# Patient Record
Sex: FEMALE | Race: WHITE | Employment: OTHER | URBAN - METROPOLITAN AREA
[De-identification: names, ages, dates, MRNs, and addresses within clinical notes are randomized per-mention and may not be internally consistent; named-entity substitution may affect disease eponyms.]

---

## 2021-03-19 ENCOUNTER — IMMUNIZATION (OUTPATIENT)
Dept: PRIMARY CARE CLINIC | Age: 81
End: 2021-03-19
Payer: MEDICARE

## 2021-03-19 PROCEDURE — 0001A COVID-19, PFIZER VACCINE 30MCG/0.3ML DOSE: CPT | Performed by: PHYSICIAN ASSISTANT

## 2021-03-19 PROCEDURE — 91300 COVID-19, PFIZER VACCINE 30MCG/0.3ML DOSE: CPT | Performed by: PHYSICIAN ASSISTANT

## 2021-04-14 ENCOUNTER — IMMUNIZATION (OUTPATIENT)
Dept: PRIMARY CARE CLINIC | Age: 81
End: 2021-04-14
Payer: MEDICARE

## 2021-04-14 PROCEDURE — 0002A COVID-19, PFIZER VACCINE 30MCG/0.3ML DOSE: CPT

## 2021-04-14 PROCEDURE — 91300 COVID-19, PFIZER VACCINE 30MCG/0.3ML DOSE: CPT

## 2022-03-09 ENCOUNTER — APPOINTMENT (OUTPATIENT)
Dept: GENERAL RADIOLOGY | Age: 82
End: 2022-03-09
Payer: MEDICARE

## 2022-03-09 ENCOUNTER — HOSPITAL ENCOUNTER (EMERGENCY)
Age: 82
Discharge: HOME | End: 2022-03-09
Attending: EMERGENCY MEDICINE
Payer: MEDICARE

## 2022-03-09 VITALS
OXYGEN SATURATION: 90 % | HEART RATE: 66 BPM | SYSTOLIC BLOOD PRESSURE: 136 MMHG | DIASTOLIC BLOOD PRESSURE: 64 MMHG | TEMPERATURE: 96.7 F | RESPIRATION RATE: 16 BRPM

## 2022-03-09 DIAGNOSIS — M72.2 PLANTAR FASCIITIS OF LEFT FOOT: ICD-10-CM

## 2022-03-09 DIAGNOSIS — M77.32 CALCANEAL SPUR OF FOOT, LEFT: Primary | ICD-10-CM

## 2022-03-09 PROCEDURE — 6370000000 HC RX 637 (ALT 250 FOR IP): Performed by: EMERGENCY MEDICINE

## 2022-03-09 PROCEDURE — 73630 X-RAY EXAM OF FOOT: CPT

## 2022-03-09 PROCEDURE — 99284 EMERGENCY DEPT VISIT MOD MDM: CPT

## 2022-03-09 PROCEDURE — 6370000000 HC RX 637 (ALT 250 FOR IP)

## 2022-03-09 RX ORDER — IBUPROFEN 800 MG/1
TABLET ORAL
Status: COMPLETED
Start: 2022-03-09 | End: 2022-03-09

## 2022-03-09 RX ORDER — IBUPROFEN 800 MG/1
800 TABLET ORAL ONCE
Status: COMPLETED | OUTPATIENT
Start: 2022-03-09 | End: 2022-03-09

## 2022-03-09 RX ORDER — HYDROCHLOROTHIAZIDE 25 MG/1
12.5 TABLET ORAL DAILY
COMMUNITY

## 2022-03-09 RX ADMIN — IBUPROFEN 800 MG: 800 TABLET, FILM COATED ORAL at 11:55

## 2022-03-09 ASSESSMENT — PAIN SCALES - GENERAL
PAINLEVEL_OUTOF10: 8
PAINLEVEL_OUTOF10: 8

## 2022-03-09 NOTE — ED PROVIDER NOTES
HPI:  3/9/22,   Time: 11:49 AM REI Theodore is a 80 y.o. female presenting to the ED for left heel pain, beginning 5 days ago. The complaint has been persistent, mild in severity, and worsened by nothing. Patient says the pain started out of nowhere 5 days ago. Says it's a sharp and stabbing sensation to the left heel. No radiation. Pain alleviating with propping her foot up. Exacerbated when walking. Denies numbness,tingling, or weakness. No known trauma or injury. ROS:   Pertinent positives and negatives are stated within HPI, all other systems reviewed and are negative.  --------------------------------------------- PAST HISTORY ---------------------------------------------  Past Medical History:  has no past medical history on file. Past Surgical History:  has a past surgical history that includes Tonsillectomy and Appendectomy. Social History:  reports that she has been smoking cigarettes. She has been smoking about 0.50 packs per day. She has never used smokeless tobacco. She reports current alcohol use. She reports that she does not use drugs. Family History: family history is not on file. The patients home medications have been reviewed. Allergies: Sulfa antibiotics    -------------------------------------------------- RESULTS -------------------------------------------------  All laboratory and radiology results have been personally reviewed by myself   LABS:  No results found for this visit on 03/09/22. RADIOLOGY:  Interpreted by Radiologist.  XR FOOT LEFT (MIN 3 VIEWS)   Final Result   Calcaneal enthesopathy.             ------------------------- NURSING NOTES AND VITALS REVIEWED ---------------------------   The nursing notes within the ED encounter and vital signs as below have been reviewed.    BP (!) 142/58   Pulse 74   Temp 96.7 °F (35.9 °C) (Temporal)   Resp 14   SpO2 90%   Oxygen Saturation Interpretation: Normal      ---------------------------------------------------PHYSICAL EXAM--------------------------------------      Constitutional/General: Alert and oriented x3, well appearing, non toxic in NAD  Head: NC/AT  Eyes: PERRL, EOMI  Mouth: Oropharynx clear, handling secretions, no trismus  Neck: Supple, full ROM, no meningeal signs  Pulmonary: Lungs clear to auscultation bilaterally, no wheezes, rales, or rhonchi. Not in respiratory distress  Cardiovascular:  Regular rate and rhythm, no murmurs, gallops, or rubs. 2+ distal pulses  Abdomen: Soft, non tender, non distended,   Extremities: Moves all extremities x 4. Warm and well perfused. Left heel tenderness to palpation. Left plantar midfoot tenderness to palpation. No erythema, warmth, signs of infection, signs of injury  Skin: warm and dry without rash  Neurologic: GCS 15, CN 2-12 intact. Psych: Normal Affect      ------------------------------ ED COURSE/MEDICAL DECISION MAKING----------------------  Medications   ibuprofen (ADVIL;MOTRIN) tablet 800 mg (800 mg Oral Given 3/9/22 1155)         Medical Decision Making:    X-ray obtained. No acute fracture or dislocation. There is a calcaneal spur noted. Soft tissues are unremarkable. Patient does have pain to the left heel and midfoot. Likely has plantar fasciitis. She was given Motrin while in the emergency department. I told her to take over-the-counter medication as needed for symptom relief, to follow the exercises in her discharge instructions, and to follow-up with her primary care physician for reevaluation this week. She is agreeable with plan of care. Counseling: The emergency provider has spoken with the patient and discussed todays results, in addition to providing specific details for the plan of care and counseling regarding the diagnosis and prognosis.   Questions are answered at this time and they are agreeable with the plan.      --------------------------------- IMPRESSION AND DISPOSITION ---------------------------------    IMPRESSION  1. Calcaneal spur of foot, left    2.  Plantar fasciitis of left foot        DISPOSITION  Disposition: Discharge to home  Patient condition is stable                  Anuradha Leyva MD  03/09/22 3614

## 2023-01-07 ENCOUNTER — HOSPITAL ENCOUNTER (INPATIENT)
Age: 83
LOS: 5 days | Discharge: OTHER INSTITUTION | DRG: 280 | End: 2023-01-13
Attending: EMERGENCY MEDICINE | Admitting: INTERNAL MEDICINE
Payer: MEDICARE

## 2023-01-07 ENCOUNTER — APPOINTMENT (OUTPATIENT)
Dept: GENERAL RADIOLOGY | Age: 83
DRG: 280 | End: 2023-01-07
Payer: MEDICARE

## 2023-01-07 DIAGNOSIS — I21.4 NSTEMI (NON-ST ELEVATED MYOCARDIAL INFARCTION) (HCC): Primary | ICD-10-CM

## 2023-01-07 DIAGNOSIS — J18.9 PNEUMONIA DUE TO INFECTIOUS ORGANISM, UNSPECIFIED LATERALITY, UNSPECIFIED PART OF LUNG: ICD-10-CM

## 2023-01-07 DIAGNOSIS — R77.8 ELEVATED TROPONIN: ICD-10-CM

## 2023-01-07 DIAGNOSIS — I48.91 ATRIAL FIBRILLATION, UNSPECIFIED TYPE (HCC): ICD-10-CM

## 2023-01-07 DIAGNOSIS — E86.0 DEHYDRATION: ICD-10-CM

## 2023-01-07 DIAGNOSIS — N17.9 AKI (ACUTE KIDNEY INJURY) (HCC): ICD-10-CM

## 2023-01-07 LAB
ACETAMINOPHEN LEVEL: <5 MCG/ML (ref 10–30)
ACETAMINOPHEN LEVEL: <5 MCG/ML (ref 10–30)
ALBUMIN SERPL-MCNC: 3.9 G/DL (ref 3.5–5.2)
ALBUMIN SERPL-MCNC: 3.9 G/DL (ref 3.5–5.2)
ALP BLD-CCNC: 116 U/L (ref 35–104)
ALP BLD-CCNC: 116 U/L (ref 35–104)
ALT SERPL-CCNC: 14 U/L (ref 0–32)
ALT SERPL-CCNC: 14 U/L (ref 0–32)
AMMONIA: 16 UMOL/L (ref 11–51)
AMMONIA: 16 UMOL/L (ref 11–51)
ANION GAP SERPL CALCULATED.3IONS-SCNC: 21 MMOL/L (ref 7–16)
ANION GAP SERPL CALCULATED.3IONS-SCNC: 21 MMOL/L (ref 7–16)
AST SERPL-CCNC: 31 U/L (ref 0–31)
AST SERPL-CCNC: 31 U/L (ref 0–31)
BASOPHILS ABSOLUTE: 0 E9/L (ref 0–0.2)
BASOPHILS ABSOLUTE: 0 E9/L (ref 0–0.2)
BASOPHILS RELATIVE PERCENT: 0.3 % (ref 0–2)
BASOPHILS RELATIVE PERCENT: 0.3 % (ref 0–2)
BILIRUB SERPL-MCNC: 1.1 MG/DL (ref 0–1.2)
BILIRUB SERPL-MCNC: 1.1 MG/DL (ref 0–1.2)
BUN BLDV-MCNC: 41 MG/DL (ref 6–23)
BUN BLDV-MCNC: 41 MG/DL (ref 6–23)
CALCIUM SERPL-MCNC: 9.3 MG/DL (ref 8.6–10.2)
CALCIUM SERPL-MCNC: 9.3 MG/DL (ref 8.6–10.2)
CHLORIDE BLD-SCNC: 101 MMOL/L (ref 98–107)
CHLORIDE BLD-SCNC: 101 MMOL/L (ref 98–107)
CO2: 18 MMOL/L (ref 22–29)
CO2: 18 MMOL/L (ref 22–29)
CREAT SERPL-MCNC: 2.3 MG/DL (ref 0.5–1)
CREAT SERPL-MCNC: 2.3 MG/DL (ref 0.5–1)
DOHLE BODIES: ABNORMAL
DOHLE BODIES: ABNORMAL
EOSINOPHILS ABSOLUTE: 0 E9/L (ref 0.05–0.5)
EOSINOPHILS ABSOLUTE: 0 E9/L (ref 0.05–0.5)
EOSINOPHILS RELATIVE PERCENT: 0.3 % (ref 0–6)
EOSINOPHILS RELATIVE PERCENT: 0.3 % (ref 0–6)
ETHANOL: <10 MG/DL (ref 0–0.08)
ETHANOL: <10 MG/DL (ref 0–0.08)
GFR SERPL CREATININE-BSD FRML MDRD: 21 ML/MIN/1.73
GFR SERPL CREATININE-BSD FRML MDRD: 21 ML/MIN/1.73
GFR SERPL CREATININE-BSD FRML MDRD: 23 ML/MIN/1.73
GFR SERPL CREATININE-BSD FRML MDRD: 23 ML/MIN/1.73
GLUCOSE BLD-MCNC: 123 MG/DL (ref 74–99)
GLUCOSE BLD-MCNC: 123 MG/DL (ref 74–99)
GLUCOSE BLD-MCNC: 125 MG/DL (ref 74–99)
GLUCOSE BLD-MCNC: 125 MG/DL (ref 74–99)
HCT VFR BLD CALC: 35.4 % (ref 34–48)
HCT VFR BLD CALC: 35.4 % (ref 34–48)
HEMOGLOBIN: 12.1 G/DL (ref 11.5–15.5)
HEMOGLOBIN: 12.1 G/DL (ref 11.5–15.5)
INFLUENZA A BY PCR: NOT DETECTED
INFLUENZA A BY PCR: NOT DETECTED
INFLUENZA B BY PCR: NOT DETECTED
INFLUENZA B BY PCR: NOT DETECTED
INR BLD: 1.2
INR BLD: 1.2
LACTIC ACID: 2.1 MMOL/L (ref 0.5–2.2)
LACTIC ACID: 2.1 MMOL/L (ref 0.5–2.2)
LYMPHOCYTES ABSOLUTE: 0.67 E9/L (ref 1.5–4)
LYMPHOCYTES ABSOLUTE: 0.67 E9/L (ref 1.5–4)
LYMPHOCYTES RELATIVE PERCENT: 6.1 % (ref 20–42)
LYMPHOCYTES RELATIVE PERCENT: 6.1 % (ref 20–42)
MCH RBC QN AUTO: 31.4 PG (ref 26–35)
MCH RBC QN AUTO: 31.4 PG (ref 26–35)
MCHC RBC AUTO-ENTMCNC: 34.2 % (ref 32–34.5)
MCHC RBC AUTO-ENTMCNC: 34.2 % (ref 32–34.5)
MCV RBC AUTO: 91.9 FL (ref 80–99.9)
MCV RBC AUTO: 91.9 FL (ref 80–99.9)
METAMYELOCYTES RELATIVE PERCENT: 0.9 % (ref 0–1)
METAMYELOCYTES RELATIVE PERCENT: 0.9 % (ref 0–1)
MONOCYTES ABSOLUTE: 1.12 E9/L (ref 0.1–0.95)
MONOCYTES ABSOLUTE: 1.12 E9/L (ref 0.1–0.95)
MONOCYTES RELATIVE PERCENT: 9.5 % (ref 2–12)
MONOCYTES RELATIVE PERCENT: 9.5 % (ref 2–12)
NEUTROPHILS ABSOLUTE: 9.41 E9/L (ref 1.8–7.3)
NEUTROPHILS ABSOLUTE: 9.41 E9/L (ref 1.8–7.3)
NEUTROPHILS RELATIVE PERCENT: 82.6 % (ref 43–80)
NEUTROPHILS RELATIVE PERCENT: 82.6 % (ref 43–80)
OVALOCYTES: ABNORMAL
OVALOCYTES: ABNORMAL
PDW BLD-RTO: 13.7 FL (ref 11.5–15)
PDW BLD-RTO: 13.7 FL (ref 11.5–15)
PERFORMED ON: ABNORMAL
PERFORMED ON: ABNORMAL
PLATELET # BLD: 351 E9/L (ref 130–450)
PLATELET # BLD: 351 E9/L (ref 130–450)
PMV BLD AUTO: 8.9 FL (ref 7–12)
PMV BLD AUTO: 8.9 FL (ref 7–12)
POC CHLORIDE: 107 MMOL/L (ref 100–108)
POC CHLORIDE: 107 MMOL/L (ref 100–108)
POC CREATININE: 2.1 MG/DL (ref 0.5–1)
POC CREATININE: 2.1 MG/DL (ref 0.5–1)
POC POTASSIUM: 3.8 MMOL/L (ref 3.5–5)
POC POTASSIUM: 3.8 MMOL/L (ref 3.5–5)
POC SODIUM: 138 MMOL/L (ref 132–146)
POC SODIUM: 138 MMOL/L (ref 132–146)
POIKILOCYTES: ABNORMAL
POIKILOCYTES: ABNORMAL
POLYCHROMASIA: ABNORMAL
POLYCHROMASIA: ABNORMAL
POTASSIUM SERPL-SCNC: 3.8 MMOL/L (ref 3.5–5)
POTASSIUM SERPL-SCNC: 3.8 MMOL/L (ref 3.5–5)
PRO-BNP: 1608 PG/ML (ref 0–450)
PRO-BNP: 1608 PG/ML (ref 0–450)
PROMYELOCYTES PERCENT: 0.9 % (ref 0–0)
PROMYELOCYTES PERCENT: 0.9 % (ref 0–0)
PROTHROMBIN TIME: 12.8 SEC (ref 9.3–12.4)
PROTHROMBIN TIME: 12.8 SEC (ref 9.3–12.4)
RBC # BLD: 3.85 E12/L (ref 3.5–5.5)
RBC # BLD: 3.85 E12/L (ref 3.5–5.5)
REASON FOR REJECTION: NORMAL
REASON FOR REJECTION: NORMAL
REJECTED TEST: NORMAL
REJECTED TEST: NORMAL
SALICYLATE, SERUM: <0.3 MG/DL (ref 0–30)
SALICYLATE, SERUM: <0.3 MG/DL (ref 0–30)
SARS-COV-2, NAAT: NOT DETECTED
SARS-COV-2, NAAT: NOT DETECTED
SODIUM BLD-SCNC: 140 MMOL/L (ref 132–146)
SODIUM BLD-SCNC: 140 MMOL/L (ref 132–146)
TOTAL PROTEIN: 7.3 G/DL (ref 6.4–8.3)
TOTAL PROTEIN: 7.3 G/DL (ref 6.4–8.3)
TRICYCLIC ANTIDEPRESSANTS SCREEN SERUM: NEGATIVE NG/ML
TRICYCLIC ANTIDEPRESSANTS SCREEN SERUM: NEGATIVE NG/ML
TROPONIN, HIGH SENSITIVITY: 115 NG/L (ref 0–9)
TROPONIN, HIGH SENSITIVITY: 115 NG/L (ref 0–9)
VACUOLATED NEUTROPHILS: ABNORMAL
VACUOLATED NEUTROPHILS: ABNORMAL
WBC # BLD: 11.2 E9/L (ref 4.5–11.5)
WBC # BLD: 11.2 E9/L (ref 4.5–11.5)

## 2023-01-07 PROCEDURE — 6370000000 HC RX 637 (ALT 250 FOR IP)

## 2023-01-07 PROCEDURE — 80179 DRUG ASSAY SALICYLATE: CPT

## 2023-01-07 PROCEDURE — 96360 HYDRATION IV INFUSION INIT: CPT

## 2023-01-07 PROCEDURE — 96374 THER/PROPH/DIAG INJ IV PUSH: CPT

## 2023-01-07 PROCEDURE — 93005 ELECTROCARDIOGRAM TRACING: CPT

## 2023-01-07 PROCEDURE — 83880 ASSAY OF NATRIURETIC PEPTIDE: CPT

## 2023-01-07 PROCEDURE — 80307 DRUG TEST PRSMV CHEM ANLYZR: CPT

## 2023-01-07 PROCEDURE — 84132 ASSAY OF SERUM POTASSIUM: CPT

## 2023-01-07 PROCEDURE — 87040 BLOOD CULTURE FOR BACTERIA: CPT

## 2023-01-07 PROCEDURE — 84295 ASSAY OF SERUM SODIUM: CPT

## 2023-01-07 PROCEDURE — 82435 ASSAY OF BLOOD CHLORIDE: CPT

## 2023-01-07 PROCEDURE — 99285 EMERGENCY DEPT VISIT HI MDM: CPT

## 2023-01-07 PROCEDURE — 82077 ASSAY SPEC XCP UR&BREATH IA: CPT

## 2023-01-07 PROCEDURE — 6360000002 HC RX W HCPCS

## 2023-01-07 PROCEDURE — 71045 X-RAY EXAM CHEST 1 VIEW: CPT

## 2023-01-07 PROCEDURE — 85610 PROTHROMBIN TIME: CPT

## 2023-01-07 PROCEDURE — 84484 ASSAY OF TROPONIN QUANT: CPT

## 2023-01-07 PROCEDURE — 71045 X-RAY EXAM CHEST 1 VIEW: CPT | Performed by: RADIOLOGY

## 2023-01-07 PROCEDURE — 87635 SARS-COV-2 COVID-19 AMP PRB: CPT

## 2023-01-07 PROCEDURE — 82947 ASSAY GLUCOSE BLOOD QUANT: CPT

## 2023-01-07 PROCEDURE — 2580000003 HC RX 258

## 2023-01-07 PROCEDURE — 82140 ASSAY OF AMMONIA: CPT

## 2023-01-07 PROCEDURE — 87502 INFLUENZA DNA AMP PROBE: CPT

## 2023-01-07 PROCEDURE — 82565 ASSAY OF CREATININE: CPT

## 2023-01-07 PROCEDURE — 83605 ASSAY OF LACTIC ACID: CPT

## 2023-01-07 PROCEDURE — 96361 HYDRATE IV INFUSION ADD-ON: CPT

## 2023-01-07 PROCEDURE — 85025 COMPLETE CBC W/AUTO DIFF WBC: CPT

## 2023-01-07 PROCEDURE — 80143 DRUG ASSAY ACETAMINOPHEN: CPT

## 2023-01-07 PROCEDURE — 80053 COMPREHEN METABOLIC PANEL: CPT

## 2023-01-07 RX ORDER — DOXYCYCLINE HYCLATE 100 MG/1
100 CAPSULE ORAL ONCE
Status: COMPLETED | OUTPATIENT
Start: 2023-01-07 | End: 2023-01-07

## 2023-01-07 RX ORDER — HYDROCHLOROTHIAZIDE 12.5 MG/1
12.5 CAPSULE, GELATIN COATED ORAL 2 TIMES DAILY
Status: ON HOLD | COMMUNITY
End: 2023-01-13 | Stop reason: HOSPADM

## 2023-01-07 RX ORDER — 0.9 % SODIUM CHLORIDE 0.9 %
1000 INTRAVENOUS SOLUTION INTRAVENOUS ONCE
Status: COMPLETED | OUTPATIENT
Start: 2023-01-07 | End: 2023-01-08

## 2023-01-07 RX ORDER — MIRTAZAPINE 15 MG/1
15 TABLET, FILM COATED ORAL NIGHTLY
Status: ON HOLD | COMMUNITY
End: 2023-01-13 | Stop reason: HOSPADM

## 2023-01-07 RX ORDER — AMLODIPINE BESYLATE 5 MG/1
5 TABLET ORAL DAILY
Status: ON HOLD | COMMUNITY
End: 2023-01-13 | Stop reason: HOSPADM

## 2023-01-07 RX ADMIN — CEFTRIAXONE 2000 MG: 2 INJECTION, POWDER, FOR SOLUTION INTRAMUSCULAR; INTRAVENOUS at 23:07

## 2023-01-07 RX ADMIN — SODIUM CHLORIDE 1000 ML: 9 INJECTION, SOLUTION INTRAVENOUS at 20:40

## 2023-01-07 RX ADMIN — SODIUM CHLORIDE 1000 ML: 9 INJECTION, SOLUTION INTRAVENOUS at 21:30

## 2023-01-07 RX ADMIN — DOXYCYCLINE HYCLATE 100 MG: 100 CAPSULE ORAL at 23:01

## 2023-01-07 ASSESSMENT — ENCOUNTER SYMPTOMS
CHEST TIGHTNESS: 0
BACK PAIN: 0
EYE ITCHING: 0
APNEA: 0
ABDOMINAL PAIN: 0
EYE DISCHARGE: 0
ABDOMINAL DISTENTION: 0

## 2023-01-07 NOTE — Clinical Note
Discharge Plan[de-identified] Other/[facility])   Telemetry/Cardiac Monitoring Required?: Yes

## 2023-01-07 NOTE — LETTER
[address] Medicaid  CERTIFICATION OF NECESSITY  FOR TRANSPORTATION   BY WHEELCHAIR VAN     Individual Information   1. Name: Gary Toure 2. [place] Medicaid Billing Number: self pay    3. Address: [place] [address]  [address]      Transportation Provider Information   4. Provider Name:     5. [place] Medicaid Provider Number:   National Provider Identifier (NPI):       Certification  7. Criteria:  By signing this document, the practitioner certifies that two statements are true:  A. This individual must be accompanied by a mobility-related assistive device from the point of pick-up to the point of drop-off. B. Transport of this individual by standard passenger vehicle or common carrier is precluded or contraindicated. 8. Period Beginning Date:    9. Length  [x] Not more than 1 day(s)  [] One Year     Additional Information Relevant to Certification   Weakness      Certifying Practitioner Information   11. Name of Practitioner: Amarjit Butt M.D.    12. [facility] Provider Number, If Applicable:   [address] Provider Identifier (NPI):       Signature Information   14. Date of Signature:   15. Name of Person Signing: Casandra Sindhu    12. Signature and Professional Designation:      [facility] [id number]  Rev. 2015  #  [id number]        Gary Toure : 1940 (82 yrs)    Address: [address]  Sex: Female   City: [place] [address]         Marital Status:    Employer: NOT EMPLOYED         Rastafarian: None   Primary Care Provider:           Primary Phone: [phone]   EMERGENCY CONTACT   Contact Name Legal Guardian?  Relationship to Patient Home Phone Work Phone   1. fred andrade  2. fernando small No  No Child  Child                   GUARANTOR            Guarantor: Gary Mesfin     : 1940   [address]  Sex: Female     [place] [address]     Relation to Patient: Self       Home Phone: [phone]   Guarantor ID: [id number]       Work Phone:     Guarantor Employer: NOT EMPLOYED         Status: NOT EMPLO*      COVERAGE        PRIMARY INSURANCE   Payor: [facility] MEDICARE Plan: DINORA DOZIER*   Payor Address: [facility] [id number] [place] [address]       Group Number: [address] Type: INDEMNITY   Subscriber Name: Ted Contreras : 1940   Subscriber ID: [id number] Deo Webber.  Rel. to Sub: Self

## 2023-01-08 ENCOUNTER — APPOINTMENT (OUTPATIENT)
Dept: CT IMAGING | Age: 83
DRG: 280 | End: 2023-01-08
Payer: MEDICARE

## 2023-01-08 ENCOUNTER — APPOINTMENT (OUTPATIENT)
Dept: ULTRASOUND IMAGING | Age: 83
DRG: 280 | End: 2023-01-08
Payer: MEDICARE

## 2023-01-08 PROBLEM — I21.4 NSTEMI (NON-ST ELEVATED MYOCARDIAL INFARCTION) (HCC): Status: ACTIVE | Noted: 2023-01-08

## 2023-01-08 PROBLEM — R41.82 AMS (ALTERED MENTAL STATUS): Status: ACTIVE | Noted: 2023-01-08

## 2023-01-08 LAB
ADENOVIRUS BY PCR: NOT DETECTED
ADENOVIRUS BY PCR: NOT DETECTED
AMORPHOUS: ABNORMAL
AMORPHOUS: ABNORMAL
AMPHETAMINE SCREEN, URINE: NOT DETECTED
AMPHETAMINE SCREEN, URINE: NOT DETECTED
ANION GAP SERPL CALCULATED.3IONS-SCNC: 17 MMOL/L (ref 7–16)
ANION GAP SERPL CALCULATED.3IONS-SCNC: 17 MMOL/L (ref 7–16)
APTT: 33.7 SEC (ref 24.5–35.1)
APTT: 33.7 SEC (ref 24.5–35.1)
APTT: 40.6 SEC (ref 24.5–35.1)
APTT: 40.6 SEC (ref 24.5–35.1)
APTT: 48 SEC (ref 24.5–35.1)
APTT: 48 SEC (ref 24.5–35.1)
APTT: 63.1 SEC (ref 24.5–35.1)
APTT: 63.1 SEC (ref 24.5–35.1)
BACTERIA: ABNORMAL /HPF
BACTERIA: ABNORMAL /HPF
BARBITURATE SCREEN URINE: NOT DETECTED
BARBITURATE SCREEN URINE: NOT DETECTED
BENZODIAZEPINE SCREEN, URINE: NOT DETECTED
BENZODIAZEPINE SCREEN, URINE: NOT DETECTED
BILIRUBIN URINE: ABNORMAL
BILIRUBIN URINE: ABNORMAL
BLOOD, URINE: ABNORMAL
BLOOD, URINE: ABNORMAL
BORDETELLA PARAPERTUSSIS BY PCR: NOT DETECTED
BORDETELLA PARAPERTUSSIS BY PCR: NOT DETECTED
BORDETELLA PERTUSSIS BY PCR: NOT DETECTED
BORDETELLA PERTUSSIS BY PCR: NOT DETECTED
BUN BLDV-MCNC: 48 MG/DL (ref 6–23)
BUN BLDV-MCNC: 48 MG/DL (ref 6–23)
CALCIUM SERPL-MCNC: 7.9 MG/DL (ref 8.6–10.2)
CALCIUM SERPL-MCNC: 7.9 MG/DL (ref 8.6–10.2)
CANNABINOID SCREEN URINE: NOT DETECTED
CANNABINOID SCREEN URINE: NOT DETECTED
CHLAMYDOPHILIA PNEUMONIAE BY PCR: NOT DETECTED
CHLAMYDOPHILIA PNEUMONIAE BY PCR: NOT DETECTED
CHLORIDE BLD-SCNC: 108 MMOL/L (ref 98–107)
CHLORIDE BLD-SCNC: 108 MMOL/L (ref 98–107)
CHOLESTEROL, TOTAL: 155 MG/DL (ref 0–199)
CHOLESTEROL, TOTAL: 155 MG/DL (ref 0–199)
CLARITY: CLEAR
CLARITY: CLEAR
CO2: 19 MMOL/L (ref 22–29)
CO2: 19 MMOL/L (ref 22–29)
COARSE CASTS, UA: ABNORMAL /LPF (ref 0–2)
COARSE CASTS, UA: ABNORMAL /LPF (ref 0–2)
COCAINE METABOLITE SCREEN URINE: NOT DETECTED
COCAINE METABOLITE SCREEN URINE: NOT DETECTED
COLOR: YELLOW
COLOR: YELLOW
CORONAVIRUS 229E BY PCR: NOT DETECTED
CORONAVIRUS 229E BY PCR: NOT DETECTED
CORONAVIRUS HKU1 BY PCR: NOT DETECTED
CORONAVIRUS HKU1 BY PCR: NOT DETECTED
CORONAVIRUS NL63 BY PCR: NOT DETECTED
CORONAVIRUS NL63 BY PCR: NOT DETECTED
CORONAVIRUS OC43 BY PCR: NOT DETECTED
CORONAVIRUS OC43 BY PCR: NOT DETECTED
CREAT SERPL-MCNC: 1.8 MG/DL (ref 0.5–1)
CREAT SERPL-MCNC: 1.8 MG/DL (ref 0.5–1)
EPITHELIAL CELLS, UA: ABNORMAL /HPF
EPITHELIAL CELLS, UA: ABNORMAL /HPF
FENTANYL SCREEN, URINE: NOT DETECTED
FENTANYL SCREEN, URINE: NOT DETECTED
GFR SERPL CREATININE-BSD FRML MDRD: 28 ML/MIN/1.73
GFR SERPL CREATININE-BSD FRML MDRD: 28 ML/MIN/1.73
GLUCOSE BLD-MCNC: 88 MG/DL (ref 74–99)
GLUCOSE BLD-MCNC: 88 MG/DL (ref 74–99)
GLUCOSE URINE: NEGATIVE MG/DL
GLUCOSE URINE: NEGATIVE MG/DL
HBA1C MFR BLD: 5.8 % (ref 4–5.6)
HBA1C MFR BLD: 5.8 % (ref 4–5.6)
HDLC SERPL-MCNC: 58 MG/DL
HDLC SERPL-MCNC: 58 MG/DL
HUMAN METAPNEUMOVIRUS BY PCR: NOT DETECTED
HUMAN METAPNEUMOVIRUS BY PCR: NOT DETECTED
HUMAN RHINOVIRUS/ENTEROVIRUS BY PCR: NOT DETECTED
HUMAN RHINOVIRUS/ENTEROVIRUS BY PCR: NOT DETECTED
HYALINE CASTS: ABNORMAL /LPF (ref 0–2)
HYALINE CASTS: ABNORMAL /LPF (ref 0–2)
INFLUENZA A BY PCR: NOT DETECTED
INFLUENZA A BY PCR: NOT DETECTED
INFLUENZA B BY PCR: NOT DETECTED
INFLUENZA B BY PCR: NOT DETECTED
KETONES, URINE: NEGATIVE MG/DL
KETONES, URINE: NEGATIVE MG/DL
LDL CHOLESTEROL CALCULATED: 79 MG/DL (ref 0–99)
LDL CHOLESTEROL CALCULATED: 79 MG/DL (ref 0–99)
LEUKOCYTE ESTERASE, URINE: NEGATIVE
LEUKOCYTE ESTERASE, URINE: NEGATIVE
Lab: NORMAL
Lab: NORMAL
MAGNESIUM: 1.9 MG/DL (ref 1.6–2.6)
MAGNESIUM: 1.9 MG/DL (ref 1.6–2.6)
METHADONE SCREEN, URINE: NOT DETECTED
METHADONE SCREEN, URINE: NOT DETECTED
MUCUS: PRESENT /LPF
MUCUS: PRESENT /LPF
MYCOPLASMA PNEUMONIAE BY PCR: NOT DETECTED
MYCOPLASMA PNEUMONIAE BY PCR: NOT DETECTED
NITRITE, URINE: NEGATIVE
NITRITE, URINE: NEGATIVE
OPIATE SCREEN URINE: NOT DETECTED
OPIATE SCREEN URINE: NOT DETECTED
OXYCODONE URINE: NOT DETECTED
OXYCODONE URINE: NOT DETECTED
PARAINFLUENZA VIRUS 1 BY PCR: NOT DETECTED
PARAINFLUENZA VIRUS 1 BY PCR: NOT DETECTED
PARAINFLUENZA VIRUS 2 BY PCR: NOT DETECTED
PARAINFLUENZA VIRUS 2 BY PCR: NOT DETECTED
PARAINFLUENZA VIRUS 3 BY PCR: NOT DETECTED
PARAINFLUENZA VIRUS 3 BY PCR: NOT DETECTED
PARAINFLUENZA VIRUS 4 BY PCR: NOT DETECTED
PARAINFLUENZA VIRUS 4 BY PCR: NOT DETECTED
PH UA: 5.5 (ref 5–9)
PH UA: 5.5 (ref 5–9)
PHENCYCLIDINE SCREEN URINE: NOT DETECTED
PHENCYCLIDINE SCREEN URINE: NOT DETECTED
PHOSPHORUS: 4.3 MG/DL (ref 2.5–4.5)
PHOSPHORUS: 4.3 MG/DL (ref 2.5–4.5)
POTASSIUM SERPL-SCNC: 3.8 MMOL/L (ref 3.5–5)
POTASSIUM SERPL-SCNC: 3.8 MMOL/L (ref 3.5–5)
PROCALCITONIN: 3.65 NG/ML (ref 0–0.08)
PROCALCITONIN: 3.65 NG/ML (ref 0–0.08)
PROTEIN UA: 30 MG/DL
PROTEIN UA: 30 MG/DL
RBC UA: ABNORMAL /HPF (ref 0–2)
RBC UA: ABNORMAL /HPF (ref 0–2)
RESPIRATORY SYNCYTIAL VIRUS BY PCR: NOT DETECTED
RESPIRATORY SYNCYTIAL VIRUS BY PCR: NOT DETECTED
SARS-COV-2, PCR: NOT DETECTED
SARS-COV-2, PCR: NOT DETECTED
SODIUM BLD-SCNC: 144 MMOL/L (ref 132–146)
SODIUM BLD-SCNC: 144 MMOL/L (ref 132–146)
SPECIFIC GRAVITY UA: >=1.03 (ref 1–1.03)
SPECIFIC GRAVITY UA: >=1.03 (ref 1–1.03)
T4 FREE: 1.04 NG/DL (ref 0.93–1.7)
T4 FREE: 1.04 NG/DL (ref 0.93–1.7)
TOTAL CK: 594 U/L (ref 20–180)
TOTAL CK: 594 U/L (ref 20–180)
TRIGL SERPL-MCNC: 89 MG/DL (ref 0–149)
TRIGL SERPL-MCNC: 89 MG/DL (ref 0–149)
TROPONIN, HIGH SENSITIVITY: 241 NG/L (ref 0–9)
TROPONIN, HIGH SENSITIVITY: 241 NG/L (ref 0–9)
TROPONIN, HIGH SENSITIVITY: 244 NG/L (ref 0–9)
TROPONIN, HIGH SENSITIVITY: 244 NG/L (ref 0–9)
TROPONIN, HIGH SENSITIVITY: 261 NG/L (ref 0–9)
TROPONIN, HIGH SENSITIVITY: 261 NG/L (ref 0–9)
TROPONIN, HIGH SENSITIVITY: 264 NG/L (ref 0–9)
TROPONIN, HIGH SENSITIVITY: 264 NG/L (ref 0–9)
TSH SERPL DL<=0.05 MIU/L-ACNC: 2.18 UIU/ML (ref 0.27–4.2)
TSH SERPL DL<=0.05 MIU/L-ACNC: 2.18 UIU/ML (ref 0.27–4.2)
UROBILINOGEN, URINE: 0.2 E.U./DL
UROBILINOGEN, URINE: 0.2 E.U./DL
VLDLC SERPL CALC-MCNC: 18 MG/DL
VLDLC SERPL CALC-MCNC: 18 MG/DL
WBC UA: ABNORMAL /HPF (ref 0–5)
WBC UA: ABNORMAL /HPF (ref 0–5)

## 2023-01-08 PROCEDURE — 0202U NFCT DS 22 TRGT SARS-COV-2: CPT

## 2023-01-08 PROCEDURE — 84439 ASSAY OF FREE THYROXINE: CPT

## 2023-01-08 PROCEDURE — 80061 LIPID PANEL: CPT

## 2023-01-08 PROCEDURE — APPSS180 APP SPLIT SHARED TIME > 60 MINUTES: Performed by: NURSE PRACTITIONER

## 2023-01-08 PROCEDURE — 36415 COLL VENOUS BLD VENIPUNCTURE: CPT

## 2023-01-08 PROCEDURE — 93005 ELECTROCARDIOGRAM TRACING: CPT | Performed by: INTERNAL MEDICINE

## 2023-01-08 PROCEDURE — 84145 PROCALCITONIN (PCT): CPT

## 2023-01-08 PROCEDURE — 80048 BASIC METABOLIC PNL TOTAL CA: CPT

## 2023-01-08 PROCEDURE — 82550 ASSAY OF CK (CPK): CPT

## 2023-01-08 PROCEDURE — 6370000000 HC RX 637 (ALT 250 FOR IP): Performed by: INTERNAL MEDICINE

## 2023-01-08 PROCEDURE — 83735 ASSAY OF MAGNESIUM: CPT

## 2023-01-08 PROCEDURE — 2580000003 HC RX 258: Performed by: INTERNAL MEDICINE

## 2023-01-08 PROCEDURE — 76770 US EXAM ABDO BACK WALL COMP: CPT

## 2023-01-08 PROCEDURE — 83036 HEMOGLOBIN GLYCOSYLATED A1C: CPT

## 2023-01-08 PROCEDURE — 81001 URINALYSIS AUTO W/SCOPE: CPT

## 2023-01-08 PROCEDURE — 99223 1ST HOSP IP/OBS HIGH 75: CPT | Performed by: INTERNAL MEDICINE

## 2023-01-08 PROCEDURE — 6360000002 HC RX W HCPCS: Performed by: INTERNAL MEDICINE

## 2023-01-08 PROCEDURE — 70450 CT HEAD/BRAIN W/O DYE: CPT

## 2023-01-08 PROCEDURE — 84100 ASSAY OF PHOSPHORUS: CPT

## 2023-01-08 PROCEDURE — 80307 DRUG TEST PRSMV CHEM ANLYZR: CPT

## 2023-01-08 PROCEDURE — 87088 URINE BACTERIA CULTURE: CPT

## 2023-01-08 PROCEDURE — 6360000002 HC RX W HCPCS

## 2023-01-08 PROCEDURE — 84443 ASSAY THYROID STIM HORMONE: CPT

## 2023-01-08 PROCEDURE — 2060000000 HC ICU INTERMEDIATE R&B

## 2023-01-08 PROCEDURE — 2700000000 HC OXYGEN THERAPY PER DAY

## 2023-01-08 PROCEDURE — 84484 ASSAY OF TROPONIN QUANT: CPT

## 2023-01-08 PROCEDURE — 6370000000 HC RX 637 (ALT 250 FOR IP): Performed by: NURSE PRACTITIONER

## 2023-01-08 PROCEDURE — 85730 THROMBOPLASTIN TIME PARTIAL: CPT

## 2023-01-08 RX ORDER — HEPARIN SODIUM 1000 [USP'U]/ML
30 INJECTION, SOLUTION INTRAVENOUS; SUBCUTANEOUS PRN
Status: DISCONTINUED | OUTPATIENT
Start: 2023-01-08 | End: 2023-01-10

## 2023-01-08 RX ORDER — SODIUM CHLORIDE 0.9 % (FLUSH) 0.9 %
10 SYRINGE (ML) INJECTION EVERY 12 HOURS SCHEDULED
Status: DISCONTINUED | OUTPATIENT
Start: 2023-01-08 | End: 2023-01-13 | Stop reason: HOSPADM

## 2023-01-08 RX ORDER — ACETAMINOPHEN 325 MG/1
650 TABLET ORAL EVERY 6 HOURS PRN
Status: DISCONTINUED | OUTPATIENT
Start: 2023-01-08 | End: 2023-01-13 | Stop reason: HOSPADM

## 2023-01-08 RX ORDER — ACETAMINOPHEN 650 MG/1
650 SUPPOSITORY RECTAL EVERY 6 HOURS PRN
Status: DISCONTINUED | OUTPATIENT
Start: 2023-01-08 | End: 2023-01-13 | Stop reason: HOSPADM

## 2023-01-08 RX ORDER — METOPROLOL SUCCINATE 25 MG/1
12.5 TABLET, EXTENDED RELEASE ORAL 2 TIMES DAILY
Status: DISCONTINUED | OUTPATIENT
Start: 2023-01-08 | End: 2023-01-13

## 2023-01-08 RX ORDER — ONDANSETRON 2 MG/ML
4 INJECTION INTRAMUSCULAR; INTRAVENOUS EVERY 6 HOURS PRN
Status: DISCONTINUED | OUTPATIENT
Start: 2023-01-08 | End: 2023-01-13 | Stop reason: HOSPADM

## 2023-01-08 RX ORDER — SODIUM CHLORIDE 9 MG/ML
INJECTION, SOLUTION INTRAVENOUS PRN
Status: DISCONTINUED | OUTPATIENT
Start: 2023-01-08 | End: 2023-01-13 | Stop reason: HOSPADM

## 2023-01-08 RX ORDER — SODIUM CHLORIDE 9 MG/ML
INJECTION, SOLUTION INTRAVENOUS CONTINUOUS
Status: DISCONTINUED | OUTPATIENT
Start: 2023-01-08 | End: 2023-01-08

## 2023-01-08 RX ORDER — ASPIRIN 81 MG/1
81 TABLET ORAL DAILY
Status: DISCONTINUED | OUTPATIENT
Start: 2023-01-08 | End: 2023-01-13 | Stop reason: HOSPADM

## 2023-01-08 RX ORDER — HEPARIN SODIUM 10000 [USP'U]/100ML
5-30 INJECTION, SOLUTION INTRAVENOUS CONTINUOUS
Status: DISCONTINUED | OUTPATIENT
Start: 2023-01-08 | End: 2023-01-10

## 2023-01-08 RX ORDER — HEPARIN SODIUM 1000 [USP'U]/ML
60 INJECTION, SOLUTION INTRAVENOUS; SUBCUTANEOUS ONCE
Status: COMPLETED | OUTPATIENT
Start: 2023-01-08 | End: 2023-01-08

## 2023-01-08 RX ORDER — ATORVASTATIN CALCIUM 10 MG/1
20 TABLET, FILM COATED ORAL NIGHTLY
Status: DISCONTINUED | OUTPATIENT
Start: 2023-01-08 | End: 2023-01-13 | Stop reason: HOSPADM

## 2023-01-08 RX ORDER — SODIUM CHLORIDE 0.9 % (FLUSH) 0.9 %
10 SYRINGE (ML) INJECTION PRN
Status: DISCONTINUED | OUTPATIENT
Start: 2023-01-08 | End: 2023-01-13 | Stop reason: HOSPADM

## 2023-01-08 RX ORDER — DOXYCYCLINE HYCLATE 100 MG/1
100 CAPSULE ORAL EVERY 12 HOURS SCHEDULED
Status: DISCONTINUED | OUTPATIENT
Start: 2023-01-08 | End: 2023-01-13 | Stop reason: HOSPADM

## 2023-01-08 RX ORDER — ONDANSETRON 4 MG/1
4 TABLET, ORALLY DISINTEGRATING ORAL EVERY 8 HOURS PRN
Status: DISCONTINUED | OUTPATIENT
Start: 2023-01-08 | End: 2023-01-13 | Stop reason: HOSPADM

## 2023-01-08 RX ORDER — DEXTROSE, SODIUM CHLORIDE, SODIUM LACTATE, POTASSIUM CHLORIDE, AND CALCIUM CHLORIDE 5; .6; .31; .03; .02 G/100ML; G/100ML; G/100ML; G/100ML; G/100ML
INJECTION, SOLUTION INTRAVENOUS CONTINUOUS
Status: DISCONTINUED | OUTPATIENT
Start: 2023-01-08 | End: 2023-01-10

## 2023-01-08 RX ORDER — HEPARIN SODIUM 1000 [USP'U]/ML
60 INJECTION, SOLUTION INTRAVENOUS; SUBCUTANEOUS PRN
Status: DISCONTINUED | OUTPATIENT
Start: 2023-01-08 | End: 2023-01-10

## 2023-01-08 RX ADMIN — HEPARIN SODIUM 3540 UNITS: 1000 INJECTION INTRAVENOUS; SUBCUTANEOUS at 01:32

## 2023-01-08 RX ADMIN — SODIUM CHLORIDE, PRESERVATIVE FREE 10 ML: 5 INJECTION INTRAVENOUS at 23:25

## 2023-01-08 RX ADMIN — HEPARIN SODIUM 1770 UNITS: 1000 INJECTION INTRAVENOUS; SUBCUTANEOUS at 15:58

## 2023-01-08 RX ADMIN — CEFTRIAXONE 1000 MG: 1 INJECTION, POWDER, FOR SOLUTION INTRAMUSCULAR; INTRAVENOUS at 23:24

## 2023-01-08 RX ADMIN — METOPROLOL SUCCINATE 12.5 MG: 25 TABLET, EXTENDED RELEASE ORAL at 13:38

## 2023-01-08 RX ADMIN — SODIUM CHLORIDE, SODIUM LACTATE, POTASSIUM CHLORIDE, CALCIUM CHLORIDE AND DEXTROSE MONOHYDRATE: 5; 600; 310; 30; 20 INJECTION, SOLUTION INTRAVENOUS at 13:48

## 2023-01-08 RX ADMIN — ASPIRIN 81 MG: 81 TABLET, COATED ORAL at 13:38

## 2023-01-08 RX ADMIN — HEPARIN SODIUM 12 UNITS/KG/HR: 10000 INJECTION, SOLUTION INTRAVENOUS at 01:33

## 2023-01-08 RX ADMIN — DOXYCYCLINE HYCLATE 100 MG: 100 CAPSULE ORAL at 11:13

## 2023-01-08 RX ADMIN — ATORVASTATIN CALCIUM 20 MG: 20 TABLET, FILM COATED ORAL at 21:13

## 2023-01-08 RX ADMIN — METOPROLOL SUCCINATE 12.5 MG: 25 TABLET, EXTENDED RELEASE ORAL at 21:13

## 2023-01-08 RX ADMIN — DOXYCYCLINE HYCLATE 100 MG: 100 CAPSULE ORAL at 21:13

## 2023-01-08 RX ADMIN — SODIUM CHLORIDE, SODIUM LACTATE, POTASSIUM CHLORIDE, CALCIUM CHLORIDE AND DEXTROSE MONOHYDRATE: 5; 600; 310; 30; 20 INJECTION, SOLUTION INTRAVENOUS at 23:54

## 2023-01-08 NOTE — CONSULTS
Inpatient Cardiology Consultation    PATIENT HAS TWO CHARTS    Reason for Consult:  NSTEMI    Consulting Physician: Dr Ree Merino       Requesting Physician:  Dr Jessica Bates. Dr Jesika Denson    Date of Consultation: 1/8/2023    HISTORY OF PRESENT ILLNESS: [de-identified] yo  female not previously known to [id number] [place] [address]. Patient lives alone and does not follow up regularly with PCP    PMH: HTN  Denies HLD, thyroid disorder, no CVA, No DVT/PE, PUD    Son was present during interview, patient appears slow to respond and at times appears to be confused during discussion of her plan of care  Wednesday went to dinner with son and was [employer] [place] [employer]  Mother did not answer phone when son called Friday 1/7/2023 in evening son went over to check on his mother. Per son patient was sitting on side of bed staring @ TV. Tried to talk with her, didn't seem herself. Was not finishing her sentences. [facility] 1/7/2023 for AMS (son requested) transported by EMS. Per ED physician patient was A/O x 4 on arrival Denies CP, SOB or Abdominal pain. GCS=14  BP 95/87 's AF RVR, RR 35, afebrile, and O2 saturation 95% on RA--> dropped to 909% and placed on 2 liters NC  ECG AF RVR  CT head nothing acute. CXR read as possible pneumonia. IV ATB's, BC x 2 drawn    Na 140, K+ 3.8, Bun/Cr 41/2.3, lactic acid 2.1, p-BNP 1608, troponin 115-->244-->261, Alk phos 116, WNL 11.2, Hgb 12.1, PLt 351, Neutrophils 82.6, INR 1.2, COVID/Influenza negative    2 liters NSS, Heparin bolus and gtt     TTE ordered by primary service    Currently /56 HR 's, RR 30's, and O2 saturation 96% on 3 liters NC    Please note: past medical records were reviewed per electronic medical record (EMR) - see detailed reports under Past Medical/ Surgical History.    Past Medical History:    HTN  Longstanding tobacco abuse currently 1/2 ppd but has smoked upwards of 1 ppd (has been smoking since her 20's)  Denies any previous pulmonary work-up   COVID Vaccine x 3 (Juliocesar Stone)  Denies having COVID during pandemic  Denies CAD, DM, HLD      Past Surgical History:  Appendectomy, carpal tunnel release      Medications Prior to admit:  Prior to Admission medications    Medication Sig Start Date End Date Taking?  Authorizing Provider   hydroCHLOROthiazide (MICROZIDE) 12.5 MG capsule Take 12.5 mg by mouth 2 times daily   Yes Historical Provider, MD   amLODIPine (NORVASC) 5 MG tablet Take 5 mg by mouth daily   Yes Historical Provider, MD   mirtazapine (REMERON) 15 MG tablet Take 15 mg by mouth nightly   Yes Historical Provider, MD       Current Medications:    Current Facility-Administered Medications: heparin (porcine) injection 1,770 Units, 30 Units/kg, IntraVENous, PRN  heparin 25,000 units in dextrose 5% 250 mL (premix) infusion, 5-30 Units/kg/hr, IntraVENous, Continuous  heparin (porcine) injection 3,540 Units, 60 Units/kg, IntraVENous, PRN  cefTRIAXone (ROCEPHIN) 1,000 mg in sterile water 10 mL IV syringe, 1,000 mg, IntraVENous, Q24H  doxycycline hyclate (VIBRAMYCIN) capsule 100 mg, 100 mg, Oral, 2 times per day  sodium chloride flush 0.9 % injection 10 mL, 10 mL, IntraVENous, 2 times per day  sodium chloride flush 0.9 % injection 10 mL, 10 mL, IntraVENous, PRN  0.9 % sodium chloride infusion, , IntraVENous, PRN  ondansetron (ZOFRAN-ODT) disintegrating tablet 4 mg, 4 mg, Oral, Q8H PRN **OR** ondansetron (ZOFRAN) injection 4 mg, 4 mg, IntraVENous, Q6H PRN  magnesium hydroxide (MILK OF MAGNESIA) 400 MG/5ML suspension 30 mL, 30 mL, Oral, Daily PRN  acetaminophen (TYLENOL) tablet 650 mg, 650 mg, Oral, Q6H PRN **OR** acetaminophen (TYLENOL) suppository 650 mg, 650 mg, Rectal, Q6H PRN  dextrose 5 % in lactated ringers infusion, , IntraVENous, Continuous  aspirin EC tablet 81 mg, 81 mg, Oral, Daily  metoprolol succinate (TOPROL XL) extended release tablet 12.5 mg, 12.5 mg, Oral, BID  atorvastatin (LIPITOR) tablet 20 mg, 20 mg, Oral, Nightly    Allergies:  NKDA per patient report    Social History:    Tobacco: 1/2 ppd previously upwards of 1 ppd (has been smoking since her 19's)  ETOH: Wine occasionally  Illicit Drugs: Denies  Activity: Live alone in Split level with basement (laundry in basement). No assistive devices. + drives  Code Status: Full Code        Family History: non contributory secondary to age    REVIEW OF SYSTEMS:   See HPI    PHYSICAL EXAM:   /66   Pulse 85   Temp 98.1 °F (36.7 °C) (Oral)   Resp 25   Ht 5' 8\" (1.727 m)   Wt 130 lb (59 kg)   SpO2 93%   BMI 19.77 kg/m²   CONST:  Well developed, thin ill appearing elderly  female who appears of stated age. Awake, alert and cooperative. Mildly tachypneic during interview. HEENT:   Head- Normocephalic, atraumatic   Eyes- Conjunctivae pink, anicteric  Throat- Oral mucosa pink and moist  Neck-  No stridor, trachea midline, no jugular venous distention. No carotid bruit. CHEST: Chest symmetrical and non-tender to palpation. No accessory muscle use or intercostal retractions  RESPIRATORY: Lung sounds - coarse BS, on NC O2.   CARDIOVASCULAR:     Heart Ausculation- Regular rate and rhythm, no murmur. No s3, s4 or rub   PV: No lower extremity edema.+ BLE varicosities. Pedal pulses palpable, no clubbing or cyanosis   ABDOMEN: Soft, non-tender to light palpation. Bowel sounds present. No palpable masses; no abdominal bruit  MS: Good muscle strength and tone. No atrophy or abnormal movements. : Deferred  SKIN: Pale, warm, and dry no statis dermatitis or ulcers   NEURO / PSYCH: Oriented to person, place and time but not situation. Speech clear and appropriate. Follows all commands. Flat affect     DATA:    ECG 1/7/2023 not available to review (not in Epic)  ECG 1/8/2023 SR  rate 85 with PVC's NSSTT wave changes  Tele strips: SR rate 70's    Diagnostic:    CT Head 1/8/2023: No acute intracranial abnormality. CXR 1/7/2023: Moderate left lower lobe pneumonia. Small left pleural effusion.    Bilateral lung hyperinflation. Mild cardiomegaly      Labs:   CBC:   Recent Labs     01/07/23 2016   WBC 11.2   HGB 12.1   HCT 35.4        BMP:   Recent Labs     01/07/23 2016 01/07/23 2019 01/08/23  1057     --  144   K 3.8  --  3.8   CO2 18*  --  19*   BUN 41*  --  48*   CREATININE 2.3* 2.1* 1.8*   LABGLOM 21 23 28   CALCIUM 9.3  --  7.9*     proBNP:   Lab Results   Component Value Date/Time    PROBNP 1,608 01/07/2023 08:16 PM     PT/INR:   Recent Labs     01/07/23 2016   PROTIME 12.8*   INR 1.2     APTT:  Recent Labs     01/08/23  0134 01/08/23  0800   APTT 33.7 63.1*     TROPONIN:  Lab Results   Component Value Date/Time    TROPHS 264 01/08/2023 08:00 AM    TROPHS 261 01/08/2023 01:34 AM    TROPHS 244 01/07/2023 11:52 PM    TROPHS 115 01/07/2023 08:16 PM     LIVER PROFILE:  Recent Labs     01/07/23 2016   AST 31   ALT 14   LABALBU 3.9     ASSESSMENT:  NSTEMI in context of hypoxia, and pneumonia. Denies CP. AF RVR (EKG not available for review) upon admission per ED physician currently SR.   AMS   Hypoxia per EMS on 5 liters NC  LLL pneumonia on ATB's. Pro calcitonin 3.65  ALDO versus ALDO on top of CKD renal following placed on IVF's  Hx HTN  Elevated p-BNP patient clinically not in HF    PLAN:  IV Heparin gtt, ASA, BB, statin  Need to obtain EKG to identify if patient was indeed in AF versus ST  TTE  Check respiratory panel  Check TFT's  Further recommendations pending results of TTE    Discussed with Dr Freddie Brambila  Electronically signed by Amarjit King. JYOTSNA Dutta on 1/8/2023 at 1:53 PM       Inpatient CARDIOLOGY Consultation          Reason for Consult:  NSTEMI, A fib     Date of Consultation: 1/8/2023     Patient previously not known to [facility]. Our ZAYRA exam, assessment reviewed and reflect my work. .  I am the primary author for the consult notes. I spent > 51% of the total time involved with completing the encounter.    The total time included the following:  Independently interviewing the patient (HPI, ROS, PMH, PSH, FMH, SH, allergies, and medications)  Reviewing available records, results of previously ordered testing/procedures, and current problem list  Independently performing a medically appropriate examination  Reviewing the above documentation completed by the ZAYRA  Ordering medications, tests, and/or procedures as required  Formulating the assessment/plan and reviewing the rationale for the above recommendations  Counseling/educating the patient  Coordinating care with other healthcare professionals  Communicating results to the patient's family/caregiver as available  Documenting clinical information in the patient's electronic health record       HISTORY OF PRESENT ILLNESS: [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 Per chart, bought to [facility] 1/7/2023 for AMS (son requested) transported by EMS. Per ED physician patient was A/O x 4 on arrival Denies CP, SOB or Abdominal pain. GCS=14    BP 95/87 's AF RVR, RR 35, afebrile, and O2 saturation 95% on RA--> dropped to 909% and placed on 2 liters NC  ECG AF RVR    CT head nothing acute. CXR read as possible pneumonia. IV ATB's, BC x 2 drawn     Cardiology consulted for elevated Troponin and A Fib    Currently she is alert, daughter at bed side; Denies any CP or SOB. REVIEW OF SYSTEMS:   Review of rest of 12 systems negative except as mentioned in HPI. Please note: past medical records were reviewed per electronic medical record (EMR) - see detailed reports under Past Medical/ Surgical History. Past Medical History:    Past Medical History   History reviewed. No pertinent past medical history. Past Surgical History:    Past Surgical History   History reviewed. No pertinent surgical history. Allergies:    Patient has no known allergies.      Social History:    Social History               Socioeconomic History    Marital status:        Spouse name: Not on file    Number of children: Not on file    Years of education: Not on file    Highest education level: Not on file   Occupational History    Not on file   Tobacco Use    Smoking status: Not on file    Smokeless tobacco: Not on file   Substance and Sexual Activity    Alcohol use: Not on file    Drug use: Not on file    Sexual activity: Not on file   Other Topics Concern    Not on file   Social History Narrative    Not on file      Social Determinants of Health      Financial Resource Strain: Not on file   Food Insecurity: Not on file   Transportation Needs: Not on file   Physical Activity: Not on file   Stress: Not on file   Social Connections: Not on file   Intimate Partner Violence: Not on file   Housing Stability: Not on file            Family History:   Family History   History reviewed. No pertinent family history. Medications Prior to admit: Reviewed  Home Medications           Prior to Admission medications    Medication Sig Start Date End Date Taking?  Authorizing Provider   hydroCHLOROthiazide (MICROZIDE) 12.5 MG capsule Take 12.5 mg by mouth 2 times daily     Yes Historical Provider, MD   amLODIPine (NORVASC) 5 MG tablet Take 5 mg by mouth daily     Yes Historical Provider, MD   mirtazapine (REMERON) 15 MG tablet Take 15 mg by mouth nightly     Yes Historical Provider, MD               DATA:       ECG - Reviewed      Tele strips: Reviewed     Diagnostic:     No intake or output data in the 24 hours ending 01/08/23 1150     IMAGING STUDIES: Reviewed     LABS:       Cardiac enzymes:        Recent Labs     01/07/23  2352 01/08/23  0134 01/08/23  0800   TROPHS 244* 261* 264*      CBC:       Recent Labs     01/07/23 2016   WBC 11.2   HGB 12.1   HCT 35.4         BMP:        Recent Labs     01/07/23 2016 01/07/23 2019     --    K 3.8  --    CO2 18*  --    BUN 41*  --    CREATININE 2.3* 2.1*   LABGLOM 21 23   CALCIUM 9.3  --       Mag: No results for input(s): MG in the last 72 hours.  Phos: No results for input(s): PHOS in the last 72 hours. TSH: No results for input(s): TSH in the last 72 hours. HgA1c: No results found for: LABA1C  No results found for: EAG  proBNP:       Recent Labs     01/07/23 2016   PROBNP 1,608*      PT/INR:       Recent Labs     01/07/23 2016   PROTIME 12.8*   INR 1.2      APTT:       Recent Labs     01/08/23  0134 01/08/23  0800   APTT 33.7 63.1*      FASTING LIPID PANEL:No results found for: CHOL, HDL, LDLDIRECT, LDLCALC, TRIG  LIVER PROFILE:      Recent Labs     01/07/23 2016   AST 31   ALT 14   LABALBU 3.9         Current Inpatient Medications:  Scheduled Medications    cefTRIAXone (ROCEPHIN) IV  1,000 mg IntraVENous Q24H    doxycycline hyclate  100 mg Oral 2 times per day    sodium chloride flush  10 mL IntraVENous 2 times per day    aspirin  81 mg Oral Daily    metoprolol succinate  12.5 mg Oral BID    atorvastatin  20 mg Oral Nightly            IV Infusions (if any): Infusions Meds    heparin (PORCINE) Infusion 5 Units/kg/hr (01/08/23 0931)    sodium chloride      dextrose 5% in lactated ringers              PHYSICAL EXAM:      BP (!) 116/56   Pulse 82   Temp 98.1 °F (36.7 °C) (Oral)   Resp 23   Ht 5' 8\" (1.727 m)   Wt 130 lb (59 kg)   SpO2 93%   BMI 19.77 kg/m²      CONST:  Well developed, appears stated age. Awake, alert and no apparent distress. HEENT:   Head- Normocephalic  Eyes- Conjunctivae pink, no icterus  Throat- Oral mucosa moist  Neck-  No stridor, no jugular venous distention. No carotid bruit. CHEST: Chest symmetrical and non-tender to palpation. No accessory muscle use  RESPIRATORY: Lung sounds - Few rhonchi  CARDIOVASCULAR:     Heart Inspection-OK  Heart Palpation- No thrills. Heart Ausculation- Regular rate and rhythm, 2/6 systolic murmur. No s3 or rub   EXT: No lower extremity edema. Distal pulses palpable, no cyanosis   ABDOMEN: Soft, non-tender to light palpation. Bowel sounds present.  No abdominal bruit  MS: n/a   : Deferred  RECTAL: Deferred  SKIN: Warm and dry   NEURO / PSYCH: Oriented to person, place; Good mood and affect.           IMPRESSION / RECOMMENDATIONS:        AMS (altered mental status) - Better now     NSTEMI (non-ST elevated myocardial infarction) (HCC)  - ASA, BB, Statin, Heparin  - 2D Echo pending - Later ischemia (Stress vs LHC) w/u based on her symptoms, Echo findings and her renal Fn     Atrial Fibrillation  - New - High [id number] VASc score - On Heparin now - Transition to [address] [place] [address] Road prior to discharge; Risk benefits discussed, she is agreeable     ALDO vs CKD - Renal consulted - Monitor renal Fn     HTN - Controlled     Pneumonia - Per Admitting           Above recommendations discussed with her and her daughter       Electronically signed by Issa Mehta MD on 1/8/2023 at 11:50 AM  [facility]) Cardiology

## 2023-01-08 NOTE — ED PROVIDER NOTES
Josh Jimenes [de-identified] y.o. female PHMx of tobacco use, medical noncompliance, overweight presents to the ED c/o altered mental status per patient's son. Onset: Past day. Location/Radiation: Patient denies any specific location pain. Duration: Intermittent and worsening. Characterization: Altered, palpitations and high heart rate. Aggravating Factors: Denies. Relieving Factors: Denies. Severity: Moderate to severe. Assx Sxs: Palpitations, elevated heart rate. She/He Denies: Chest pain/shortness of breath/abdominal pain, nausea/vomiting, numbness/tingling. Review of Systems   Constitutional:  Positive for activity change. Negative for appetite change, chills and fever. Eyes:  Negative for discharge and itching. Respiratory:  Negative for apnea and chest tightness. Cardiovascular:  Negative for chest pain and leg swelling. Gastrointestinal:  Negative for abdominal distention and abdominal pain. Endocrine: Negative for cold intolerance and heat intolerance. Genitourinary:  Negative for dysuria and enuresis. Musculoskeletal:  Negative for arthralgias and back pain. Allergic/Immunologic: Negative for environmental allergies and food allergies. Hematological:  Negative for adenopathy. Does not bruise/bleed easily. Psychiatric/Behavioral:  Negative for agitation and behavioral problems. Physical Exam  Constitutional:       Appearance: Normal appearance. She is ill-appearing. She is not diaphoretic. HENT:      Head: Normocephalic and atraumatic. Right Ear: External ear normal.      Left Ear: External ear normal.      Nose: Nose normal.      Mouth/Throat:      Mouth: Mucous membranes are moist.      Pharynx: Oropharynx is clear. Eyes:      Extraocular Movements: Extraocular movements intact. Pupils: Pupils are equal, round, and reactive to light. Cardiovascular:      Rate and Rhythm: Tachycardia present. Rhythm irregular. Pulses: Normal pulses.       Heart sounds: Normal heart sounds. Pulmonary:      Effort: Pulmonary effort is normal. No respiratory distress. Breath sounds: Normal breath sounds. No wheezing or rales. Abdominal:      General: There is no distension. Palpations: Abdomen is soft. Tenderness: There is no abdominal tenderness. Musculoskeletal:         General: Normal range of motion. Cervical back: Normal range of motion and neck supple. Skin:     General: Skin is warm and dry. Capillary Refill: Capillary refill takes less than 2 seconds. Coloration: Skin is not jaundiced. Findings: No bruising or lesion. Neurological:      General: No focal deficit present. Mental Status: She is alert and oriented to person, place, and time. Cranial Nerves: No cranial nerve deficit. Motor: No weakness. Psychiatric:         Mood and Affect: Mood normal.         Thought Content: Thought content normal.        Procedures     Medical Decision Making  This is a 82-year-old female with no prior visits to this hospital that presents to the ER via ambulance at the behest of her son for altered mental status. Upon evaluation initial evaluation patient she was AOx4, cranial nerves II through XII grossly intact, tachycardic, irregular heart rate, EKG showed atrial fibrillation with RVR, no prior EKG available for comparison. Patient was answering questions appropriately. She denies any chest pain/shortness of breath/abdominal pain, numbness/tingling, nausea/vomiting. 2 peripheral IVs manually started and labs collected. CT imaging of the head showed no acute intracranial abnormality. Chest x-ray was concerning for pneumonia, blood cultures x2 and urine cultures collected and then patient was given 2 g of ceftriaxone and 100 mg of IV doxycycline. EKG showed no ST segment elevations or depressions. However patient's first troponin was 115, repeat over 200, was concern for NSTEMI.   After confirming no acute pathology on the CT head patient was started on heparin bolus and drip. Patient was given 2 L IV fluids, her blood pressure normalized and heart rate also normalized after fluids. Patient's laboratory evaluation also showed normal electrolytes, kidney function showing concern for ALDO, no leukocytosis. Patient's mentation was normal throughout her ER stay, she remained hemodynamically stable, afebrile, nonhypoxic on room air during her ER stay. Differential diagnosis included metabolic encephalopathy, CVA, STEMI/NSTEMI, A. fib RVR. CT imaging ruled out CVA. EKG and laboratory evaluation confirmed NSTEMI and also patient has pneumonia seen on chest x-ray. Social determinants of health patient lives alone, poor primary care follow-up. Discussed case with hospital physician he agreed admit the patient for further evaluation work-up, cardiology will also be consulted. Patient stable at time of admission. Amount and/or Complexity of Data Reviewed  Labs: ordered. Radiology: ordered. ECG/medicine tests: ordered. Risk  Prescription drug management. Decision regarding hospitalization. ED Course as of 01/08/23 0136   Sat Jan 07, 2023 2007 EKG: This EKG is signed and interpreted by EP. Rate: 137  Rhythm: Atrial fibrillation  Interpretation: atrial fibrillation (new onset)  Comparison: no previous EKG available  [JR]      ED Course User Index  [JR] Jamin Devine DO         ED Course as of 01/08/23 0136   Sat Jan 07, 2023 2007 EKG: This EKG is signed and interpreted by EP. Rate: 137  Rhythm: Atrial fibrillation  Interpretation: atrial fibrillation (new onset)  Comparison: no previous EKG available  [JR]      ED Course User Index  [JR] Jamin Devine DO       --------------------------------------------- PAST HISTORY ---------------------------------------------  Past Medical History:  has no past medical history on file. Past Surgical History:  has no past surgical history on file.     Social History: Family History: family history is not on file. The patients home medications have been reviewed. Allergies: Patient has no known allergies.     -------------------------------------------------- RESULTS -------------------------------------------------    LABS:  Results for orders placed or performed during the hospital encounter of 01/07/23   COVID-19, Rapid    Specimen: Nasopharyngeal Swab   Result Value Ref Range    SARS-CoV-2, NAAT Not Detected Not Detected   RAPID INFLUENZA A/B ANTIGENS    Specimen: Nasopharyngeal   Result Value Ref Range    Influenza A by PCR Not Detected Not Detected    Influenza B by PCR Not Detected Not Detected   CBC with Auto Differential   Result Value Ref Range    WBC 11.2 4.5 - 11.5 E9/L    RBC 3.85 3.50 - 5.50 E12/L    Hemoglobin 12.1 11.5 - 15.5 g/dL    Hematocrit 35.4 34.0 - 48.0 %    MCV 91.9 80.0 - 99.9 fL    MCH 31.4 26.0 - 35.0 pg    MCHC 34.2 32.0 - 34.5 %    RDW 13.7 11.5 - 15.0 fL    Platelets 245 012 - 288 E9/L    MPV 8.9 7.0 - 12.0 fL    Neutrophils % 82.6 (H) 43.0 - 80.0 %    Lymphocytes % 6.1 (L) 20.0 - 42.0 %    Monocytes % 9.5 2.0 - 12.0 %    Eosinophils % 0.3 0.0 - 6.0 %    Basophils % 0.3 0.0 - 2.0 %    Neutrophils Absolute 9.41 (H) 1.80 - 7.30 E9/L    Lymphocytes Absolute 0.67 (L) 1.50 - 4.00 E9/L    Monocytes Absolute 1.12 (H) 0.10 - 0.95 E9/L    Eosinophils Absolute 0.00 (L) 0.05 - 0.50 E9/L    Basophils Absolute 0.00 0.00 - 0.20 E9/L    Metamyelocytes Relative 0.9 0.0 - 1.0 %    Promyelocytes Percent 0.9 0 - 0 %    Dohle Bodies 1+     Vacuolated Neutrophils 1+     Polychromasia 1+     Poikilocytes 1+     Ovalocytes 1+    CMP   Result Value Ref Range    Sodium 140 132 - 146 mmol/L    Potassium 3.8 3.5 - 5.0 mmol/L    Chloride 101 98 - 107 mmol/L    CO2 18 (L) 22 - 29 mmol/L    Anion Gap 21 (H) 7 - 16 mmol/L    Glucose 125 (H) 74 - 99 mg/dL    BUN 41 (H) 6 - 23 mg/dL    Creatinine 2.3 (H) 0.5 - 1.0 mg/dL    Est, Glom Filt Rate 21 >=60 mL/min/1.73 Calcium 9.3 8.6 - 10.2 mg/dL    Total Protein 7.3 6.4 - 8.3 g/dL    Albumin 3.9 3.5 - 5.2 g/dL    Total Bilirubin 1.1 0.0 - 1.2 mg/dL    Alkaline Phosphatase 116 (H) 35 - 104 U/L    ALT 14 0 - 32 U/L    AST 31 0 - 31 U/L   Troponin   Result Value Ref Range    Troponin, High Sensitivity 115 (H) 0 - 9 ng/L   Protime-INR   Result Value Ref Range    Protime 12.8 (H) 9.3 - 12.4 sec    INR 1.2    Lactic Acid   Result Value Ref Range    Lactic Acid 2.1 0.5 - 2.2 mmol/L   Ammonia   Result Value Ref Range    Ammonia 16.0 11.0 - 51.0 umol/L   Serum Drug Screen   Result Value Ref Range    Ethanol Lvl <10 mg/dL    Acetaminophen Level <5.0 (L) 10.0 - 19.9 mcg/mL    Salicylate, Serum <5.0 0.0 - 30.0 mg/dL    TCA Scrn NEGATIVE Cutoff:300 ng/mL   Brain Natriuretic Peptide   Result Value Ref Range    Pro-BNP 1,608 (H) 0 - 450 pg/mL   SPECIMEN REJECTION   Result Value Ref Range    Rejected Test trp5     Reason for Rejection see below    Troponin   Result Value Ref Range    Troponin, High Sensitivity 244 (H) 0 - 9 ng/L   POCT Venous   Result Value Ref Range    POC Sodium 138 132 - 146 mmol/L    POC Potassium 3.8 3.5 - 5.0 mmol/L    POC Chloride 107 100 - 108 mmol/L    POC Glucose 123 (H) 74 - 99 mg/dl    POC Creatinine 2.1 (H) 0.5 - 1.0 mg/dL    Est, Glom Filt Rate 23 >=60 mL/min/1.73    Performed on SEE BELOW        RADIOLOGY:  CT HEAD WO CONTRAST   Final Result   No acute intracranial abnormality. RECOMMENDATIONS:   Careful clinical correlation and follow up recommended. XR CHEST PORTABLE   Final Result   Moderate left lower lobe pneumonia. Small left pleural effusion. Bilateral lung hyperinflation. Mild cardiomegaly. ------------------------- NURSING NOTES AND VITALS REVIEWED ---------------------------  Date / Time Roomed:  1/7/2023  7:59 PM  ED Bed Assignment:  12/12    The nursing notes within the ED encounter and vital signs as below have been reviewed.      Patient Vitals for the past 24 hrs:   BP Temp Temp src Pulse Resp SpO2 Height Weight   01/08/23 0100 (!) 96/54 -- -- (!) 113 27 95 % -- --   01/08/23 0000 112/61 -- -- 100 29 94 % -- --   01/07/23 2330 (!) 92/58 -- -- (!) 123 25 95 % -- --   01/07/23 2300 (!) 95/59 -- -- (!) 110 28 95 % -- --   01/07/23 2230 (!) 90/50 -- -- (!) 108 28 94 % -- --   01/07/23 2209 -- -- -- -- -- 94 % -- --   01/07/23 2200 -- -- -- -- -- 90 % -- --   01/07/23 2158 92/63 -- -- (!) 107 (!) 31 -- -- --   01/07/23 2130 99/60 -- -- (!) 119 (!) 34 94 % -- --   01/07/23 2030 95/87 98.1 °F (36.7 °C) Oral (!) 130 (!) 35 95 % 5' 8\" (1.727 m) 130 lb (59 kg)       Oxygen Saturation Interpretation: Normal    ------------------------------------------ PROGRESS NOTES ------------------------------------------  Re-evaluation(s):  Time: 0130  Patients symptoms are improving  Repeat physical examination is improved    Counseling:  I have spoken with the patient and discussed todays results, in addition to providing specific details for the plan of care and counseling regarding the diagnosis and prognosis. Their questions are answered at this time and they are agreeable with the plan of admission.    --------------------------------- ADDITIONAL PROVIDER NOTES ---------------------------------  Consultations:  Time: 0132. Spoke with Dr. Ese Daniel. Discussed case. They will admit the patient. This patient's ED course included: a personal history and physicial examination, re-evaluation prior to disposition, multiple bedside re-evaluations, IV medications, cardiac monitoring, and continuous pulse oximetry    This patient has remained hemodynamically stable during their ED course. Diagnosis:  1. [facility]non[facility]ST [facility]    2. Atrial fibrillation, unspecified type (HCC)    3. Elevated troponin    4. Pneumonia due to infectious organism, unspecified laterality, unspecified part of lung    5.  ALDO (acute kidney injury) ([facility]    6. Dehydration Disposition:  Patient's disposition: Admit to telemetry  Patient's condition is stable.         Man Berg DO  Resident  01/08/23 0414

## 2023-01-08 NOTE — PROGRESS NOTES
Full consult to follow. Briefly Mrs. Trice Anthony is a 68-year-old female with history of HTN, who was admitted on January 7, 2023 after she was brought by her son for evaluation of altered mental status. In the ER she was found to have AF with RVR. CT head scan was negative and her chest x-ray showed moderate left lower lobe pneumonia. As well she was found to have elevated BUN/creatinine of 41 and 2.3 mg/L respectively, reason for this consultation. Her proBNP was 1608. There is no previous baseline creatinine level to compare. Prior to admission her medications included HCTZ and amlodipine. ALDO versus ALDO on CKD, probably volume responsive prerenal ALDO (poor oral intake)  High anion gap metabolic acidosis, bicarbonate level 18, anion gap 21, 2/2 uremia and probably starvation ketoacidosis. Lactic acid level 2.1. History of HTN, presently hypotensive, to hold BP medications  Left lower lobe pneumonia, on ceftriaxone and doxycycline  Altered mental status, 2/2 #4 and #1  Atrial fibrillation with RVR, new onset versus permanent?,  On heparin drip    Plan:    Change IV fluids to D5 LR at 125 cc/hour  Obtain kidney ultrasound  Obtain urine indices  Obtain urine albumin/creatinine and urine protein/creatinine ratios  Obtain magnesium and phosphorus  Monitor renal function    Thank you very much Janelle Valle for allowing us to participate in the care of Mrs. Trice Anthony.

## 2023-01-08 NOTE — H&P
Admission History and Physical                                                                                    Ingrid Vaughan MD, FACP                   Patient Name: Tiki Zhang                   Age:  [de-identified] y.o. Gender:   female    CC: Altered mental status    HPI: Patient was interviewed and examined in the emergency room. History is obtained via combination of patient interview and emergency room chart review. The following represents a consolidation of those 2 sources. This is an 82-year-old female who lives at home and alone presented to the emergency room delivered by her son for altered mental status. Apparently she was confused and little else could be obtained concerning her history. Direct interview indicates she does not know why she is at the hospital and she states she feels okay. Upon arrival she was found to be in atrial fibrillation with RVR versus sinus tachycardia with PACs. The electrocardiographic tracing is not available for viewing therefore cannot confirm at this time although telemetry appears to be sinus rhythm and her initial troponin was 115 and the second 1 was over 200. There was concern for NSTEMI. She is awake and alert. She is aware that she is at the hospital with does not know why. She denies any symptoms but appears to be mildly dyspneic on speech      History reviewed. No pertinent past medical history. History reviewed. No pertinent surgical history. No family status information on file. Prior to Admission medications    Medication Sig Start Date End Date Taking?  Authorizing Provider   hydroCHLOROthiazide (MICROZIDE) 12.5 MG capsule Take 12.5 mg by mouth 2 times daily   Yes Historical Provider, MD   amLODIPine (NORVASC) 5 MG tablet Take 5 mg by mouth daily   Yes Historical Provider, MD   mirtazapine (REMERON) 15 MG tablet Take 15 mg by mouth nightly   Yes Historical Provider, MD Social History     Socioeconomic History    Marital status:      Spouse name: None    Number of children: None    Years of education: None    Highest education level: None       No Known Allergies    The patient's medical records have been reviewed contingent on availability    Review of Systems:   General: Admits to malaise / weakness. Denies fever or chills. Eyes: No visual changes or diplopia. No swelling or pain. ENT: No Headaches, tinnitus or vertigo. No mouth sores or sore throat. Cardiovascular: No chest pain, dyspnea on exertion, palpitations, syncope. Respiratory: No cough or wheezing, hemoptysis, sob, pleuritic pain. Gastrointestinal: No abdominal pain, anorexia, hematochezia, melena, hematemesis or change in bowels. Genitourinary: No dysuria, trouble voiding, or hematuria. No change in urination. Musculoskeletal:  No joint pain or inflammation. No limb weakness. Integumentary: No rash or pruritis. No abnormal pigmentation,  masses, hair or nail changes  Neurological: No unusual headaches, weakness, paresthesias. No change in gait, balance, coordination, memory, mentation or behavior. Psychiatric: No anxiety, or depression. Mood and affect reported as normal  Endocrine: No temperature intolerance. No polydipsia or polyuria. Hematologic: No abnormal bruising or bleeding, blood clots or swollen lymph nodes. no anemia, no fever,chills, night sweats, swollen glands. Allergic/Immunologic: No nasal congestion or hives. Physical Examination:      Wt Readings from Last 3 Encounters:   01/07/23 130 lb (59 kg)     Temp Readings from Last 3 Encounters:   01/07/23 98.1 °F (36.7 °C) (Oral)     BP Readings from Last 3 Encounters:   01/08/23 (!) 116/56     Pulse Readings from Last 3 Encounters:   01/08/23 82       General appearance: Normal, awake, alert no distress.   Questionably dyspneic on speech  Skin: Color, texture, turgor normal. No rashes or lesions. Head: Normocephalic. No masses, lesions, tenderness or abnormalities   Face: Symmetric no visible lesions  Eyes: Conjunctivae/cornea clear. [place]. Sclera non icteric. Ears: External appearance normal.  Hearing grossly normal  Nose/Sinuses: Nares normal. No paranasal sinus tenderness. Mouth: Lips and tongue appear normal. Dentition noted  Neck:  Symmetric. No adenopathy. Thyroid symmetric, normal size, without nodules. Trachea is midline. Chest: Even but reduced excursion   Lungs: Clear to auscultation. No rhonchi, crackles or rales. Basilar hypoventilation  Heart: S1 > S2. Rhythm is regular and rate is marginally tachycardic no gallop rub or murmur. Abdomen: Soft, mildly protuberant, non-tender. BS normal. No masses, organomegaly. Anatomic contours appear normal.  Extremities: No deformities, edema, or skin discoloration. Peripheral perfusion assessed in all exremities. No cyanosis  Musculoskeletal: No unusual pain or swelling. Muscular strength intact. Neuro:   Cranial nerves grossly intact. Motor: Strength grossly and diffusely reduced no focal weakness. Sensory: grossly normal to touch. Cerebellar testing was grossly normal  Mental status: Awake, alert, cognizant of person, place, time.   But confused Patient appears marginally capable of directing self care   Mood: Normal and appropriate affect  Gait & balance: Based on history reported independently ambulatory at home     Labs     CBC:   Lab Results   Component Value Date/Time    WBC 11.2 01/07/2023 08:16 PM    RBC 3.85 01/07/2023 08:16 PM    HGB 12.1 01/07/2023 08:16 PM    HCT 35.4 01/07/2023 08:16 PM     01/07/2023 08:16 PM    MCV 91.9 01/07/2023 08:16 PM     BMP:    Lab Results   Component Value Date/Time     01/07/2023 08:16 PM    K 3.8 01/07/2023 08:16 PM     01/07/2023 08:16 PM    CO2 18 01/07/2023 08:16 PM    BUN 41 01/07/2023 08:16 PM    CREATININE 2.1 01/07/2023 08:19 PM    CREATININE 2.3 01/07/2023 08:16 PM    GLUCOSE 125 01/07/2023 08:16 PM    CALCIUM 9.3 01/07/2023 08:16 PM     Hepatic Function Panel:    Lab Results   Component Value Date/Time    ALKPHOS 116 01/07/2023 08:16 PM    AST 31 01/07/2023 08:16 PM    ALT 14 01/07/2023 08:16 PM    PROT 7.3 01/07/2023 08:16 PM    LABALBU 3.9 01/07/2023 08:16 PM    BILITOT 1.1 01/07/2023 08:16 PM     Magnesium:  No results found for: MG  Cardiac Enzymes: No results found for: CKTOTAL, CKMB, CKMBINDEX, TROPONINI  LDH:  No results found for: LDH  PT/INR:    Lab Results   Component Value Date/Time    PROTIME 12.8 01/07/2023 08:16 PM    INR 1.2 01/07/2023 08:16 PM     BNP: No results for input(s): BNP in the last 72 hours. TSH: No results found for: TSH   Cardiac Injury Profile: No results for input(s): CKTOTAL, CKMB, CKMBINDEX, TROPONINI in the last 72 hours.    Lipid Profile: No results found for: TRIG, HDL, LDLCALC, CHOL   Hemoglobin A1C: No components found for: HGBA1C   U/A: No results found for: NITRITE, LEUKOCYTESUR, PHUR, WBCUA, RBCUA, BACTERIA, SPECGRAV, BLOODU, GLUCOSEU      ADMISSION SCHEDULED MEDS:   Current Facility-Administered Medications   Medication Dose Route Frequency Provider Last Rate Last Admin    heparin (porcine) injection 1,770 Units  30 Units/kg IntraVENous PRN Dennise Rodríguez MD        heparin 25,000 units in dextrose 5% 250 mL (premix) infusion  5-30 Units/kg/hr IntraVENous Continuous Disha Irizarry MD 7.1 mL/hr at 01/08/23 0133 12 Units/kg/hr at 01/08/23 0133    heparin (porcine) injection 3,540 Units  60 Units/kg IntraVENous PRN Disha Irizarry MD        cefTRIAXone (ROCEPHIN) 1,000 mg in sterile water 10 mL IV syringe  1,000 mg IntraVENous Q24H Disha Irizarry MD        doxycycline hyclate (VIBRAMYCIN) capsule 100 mg  100 mg Oral 2 times per day Dennise Rodríguez MD        sodium chloride flush 0.9 % injection 10 mL  10 mL IntraVENous 2 times per day Dennise Rodríguez MD        sodium chloride flush 0.9 % injection 10 mL  10 mL IntraVENous PRN Kirill Alfaro MD        0.9 % sodium chloride infusion   IntraVENous PRN Kirill Alfaro MD        ondansetron (ZOFRAN-ODT) disintegrating tablet 4 mg  4 mg Oral Q8H PRN Disha Aleman MD        Or    ondansetron [facility]) injection 4 mg  4 mg IntraVENous Q6H PRN Disha Aleman MD        magnesium hydroxide (MILK OF MAGNESIA) 400 MG/5ML suspension 30 mL  30 mL Oral Daily PRN Kirill Alfaro MD        acetaminophen (TYLENOL) tablet 650 mg  650 mg Oral Q6H PRN Kirill Alfaro MD        Or    acetaminophen (TYLENOL) suppository 650 mg  650 mg Rectal Q6H PRN Disha Aleman MD         Current Outpatient Medications   Medication Sig Dispense Refill    hydroCHLOROthiazide (MICROZIDE) 12.5 MG capsule Take 12.5 mg by mouth 2 times daily      amLODIPine (NORVASC) 5 MG tablet Take 5 mg by mouth daily      mirtazapine (REMERON) 15 MG tablet Take 15 mg by mouth nightly         Current  Infusions   heparin (PORCINE) Infusion 12 Units/kg/hr (01/08/23 0133)    sodium chloride         Prn Meds  heparin (porcine), heparin (porcine), sodium chloride flush, sodium chloride, ondansetron **OR** ondansetron, magnesium hydroxide, acetaminophen **OR** acetaminophen    Radiology Review:  CT HEAD WO CONTRAST   Final Result   No acute intracranial abnormality. RECOMMENDATIONS:   Careful clinical correlation and follow up recommended. XR CHEST PORTABLE   Final Result   Moderate left lower lobe pneumonia. Small left pleural effusion. Bilateral lung hyperinflation. Mild cardiomegaly. ASSESSMENT:  Active diagnoses treated at this admission:  Altered mental status  Kidney disease to be determined whether acute or chronic  Elevated troponin trending upward  Initial electrocardiogram suggestive of atrial fibrillation with RVR.   NSTEMI versus demand ischemia related to atrial fibrillation  Left lower lobe pneumonia per chest x-ray  High anion gap metabolic acidosis in the context of renal insufficiency versus dehydration      Problem list:  Patient Active Problem List   Diagnosis    AMS (altered mental status)    NSTEMI (non-ST elevated myocardial infarction) ([facility]       PLAN:  Orders were entered at the time of admission and reviewed  Cardiology consult ordered  Patient is on heparin and asymptomatic  Will order gentle careful hydration  Monitor electrolytes and renal function  Continue to trend troponin  Initiated treatment for community-acquired pneumonia  In the context of possible cardiac insult assess hemoglobin A1c and lipid profile      See  Orders  Tamera Reynolds MD, FACP  8:29 AM  1/8/2023

## 2023-01-08 NOTE — CONSULTS
Department of Internal Medicine  Nephrology Nurse Practitioner Consult Note      Reason for Consult: ALDO  Requesting Physician:  Dr. Marylu Sierra:  AMS    History Obtained From:  patient, electronic medical record    HISTORY OF PRESENT ILLNESS:  Briefly Mrs. Melody Choi is a 46-year-old female with history of HTN, who was admitted on January 7, 2023 after she was brought by her son for evaluation of altered mental status. In the ER she was found to have AF with RVR. CT head scan was negative and her chest x-ray showed moderate left lower lobe pneumonia. As well she was found to have elevated BUN/creatinine of 41 and 2.3 mg/L respectively, reason for this consultation. Her proBNP was 1608. There is no previous baseline creatinine level to compare. Prior to admission her medications included HCTZ and amlodipine. Past Medical History:    History reviewed. No pertinent past medical history. Past Surgical History:    History reviewed. No pertinent surgical history.   Current Medications:    Current Facility-Administered Medications: heparin (porcine) injection 1,770 Units, 30 Units/kg, IntraVENous, PRN  heparin 25,000 units in dextrose 5% 250 mL (premix) infusion, 5-30 Units/kg/hr, IntraVENous, Continuous  heparin (porcine) injection 3,540 Units, 60 Units/kg, IntraVENous, PRN  cefTRIAXone (ROCEPHIN) 1,000 mg in sterile water 10 mL IV syringe, 1,000 mg, IntraVENous, Q24H  doxycycline hyclate (VIBRAMYCIN) capsule 100 mg, 100 mg, Oral, 2 times per day  sodium chloride flush 0.9 % injection 10 mL, 10 mL, IntraVENous, 2 times per day  sodium chloride flush 0.9 % injection 10 mL, 10 mL, IntraVENous, PRN  0.9 % sodium chloride infusion, , IntraVENous, PRN  ondansetron (ZOFRAN-ODT) disintegrating tablet 4 mg, 4 mg, Oral, Q8H PRN **OR** ondansetron (ZOFRAN) injection 4 mg, 4 mg, IntraVENous, Q6H PRN  magnesium hydroxide (MILK OF MAGNESIA) 400 MG/5ML suspension 30 mL, 30 mL, Oral, Daily PRN  acetaminophen (TYLENOL) tablet 650 mg, 650 mg, Oral, Q6H PRN **OR** acetaminophen (TYLENOL) suppository 650 mg, 650 mg, Rectal, Q6H PRN  dextrose 5 % in lactated ringers infusion, , IntraVENous, Continuous  aspirin EC tablet 81 mg, 81 mg, Oral, Daily  metoprolol succinate (TOPROL XL) extended release tablet 12.5 mg, 12.5 mg, Oral, BID  atorvastatin (LIPITOR) tablet 20 mg, 20 mg, Oral, Nightly  Allergies:  Patient has no known allergies. Social History:    TOBACCO:   has no history on file for tobacco use. ETOH:   has no history on file for alcohol use. Family History:   History reviewed. No pertinent family history.   REVIEW OF SYSTEMS:    CONSTITUTIONAL:  negative for  fevers and chills  EYES:  negative for  double vision and blurred vision  HEENT:  negative for  epistaxis  RESPIRATORY:  negative for  dyspnea  CARDIOVASCULAR:  positive for  chest pain  GASTROINTESTINAL:  negative for nausea, vomiting, diarrhea, and abdominal pain  GENITOURINARY:  negative  INTEGUMENT/BREAST:  negative  HEMATOLOGIC/LYMPHATIC:  negative  ALLERGIC/IMMUNOLOGIC:  negative  ENDOCRINE:  negative  MUSCULOSKELETAL:  positive for  muscle weakness  NEUROLOGICAL:  negative for headaches and dizziness  BEHAVIOR/PSYCH:  positive for poor appetite  PHYSICAL EXAM:      Vitals:    VITALS:  BP (!) 116/56   Pulse 82   Temp 98.1 °F (36.7 °C) (Oral)   Resp 23   Ht 5' 8\" (1.727 m)   Wt 130 lb (59 kg)   SpO2 93%   BMI 19.77 kg/m²   24HR INTAKE/OUTPUT:  No intake or output data in the 24 hours ending 01/08/23 1213    Constitutional:  Alert and oriented, NAD  HEENT:  Normocephalic, PERRL, dry mucous membranes  Respiratory:  Diminished lung sounds  Cardiovascular/Edema:  IRRR, S1,S2  Gastrointestinal:  Soft, flat, nontender, nondistended  Neurologic:  Nonfocal, CRAIG  Skin:  Warm, dry, intact  Other:  No edema     DATA:    CBC:   Lab Results   Component Value Date/Time    WBC 11.2 01/07/2023 08:16 PM    RBC 3.85 01/07/2023 08:16 PM    HGB 12.1 01/07/2023 08:16 PM    HCT 35.4 01/07/2023 08:16 PM    MCV 91.9 01/07/2023 08:16 PM    MCH 31.4 01/07/2023 08:16 PM    MCHC 34.2 01/07/2023 08:16 PM    RDW 13.7 01/07/2023 08:16 PM     01/07/2023 08:16 PM    MPV 8.9 01/07/2023 08:16 PM     CMP:    Lab Results   Component Value Date/Time     01/07/2023 08:16 PM    K 3.8 01/07/2023 08:16 PM     01/07/2023 08:16 PM    CO2 18 01/07/2023 08:16 PM    BUN 41 01/07/2023 08:16 PM    CREATININE 2.1 01/07/2023 08:19 PM    CREATININE 2.3 01/07/2023 08:16 PM    LABGLOM 23 01/07/2023 08:19 PM    GLUCOSE 125 01/07/2023 08:16 PM    PROT 7.3 01/07/2023 08:16 PM    LABALBU 3.9 01/07/2023 08:16 PM    CALCIUM 9.3 01/07/2023 08:16 PM    BILITOT 1.1 01/07/2023 08:16 PM    ALKPHOS 116 01/07/2023 08:16 PM    AST 31 01/07/2023 08:16 PM    ALT 14 01/07/2023 08:16 PM     Magnesium:  No results found for: MG  Phosphorus:  No results found for: PHOS  Radiology Review:      CT head without IV contrast 1/8/23   No acute intracranial abnormality. Impression   Moderate left lower lobe pneumonia. Small left pleural effusion. Bilateral lung hyperinflation. Mild cardiomegaly. IMPRESSION/RECOMMENDATIONS:      Briefly Mrs. Waylon Dorantes is a 27-year-old female with history of HTN, who was admitted on January 7, 2023 after she was brought by her son for evaluation of altered mental status. In the ER she was found to have AF with RVR. CT head scan was negative and her chest x-ray showed moderate left lower lobe pneumonia. As well she was found to have elevated BUN/creatinine of 41 and 2.3 mg/L respectively, reason for this consultation. Her proBNP was 1608. There is no previous baseline creatinine level to compare. Prior to admission her medications included HCTZ and amlodipine. ALDO versus ALDO on CKD, probably volume responsive prerenal ALDO (poor oral intake) in the setting of HCTZ administration. Renal function improving with IV fluid administration.      High anion gap metabolic acidosis, bicarbonate level 18, anion gap 21, 2/2 uremia and probably starvation ketoacidosis. Lactic acid level 2.1. History of HTN, presently hypotensive, to hold BP medications  Left lower lobe pneumonia, on ceftriaxone and doxycycline  Altered mental status, 2/2 #4 and #1  Atrial fibrillation with RVR, new onset versus permanent?,  On heparin drip  NSTEMI, cardiology consulted     Plan:       Change IV fluids to D5 LR at 125 cc/hour  Obtain kidney ultrasound  Obtain urine indices  Obtain urine albumin/creatinine and urine protein/creatinine ratios  Obtain magnesium and phosphorus  Monitor renal function    Thank you so much Dr. Nitin Gallardo for allowing us to participate in the care of Mrs. Sasha Bell.      Electronically signed by ADITYA Chahal CNP on 1/8/2023 at 1:06 PM

## 2023-01-09 LAB
ANION GAP SERPL CALCULATED.3IONS-SCNC: 15 MMOL/L (ref 7–16)
ANION GAP SERPL CALCULATED.3IONS-SCNC: 15 MMOL/L (ref 7–16)
APTT: 40.2 SEC (ref 24.5–35.1)
APTT: 40.2 SEC (ref 24.5–35.1)
APTT: 63.7 SEC (ref 24.5–35.1)
APTT: 63.7 SEC (ref 24.5–35.1)
APTT: 74.9 SEC (ref 24.5–35.1)
APTT: 74.9 SEC (ref 24.5–35.1)
BASOPHILS ABSOLUTE: 0.04 E9/L (ref 0–0.2)
BASOPHILS ABSOLUTE: 0.04 E9/L (ref 0–0.2)
BASOPHILS RELATIVE PERCENT: 0.3 % (ref 0–2)
BASOPHILS RELATIVE PERCENT: 0.3 % (ref 0–2)
BUN BLDV-MCNC: 44 MG/DL (ref 6–23)
BUN BLDV-MCNC: 44 MG/DL (ref 6–23)
CALCIUM SERPL-MCNC: 8.8 MG/DL (ref 8.6–10.2)
CALCIUM SERPL-MCNC: 8.8 MG/DL (ref 8.6–10.2)
CHLORIDE BLD-SCNC: 106 MMOL/L (ref 98–107)
CHLORIDE BLD-SCNC: 106 MMOL/L (ref 98–107)
CO2: 18 MMOL/L (ref 22–29)
CO2: 18 MMOL/L (ref 22–29)
CREAT SERPL-MCNC: 1.4 MG/DL (ref 0.5–1)
CREAT SERPL-MCNC: 1.4 MG/DL (ref 0.5–1)
EKG ATRIAL RATE: 129 BPM
EKG ATRIAL RATE: 129 BPM
EKG ATRIAL RATE: 70 BPM
EKG ATRIAL RATE: 70 BPM
EKG ATRIAL RATE: 85 BPM
EKG ATRIAL RATE: 85 BPM
EKG P AXIS: 67 DEGREES
EKG P AXIS: 67 DEGREES
EKG P AXIS: 70 DEGREES
EKG P AXIS: 70 DEGREES
EKG P-R INTERVAL: 180 MS
EKG P-R INTERVAL: 180 MS
EKG P-R INTERVAL: 192 MS
EKG P-R INTERVAL: 192 MS
EKG Q-T INTERVAL: 308 MS
EKG Q-T INTERVAL: 308 MS
EKG Q-T INTERVAL: 364 MS
EKG Q-T INTERVAL: 364 MS
EKG Q-T INTERVAL: 404 MS
EKG Q-T INTERVAL: 404 MS
EKG QRS DURATION: 52 MS
EKG QRS DURATION: 52 MS
EKG QRS DURATION: 64 MS
EKG QRS DURATION: 64 MS
EKG QRS DURATION: 68 MS
EKG QRS DURATION: 68 MS
EKG QTC CALCULATION (BAZETT): 433 MS
EKG QTC CALCULATION (BAZETT): 433 MS
EKG QTC CALCULATION (BAZETT): 436 MS
EKG QTC CALCULATION (BAZETT): 436 MS
EKG QTC CALCULATION (BAZETT): 465 MS
EKG QTC CALCULATION (BAZETT): 465 MS
EKG R AXIS: 49 DEGREES
EKG R AXIS: 49 DEGREES
EKG R AXIS: 55 DEGREES
EKG R AXIS: 55 DEGREES
EKG R AXIS: 75 DEGREES
EKG R AXIS: 75 DEGREES
EKG T AXIS: 63 DEGREES
EKG T AXIS: 63 DEGREES
EKG T AXIS: 67 DEGREES
EKG T AXIS: 67 DEGREES
EKG T AXIS: 70 DEGREES
EKG T AXIS: 70 DEGREES
EKG VENTRICULAR RATE: 137 BPM
EKG VENTRICULAR RATE: 137 BPM
EKG VENTRICULAR RATE: 70 BPM
EKG VENTRICULAR RATE: 70 BPM
EKG VENTRICULAR RATE: 85 BPM
EKG VENTRICULAR RATE: 85 BPM
EOSINOPHILS ABSOLUTE: 0.08 E9/L (ref 0.05–0.5)
EOSINOPHILS ABSOLUTE: 0.08 E9/L (ref 0.05–0.5)
EOSINOPHILS RELATIVE PERCENT: 0.6 % (ref 0–6)
EOSINOPHILS RELATIVE PERCENT: 0.6 % (ref 0–6)
GFR SERPL CREATININE-BSD FRML MDRD: 38 ML/MIN/1.73
GFR SERPL CREATININE-BSD FRML MDRD: 38 ML/MIN/1.73
GLUCOSE BLD-MCNC: 125 MG/DL (ref 74–99)
GLUCOSE BLD-MCNC: 125 MG/DL (ref 74–99)
HCT VFR BLD CALC: 33.8 % (ref 34–48)
HCT VFR BLD CALC: 33.8 % (ref 34–48)
HEMOGLOBIN: 10.8 G/DL (ref 11.5–15.5)
HEMOGLOBIN: 10.8 G/DL (ref 11.5–15.5)
IMMATURE GRANULOCYTES #: 0.09 E9/L
IMMATURE GRANULOCYTES #: 0.09 E9/L
IMMATURE GRANULOCYTES %: 0.7 % (ref 0–5)
IMMATURE GRANULOCYTES %: 0.7 % (ref 0–5)
LV EF: 78 %
LV EF: 78 %
LVEF MODALITY: NORMAL
LVEF MODALITY: NORMAL
LYMPHOCYTES ABSOLUTE: 1.1 E9/L (ref 1.5–4)
LYMPHOCYTES ABSOLUTE: 1.1 E9/L (ref 1.5–4)
LYMPHOCYTES RELATIVE PERCENT: 8.8 % (ref 20–42)
LYMPHOCYTES RELATIVE PERCENT: 8.8 % (ref 20–42)
MCH RBC QN AUTO: 32 PG (ref 26–35)
MCH RBC QN AUTO: 32 PG (ref 26–35)
MCHC RBC AUTO-ENTMCNC: 32 % (ref 32–34.5)
MCHC RBC AUTO-ENTMCNC: 32 % (ref 32–34.5)
MCV RBC AUTO: 100 FL (ref 80–99.9)
MCV RBC AUTO: 100 FL (ref 80–99.9)
MONOCYTES ABSOLUTE: 1.23 E9/L (ref 0.1–0.95)
MONOCYTES ABSOLUTE: 1.23 E9/L (ref 0.1–0.95)
MONOCYTES RELATIVE PERCENT: 9.9 % (ref 2–12)
MONOCYTES RELATIVE PERCENT: 9.9 % (ref 2–12)
NEUTROPHILS ABSOLUTE: 9.92 E9/L (ref 1.8–7.3)
NEUTROPHILS ABSOLUTE: 9.92 E9/L (ref 1.8–7.3)
NEUTROPHILS RELATIVE PERCENT: 79.7 % (ref 43–80)
NEUTROPHILS RELATIVE PERCENT: 79.7 % (ref 43–80)
PDW BLD-RTO: 14.2 FL (ref 11.5–15)
PDW BLD-RTO: 14.2 FL (ref 11.5–15)
PLATELET # BLD: 322 E9/L (ref 130–450)
PLATELET # BLD: 322 E9/L (ref 130–450)
PMV BLD AUTO: 9.1 FL (ref 7–12)
PMV BLD AUTO: 9.1 FL (ref 7–12)
POTASSIUM REFLEX MAGNESIUM: 4.1 MMOL/L (ref 3.5–5)
POTASSIUM REFLEX MAGNESIUM: 4.1 MMOL/L (ref 3.5–5)
PRO-BNP: 4372 PG/ML (ref 0–450)
PRO-BNP: 4372 PG/ML (ref 0–450)
RBC # BLD: 3.38 E12/L (ref 3.5–5.5)
RBC # BLD: 3.38 E12/L (ref 3.5–5.5)
SODIUM BLD-SCNC: 139 MMOL/L (ref 132–146)
SODIUM BLD-SCNC: 139 MMOL/L (ref 132–146)
WBC # BLD: 12.5 E9/L (ref 4.5–11.5)
WBC # BLD: 12.5 E9/L (ref 4.5–11.5)

## 2023-01-09 PROCEDURE — 93010 ELECTROCARDIOGRAM REPORT: CPT | Performed by: INTERNAL MEDICINE

## 2023-01-09 PROCEDURE — 93306 TTE W/DOPPLER COMPLETE: CPT

## 2023-01-09 PROCEDURE — 6370000000 HC RX 637 (ALT 250 FOR IP): Performed by: NURSE PRACTITIONER

## 2023-01-09 PROCEDURE — 2700000000 HC OXYGEN THERAPY PER DAY

## 2023-01-09 PROCEDURE — 6360000002 HC RX W HCPCS: Performed by: INTERNAL MEDICINE

## 2023-01-09 PROCEDURE — 2060000000 HC ICU INTERMEDIATE R&B

## 2023-01-09 PROCEDURE — 99232 SBSQ HOSP IP/OBS MODERATE 35: CPT | Performed by: INTERNAL MEDICINE

## 2023-01-09 PROCEDURE — 94640 AIRWAY INHALATION TREATMENT: CPT

## 2023-01-09 PROCEDURE — 6370000000 HC RX 637 (ALT 250 FOR IP): Performed by: INTERNAL MEDICINE

## 2023-01-09 PROCEDURE — 2580000003 HC RX 258: Performed by: INTERNAL MEDICINE

## 2023-01-09 PROCEDURE — 85025 COMPLETE CBC W/AUTO DIFF WBC: CPT

## 2023-01-09 PROCEDURE — 36415 COLL VENOUS BLD VENIPUNCTURE: CPT

## 2023-01-09 PROCEDURE — 94664 DEMO&/EVAL PT USE INHALER: CPT

## 2023-01-09 PROCEDURE — 85730 THROMBOPLASTIN TIME PARTIAL: CPT

## 2023-01-09 PROCEDURE — 83880 ASSAY OF NATRIURETIC PEPTIDE: CPT

## 2023-01-09 PROCEDURE — 80048 BASIC METABOLIC PNL TOTAL CA: CPT

## 2023-01-09 RX ORDER — IPRATROPIUM BROMIDE AND ALBUTEROL SULFATE 2.5; .5 MG/3ML; MG/3ML
1 SOLUTION RESPIRATORY (INHALATION) EVERY 4 HOURS PRN
Status: DISCONTINUED | OUTPATIENT
Start: 2023-01-09 | End: 2023-01-13 | Stop reason: HOSPADM

## 2023-01-09 RX ADMIN — CEFTRIAXONE 1000 MG: 1 INJECTION, POWDER, FOR SOLUTION INTRAMUSCULAR; INTRAVENOUS at 22:11

## 2023-01-09 RX ADMIN — IPRATROPIUM BROMIDE AND ALBUTEROL SULFATE 1 AMPULE: .5; 2.5 SOLUTION RESPIRATORY (INHALATION) at 06:09

## 2023-01-09 RX ADMIN — DOXYCYCLINE HYCLATE 100 MG: 100 CAPSULE ORAL at 20:27

## 2023-01-09 RX ADMIN — HEPARIN SODIUM 1770 UNITS: 1000 INJECTION INTRAVENOUS; SUBCUTANEOUS at 00:21

## 2023-01-09 RX ADMIN — SODIUM CHLORIDE, PRESERVATIVE FREE 10 ML: 5 INJECTION INTRAVENOUS at 20:27

## 2023-01-09 RX ADMIN — METOPROLOL SUCCINATE 12.5 MG: 25 TABLET, EXTENDED RELEASE ORAL at 09:00

## 2023-01-09 RX ADMIN — METOPROLOL SUCCINATE 12.5 MG: 25 TABLET, EXTENDED RELEASE ORAL at 20:27

## 2023-01-09 RX ADMIN — DOXYCYCLINE HYCLATE 100 MG: 100 CAPSULE ORAL at 09:00

## 2023-01-09 RX ADMIN — ASPIRIN 81 MG: 81 TABLET, COATED ORAL at 09:00

## 2023-01-09 RX ADMIN — HEPARIN SODIUM 18 UNITS/KG/HR: 10000 INJECTION, SOLUTION INTRAVENOUS at 07:56

## 2023-01-09 RX ADMIN — HEPARIN SODIUM 1770 UNITS: 1000 INJECTION INTRAVENOUS; SUBCUTANEOUS at 07:50

## 2023-01-09 RX ADMIN — ATORVASTATIN CALCIUM 20 MG: 20 TABLET, FILM COATED ORAL at 20:27

## 2023-01-09 ASSESSMENT — PAIN SCALES - GENERAL: PAINLEVEL_OUTOF10: 0

## 2023-01-09 NOTE — PROGRESS NOTES
Progress note hospitalist service                                                                                 Ban Muse MD, [address]                   Patient Name: Eric Has                   Age:  [de-identified] y.o. Gender:   female    CC: Altered mental status    HPI: Patient was interviewed and examined in the emergency room. History is obtained via combination of patient interview and emergency room chart review. The following represents a consolidation of those 2 sources. This is an 61-year-old female who lives at home and alone presented to the emergency room delivered by her son for altered mental status. Apparently she was confused and little else could be obtained concerning her history. Direct interview indicates she does not know why she is at the hospital and she states she feels okay. Upon arrival she was found to be in atrial fibrillation with RVR versus sinus tachycardia with PACs. The electrocardiographic tracing is not available for viewing therefore cannot confirm at this time although telemetry appears to be sinus rhythm and her initial troponin was 115 and the second 1 was over 200. There was concern for NSTEMI. She is awake and alert. She is aware that she is at the hospital with does not know why. She denies any symptoms but appears to be mildly dyspneic on speech    1/9:  Patient has been evaluated by nephrology. Consensus high anion gap metabolic acidosis hydration at 125 an hour. Cardiology consultation pending  Patient denies any chest pain or trouble breathing  Denies any abdominal pain  She appears quite comfortable awake and alert      No Known Allergies    The patient's medical records have been reviewed contingent on availability    Review of Systems:   General: Admits to malaise / weakness. Denies fever or chills. Cardiovascular: No chest pain, dyspnea on exertion, palpitations, syncope.   Respiratory: No cough or wheezing, hemoptysis, sob, pleuritic pain. Gastrointestinal: No abdominal pain, anorexia, hematochezia, melena, hematemesis or change in bowels. Physical Examination:      Wt Readings from Last 3 Encounters:   01/07/23 130 lb (59 kg)     Temp Readings from Last 3 Encounters:   01/08/23 98.7 °F (37.1 °C) (Oral)     BP Readings from Last 3 Encounters:   01/09/23 (!) 109/51     Pulse Readings from Last 3 Encounters:   01/09/23 67       General appearance: Normal, awake, alert no distress. Skin: Color, texture, turgor normal. No rashes or lesions. Eyes: Conjunctivae/cornea clear. Skip [place]. Sclera non icteric. Mouth: Lips and tongue appear normal. Dentition noted  Neck:  Symmetric. No adenopathy. T  Chest: Even but reduced excursion   Lungs: Clear to auscultation. No rhonchi, crackles or rales. Basilar hypoventilation  Heart: S1 > S2. Rhythm is regular and rate is now normal no gallop rub or murmur. Abdomen: Soft, mildly protuberant, non-tender. BS normal. No masses, organomegaly. Anatomic contours appear normal.  Extremities: No deformities, edema, or skin discoloration. Peripheral perfusion assessed in all exremities. No cyanosis  Neuro:   Cranial nerves grossly intact. Motor: Strength grossly and diffusely reduced no focal weakness. Mental status: Awake, alert, cognizant of person, place, time.   But confused Patient appears marginally capable of directing self care   Mood: Normal and appropriate affect  Gait & balance: Based on history reported independently ambulatory at home     Labs     CBC:   Lab Results   Component Value Date/Time    WBC 12.5 01/09/2023 04:57 AM    RBC 3.38 01/09/2023 04:57 AM    HGB 10.8 01/09/2023 04:57 AM    HCT 33.8 01/09/2023 04:57 AM     01/09/2023 04:57 AM    .0 01/09/2023 04:57 AM     BMP:    Lab Results   Component Value Date/Time     01/09/2023 04:57 AM    K 4.1 01/09/2023 04:57 AM     01/09/2023 04:57 AM    CO2 18 01/09/2023 04:57 AM BUN 44 01/09/2023 04:57 AM    CREATININE 1.4 01/09/2023 04:57 AM    GLUCOSE 125 01/09/2023 04:57 AM    CALCIUM 8.8 01/09/2023 04:57 AM     Hepatic Function Panel:    Lab Results   Component Value Date/Time    ALKPHOS 116 01/07/2023 08:16 PM    AST 31 01/07/2023 08:16 PM    ALT 14 01/07/2023 08:16 PM    PROT 7.3 01/07/2023 08:16 PM    LABALBU 3.9 01/07/2023 08:16 PM    BILITOT 1.1 01/07/2023 08:16 PM     Magnesium:    Lab Results   Component Value Date/Time    MG 1.9 01/08/2023 10:57 AM     Cardiac Enzymes:   Lab Results   Component Value Date    CKTOTAL 594 (H) 01/08/2023     LDH:  No results found for: LDH  PT/INR:    Lab Results   Component Value Date/Time    PROTIME 12.8 01/07/2023 08:16 PM    INR 1.2 01/07/2023 08:16 PM     BNP: No results for input(s): BNP in the last 72 hours.    TSH:   Lab Results   Component Value Date    TSH 2.180 01/08/2023      Cardiac Injury Profile:   Recent Labs     01/08/23  1506   CKTOTAL 594*      Lipid Profile:   Lab Results   Component Value Date/Time    TRIG 89 01/08/2023 10:57 AM    HDL 58 01/08/2023 10:57 AM    LDLCALC 79 01/08/2023 10:57 AM    CHOL 155 01/08/2023 10:57 AM      Hemoglobin A1C: No components found for: HGBA1C   U/A:   Lab Results   Component Value Date/Time    LEUKOCYTESUR Negative 01/08/2023 10:57 AM    PHUR 5.5 01/08/2023 10:57 AM    WBCUA 2-5 01/08/2023 10:57 AM    RBCUA 5-10 01/08/2023 10:57 AM    BACTERIA FEW 01/08/2023 10:57 AM    SPECGRAV >=1.030 01/08/2023 10:57 AM    BLOODU TRACE-INTACT 01/08/2023 10:57 AM    GLUCOSEU Negative 01/08/2023 10:57 AM         ADMISSION SCHEDULED MEDS:   Current Facility-Administered Medications   Medication Dose Route Frequency Provider Last Rate Last Admin    ipratropium-albuterol (DUONEB) nebulizer solution 1 ampule  1 ampule Inhalation Q4H PRN Samer Mariah Gutierrez MD   1 ampule at 01/09/23 0609    heparin (porcine) injection 1,770 Units  30 Units/kg IntraVENous PRN Lonny Lu MD   1,770 Units at 01/09/23 6292 heparin 25,000 units in dextrose 5% 250 mL (premix) infusion  5-30 Units/kg/hr IntraVENous Continuous Disha Kearns MD 10.6 mL/hr at 01/09/23 0756 18 Units/kg/hr at 01/09/23 0756    heparin (porcine) injection 3,540 Units  60 Units/kg IntraVENous PRN Disha Kearns MD        cefTRIAXone (ROCEPHIN) 1,000 mg in sterile water 10 mL IV syringe  1,000 mg IntraVENous Q24H Carlosr Brandon Kearns MD   1,000 mg at 01/08/23 2324    doxycycline hyclate (VIBRAMYCIN) capsule 100 mg  100 mg Oral 2 times per day Giuseppe Khoury MD   100 mg at 01/08/23 2113    sodium chloride flush 0.9 % injection 10 mL  10 mL IntraVENous 2 times per day Giuseppe Khoury MD   10 mL at 01/08/23 2325    sodium chloride flush 0.9 % injection 10 mL  10 mL IntraVENous PRN Giuseppe Khoury MD        0.9 % sodium chloride infusion   IntraVENous PRN Carlosr Brandon Kearns MD        ondansetron (ZOFRAN-ODT) disintegrating tablet 4 mg  4 mg Oral Q8H PRN Disha Kearns MD        Or    [facility] injection 4 mg  4 mg IntraVENous Q6H PRN Disha Kearns MD        magnesium hydroxide (MILK OF MAGNESIA) 400 MG/5ML suspension 30 mL  30 mL Oral Daily PRN Disha Kearns MD        acetaminophen (TYLENOL) tablet 650 mg  650 mg Oral Q6H PRN Disha Kearns MD        Or    acetaminophen (TYLENOL) suppository 650 mg  650 mg Rectal Q6H PRN Disha Kearns MD        dextrose 5 % in lactated ringers infusion   IntraVENous Continuous Florecita Carmona  mL/hr at 01/09/23 0757 Rate Verify at 01/09/23 0757    aspirin EC tablet 81 mg  81 mg Oral Daily JYOTSNA Castro   81 mg at 01/08/23 1338    metoprolol succinate (TOPROL XL) extended release tablet 12.5 mg  12.5 mg Oral BID JYOTSNA Castro   12.5 mg at 01/08/23 2113    atorvastatin (LIPITOR) tablet 20 mg  20 mg Oral Nightly Frankie Rowe.  JYOTSNA Montano   20 mg at 01/08/23 2113     Current Outpatient Medications   Medication Sig Dispense Refill    hydroCHLOROthiazide (MICROZIDE) 12.5 MG capsule Take 12.5 mg by mouth 2 times daily      amLODIPine (NORVASC) 5 MG tablet Take 5 mg by mouth daily      mirtazapine (REMERON) 15 MG tablet Take 15 mg by mouth nightly         Current  Infusions   heparin (PORCINE) Infusion 18 Units/kg/hr (01/09/23 0756)    sodium chloride      dextrose 5% in lactated ringers 100 mL/hr at 01/09/23 0757       Prn Meds  ipratropium-albuterol, heparin (porcine), heparin (porcine), sodium chloride flush, sodium chloride, ondansetron **OR** ondansetron, magnesium hydroxide, acetaminophen **OR** acetaminophen    Radiology Review:  US RETROPERITONEAL COMPLETE   Final Result   Findings suggestive of medical renal disease without obstruction. CT HEAD WO CONTRAST   Final Result   No acute intracranial abnormality. RECOMMENDATIONS:   Careful clinical correlation and follow up recommended. XR CHEST PORTABLE   Final Result   Moderate left lower lobe pneumonia. Small left pleural effusion. Bilateral lung hyperinflation. Mild cardiomegaly. ASSESSMENT:  Active diagnoses treated at this admission:  Altered mental status-resolved. Metabolic encephalopathy  ALDO improving with hydration  Initial electrocardiogram suggestive of atrial fibrillation with RVR.   NSTEMI versus demand ischemia related to atrial fibrillation  Left lower lobe pneumonia per chest x-ray  High anion gap metabolic acidosis in the context of renal insufficiency versus dehydration      Problem list:  Patient Active Problem List   Diagnosis    AMS (altered mental status)    NSTEMI (non-ST elevated myocardial infarction) ([facility] [address]       PLAN:  Orders were entered at the time of admission and reviewed  Cardiology consult pending continue hydration  Patient is on heparin and asymptomatic  Will order gentle careful hydration  Monitor electrolytes and renal function  Continue to trend troponin following initial elevation appears to be plateaued  Initiated treatment for community-acquired pneumonia to continue: Procalcitonin elevated  In the context of possible cardiac insult assess hemoglobin A1c and lipid profile    Overall the patient is improving  Pending the diagnosis of NSTEMI versus demand ischemia both in the context of acute renal insufficiency interpretation with caution    See  Orders  Sherman Fields MD, [address]  8:05 AM  1/9/2023

## 2023-01-09 NOTE — PROGRESS NOTES
Inpatient Cardiology Progress note     PATIENT IS BEING FOLLOWED FOR: NSTEMI    Azalea Garzon is a [de-identified] y.o. female who is not previously known to [facility] cardiology prior to this admission. She was initially seen in consultation by Dr Radha Narvaez 1/8/2023. Wednesday 1/4/2023 went to dinner with son and was \"fine  Mother did not answer phone when son called Friday 1/6/2023 1/7/2023 in evening son went over to check on his mother. Per son patient was sitting on side of bed staring @ TV. Tried to talk with her, didn't seem herself. Was not finishing her sentences. [facility] 1/7/[facility]23 for AMS (son requested) transported by EMS. Per ED physician patient was A/O x 4 on arrival Denies CP, SOB or Abdominal pain. GCS=14  BP 95/87 's AF RVR, RR 35, afebrile, and O2 saturation 95% on RA--> dropped to 909% and placed on 2 liters NC  ECG AF RVR  CT head nothing acute. CXR read as possible pneumonia.  IV ATB's, BC x 2 drawn     Na 140, K+ 3.8, Bun/Cr 41/2.3, lactic acid 2.1, p-BNP 1608, troponin 115-->244-->261, Alk phos 116, WNL 11.2, Hgb 12.1, PLt 351, Neutrophils 82.6, INR 1.2, COVID/Influenza negative     2 liters NSS, Heparin bolus and gtt      TTE ordered by primary service    Past Medical History:    HTN  Longstanding tobacco abuse currently 1/2 ppd but has smoked upwards of 1 ppd (has been smoking since her 20's)  Denies any previous pulmonary work-up   COVID Vaccine x 3 ([facility] Stone)  Denies having COVID during pandemic  Denies CAD, DM, HLD       SUBJECTIVE: denies CP or SOB  OBJECTIVE: Laying flat in no apparent distress     ROS:  Consist: Denies fevers, chills or night sweats  Heart: Denies chest pain, palpitations, lightheadedness, dizziness or syncope  Lungs: Denies SOB, cough, wheezing, orthopnea or PND  GI: Denies abdominal pain, vomiting or diarrhea    PHYSICAL EXAM:   BP (!) 109/51   Pulse 67   Temp 98.7 °F (37.1 °C) (Oral)   Resp 26   Ht 5' 8\" (1.727 m)   Wt 130 lb (59 kg)   SpO2 93%   BMI 19.77 kg/m²    B/P Range last 24 hours: Systolic (21XGK), JULIO:247 , Min:98 , MELODY:361    Diastolic (18RUD), GGU:83, Min:49, Max:90    CONST: Well developed, well nourished female who appears of stated age. Awake, alert and cooperative. No apparent distress  HEENT:   Head- Normocephalic, atraumatic   Eyes- Conjunctivae pink, anicteric  Throat- Oral mucosa pink and moist  Neck-  No stridor, trachea midline, no jugular venous distention. No carotid bruit  CHEST: Chest symmetrical and non-tender to palpation. No accessory muscle use or intercostal retractions  RESPIRATORY:  Lung sounds - fairly clear bilaterally   CARDIOVASCULAR:     Heart Inspection- shows no noted pulsations  Heart Palpation- no heaves or thrills; PMI is non-displaced   Heart Ausculation- Regular rate and rhythm, no murmur. No s3, s4 or rub   PV: No lower extremity edema. No varicosities. Pedal pulses palpable, no clubbing or cyanosis   ABDOMEN: Soft, non-tender to light palpation. Bowel sounds present. No palpable masses no organomegaly; no abdominal bruit  MS: Good muscle strength and tone. No atrophy or abnormal movements. : Deferred  SKIN: Warm and dry no statis dermatitis or ulcers   NEURO / PSYCH: Oriented to person, place and time. Speech clear and appropriate. Follows all commands.  Pleasant affect       Intake/Output Summary (Last 24 hours) at 1/9/2023 0818  Last data filed at 1/8/2023 2325  Gross per 24 hour   Intake 10 ml   Output --   Net 10 ml       Weight:   Wt Readings from Last 3 Encounters:   01/07/23 130 lb (59 kg)     Current Inpatient Medications:   cefTRIAXone (ROCEPHIN) IV  1,000 mg IntraVENous Q24H    doxycycline hyclate  100 mg Oral 2 times per day    sodium chloride flush  10 mL IntraVENous 2 times per day    aspirin  81 mg Oral Daily    metoprolol succinate  12.5 mg Oral BID    atorvastatin  20 mg Oral Nightly       IV Infusions (if any):   heparin (PORCINE) Infusion 18 Units/kg/hr (01/09/23 5092)    sodium chloride      dextrose 5% in lactated ringers 100 mL/hr at 23 0757       DIAGNOSTIC/ LABORATORY DATA:  Labs:   CBC:   Recent Labs     23  0457   WBC 11.2 12.5*   HGB 12.1 10.8*   HCT 35.4 33.8*    322     BMP:   Recent Labs     23  1057 23  0457    139   K 3.8 4.1   CO2 19* 18*   BUN 48* 44*   CREATININE 1.8* 1.4*   LABGLOM 28 38   CALCIUM 7.9* 8.8     Mag:   Recent Labs     23  1057   MG 1.9     Phos:   Recent Labs     23  1057   PHOS 4.3     TFT:   Lab Results   Component Value Date    TSH 2.180 2023    T4FREE 1.04 2023      HgA1c:   Lab Results   Component Value Date    LABA1C 5.8 (H) 2023       PT/INR:   Recent Labs     23   PROTIME 12.8*   INR 1.2     APTT:  Recent Labs     23  2318 23  0636   APTT 48.0* 40.2*     CARDIAC ENZYMES:  Recent Labs     23  2016 23  2352 23  0134 23  0800 23  1506   CKTOTAL  --   --   --   --  594*   TROPHS 115* 244* 261* 264* 241*     FASTING LIPID PANEL:  Lab Results   Component Value Date/Time    CHOL 155 2023 10:57 AM    HDL 58 2023 10:57 AM    LDLCALC 79 2023 10:57 AM    TRIG 89 2023 10:57 AM     LIVER PROFILE:  Recent Labs     23  2016   AST 31   ALT 14   LABALBU 3.9     CT head:  Impression   No acute intracranial abnormality. US retroperitoneal:  Impression   Findings suggestive of medical renal disease without obstruction. CXR:   Impression   Moderate left lower lobe pneumonia. Small left pleural effusion. Bilateral lung hyperinflation. Mild cardiomegaly. 12 lead EK2023: Sinus rhythm with low voltage QRS, septal infarct. Ventricular rate 70, OH interval 180, QRS duration 64, QTc 436. Echo 2023:   Technically limited study. Left ventricle is normal in size . Normal left ventricle wall thickness. No regional wall motion abnormalities seen.    Ejection fraction is visually estimated at 75-80%. There is doppler evidence of stage I diastolic dysfunction. Normal right ventricular size and function. Normal sized left atrium. Possibly mildly dilated right atrium. Mild aortic regurgitation   Mild tricuspid regurgitation. Moderate pulmonary hypertension. Telemetry: SR    ASSESSMENT:   Pneumonia  Leukocytosis  Elevated pro calcitonin  Mild anemia  NSTEMI ? Type II vs type I. No ischemic ECG changes. Denies CP  PAF, converted and maintaining SR  Elevated Pro BNP   ALDO versus ALDO on CKD       PLAN:  Continue current cardiac medications  Would not diurese.  Would not give fluids  Rest as per the primary service +/- other consultants  Dmitri Perez nuclear stress test ( once over the acute ilnness / pneumonia ) for cardiac risk stratification  Will follow    Electronically signed by Bailey Daniels MD on 1/9/2023 at 8:18 AM

## 2023-01-09 NOTE — ED NOTES
Patient incontinent of bowel. Patient cleaned up with new bed linens and gown provided.      Mikle Goodell, RN  01/09/23 4136

## 2023-01-09 NOTE — PROGRESS NOTES
Department of Internal Medicine  Nephrology  Consult Note    Events reviewed. SUBJECTIVE: We are following Mrs. Trice Anthony for ALDO. Reports feeling much better.     PHYSICAL EXAM:      Vitals:    VITALS:  BP 88/65   Pulse 74   Temp 97.8 °F (36.6 °C) (Temporal)   Resp 18   Ht 5' 8\" (1.727 m)   Wt 130 lb (59 kg)   SpO2 92%   BMI 19.77 kg/m²   24HR INTAKE/OUTPUT:    Intake/Output Summary (Last 24 hours) at 1/9/2023 1720  Last data filed at 1/9/2023 1143  Gross per 24 hour   Intake 2417.1 ml   Output --   Net 2417.1 ml       Constitutional:  Alert and oriented, NAD  HEENT:  Normocephalic, PERRL, dry mucous membranes  Respiratory:  Diminished lung sounds  Cardiovascular/Edema:  IRRR, S1,S2  Gastrointestinal:  Soft, flat, nontender, nondistended  Neurologic:  Nonfocal, CRAIG  Skin:  Warm, dry, intact  Other:  No edema     Scheduled Meds:   cefTRIAXone (ROCEPHIN) IV  1,000 mg IntraVENous Q24H    doxycycline hyclate  100 mg Oral 2 times per day    sodium chloride flush  10 mL IntraVENous 2 times per day    aspirin  81 mg Oral Daily    metoprolol succinate  12.5 mg Oral BID    atorvastatin  20 mg Oral Nightly     Continuous Infusions:   heparin (PORCINE) Infusion 18 Units/kg/hr (01/09/23 1142)    sodium chloride      dextrose 5% in lactated ringers 100 mL/hr at 01/09/23 1143     PRN Meds:.ipratropium-albuterol, heparin (porcine), heparin (porcine), sodium chloride flush, sodium chloride, ondansetron **OR** ondansetron, magnesium hydroxide, acetaminophen **OR** acetaminophen      DATA:    CBC:   Lab Results   Component Value Date/Time    WBC 12.5 01/09/2023 04:57 AM    RBC 3.38 01/09/2023 04:57 AM    HGB 10.8 01/09/2023 04:57 AM    HCT 33.8 01/09/2023 04:57 AM    .0 01/09/2023 04:57 AM    MCH 32.0 01/09/2023 04:57 AM    MCHC 32.0 01/09/2023 04:57 AM    RDW 14.2 01/09/2023 04:57 AM     01/09/2023 04:57 AM    MPV 9.1 01/09/2023 04:57 AM     CMP:    Lab Results   Component Value Date/Time     01/09/2023 04:57 AM    K 4.1 01/09/2023 04:57 AM     01/09/2023 04:57 AM    CO2 18 01/09/2023 04:57 AM    BUN 44 01/09/2023 04:57 AM    CREATININE 1.4 01/09/2023 04:57 AM    LABGLOM 38 01/09/2023 04:57 AM    GLUCOSE 125 01/09/2023 04:57 AM    PROT 7.3 01/07/2023 08:16 PM    LABALBU 3.9 01/07/2023 08:16 PM    CALCIUM 8.8 01/09/2023 04:57 AM    BILITOT 1.1 01/07/2023 08:16 PM    ALKPHOS 116 01/07/2023 08:16 PM    AST 31 01/07/2023 08:16 PM    ALT 14 01/07/2023 08:16 PM     Magnesium:    Lab Results   Component Value Date/Time    MG 1.9 01/08/2023 10:57 AM     Phosphorus:    Lab Results   Component Value Date/Time    PHOS 4.3 01/08/2023 10:57 AM     Radiology Review:      Kidney ultrasound October 8, 2023     FINDINGS:       Kidneys: The right kidney measures 9.6 cm in length and the left kidney measures 9.2   cm in length. There is no hydronephrosis. A suggestion of increased renal echogenicity. 1.6 cm right renal cyst noted           Bladder:       Volume of 302 cc without obvious mass   Impression:  Findings suggestive of medical renal disease without obstruction. CT head without IV contrast 1/8/23   No acute intracranial abnormality. Chest x-ray January 7, 2023   Moderate left lower lobe pneumonia. Small left pleural effusion. Bilateral lung hyperinflation. Mild cardiomegaly. BRIEF SUMMARY OF INITIAL CONSULT:    Briefly Mrs. Francy Jiang is a [de-identified] female with history of HTN, who was admitted on January 7, 2023 after she was brought by her son for evaluation of altered mental status. In the ER she was found to have AF with RVR. CT head scan was negative and her chest x-ray showed moderate left lower lobe pneumonia. As well she was found to have elevated BUN/creatinine of 41 and 2.3 mg/L respectively, reason for this consultation. Her proBNP was 1608. There is no previous baseline creatinine level to compare.   Prior to admission her medications included HCTZ and amlodipine. Problems resolved: Altered mental status, 2/2 #4 and #1    IMPRESSION/RECOMMENDATIONS:      ALDO stage I on CKD, probably volume responsive prerenal ALDO (poor oral intake) in the setting of HCTZ administration. Urine specific gravity >1.030. Renal function continues to improve with IV fluid administration, creatinine down to 1.4 mg/dL. CKD stage III, kidney ultrasound with increased echogenicity. Mixed metabolic acidosis, HAGMA, bicarbonate level 18, anion gap 21, 2/2 uremia and probably starvation ketoacidosis, and NAGMA (NS administration?,  Urinary losses of potential bicarb-ketonuria?). Lactic acid level 2.1. Bicarbonate levels are stable otherwise improved anion gap.   History of HTN, holding BP medications  Elevated proBNP, 1608> 4372, heart failure?  --------------------------------------------------------  Left lower lobe pneumonia, on ceftriaxone and doxycycline  Atrial fibrillation with RVR, new onset versus permanent?,  On heparin drip  NSTEMI, cardiology consulted     Plan:       Discontinue IV fluids  Obtain urine albumin/creatinine and urine protein/creatinine ratios  Continue to monitor renal function  Monitor bicarbonate levels        Electronically signed by Marcial Rodríguez MD on 1/9/2023 at 5:20 PM

## 2023-01-10 ENCOUNTER — APPOINTMENT (OUTPATIENT)
Dept: GENERAL RADIOLOGY | Age: 83
DRG: 280 | End: 2023-01-10
Payer: MEDICARE

## 2023-01-10 LAB
ANION GAP SERPL CALCULATED.3IONS-SCNC: 11 MMOL/L (ref 7–16)
ANION GAP SERPL CALCULATED.3IONS-SCNC: 11 MMOL/L (ref 7–16)
APTT: 67.9 SEC (ref 24.5–35.1)
APTT: 67.9 SEC (ref 24.5–35.1)
BUN BLDV-MCNC: 28 MG/DL (ref 6–23)
BUN BLDV-MCNC: 28 MG/DL (ref 6–23)
CALCIUM SERPL-MCNC: 8.6 MG/DL (ref 8.6–10.2)
CALCIUM SERPL-MCNC: 8.6 MG/DL (ref 8.6–10.2)
CHLORIDE BLD-SCNC: 108 MMOL/L (ref 98–107)
CHLORIDE BLD-SCNC: 108 MMOL/L (ref 98–107)
CO2: 23 MMOL/L (ref 22–29)
CO2: 23 MMOL/L (ref 22–29)
CREAT SERPL-MCNC: 1.1 MG/DL (ref 0.5–1)
CREAT SERPL-MCNC: 1.1 MG/DL (ref 0.5–1)
GFR SERPL CREATININE-BSD FRML MDRD: 51 ML/MIN/1.73
GFR SERPL CREATININE-BSD FRML MDRD: 51 ML/MIN/1.73
GLUCOSE BLD-MCNC: 126 MG/DL (ref 74–99)
GLUCOSE BLD-MCNC: 126 MG/DL (ref 74–99)
POTASSIUM REFLEX MAGNESIUM: 3.8 MMOL/L (ref 3.5–5)
POTASSIUM REFLEX MAGNESIUM: 3.8 MMOL/L (ref 3.5–5)
PRO-BNP: 7749 PG/ML (ref 0–450)
PRO-BNP: 7749 PG/ML (ref 0–450)
SODIUM BLD-SCNC: 142 MMOL/L (ref 132–146)
SODIUM BLD-SCNC: 142 MMOL/L (ref 132–146)
URINE CULTURE, ROUTINE: NORMAL
URINE CULTURE, ROUTINE: NORMAL

## 2023-01-10 PROCEDURE — 6370000000 HC RX 637 (ALT 250 FOR IP): Performed by: INTERNAL MEDICINE

## 2023-01-10 PROCEDURE — 94640 AIRWAY INHALATION TREATMENT: CPT

## 2023-01-10 PROCEDURE — 83880 ASSAY OF NATRIURETIC PEPTIDE: CPT

## 2023-01-10 PROCEDURE — 6360000002 HC RX W HCPCS: Performed by: INTERNAL MEDICINE

## 2023-01-10 PROCEDURE — 71045 X-RAY EXAM CHEST 1 VIEW: CPT

## 2023-01-10 PROCEDURE — 99232 SBSQ HOSP IP/OBS MODERATE 35: CPT | Performed by: INTERNAL MEDICINE

## 2023-01-10 PROCEDURE — 80048 BASIC METABOLIC PNL TOTAL CA: CPT

## 2023-01-10 PROCEDURE — 2060000000 HC ICU INTERMEDIATE R&B

## 2023-01-10 PROCEDURE — 85730 THROMBOPLASTIN TIME PARTIAL: CPT

## 2023-01-10 PROCEDURE — 2700000000 HC OXYGEN THERAPY PER DAY

## 2023-01-10 PROCEDURE — 2580000003 HC RX 258: Performed by: INTERNAL MEDICINE

## 2023-01-10 PROCEDURE — 36415 COLL VENOUS BLD VENIPUNCTURE: CPT

## 2023-01-10 PROCEDURE — 6370000000 HC RX 637 (ALT 250 FOR IP): Performed by: NURSE PRACTITIONER

## 2023-01-10 RX ORDER — HEPARIN SODIUM 10000 [USP'U]/ML
5000 INJECTION, SOLUTION INTRAVENOUS; SUBCUTANEOUS EVERY 8 HOURS
Status: DISCONTINUED | OUTPATIENT
Start: 2023-01-10 | End: 2023-01-13 | Stop reason: HOSPADM

## 2023-01-10 RX ORDER — FUROSEMIDE 10 MG/ML
20 INJECTION INTRAMUSCULAR; INTRAVENOUS ONCE
Status: COMPLETED | OUTPATIENT
Start: 2023-01-10 | End: 2023-01-10

## 2023-01-10 RX ADMIN — DOXYCYCLINE HYCLATE 100 MG: 100 CAPSULE ORAL at 21:15

## 2023-01-10 RX ADMIN — METOPROLOL SUCCINATE 12.5 MG: 25 TABLET, EXTENDED RELEASE ORAL at 09:38

## 2023-01-10 RX ADMIN — SODIUM CHLORIDE, PRESERVATIVE FREE 10 ML: 5 INJECTION INTRAVENOUS at 21:16

## 2023-01-10 RX ADMIN — FUROSEMIDE 20 MG: 10 INJECTION, SOLUTION INTRAMUSCULAR; INTRAVENOUS at 13:35

## 2023-01-10 RX ADMIN — METOPROLOL SUCCINATE 12.5 MG: 25 TABLET, EXTENDED RELEASE ORAL at 21:15

## 2023-01-10 RX ADMIN — ASPIRIN 81 MG: 81 TABLET, COATED ORAL at 09:38

## 2023-01-10 RX ADMIN — HEPARIN SODIUM 18 UNITS/KG/HR: 10000 INJECTION, SOLUTION INTRAVENOUS at 05:46

## 2023-01-10 RX ADMIN — DOXYCYCLINE HYCLATE 100 MG: 100 CAPSULE ORAL at 09:38

## 2023-01-10 RX ADMIN — ATORVASTATIN CALCIUM 20 MG: 20 TABLET, FILM COATED ORAL at 21:15

## 2023-01-10 RX ADMIN — HEPARIN SODIUM 5000 UNITS: 10000 INJECTION INTRAVENOUS; SUBCUTANEOUS at 13:35

## 2023-01-10 RX ADMIN — IPRATROPIUM BROMIDE AND ALBUTEROL SULFATE 1 AMPULE: .5; 2.5 SOLUTION RESPIRATORY (INHALATION) at 17:30

## 2023-01-10 RX ADMIN — CEFTRIAXONE 1000 MG: 1 INJECTION, POWDER, FOR SOLUTION INTRAMUSCULAR; INTRAVENOUS at 22:46

## 2023-01-10 RX ADMIN — IPRATROPIUM BROMIDE AND ALBUTEROL SULFATE 1 AMPULE: .5; 2.5 SOLUTION RESPIRATORY (INHALATION) at 23:32

## 2023-01-10 RX ADMIN — HEPARIN SODIUM 5000 UNITS: 10000 INJECTION INTRAVENOUS; SUBCUTANEOUS at 21:16

## 2023-01-10 ASSESSMENT — PAIN SCALES - GENERAL: PAINLEVEL_OUTOF10: 0

## 2023-01-10 NOTE — CARE COORDINATION
SOCIAL WORK/CASEMANAGEMENT TRANSITION OF CARE PLANNINGEstefania Butler, [address]):  [place] with 1 step to enter thru the garage. She was independent with no hhc or dme pta. She has a pcp she said. Pt has 2 grown children in the are who work. Pt plans on home at discharge.  CATRACHITA Crook  1/10/2023

## 2023-01-10 NOTE — PLAN OF CARE
Problem: Discharge Planning  Goal: Discharge to home or other facility with appropriate resources  Outcome: Progressing  Flowsheets (Taken 1/9/2023 3269 by Laretta Haste Wilms, RN)  Discharge to home or other facility with appropriate resources: Identify barriers to discharge with patient and caregiver     Problem: Safety - Adult  Goal: Free from fall injury  Outcome: Progressing

## 2023-01-10 NOTE — PROGRESS NOTES
Inpatient Cardiology Progress note     PATIENT IS BEING FOLLOWED FOR: NSTEMI    Med Romeo is a [de-identified] y.o. female who is not previously known to [facility] cardiology prior to this admission. She was initially seen in consultation by Dr Merly Conti 1/8/2023. Wednesday 1/4/2023 went to dinner with son and was \"fine  Mother did not answer phone when son called Friday 1/6/2023 1/7/2023 in evening son went over to check on his mother. Per son patient was sitting on side of bed staring @ TV. Tried to talk with her, didn't seem herself. Was not finishing her sentences. [facility] 1/7/2023 for AMS (son requested) transported by EMS. Per ED physician patient was A/O x 4 on arrival Denies CP, SOB or Abdominal pain. GCS=14  BP 95/87 's AF RVR, RR 35, afebrile, and O2 saturation 95% on RA--> dropped to 909% and placed on 2 liters NC  ECG AF RVR  CT head nothing acute. CXR read as possible pneumonia. IV ATB's, BC x 2 drawn     Na 140, K+ 3.8, Bun/Cr 41/2.3, lactic acid 2.1, p-BNP 1608, troponin 115-->244-->261, Alk phos 116, WNL 11.2, Hgb 12.1, PLt 351, Neutrophils 82.6, INR 1.2, COVID/Influenza negative     2 liters NSS, Heparin bolus and gtt      TTE ordered by primary service    Past Medical History:    HTN  Longstanding tobacco abuse currently 1/2 ppd but has smoked upwards of 1 ppd (has been smoking since her 20's)  Denies any previous pulmonary work-up   COVID Vaccine x 3 (Gandara Chase)  Denies having COVID during pandemic  Denies CAD, DM, HLD       SUBJECTIVE: denies CP. Denies SOB ( although breathing appears somewhat labored and she appears to be using accessory muscles to breath )    OBJECTIVE: Laying flat in bed: breathing appears somewhat labored and she appears to be using accessory muscles to breath.  On 4L O2 NC with pulse Ox = 94%    ROS:  Consist: Denies fevers, chills or night sweats  Heart: Denies chest pain, palpitations, lightheadedness, dizziness or syncope  Lungs: Denies SOB, cough, wheezing, orthopnea or PND  GI: Denies abdominal pain, vomiting or diarrhea    PHYSICAL EXAM:   /71   Pulse 67   Temp 97.2 °F (36.2 °C) (Temporal)   Resp 15   Ht 5' 8\" (1.727 m)   Wt 142 lb (64.4 kg)   SpO2 94%   BMI 21.59 kg/m²    B/P Range last 24 hours: Systolic (73XZZ), IUO:624 , Min:88 , GGT:344    Diastolic (59FBJ), FMO:88, Min:48, Max:71    CONST: Well developed, well nourished female who appears of stated age. Awake, alert and cooperative. Ill appearing. ANo apparent distress  HEENT:   Head- Normocephalic, atraumatic   Eyes- Conjunctivae pink, anicteric  Throat- Oral mucosa pink and moist  Neck-  No stridor, trachea midline, no jugular venous distention. No carotid bruit  CHEST: Chest symmetrical and non-tender to palpation. No accessory muscle use or intercostal retractions  RESPIRATORY:  Lung sounds - expiratory wheezes in the lower lung files with crackles left base  CARDIOVASCULAR:     Heart Inspection- shows no noted pulsations  Heart Palpation- no heaves or thrills; PMI is non-displaced   Heart Ausculation- Regular rate and rhythm, no murmur. No s3, s4 or rub   PV: No lower extremity edema. No varicosities. Pedal pulses palpable, no clubbing or cyanosis   ABDOMEN: Soft, non-tender to light palpation. Bowel sounds present. No palpable masses no organomegaly; no abdominal bruit  MS: Good muscle strength and tone. No atrophy or abnormal movements. : Deferred  SKIN: Warm and dry no statis dermatitis or ulcers   NEURO / PSYCH: Oriented to person, place and time. Speech clear and appropriate. Follows all commands.  Pleasant affect       Intake/Output Summary (Last 24 hours) at 1/10/2023 1111  Last data filed at 1/9/2023 1143  Gross per 24 hour   Intake 2407.1 ml   Output --   Net 2407.1 ml       Weight:   Wt Readings from Last 3 Encounters:   01/10/23 142 lb (64.4 kg)     Current Inpatient Medications:   cefTRIAXone (ROCEPHIN) IV  1,000 mg IntraVENous Q24H    doxycycline hyclate  100 mg Oral 2 times per day    sodium chloride flush  10 mL IntraVENous 2 times per day    aspirin  81 mg Oral Daily    metoprolol succinate  12.5 mg Oral BID    atorvastatin  20 mg Oral Nightly       IV Infusions (if any):   heparin (PORCINE) Infusion 18 Units/kg/hr (01/10/23 0546)    sodium chloride         DIAGNOSTIC/ LABORATORY DATA:  Labs:   CBC:   Recent Labs     237   WBC 11.2 12.5*   HGB 12.1 10.8*   HCT 35.4 33.8*    322     BMP:   Recent Labs     23  0457 01/10/23  0532    142   K 4.1 3.8   CO2 18* 23   BUN 44* 28*   CREATININE 1.4* 1.1*   LABGLOM 38 51   CALCIUM 8.8 8.6     Mag:   Recent Labs     23  1057   MG 1.9     Phos:   Recent Labs     23  1057   PHOS 4.3     TFT:   Lab Results   Component Value Date    TSH 2.180 2023    T4FREE 1.04 2023      HgA1c:   Lab Results   Component Value Date    LABA1C 5.8 (H) 2023       PT/INR:   Recent Labs     23   PROTIME 12.8*   INR 1.2     APTT:  Recent Labs     01/09/23  1929 01/10/23  0532   APTT 63.7* 67.9*     CARDIAC ENZYMES:  Recent Labs     23  2352 23  0134 23  0800 23  1506   CKTOTAL  --   --   --   --  594*   TROPHS 115* 244* 261* 264* 241*     FASTING LIPID PANEL:  Lab Results   Component Value Date/Time    CHOL 155 2023 10:57 AM    HDL 58 2023 10:57 AM    LDLCALC 79 2023 10:57 AM    TRIG 89 2023 10:57 AM     LIVER PROFILE:  Recent Labs     23   AST 31   ALT 14   LABALBU 3.9     CT head:  Impression   No acute intracranial abnormality. US retroperitoneal:  Impression   Findings suggestive of medical renal disease without obstruction. CXR:   Impression   Moderate left lower lobe pneumonia. Small left pleural effusion. Bilateral lung hyperinflation. Mild cardiomegaly. 12 lead EK2023: Sinus rhythm with low voltage QRS, septal infarct.   Ventricular rate 70, AR interval 180, QRS duration 64, QTc 436. Echo 1/9/2023:   Technically limited study. Left ventricle is normal in size . Normal left ventricle wall thickness. No regional wall motion abnormalities seen. Ejection fraction is visually estimated at 75-80%. There is doppler evidence of stage I diastolic dysfunction. Normal right ventricular size and function. Normal sized left atrium. Possibly mildly dilated right atrium. Mild aortic regurgitation   Mild tricuspid regurgitation. Moderate pulmonary hypertension. Telemetry: SR    ASSESSMENT:   Pneumonia  Leukocytosis  Elevated pro calcitonin  Mild anemia  ? NSTEMI ( elevated troponin in the context of ALDO ) ? Type II vs type I. No ischemic ECG changes.  Denies CP  PAF, converted and maintaining SR  Elevated Pro BNP   ALDO versus ALDO on CKD       PLAN:  Discontinue IV fluids  Check CXR  Check BNP  Consider diuresis  Stop IV Heparin ---> SQ heparin for DVT prophylaxis  Rest of cardiac medications same  Rest as per the primary service +/- other consultants  Rafia Mccord nuclear stress test, once over the acute ilnness / pneumonia, and prior to discharge, for cardiac risk stratification  Will follow    Electronically signed by Edu Goncalves MD on 1/10/2023 at 11:11 AM

## 2023-01-10 NOTE — PROGRESS NOTES
Department of Internal Medicine  Nephrology  Consult Note    Events reviewed. SUBJECTIVE: We are following Mrs. Tee Isaac for ALDO. Reports feeling much better.     PHYSICAL EXAM:      Vitals:    VITALS:  /71   Pulse 67   Temp 97.2 °F (36.2 °C) (Temporal)   Resp 15   Ht 5' 8\" (1.727 m)   Wt 142 lb (64.4 kg)   SpO2 94%   BMI 21.59 kg/m²   24HR INTAKE/OUTPUT:    Intake/Output Summary (Last 24 hours) at 1/10/2023 0843  Last data filed at 1/9/2023 1143  Gross per 24 hour   Intake 2407.1 ml   Output --   Net 2407.1 ml         Constitutional:  Alert and oriented, NAD  HEENT:  Normocephalic, PERRL, dry mucous membranes  Respiratory:  Diminished lung sounds  Cardiovascular/Edema:  IRRR, S1,S2  Gastrointestinal:  Soft, flat, nontender, nondistended  Neurologic:  Nonfocal, CRAIG  Skin:  Warm, dry, intact  Other:  No edema     Scheduled Meds:   cefTRIAXone (ROCEPHIN) IV  1,000 mg IntraVENous Q24H    doxycycline hyclate  100 mg Oral 2 times per day    sodium chloride flush  10 mL IntraVENous 2 times per day    aspirin  81 mg Oral Daily    metoprolol succinate  12.5 mg Oral BID    atorvastatin  20 mg Oral Nightly     Continuous Infusions:   heparin (PORCINE) Infusion 18 Units/kg/hr (01/10/23 0546)    sodium chloride      dextrose 5% in lactated ringers 100 mL/hr at 01/09/23 1143     PRN Meds:.ipratropium-albuterol, heparin (porcine), heparin (porcine), sodium chloride flush, sodium chloride, ondansetron **OR** ondansetron, magnesium hydroxide, acetaminophen **OR** acetaminophen      DATA:    CBC:   Lab Results   Component Value Date/Time    WBC 12.5 01/09/2023 04:57 AM    RBC 3.38 01/09/2023 04:57 AM    HGB 10.8 01/09/2023 04:57 AM    HCT 33.8 01/09/2023 04:57 AM    .0 01/09/2023 04:57 AM    MCH 32.0 01/09/2023 04:57 AM    MCHC 32.0 01/09/2023 04:57 AM    RDW 14.2 01/09/2023 04:57 AM     01/09/2023 04:57 AM    MPV 9.1 01/09/2023 04:57 AM     CMP:    Lab Results   Component Value Date/Time     01/10/2023 05:32 AM    K 3.8 01/10/2023 05:32 AM     01/10/2023 05:32 AM    CO2 23 01/10/2023 05:32 AM    BUN 28 01/10/2023 05:32 AM    CREATININE 1.1 01/10/2023 05:32 AM    LABGLOM 51 01/10/2023 05:32 AM    GLUCOSE 126 01/10/2023 05:32 AM    PROT 7.3 01/07/2023 08:16 PM    LABALBU 3.9 01/07/2023 08:16 PM    CALCIUM 8.6 01/10/2023 05:32 AM    BILITOT 1.1 01/07/2023 08:16 PM    ALKPHOS 116 01/07/2023 08:16 PM    AST 31 01/07/2023 08:16 PM    ALT 14 01/07/2023 08:16 PM     Magnesium:    Lab Results   Component Value Date/Time    MG 1.9 01/08/2023 10:57 AM     Phosphorus:    Lab Results   Component Value Date/Time    PHOS 4.3 01/08/2023 10:57 AM     Radiology Review:      Kidney ultrasound October 8, 2023     FINDINGS:       Kidneys: The right kidney measures 9.6 cm in length and the left kidney measures 9.2   cm in length. There is no hydronephrosis. A suggestion of increased renal echogenicity. 1.6 cm right renal cyst noted           Bladder:       Volume of 302 cc without obvious mass   Impression:  Findings suggestive of medical renal disease without obstruction. CT head without IV contrast 1/8/23   No acute intracranial abnormality. Chest x-ray January 7, 2023   Moderate left lower lobe pneumonia. Small left pleural effusion. Bilateral lung hyperinflation. Mild cardiomegaly. BRIEF SUMMARY OF INITIAL CONSULT:    Briefly Mrs. Alina Caban is a 62-year-old female with history of HTN, who was admitted on January 7, 2023 after she was brought by her son for evaluation of altered mental status. In the ER she was found to have AF with RVR. CT head scan was negative and her chest x-ray showed moderate left lower lobe pneumonia. As well she was found to have elevated BUN/creatinine of 41 and 2.3 mg/L respectively, reason for this consultation. Her proBNP was 1608. There is no previous baseline creatinine level to compare.   Prior to admission her medications included HCTZ and amlodipine. Problems resolved: Altered mental status, 2/2 #4 and #1  Mixed metabolic acidosis, HAGMA, bicarbonate level 18, anion gap 21, 2/2 uremia and probably starvation ketoacidosis, and NAGMA (NS administration?,  Urinary losses of potential bicarb-ketonuria?). Lactic acid level 2.1. Bicarbonate levels are stable otherwise improved anion gap. IMPRESSION/RECOMMENDATIONS:      ALDO stage I on CKD, volume responsive prerenal ALDO (poor oral intake) in the setting of HCTZ administration. Urine specific gravity >1.030. Discontinue IVF today and monitor renal function. CKD stage III, kidney ultrasound with increased echogenicity. History of HTN, on metoprolol, holding amlodipine and HCTZ  HFpEF 75-80% with stage I diastolic dysfunction, moderate PH, mild aortic and tricuspid regurgitation (Echo 1/7/23). ProBNP 1608 > 4372.   --------------------------------------------------------  Left lower lobe pneumonia, on ceftriaxone and doxycycline  Atrial fibrillation with RVR, new onset versus permanent? On heparin drip and metoprolol.   NSTEMI, cardiology following, for cardiac stress in the future    Plan:    Discontinue IVF and monitor renal function  Obtain urine albumin/creatinine and urine protein/creatinine ratios  Continue to monitor renal function  Continue to monitor bicarbonate levels  Continue to hold amlodipine and HCTZ  Continue metoprolol     Electronically signed by ADITYA Muro CNP on 1/10/2023 at 8:43 AM

## 2023-01-11 LAB
ANION GAP SERPL CALCULATED.3IONS-SCNC: 11 MMOL/L (ref 7–16)
ANION GAP SERPL CALCULATED.3IONS-SCNC: 11 MMOL/L (ref 7–16)
BUN BLDV-MCNC: 19 MG/DL (ref 6–23)
BUN BLDV-MCNC: 19 MG/DL (ref 6–23)
CALCIUM SERPL-MCNC: 8.8 MG/DL (ref 8.6–10.2)
CALCIUM SERPL-MCNC: 8.8 MG/DL (ref 8.6–10.2)
CHLORIDE BLD-SCNC: 105 MMOL/L (ref 98–107)
CHLORIDE BLD-SCNC: 105 MMOL/L (ref 98–107)
CO2: 27 MMOL/L (ref 22–29)
CO2: 27 MMOL/L (ref 22–29)
CREAT SERPL-MCNC: 1 MG/DL (ref 0.5–1)
CREAT SERPL-MCNC: 1 MG/DL (ref 0.5–1)
CREATININE URINE: 29 MG/DL (ref 29–226)
GFR SERPL CREATININE-BSD FRML MDRD: 57 ML/MIN/1.73
GFR SERPL CREATININE-BSD FRML MDRD: 57 ML/MIN/1.73
GLUCOSE BLD-MCNC: 98 MG/DL (ref 74–99)
GLUCOSE BLD-MCNC: 98 MG/DL (ref 74–99)
MAGNESIUM: 1.4 MG/DL (ref 1.6–2.6)
MAGNESIUM: 1.4 MG/DL (ref 1.6–2.6)
MICROALBUMIN UR-MCNC: 18.1 MG/L
MICROALBUMIN UR-MCNC: 18.1 MG/L
MICROALBUMIN/CREAT UR-RTO: 62.4 (ref 0–30)
MICROALBUMIN/CREAT UR-RTO: 62.4 (ref 0–30)
POTASSIUM REFLEX MAGNESIUM: 3.4 MMOL/L (ref 3.5–5)
POTASSIUM REFLEX MAGNESIUM: 3.4 MMOL/L (ref 3.5–5)
PROTEIN PROTEIN: 16 MG/DL (ref 0–12)
PROTEIN PROTEIN: 16 MG/DL (ref 0–12)
PROTEIN/CREAT RATIO: 0.6
PROTEIN/CREAT RATIO: 0.6
PROTEIN/CREAT RATIO: 0.6 (ref 0–0.2)
PROTEIN/CREAT RATIO: 0.6 (ref 0–0.2)
SODIUM BLD-SCNC: 143 MMOL/L (ref 132–146)
SODIUM BLD-SCNC: 143 MMOL/L (ref 132–146)

## 2023-01-11 PROCEDURE — 2580000003 HC RX 258: Performed by: INTERNAL MEDICINE

## 2023-01-11 PROCEDURE — 6370000000 HC RX 637 (ALT 250 FOR IP): Performed by: INTERNAL MEDICINE

## 2023-01-11 PROCEDURE — 6370000000 HC RX 637 (ALT 250 FOR IP): Performed by: NURSE PRACTITIONER

## 2023-01-11 PROCEDURE — 2700000000 HC OXYGEN THERAPY PER DAY

## 2023-01-11 PROCEDURE — 6360000002 HC RX W HCPCS: Performed by: INTERNAL MEDICINE

## 2023-01-11 PROCEDURE — 82044 UR ALBUMIN SEMIQUANTITATIVE: CPT

## 2023-01-11 PROCEDURE — 36415 COLL VENOUS BLD VENIPUNCTURE: CPT

## 2023-01-11 PROCEDURE — 83735 ASSAY OF MAGNESIUM: CPT

## 2023-01-11 PROCEDURE — 82570 ASSAY OF URINE CREATININE: CPT

## 2023-01-11 PROCEDURE — 2060000000 HC ICU INTERMEDIATE R&B

## 2023-01-11 PROCEDURE — 84156 ASSAY OF PROTEIN URINE: CPT

## 2023-01-11 PROCEDURE — 99232 SBSQ HOSP IP/OBS MODERATE 35: CPT | Performed by: INTERNAL MEDICINE

## 2023-01-11 PROCEDURE — 94640 AIRWAY INHALATION TREATMENT: CPT

## 2023-01-11 PROCEDURE — 80048 BASIC METABOLIC PNL TOTAL CA: CPT

## 2023-01-11 RX ORDER — POTASSIUM CHLORIDE 20 MEQ/1
40 TABLET, EXTENDED RELEASE ORAL ONCE
Status: COMPLETED | OUTPATIENT
Start: 2023-01-11 | End: 2023-01-11

## 2023-01-11 RX ORDER — FUROSEMIDE 10 MG/ML
20 INJECTION INTRAMUSCULAR; INTRAVENOUS ONCE
Status: COMPLETED | OUTPATIENT
Start: 2023-01-11 | End: 2023-01-11

## 2023-01-11 RX ORDER — MAGNESIUM SULFATE IN WATER 40 MG/ML
2000 INJECTION, SOLUTION INTRAVENOUS ONCE
Status: COMPLETED | OUTPATIENT
Start: 2023-01-11 | End: 2023-01-11

## 2023-01-11 RX ADMIN — HEPARIN SODIUM 5000 UNITS: 10000 INJECTION INTRAVENOUS; SUBCUTANEOUS at 22:34

## 2023-01-11 RX ADMIN — IPRATROPIUM BROMIDE AND ALBUTEROL SULFATE 1 AMPULE: .5; 2.5 SOLUTION RESPIRATORY (INHALATION) at 21:02

## 2023-01-11 RX ADMIN — HEPARIN SODIUM 5000 UNITS: 10000 INJECTION INTRAVENOUS; SUBCUTANEOUS at 15:14

## 2023-01-11 RX ADMIN — METOPROLOL SUCCINATE 12.5 MG: 25 TABLET, EXTENDED RELEASE ORAL at 08:06

## 2023-01-11 RX ADMIN — ATORVASTATIN CALCIUM 20 MG: 20 TABLET, FILM COATED ORAL at 19:54

## 2023-01-11 RX ADMIN — SODIUM CHLORIDE, PRESERVATIVE FREE 10 ML: 5 INJECTION INTRAVENOUS at 19:55

## 2023-01-11 RX ADMIN — SODIUM CHLORIDE, PRESERVATIVE FREE 10 ML: 5 INJECTION INTRAVENOUS at 08:07

## 2023-01-11 RX ADMIN — HEPARIN SODIUM 5000 UNITS: 10000 INJECTION INTRAVENOUS; SUBCUTANEOUS at 05:10

## 2023-01-11 RX ADMIN — CEFTRIAXONE 1000 MG: 1 INJECTION, POWDER, FOR SOLUTION INTRAMUSCULAR; INTRAVENOUS at 22:34

## 2023-01-11 RX ADMIN — DOXYCYCLINE HYCLATE 100 MG: 100 CAPSULE ORAL at 08:06

## 2023-01-11 RX ADMIN — ASPIRIN 81 MG: 81 TABLET, COATED ORAL at 08:06

## 2023-01-11 RX ADMIN — MAGNESIUM SULFATE HEPTAHYDRATE 2000 MG: 40 INJECTION, SOLUTION INTRAVENOUS at 11:21

## 2023-01-11 RX ADMIN — DOXYCYCLINE HYCLATE 100 MG: 100 CAPSULE ORAL at 19:54

## 2023-01-11 RX ADMIN — FUROSEMIDE 20 MG: 10 INJECTION, SOLUTION INTRAMUSCULAR; INTRAVENOUS at 11:17

## 2023-01-11 RX ADMIN — IPRATROPIUM BROMIDE AND ALBUTEROL SULFATE 1 AMPULE: .5; 2.5 SOLUTION RESPIRATORY (INHALATION) at 17:25

## 2023-01-11 RX ADMIN — SODIUM CHLORIDE, PRESERVATIVE FREE 10 ML: 5 INJECTION INTRAVENOUS at 08:06

## 2023-01-11 RX ADMIN — POTASSIUM CHLORIDE 40 MEQ: 20 TABLET, EXTENDED RELEASE ORAL at 11:17

## 2023-01-11 RX ADMIN — METOPROLOL SUCCINATE 12.5 MG: 25 TABLET, EXTENDED RELEASE ORAL at 19:54

## 2023-01-11 RX ADMIN — IPRATROPIUM BROMIDE AND ALBUTEROL SULFATE 1 AMPULE: .5; 2.5 SOLUTION RESPIRATORY (INHALATION) at 04:53

## 2023-01-11 ASSESSMENT — PAIN SCALES - GENERAL: PAINLEVEL_OUTOF10: 0

## 2023-01-11 NOTE — PROGRESS NOTES
Progress note hospitalist service                                                                                 Akira Holder MD, [address]                   Patient Name: Maria Luisa Westbrook                   Age:  [de-identified] y.o. Gender:   female    CC: Altered mental status    HPI: Patient was interviewed and examined in the emergency room. History is obtained via combination of patient interview and emergency room chart review. The following represents a consolidation of those 2 sources. This is an 72-year-old female who lives at home and alone presented to the emergency room delivered by her son for altered mental status. Apparently she was confused and little else could be obtained concerning her history. Direct interview indicates she does not know why she is at the hospital and she states she feels okay. Upon arrival she was found to be in atrial fibrillation with RVR versus sinus tachycardia with PACs. The electrocardiographic tracing is not available for viewing therefore cannot confirm at this time although telemetry appears to be sinus rhythm and her initial troponin was 115 and the second 1 was over 200. There was concern for NSTEMI. She is awake and alert. She is aware that she is at the hospital with does not know why. She denies any symptoms but appears to be mildly dyspneic on speech    1/9:  Patient has been evaluated by nephrology. Consensus high anion gap metabolic acidosis hydration at 125 an hour.   Cardiology consultation pending  Patient denies any chest pain or trouble breathing  Denies any abdominal pain  She appears quite comfortable awake and alert    1/10  Patient appears to be comfortable  She still has some mild dyspnea on speech  Cardiology has recommended to stop the hydration  And diurese if possible  Oxygenating well on 4 L and vitals are otherwise normal and there is no fever    1/11  Patient feels comfortable  She still is mildly dyspneic on speech  Denies any chest pain or pressure sensation  Cardiology has seen the patient and the plan is for a stress test  Oxygenating satisfactorily on 2 L which is improved from 5 L  Good response to diuretic  Plan is for another episode of diuresis    No Known Allergies    The patient's medical records have been reviewed contingent on availability    Review of Systems:   As per HPI      Physical Examination:      Wt Readings from Last 3 Encounters:   01/11/23 142 lb (64.4 kg)     Temp Readings from Last 3 Encounters:   01/11/23 97.2 °F (36.2 °C)     BP Readings from Last 3 Encounters:   01/11/23 118/74     Pulse Readings from Last 3 Encounters:   01/11/23 92       General appearance: Normal, awake, alert no distress. Skin: Color, texture, turgor normal. No rashes or lesions. Eyes: Conjunctivae/cornea clear. Mancel Barges. Sclera non icteric. Chest: Even but reduced excursion   Lungs: Clear to auscultation. No rhonchi, crackles or rales. Basilar hypoventilation  Heart: S1 > S2. Rhythm is regular and rate is now normal no gallop rub or murmur. Abdomen: Soft, mildly protuberant, non-tender. BS normal. No masses, organomegaly. Anatomic contours appear normal.  Extremities: No deformities, edema, or skin discoloration.    Gait & balance: Based on history reported independently ambulatory at home     Labs     CBC:   Lab Results   Component Value Date/Time    WBC 12.5 01/09/2023 04:57 AM    RBC 3.38 01/09/2023 04:57 AM    HGB 10.8 01/09/2023 04:57 AM    HCT 33.8 01/09/2023 04:57 AM     01/09/2023 04:57 AM    .0 01/09/2023 04:57 AM     BMP:    Lab Results   Component Value Date/Time     01/11/2023 06:14 AM    K 3.4 01/11/2023 06:14 AM     01/11/2023 06:14 AM    CO2 27 01/11/2023 06:14 AM    BUN 19 01/11/2023 06:14 AM    CREATININE 1.0 01/11/2023 06:14 AM    GLUCOSE 98 01/11/2023 06:14 AM    CALCIUM 8.8 01/11/2023 06:14 AM     Hepatic Function Panel:    Lab Results Component Value Date/Time    ALKPHOS 116 01/07/2023 08:16 PM    AST 31 01/07/2023 08:16 PM    ALT 14 01/07/2023 08:16 PM    PROT 7.3 01/07/2023 08:16 PM    LABALBU 3.9 01/07/2023 08:16 PM    BILITOT 1.1 01/07/2023 08:16 PM     Magnesium:    Lab Results   Component Value Date/Time    MG 1.4 01/11/2023 06:14 AM     Cardiac Enzymes:   Lab Results   Component Value Date    CKTOTAL 594 (H) 01/08/2023     LDH:  No results found for: LDH  PT/INR:    Lab Results   Component Value Date/Time    PROTIME 12.8 01/07/2023 08:16 PM    INR 1.2 01/07/2023 08:16 PM     BNP: No results for input(s): BNP in the last 72 hours.    TSH:   Lab Results   Component Value Date    TSH 2.180 01/08/2023      Cardiac Injury Profile:   Recent Labs     01/08/23  1506   CKTOTAL 594*      Lipid Profile:   Lab Results   Component Value Date/Time    TRIG 89 01/08/2023 10:57 AM    HDL 58 01/08/2023 10:57 AM    LDLCALC 79 01/08/2023 10:57 AM    CHOL 155 01/08/2023 10:57 AM      Hemoglobin A1C: No components found for: HGBA1C   U/A:   Lab Results   Component Value Date/Time    LEUKOCYTESUR Negative 01/08/2023 10:57 AM    PHUR 5.5 01/08/2023 10:57 AM    WBCUA 2-5 01/08/2023 10:57 AM    RBCUA 5-10 01/08/2023 10:57 AM    BACTERIA FEW 01/08/2023 10:57 AM    SPECGRAV >=1.030 01/08/2023 10:57 AM    BLOODU TRACE-INTACT 01/08/2023 10:57 AM    GLUCOSEU Negative 01/08/2023 10:57 AM         ADMISSION SCHEDULED MEDS:   Current Facility-Administered Medications   Medication Dose Route Frequency Provider Last Rate Last Admin    furosemide (LASIX) injection 20 mg  20 mg IntraVENous Once Angelique Luque MD        potassium chloride (KLOR-CON M) extended release tablet 40 mEq  40 mEq Oral Once Angelique Luque MD        magnesium sulfate 2000 mg in 50 mL IVPB premix  2,000 mg IntraVENous Once Angelique Luque MD        heparin (porcine) injection 5,000 Units  5,000 Units SubCUTAneous Q8H Dallas Haider MD   5,000 Units at 01/11/23 0510    ipratropium-albuterol (Jenet Dura) nebulizer solution 1 ampule  1 ampule Inhalation Q4H PRN Disha Kearns MD   1 ampule at 01/11/23 0453    cefTRIAXone (ROCEPHIN) 1,000 mg in sterile water 10 mL IV syringe  1,000 mg IntraVENous Q24H Disha Kearns MD   1,000 mg at 01/10/23 2246    doxycycline hyclate (VIBRAMYCIN) capsule 100 mg  100 mg Oral 2 times per day Giuseppe Khoury MD   100 mg at 01/11/23 0806    sodium chloride flush 0.9 % injection 10 mL  10 mL IntraVENous 2 times per day Giuseppe Khoury MD   10 mL at 01/11/23 0807    sodium chloride flush 0.9 % injection 10 mL  10 mL IntraVENous PRN Giuseppe Khoury MD        0.9 % sodium chloride infusion   IntraVENous PRN Giuseppe Khoury MD        ondansetron (ZOFRAN-ODT) disintegrating tablet 4 mg  4 mg Oral Q8H PRN Carlosr Brandon Kearns MD        Or    ondansetron [facility]) injection 4 mg  4 mg IntraVENous Q6H PRN Carlosr Brandon Kearns MD        magnesium hydroxide (MILK OF MAGNESIA) 400 MG/5ML suspension 30 mL  30 mL Oral Daily PRN Carlosr Brandon Kearns MD        acetaminophen (TYLENOL) tablet 650 mg  650 mg Oral Q6H PRN Carlosr Brandon Kearns MD        Or    acetaminophen (TYLENOL) suppository 650 mg  650 mg Rectal Q6H PRN Disha Kearns MD        aspirin EC tablet 81 mg  81 mg Oral Daily JYOTSNA Castro   81 mg at 01/11/23 0806    metoprolol succinate (TOPROL XL) extended release tablet 12.5 mg  12.5 mg Oral BID SHERRY CastroN   12.5 mg at 01/11/23 0806    atorvastatin (LIPITOR) tablet 20 mg  20 mg Oral Nightly JYOTSNA Castro   20 mg at 01/10/23 2115       Current  Infusions   sodium chloride         Prn Meds  ipratropium-albuterol, sodium chloride flush, sodium chloride, ondansetron **OR** ondansetron, magnesium hydroxide, acetaminophen **OR** acetaminophen    Radiology Review:  XR CHEST PORTABLE   Final Result   Increasing bibasilar patchy infiltrates with small bilateral pleural   effusions.          US RETROPERITONEAL COMPLETE   Final Result   Findings suggestive of medical renal disease without obstruction. CT HEAD WO CONTRAST   Final Result   No acute intracranial abnormality. RECOMMENDATIONS:   Careful clinical correlation and follow up recommended. XR CHEST PORTABLE   Final Result   Moderate left lower lobe pneumonia. Small left pleural effusion. Bilateral lung hyperinflation. Mild cardiomegaly. ASSESSMENT:  Active diagnoses treated at this admission:  Altered mental status-resolved. Metabolic encephalopathy-improving  ALDO improving with hydration  Initial electrocardiogram suggestive of atrial fibrillation with RVR.   NSTEMI versus demand ischemia related to atrial fibrillation  Left lower lobe pneumonia per chest x-ray-elevated procalcitonin/in context of renal insufficiency  High anion gap metabolic acidosis in the context of renal insufficiency versus dehydration  Another      Problem list:  Patient Active Problem List   Diagnosis    AMS (altered mental status)    NSTEMI (non-ST elevated myocardial infarction) [facility])       PLAN:  Cardiology ordered another episode of diuresis    Monitor electrolytes and renal function-renal function normalized   Repeat procalcitonin with normal renal function  Heparin stopped  Initiated treatment for community-acquired pneumonia to continue: Procalcitonin elevated to repeat  In the context of possible cardiac insult assess hemoglobin A1c and lipid profile    Overall the patient is improving  Pending the diagnosis of NSTEMI versus demand ischemia both in the context of acute renal insufficiency interpretation with caution  Plan for stress test prior to discharge  Will give a another dose of diuresis  Treat potassium    See  Orders  Georges Gr MD, Mary Ellen Preston  10:24 AM  1/11/2023

## 2023-01-11 NOTE — PROGRESS NOTES
Department of Internal Medicine  Nephrology  Progress Note    Events reviewed. SUBJECTIVE: We are following Mrs. Dorcas Al for ALDO. Reports feeling much better.     PHYSICAL EXAM:      Vitals:    VITALS:  /74   Pulse 92   Temp 97.2 °F (36.2 °C)   Resp 16   Ht 5' 8\" (1.727 m)   Wt 142 lb (64.4 kg)   SpO2 94%   BMI 21.59 kg/m²   24HR INTAKE/OUTPUT:    Intake/Output Summary (Last 24 hours) at 1/11/2023 0804  Last data filed at 1/10/2023 1306  Gross per 24 hour   Intake 180 ml   Output --   Net 180 ml         Constitutional:  Alert and oriented, NAD  HEENT:  Normocephalic, PERRL, dry mucous membranes  Respiratory:  Diminished lung sounds  Cardiovascular/Edema:  IRRR, S1,S2  Gastrointestinal:  Soft, flat, nontender, nondistended  Neurologic:  Nonfocal, CRAIG  Skin:  Warm, dry, intact  Other:  No edema     Scheduled Meds:   heparin (porcine)  5,000 Units SubCUTAneous Q8H    cefTRIAXone (ROCEPHIN) IV  1,000 mg IntraVENous Q24H    doxycycline hyclate  100 mg Oral 2 times per day    sodium chloride flush  10 mL IntraVENous 2 times per day    aspirin  81 mg Oral Daily    metoprolol succinate  12.5 mg Oral BID    atorvastatin  20 mg Oral Nightly     Continuous Infusions:   sodium chloride       PRN Meds:.ipratropium-albuterol, sodium chloride flush, sodium chloride, ondansetron **OR** ondansetron, magnesium hydroxide, acetaminophen **OR** acetaminophen      DATA:    CBC:   Lab Results   Component Value Date/Time    WBC 12.5 01/09/2023 04:57 AM    RBC 3.38 01/09/2023 04:57 AM    HGB 10.8 01/09/2023 04:57 AM    HCT 33.8 01/09/2023 04:57 AM    .0 01/09/2023 04:57 AM    MCH 32.0 01/09/2023 04:57 AM    MCHC 32.0 01/09/2023 04:57 AM    RDW 14.2 01/09/2023 04:57 AM     01/09/2023 04:57 AM    MPV 9.1 01/09/2023 04:57 AM     CMP:    Lab Results   Component Value Date/Time     01/10/2023 05:32 AM    K 3.8 01/10/2023 05:32 AM     01/10/2023 05:32 AM    CO2 23 01/10/2023 05:32 AM    BUN 28 01/10/2023 05:32 AM    CREATININE 1.1 01/10/2023 05:32 AM    LABGLOM 51 01/10/2023 05:32 AM    GLUCOSE 126 01/10/2023 05:32 AM    PROT 7.3 01/07/2023 08:16 PM    LABALBU 3.9 01/07/2023 08:16 PM    CALCIUM 8.6 01/10/2023 05:32 AM    BILITOT 1.1 01/07/2023 08:16 PM    ALKPHOS 116 01/07/2023 08:16 PM    AST 31 01/07/2023 08:16 PM    ALT 14 01/07/2023 08:16 PM     Magnesium:    Lab Results   Component Value Date/Time    MG 1.9 01/08/2023 10:57 AM     Phosphorus:    Lab Results   Component Value Date/Time    PHOS 4.3 01/08/2023 10:57 AM     Radiology Review:      Kidney ultrasound October 8, 2023     FINDINGS:       Kidneys: The right kidney measures 9.6 cm in length and the left kidney measures 9.2   cm in length. There is no hydronephrosis. A suggestion of increased renal echogenicity. 1.6 cm right renal cyst noted           Bladder:       Volume of 302 cc without obvious mass   Impression:  Findings suggestive of medical renal disease without obstruction. CT head without IV contrast 1/8/23   No acute intracranial abnormality. Chest x-ray January 7, 2023   Moderate left lower lobe pneumonia. Small left pleural effusion. Bilateral lung hyperinflation. Mild cardiomegaly. BRIEF SUMMARY OF INITIAL CONSULT:    Briefly Mrs. Amrita Edge is a 79-year-old female with history of HTN, who was admitted on January 7, 2023 after she was brought by her son for evaluation of altered mental status. In the ER she was found to have AF with RVR. CT head scan was negative and her chest x-ray showed moderate left lower lobe pneumonia. As well she was found to have elevated BUN/creatinine of 41 and 2.3 mg/L respectively, reason for this consultation. Her proBNP was 1608. There is no previous baseline creatinine level to compare. Prior to admission her medications included HCTZ and amlodipine. Problems resolved:     Altered mental status, 2/2 #4 and #1  Mixed metabolic acidosis, HAGMA, bicarbonate level 18, anion gap 21, 2/2 uremia and probably starvation ketoacidosis, and NAGMA (NS administration?,  Urinary losses of potential bicarb-ketonuria?). Lactic acid level 2.1. Bicarbonate levels are stable otherwise improved anion gap. ALDO stage I on CKD, volume responsive prerenal ALDO (poor oral intake) in the setting of HCTZ administration. Urine specific gravity >1.030. Patient was given 20 mg IV Lasix yesterday and again today, creatinine has improved 1.0 mg/dL. IMPRESSION/RECOMMENDATIONS:     CKD stage III, kidney ultrasound with increased echogenicity. Hypokalemia 2/2 hypomagnesemia and loop diuretics, replaced  Hypomagnesemia 2/2 loop diuretics, replaced   History of HTN, on metoprolol, holding amlodipine and HCTZ  HFpEF 75-80% with stage I diastolic dysfunction, moderate PH, mild aortic and tricuspid regurgitation (Echo 1/7/23). ProBNP 1608 > 4372 >7749, IV Lasix given yesterday and today by primary team  --------------------------------------------------------  Left lower lobe pneumonia, on ceftriaxone and doxycycline  Atrial fibrillation with RVR, new onset versus permanent? On heparin drip and metoprolol.   NSTEMI, cardiology following, for cardiac stress in the future    Plan:    Replace potassium  Place magnesium  Await urine albumin/creatinine and urine protein/creatinine ratios  Continue to monitor renal function  Continue to monitor bicarbonate levels  Continue to hold amlodipine and HCTZ  Continue metoprolol   Continue to monitor potassium and magnesium levels    Electronically signed by ADITYA Meyer CNP on 1/11/2023 at 8:04 AM

## 2023-01-11 NOTE — PROGRESS NOTES
Inpatient Cardiology Progress note     PATIENT IS BEING FOLLOWED FOR: NSTEMI    Tiki Zhang is a [de-identified] y.o. female who is not previously known to [facility] cardiology prior to this admission. She was initially seen in consultation by Dr Cecelia Bedolla 1/8/2023. Wednesday 1/4/2023 went to dinner with son and was \"fine  Mother did not answer phone when son called Friday 1/6/2023 1/7/2023 in evening son went over to check on his mother. Per son patient was sitting on side of bed staring @ TV. Tried to talk with her, didn't seem herself. Was not finishing her sentences. [facility]/7/[facility] for AMS (son requested) transported by EMS. Per ED physician patient was A/O x 4 on arrival Denies CP, SOB or Abdominal pain. GCS=14  BP 95/87 's AF RVR, RR 35, afebrile, and O2 saturation 95% on RA--> dropped to 909% and placed on 2 liters NC  ECG AF RVR  CT head nothing acute. CXR read as possible pneumonia. IV ATB's, BC x 2 drawn     Na 140, K+ 3.8, Bun/Cr 41/2.3, lactic acid 2.1, p-BNP 1608, troponin 115-->244-->261, Alk phos 116, WNL 11.2, Hgb 12.1, PLt 351, Neutrophils 82.6, INR 1.2, COVID/Influenza negative     2 liters NSS, Heparin bolus and gtt      TTE ordered by primary service    Past Medical History:    HTN  Longstanding tobacco abuse currently 1/2 ppd but has smoked upwards of 1 ppd (has been smoking since her 20's)  Denies any previous pulmonary work-up   COVID Vaccine x 3 (Juliocesar Stone)  Denies having COVID during pandemic  Denies CAD, DM, HLD       SUBJECTIVE: denies CP.   Denies SOB    OBJECTIVE: No apparent distress    ROS:  Consist: Denies fevers, chills or night sweats  Heart: Denies chest pain, palpitations, lightheadedness, dizziness or syncope  Lungs: Denies SOB, cough, wheezing, orthopnea or PND  GI: Denies abdominal pain, vomiting or diarrhea    PHYSICAL EXAM:   /74   Pulse 92   Temp 97.2 °F (36.2 °C)   Resp 16   Ht 5' 8\" (1.727 m)   Wt 142 lb (64.4 kg)   SpO2 94%   BMI 21.59 kg/m²    B/P Range last 24 hours: Systolic (75DKR), LCM:260 , Min:110 , LQE:006    Diastolic (22MZC), BY, Min:55, Max:74    CONST: Well developed, well nourished female who appears of stated age. Awake, alert and cooperative. Ill appearing. ANo apparent distress  HEENT:   Head- Normocephalic, atraumatic   Eyes- Conjunctivae pink, anicteric  Throat- Oral mucosa pink and moist  Neck-  No stridor, trachea midline, no jugular venous distention. No carotid bruit  CHEST: Chest symmetrical and non-tender to palpation. No accessory muscle use or intercostal retractions  RESPIRATORY:  Lung sounds - expiratory wheezes in the lower lung files with crackles left base  CARDIOVASCULAR:     Heart Inspection- shows no noted pulsations  Heart Palpation- no heaves or thrills; PMI is non-displaced   Heart Ausculation- Regular rate and rhythm, no murmur. No s3, s4 or rub   PV: No lower extremity edema. No varicosities. Pedal pulses palpable, no clubbing or cyanosis   ABDOMEN: Soft, non-tender to light palpation. Bowel sounds present. No palpable masses no organomegaly; no abdominal bruit  MS: Good muscle strength and tone. No atrophy or abnormal movements. : Deferred  SKIN: Warm and dry no statis dermatitis or ulcers   NEURO / PSYCH: Oriented to person, place and time. Speech clear and appropriate. Follows all commands.  Pleasant affect       Intake/Output Summary (Last 24 hours) at 2023 0959  Last data filed at 1/10/2023 1306  Gross per 24 hour   Intake 180 ml   Output --   Net 180 ml       Weight:   Wt Readings from Last 3 Encounters:   23 142 lb (64.4 kg)     Current Inpatient Medications:   heparin (porcine)  5,000 Units SubCUTAneous Q8H    cefTRIAXone (ROCEPHIN) IV  1,000 mg IntraVENous Q24H    doxycycline hyclate  100 mg Oral 2 times per day    sodium chloride flush  10 mL IntraVENous 2 times per day    aspirin  81 mg Oral Daily    metoprolol succinate  12.5 mg Oral BID    atorvastatin  20 mg Oral Nightly       IV Infusions (if any):   sodium chloride         DIAGNOSTIC/ LABORATORY DATA:  Labs:   CBC:   Recent Labs     23  0457   WBC 12.5*   HGB 10.8*   HCT 33.8*        BMP:   Recent Labs     01/10/23  0532 23  0614    143   K 3.8 3.4*   CO2 23 27   BUN 28* 19   CREATININE 1.1* 1.0   LABGLOM 51 57   CALCIUM 8.6 8.8     Mag:   Recent Labs     23  1057 23  0614   MG 1.9 1.4*     Phos:   Recent Labs     23  1057   PHOS 4.3     TFT:   Lab Results   Component Value Date    TSH 2.180 2023    T4FREE 1.04 2023      HgA1c:   Lab Results   Component Value Date    LABA1C 5.8 (H) 2023         APTT:  Recent Labs     23  1929 01/10/23  0532   APTT 63.7* 67.9*     CARDIAC ENZYMES:  Recent Labs     23  1506   CKTOTAL 594*   TROPHS 241*     FASTING LIPID PANEL:  Lab Results   Component Value Date/Time    CHOL 155 2023 10:57 AM    HDL 58 2023 10:57 AM    LDLCALC 79 2023 10:57 AM    TRIG 89 2023 10:57 AM         CT head 2023:  Impression   No acute intracranial abnormality. US retroperitoneal 2023:  Impression   Findings suggestive of medical renal disease without obstruction. CXR 2023: Moderate left lower lobe pneumonia. Small left pleural effusion. Bilateral lung hyperinflation. Mild cardiomegaly. CXR 1/10/2023:  Increasing bibasilar patchy infiltrates with small bilateral pleural effusions. 12 lead [id number]: Sinus rhythm with low voltage QRS, septal infarct. Ventricular rate 70, TX interval 180, QRS duration 64, QTc 436. Echo 2023:   Technically limited study. Left ventricle is normal in size . Normal left ventricle wall thickness. No regional wall motion abnormalities seen. Ejection fraction is visually estimated at 75-80%. There is doppler evidence of stage I diastolic dysfunction. Normal right ventricular size and function. Normal sized left atrium. Possibly mildly dilated right atrium. Mild aortic regurgitation   Mild tricuspid regurgitation. Moderate pulmonary hypertension. Telemetry: SR    ASSESSMENT:   Pneumonia  Leukocytosis  Elevated pro calcitonin  Mild anemia  ? NSTEMI ( elevated troponin in the context of ALDO ) ? Type II vs type I. No ischemic ECG changes. Denies CP  PAF, converted and maintaining SR  Acute diastolic HF, ? Iatrogenic secondary to IV Fluids.  Pro BNP 4,372 ( 1/9/2023 ) --> 7,749 ( 1/10/2023 )  ALDO versus ALDO on CKD:  SCr 2.3 ( on admission )  --> 1.0 ( today )  Hypokalemia  Hypomagnesemia      PLAN:  Will give another dose IV lasix today  Will supplement K+  Will supplement Mg++  Rest of cardiac medications same  Lexiscan nuclear stress in am  Rest as per the primary service +/- other consultants  Will follow    Electronically signed by Yennifer Roman MD on 1/11/2023 at 9:59 AM

## 2023-01-12 ENCOUNTER — APPOINTMENT (OUTPATIENT)
Dept: NUCLEAR MEDICINE | Age: 83
DRG: 280 | End: 2023-01-12
Payer: MEDICARE

## 2023-01-12 ENCOUNTER — APPOINTMENT (OUTPATIENT)
Dept: NON INVASIVE DIAGNOSTICS | Age: 83
DRG: 280 | End: 2023-01-12
Payer: MEDICARE

## 2023-01-12 LAB
ANION GAP SERPL CALCULATED.3IONS-SCNC: 12 MMOL/L (ref 7–16)
ANION GAP SERPL CALCULATED.3IONS-SCNC: 12 MMOL/L (ref 7–16)
BLOOD CULTURE, ROUTINE: NORMAL
BUN BLDV-MCNC: 20 MG/DL (ref 6–23)
BUN BLDV-MCNC: 20 MG/DL (ref 6–23)
CALCIUM SERPL-MCNC: 8.3 MG/DL (ref 8.6–10.2)
CALCIUM SERPL-MCNC: 8.3 MG/DL (ref 8.6–10.2)
CHLORIDE BLD-SCNC: 104 MMOL/L (ref 98–107)
CHLORIDE BLD-SCNC: 104 MMOL/L (ref 98–107)
CO2: 26 MMOL/L (ref 22–29)
CO2: 26 MMOL/L (ref 22–29)
CREAT SERPL-MCNC: 0.9 MG/DL (ref 0.5–1)
CREAT SERPL-MCNC: 0.9 MG/DL (ref 0.5–1)
GFR SERPL CREATININE-BSD FRML MDRD: >60 ML/MIN/1.73
GFR SERPL CREATININE-BSD FRML MDRD: >60 ML/MIN/1.73
GLUCOSE BLD-MCNC: 104 MG/DL (ref 74–99)
GLUCOSE BLD-MCNC: 104 MG/DL (ref 74–99)
LV EF: 75 %
LV EF: 75 %
LVEF MODALITY: NORMAL
LVEF MODALITY: NORMAL
MAGNESIUM: 1.9 MG/DL (ref 1.6–2.6)
MAGNESIUM: 1.9 MG/DL (ref 1.6–2.6)
POTASSIUM REFLEX MAGNESIUM: 3.4 MMOL/L (ref 3.5–5)
POTASSIUM REFLEX MAGNESIUM: 3.4 MMOL/L (ref 3.5–5)
SODIUM BLD-SCNC: 142 MMOL/L (ref 132–146)
SODIUM BLD-SCNC: 142 MMOL/L (ref 132–146)

## 2023-01-12 PROCEDURE — 6360000002 HC RX W HCPCS: Performed by: CLINICAL NURSE SPECIALIST

## 2023-01-12 PROCEDURE — 93017 CV STRESS TEST TRACING ONLY: CPT

## 2023-01-12 PROCEDURE — 97165 OT EVAL LOW COMPLEX 30 MIN: CPT

## 2023-01-12 PROCEDURE — A9500 TC99M SESTAMIBI: HCPCS | Performed by: RADIOLOGY

## 2023-01-12 PROCEDURE — 97161 PT EVAL LOW COMPLEX 20 MIN: CPT

## 2023-01-12 PROCEDURE — 83735 ASSAY OF MAGNESIUM: CPT

## 2023-01-12 PROCEDURE — 6370000000 HC RX 637 (ALT 250 FOR IP): Performed by: NURSE PRACTITIONER

## 2023-01-12 PROCEDURE — 6370000000 HC RX 637 (ALT 250 FOR IP): Performed by: INTERNAL MEDICINE

## 2023-01-12 PROCEDURE — 2700000000 HC OXYGEN THERAPY PER DAY

## 2023-01-12 PROCEDURE — 36415 COLL VENOUS BLD VENIPUNCTURE: CPT

## 2023-01-12 PROCEDURE — 80048 BASIC METABOLIC PNL TOTAL CA: CPT

## 2023-01-12 PROCEDURE — 6360000002 HC RX W HCPCS: Performed by: INTERNAL MEDICINE

## 2023-01-12 PROCEDURE — 97530 THERAPEUTIC ACTIVITIES: CPT

## 2023-01-12 PROCEDURE — 2580000003 HC RX 258: Performed by: INTERNAL MEDICINE

## 2023-01-12 PROCEDURE — 6370000000 HC RX 637 (ALT 250 FOR IP): Performed by: LICENSED PRACTICAL NURSE

## 2023-01-12 PROCEDURE — 99232 SBSQ HOSP IP/OBS MODERATE 35: CPT | Performed by: INTERNAL MEDICINE

## 2023-01-12 PROCEDURE — 93016 CV STRESS TEST SUPVJ ONLY: CPT | Performed by: INTERNAL MEDICINE

## 2023-01-12 PROCEDURE — 2060000000 HC ICU INTERMEDIATE R&B

## 2023-01-12 PROCEDURE — 97535 SELF CARE MNGMENT TRAINING: CPT

## 2023-01-12 PROCEDURE — 78452 HT MUSCLE IMAGE SPECT MULT: CPT | Performed by: INTERNAL MEDICINE

## 2023-01-12 PROCEDURE — 3430000000 HC RX DIAGNOSTIC RADIOPHARMACEUTICAL: Performed by: RADIOLOGY

## 2023-01-12 PROCEDURE — 78452 HT MUSCLE IMAGE SPECT MULT: CPT

## 2023-01-12 PROCEDURE — 93018 CV STRESS TEST I&R ONLY: CPT | Performed by: INTERNAL MEDICINE

## 2023-01-12 RX ORDER — TECHNETIUM TC-99M SESTAMIBI 1 MG/10ML
30 INJECTION INTRAVENOUS
Status: COMPLETED | OUTPATIENT
Start: 2023-01-12 | End: 2023-01-12

## 2023-01-12 RX ORDER — FUROSEMIDE 40 MG/1
20 TABLET ORAL DAILY
Status: DISCONTINUED | OUTPATIENT
Start: 2023-01-12 | End: 2023-01-13

## 2023-01-12 RX ORDER — POTASSIUM CHLORIDE 20 MEQ/1
40 TABLET, EXTENDED RELEASE ORAL ONCE
Status: DISCONTINUED | OUTPATIENT
Start: 2023-01-12 | End: 2023-01-12 | Stop reason: SDUPTHER

## 2023-01-12 RX ORDER — TECHNETIUM TC-99M SESTAMIBI 1 MG/10ML
12.5 INJECTION INTRAVENOUS
Status: COMPLETED | OUTPATIENT
Start: 2023-01-12 | End: 2023-01-12

## 2023-01-12 RX ADMIN — HEPARIN SODIUM 5000 UNITS: 10000 INJECTION INTRAVENOUS; SUBCUTANEOUS at 22:56

## 2023-01-12 RX ADMIN — DOXYCYCLINE HYCLATE 100 MG: 100 CAPSULE ORAL at 12:12

## 2023-01-12 RX ADMIN — Medication 37.2 MILLICURIE: at 09:45

## 2023-01-12 RX ADMIN — Medication 12.5 MILLICURIE: at 07:40

## 2023-01-12 RX ADMIN — DOXYCYCLINE HYCLATE 100 MG: 100 CAPSULE ORAL at 20:38

## 2023-01-12 RX ADMIN — POTASSIUM BICARBONATE 40 MEQ: 782 TABLET, EFFERVESCENT ORAL at 13:42

## 2023-01-12 RX ADMIN — HEPARIN SODIUM 5000 UNITS: 10000 INJECTION INTRAVENOUS; SUBCUTANEOUS at 05:18

## 2023-01-12 RX ADMIN — FUROSEMIDE 20 MG: 40 TABLET ORAL at 13:42

## 2023-01-12 RX ADMIN — REGADENOSON 0.4 MG: 0.08 INJECTION, SOLUTION INTRAVENOUS at 09:46

## 2023-01-12 RX ADMIN — POTASSIUM BICARBONATE 40 MEQ: 782 TABLET, EFFERVESCENT ORAL at 18:35

## 2023-01-12 RX ADMIN — ASPIRIN 81 MG: 81 TABLET, COATED ORAL at 12:12

## 2023-01-12 RX ADMIN — SODIUM CHLORIDE, PRESERVATIVE FREE 10 ML: 5 INJECTION INTRAVENOUS at 20:40

## 2023-01-12 RX ADMIN — SODIUM CHLORIDE, PRESERVATIVE FREE 10 ML: 5 INJECTION INTRAVENOUS at 12:13

## 2023-01-12 RX ADMIN — METOPROLOL SUCCINATE 12.5 MG: 25 TABLET, EXTENDED RELEASE ORAL at 12:12

## 2023-01-12 RX ADMIN — ATORVASTATIN CALCIUM 20 MG: 20 TABLET, FILM COATED ORAL at 20:38

## 2023-01-12 RX ADMIN — HEPARIN SODIUM 5000 UNITS: 10000 INJECTION INTRAVENOUS; SUBCUTANEOUS at 13:43

## 2023-01-12 RX ADMIN — METOPROLOL SUCCINATE 12.5 MG: 25 TABLET, EXTENDED RELEASE ORAL at 20:38

## 2023-01-12 NOTE — PROGRESS NOTES
[address]        YMPT:                                                  Patient Name: Chase Castellano    MRN: [id number]    : 1940    Room: [address] [place] [address]      Evaluating OT: Mariah Reeves, [address] OTR/L; [id number]      Referring Provider: Aditi Guy MD    Specific Provider Orders/Date: OT Eval and Treat 23      Diagnosis: AMS (altered mental status)   NSTEMI (non-ST elevated myocardial infarction)    Surgery: none this admission      Pertinent Medical History:  has no past medical history on file.       Reason for Admission: AMS per family, 88% on RA    Recommended Adaptive Equipment:  TBD pending progression/discharge plan     Precautions:  Fall Risk, O2, Continuous Pulse Ox, Bed Alarm, Cognition     Assessment of current deficits:    [x] Functional mobility  [x]ADLs  [x] Strength               [x]Cognition    [x] Functional transfers   [x] IADLs         [x] Safety Awareness   [x]Endurance    [x] Fine Coordination              [x] Balance      [] Vision/perception   []Sensation     []Gross Motor Coordination  [] ROM  [] Delirium                   [] Motor Control     OT PLAN OF CARE   OT POC based on physician orders, patient diagnosis and results of clinical assessment    Frequency/Duration: 1-3 days/wk for 2 weeks PRN   Specific OT Treatment Interventions to include:   * Instruction/training on adapted ADL techniques and AE recommendations to increase functional independence within precautions       * Training on energy conservation strategies, correct breathing pattern and techniques to improve independence/tolerance for self-care routine  * Functional transfer/mobility training/DME recommendations for increased independence, safety, and fall prevention  * Patient/Family education to increase follow through with safety techniques and functional independence  * Recommendation of environmental modifications for increased safety with functional transfers/mobility and ADLs  * Cognitive retraining/development of therapeutic activities to improve problem solving, judgement, memory, and attention for increased safety/participation in ADL/IADL tasks  * Therapeutic exercise to improve motor endurance, ROM, and functional strength for ADLs/functional transfers  * Therapeutic activities to facilitate/challenge dynamic balance, stand tolerance for increased safety and independence with ADLs    Home Living: Pt questionable historian. Per chart pt lives alone in ranch style home with 1 step to enter and bed and bath on main floor. Per pt lives in [facility] with \"a couple\" steps to enter [facility].    Equipment owned: none    Prior Level of Function: pt questionable historian per pt and chart -IND with ADLs and IADLs  ambulated with no AD prior  Driving: yes (per pt)    Pain Level: 0/10  Cognition: A&O: 1-2/4 - oriented to self and location, required reorientation to year and month unable to correctly state with options  Pt required repeated education on purpose of therapy evaluation this date - pt presented with increased confusion questioning purpose of session requested to return to supine  Pt repeatedly stating \"I don't know I haven't been home in a while, you think I remember\" during subjective questioning   Follows single 1 step directions - increased   Memory:  fair -    Sequencing:  fair -    Problem solving:  fair -   Judgement/safety:  fair -     Functional Assessment:  AM-PAC Daily Activity Raw Score: 15/24   Initial Eval Status  Date: 1/12/23 Treatment Status  Date: STGs = LTGs  Time frame: 10-14 days   Feeding Stand by Assist   Independent    Grooming Minimal Assist   Independent    UB Dressing Minimal Assist   Independent    LB Dressing Moderate Assist   Stand by Assist    Bathing Moderate Assist  Stand by Assist    Toileting Moderate Assist   Stand by Assist Bed Mobility  Log Roll: NT  Supine to sit: Stand by Assist   Sit to supine: Stand by Assist   Supine to sit: Independent   Sit to supine: Independent    Functional Transfers Sit to stand: Min A   Stand to sit:Min A   Stand pivot: NT  Commode: NT  Sit to stand:IND   Stand to sit:IND  Stand pivot: IND  Commode: IND    Functional Mobility Min A   Side step to left toward HOB  Independent    Balance Sitting:     Static - SBA     Dynamic - SBA  Standing: Min A  Sitting:  Static: IND  Dynamic: IND  Standing: IND   Activity Tolerance FAIR  GOOD   Visual/  Perceptual Glasses: yes    pt unable to read clock on wall - reported one hour off of correct time          Vitals   SpO2 at rest on 6L NC: 93-95%  SpO2 EOB on 6L NC: 94%  SpO2 end of session lying supine on 6L NC: 94%         BUE  ROM/Strength/  Fine motor Coordination Hand dominance: Right     RUE: ROM WFL     Strength: difficult to access d/t pt requesting to return to supine   Grossly WFL      Strength: FAIR     Coordination:  FAIR     LUE: ROM WFL     Strength: difficult to access d/t pt requesting to return to supine   Grossly WFL      Strength: FAIR     Coordination:  FAIR  increase BUE muscle strength for improved indep with functional transfers       Hearing: WFL   Sensation:  No c/o numbness or tingling   Tone: WFL   Edema: unremarkable    Patient/Family Goal: pt goal is to get better   Based on patient's functional performance as stated below and level of assistance needed prior to admission, this therapist believes that the patient would benefit from further skilled OT following hospital stay in an effort to increase safety, functional independence, and quality of life. Comment: Cleared by RN to see pt. Upon arrival patient lying supine in bed and agreeable to OT session. At end of session, patient lying supine in bed with call light and phone within reach, all lines and tubes intact.   Overall patient demonstrated decreased independence and safety during completion of ADL/functional transfer/mobility tasks. Pt would benefit from continued skilled OT to increase safety and independence with completion of ADL/IADL tasks for functional independence and quality of life. Treatment: Required re-orientation to year/month/place. Pt required vc's for proper technique/safety with hand placement/body mechanics/posture for bed mobility/ADLs/functional tranfers/mobility/ww management. Pt required vc's for sequencing/initiation of ADLs/functional transfers. Pt able to  sit EOB ~10 mins to increase core strength/balance/activity tolerance for ease with ADLs. Pt required increased time to complete ADLs/functional transfers due to overall fatigue/weakness. Pt completed supine to sit EOB with rest break. Attempted MMT pt reported fatigue and needing rest. Completed sit to stand and side step toward [facility] and return to supine. Pt required skilled monitoring of SpO2 during session and education on energy conservation techniques. Pt required rest breaks during session and educated on pursed lip breathing. Pt appeared to have tolerated session well and appears motivated/cooperative/pleasant . Pt instructed on use of call light for assistance and fall prevention. Pt demo'ing fair understanding of education provided. Continue to educate. Rehab Potential: Good  for established goals       LTG: maximize independence with ADLs to return to PLOF    Patient and/or family were instructed on functional diagnosis, prognosis/goals and OT plan of care. Demonstrated FAIR understanding. [] Malnutrition indicators have been identified and nursing has been notified to ensure a dietitian consult is ordered.         Eval Complexity: Low     Evaluation time includes thorough review of current medical information, gathering information on past medical & social history & PLOF, completion of standardized testing, informal observation of tasks, consultation with other medical professions/disciplines, assessment of data & development of POC/goals. Time In: 1122  Time Out: 1146  Total Treatment Time: 9    Min Units   OT Eval Low 85602  x     OT Eval Medium 15393      OT Eval High 92636      OT Re-Eval [id number]       Therapeutic Ex 95881       Therapeutic Activities 52217       ADL/Self Care 85964  9  1   Orthotic Management 30406       Manual 23509     Neuro Re-Ed 14713       Non-Billable Time          Evaluation Time additionally includes thorough review of current medical information, gathering information on past medical history/social history and prior level of function, interpretation of standardized testing/informal observation of tasks, assessment of data and development of plan of care and goals.       ABIODUN Knight OTR/L; [id number]

## 2023-01-12 NOTE — PROGRESS NOTES
Physical Therapy  Physical Therapy Initial Assessment     Name: Eric Dawson  : 1940  MRN: [id number]      Date of Service: 2023    Evaluating PT:  Efrain De Luna PT, DPT    Room #:  8427/9022-O  Diagnosis:  Dehydration [E86.0]  Elevated troponin [R77.8]  [facility]STEM[facility] (non-ST elevated [facility]) ([facility] [I21.4]  ALDO (acute kidney injury) ([facility] [N17.9]  Atrial fibrillation, unspecified type ([facility] [I48.91]  Pneumonia due to infectious organism, unspecified laterality, unspecified part of lung [J18.9]  AMS (altered mental status) [R41.82]  PMHx/PSHx:  HTN, tobacco abuse  Procedure/Surgery:  N/A  Precautions:  fall risk, cognition, bed alarm, O2  Equipment Needs:  TBD    SUBJECTIVE:    Pt a questionable historian during eval. Pt lives alone in a 1 story home with 2 steps to enter and 1 handrail. Bed/bath is on main floor. Pt ambulated with no AD PTA. OBJECTIVE:   Initial Evaluation  Date: 23 Treatment Short Term/ Long Term   Goals   AM-PAC 6 Clicks 39/     Was pt agreeable to Eval/treatment? yes     Does pt have pain?  No pain     Bed Mobility  Rolling: min A  Supine to sit: min A  Sit to supine: min A  Scooting: min A  Rolling: mod I  Supine to sit: mod I  Sit to supine: mod I  Scooting: mod I   Transfers Sit to stand: min A  Stand to sit: min A  Stand pivot: NT  Sit to stand: mod I  Stand to sit: mod I  Stand pivot: mod I with AAD   Ambulation    2' with no AD min A  (Declined further mobility this date)  150'+ with AAD mod I   Stair negotiation: ascended and descended  NT  2 steps with 1 handrail mod I     Strength/ROM:   BLE grossly 4/5  BLE AROM WFL    Balance:   Static Sitting: SBA  Dynamic Sitting: CGA  Static Standing: CGA with no AD  Dynamic Standing: min A with no AD    Pt is A & O x 2, unable to recall time  Sensation:  Pt denies numbness and tingling to extremities  Edema:  unremarkable    Therapeutic Exercises:    Bed mobility: supine<>sit, cued for EOB positioning  Transfers: STSx1, cued for hand placement and postural correction  Ambulation: 2' with no AD  BLE AROM    Patient education  Pt educated on role of PT, importance of functional mobility during hospital stay, safety with functional mobility    Patient response to education:   Pt verbalized understanding Pt demonstrated skill Pt requires further education in this area   yes partial yes     ASSESSMENT:    Conditions Requiring Skilled Therapeutic Intervention:    [x]Decreased strength     []Decreased ROM  [x]Decreased functional mobility  [x]Decreased balance   [x]Decreased endurance   []Decreased posture  []Decreased sensation  []Decreased coordination   []Decreased vision  [x]Decreased safety awareness   []Increased pain       Comments:    Pt supine in bed upon entering, agreeable to participate. Pt was somewhat confused during session, requiring increased instruction and cueing to maintain pt safety. Pt was instructed to transfer to EOB, transferring with assist of trunk. Pt sitting upright with good static sitting balance. Pt reporting no dizziness with position change. While sitting EOB, pt became agitated and did want to participate any further, stating she was tired. Pt was assisted to [facility], side stepping with fair balance and no AD. Pt was transferred back to supine, made comfortable, all needs mett and call bell in reach prior to exiting.     Treatment:  Patient practiced and was instructed in the following treatment:    Bed mobility training - pt given verbal and tactile cues to facilitate proper sequencing and safety during rolling and supine>sit as well as provided with physical assistance to complete task   Sitting EOB for >5 minutes for upright tolerance, postural awareness and BLE ROM  STS and pivot transfer training - pt educated on proper hand and foot placement, safety and sequencing, and use of verbal and tactile cues to safely complete sit<>stand and pivot transfers with hands on assistance to complete task safely   Skilled positioning - Pt placed in the chair position with pillows utilized to facilitate upright posture, joint and skin integrity, and interaction with environment. Pt's/ family goals   1. Return home    Prognosis is fair for reaching above PT goals. Patient and or family understand(s) diagnosis, prognosis, and plan of care. yes    PHYSICAL THERAPY PLAN OF CARE:    PT POC is established based on physician order and patient diagnosis     Referring provider/PT Order:  Te Scales MD  Diagnosis:  Dehydration [E86.0]  Elevated troponin [R77.8]  NSTEMI (non-ST elevated myocardial infarction) ([facility] [I21.4]  ALDO (acute kidney injury) ([facility] [N17.9]  Atrial fibrillation, unspecified type ([facility] [I48.91]  Pneumonia due to infectious organism, unspecified laterality, unspecified part of lung [J18.9]  AMS (altered mental status) [R41.82]  Specific instructions for next treatment:  Progress as tolerated    Current Treatment Recommendations:     [x] Strengthening to improve independence with functional mobility   [] ROM to improve independence with functional mobility   [x] Balance Training to improve static/dynamic balance and to reduce fall risk  [x] Endurance Training to improve activity tolerance during functional mobility   [x] Transfer Training to improve safety and independence with all functional transfers   [x] Gait Training to improve gait mechanics, endurance and assess need for appropriate assistive device  [x] Stair Training in preparation for safe discharge home and/or into the community   [] Positioning to prevent skin breakdown and contractures  [x] Safety and Education Training   [x] Patient/Caregiver Education   [] HEP  [] Other     PT long term treatment goals are located in above grid    Frequency of treatments: 2-5x/week x 1-2 weeks.     Time in  1122  Time out  1142    Total Treatment Time  10 minutes     Evaluation Time includes thorough review of current medical information, gathering information on past medical history/social history and prior level of function, completion of standardized testing/informal observation of tasks, assessment of data and education on plan of care and goals.     CPT codes:  [x] Low Complexity PT evaluation 69532  [] Moderate Complexity PT evaluation 71545  [] High Complexity PT evaluation 21700  [] PT Re-evaluation 49114  [] Gait training 90636 -- minutes  [] Manual therapy [id number] -- minutes  [x] Therapeutic activities 55485 10 minutes  [] Therapeutic exercises 30781 -- minutes  [] Neuromuscular reeducation 88114 -- minutes     Nydia López, PT, DPT  [id number]

## 2023-01-12 NOTE — CARE COORDINATION
SOCIAL WORK/CASEFirst[facility] TRANSITION OF CARE PLANNINGHospitals in [place] Cookie Butler, [address]):  met with the son, Ruth Razo, at c # [phone] who came in this a.m. he feels pt needs mike. PT and OT to eval. If meets criteria he wants #1 [facility] and #2 [facility]. Pt is on 3l o2 nc with out home o2. She is more confused today. Stress test done this a.m. I will call Ruth Ravalbino once therapy sees pt. CATRACHITA Sanchez  1/12/2023  OT saw pt with ampac of   15 ,PT to eval. I met with pt after her stress test and per cardio a cath is not needed. I went over the conversation with her son this a.m. about mike and pt said she doesn't want to go. Then pt said \"I want to kill my self\". I provided this information to the rn. I told pt who said she is not going to a mike to discuss it with her children. I will follow up in the a.m.  in the mean time I had called and left  for [facility] to see if they have a bed. CATRACHITA Sanchez  1/12/2023  Pt assessed by rn and spoke with her. Pt said she was only kidding and is in agreement to go to rehab.  CATRACHITA Sanchez  1/12/2023

## 2023-01-12 NOTE — PROGRESS NOTES
Bedside report received, patient to stress test at this time. Patient ambulated to cot in room with assistance of 2 Rns.  Patient unsteady, needing constant cuing to ambulate

## 2023-01-12 NOTE — PROGRESS NOTES
This RN was informed by social work that patient stated \"I want to kill myself\" when discussing BETTE. This RN spoke with patient and preformed suicide screening to which patient answered no to screening questions. Patient states she \"was just joking. \" Notified Dr Charlotte Tomlinson per protocol.

## 2023-01-12 NOTE — PROGRESS NOTES
Inpatient Cardiology Progress note     PATIENT IS BEING FOLLOWED FOR: NSTEMI    Gary Toure is a [de-identified] y.o. female who is not previously known to [facility] cardiology prior to this admission. She was initially seen in consultation by Dr Lilliana Beard 1/8/2023. Wednesday 1/4/2023 went to dinner with son and was \"fine  Mother did not answer phone when son called Friday 1/6/2023 1/7/2023 in evening son went over to check on his mother. Per son patient was sitting on side of bed staring @ TV. Tried to talk with her, didn't seem herself. Was not finishing her sentences. [facility] 1/7/2023 for AMS (son requested) transported by EMS. Per ED physician patient was A/O x 4 on arrival Denies CP, SOB or Abdominal pain. GCS=14  BP 95/87 's AF RVR, RR 35, afebrile, and O2 saturation 95% on RA--> dropped to 909% and placed on 2 liters NC  ECG AF RVR  CT head nothing acute. CXR read as possible pneumonia. IV ATB's, BC x 2 drawn     Na 140, K+ 3.8, Bun/Cr 41/2.3, lactic acid 2.1, p-BNP 1608, troponin 115-->244-->261, Alk phos 116, WNL 11.2, Hgb 12.1, PLt 351, Neutrophils 82.6, INR 1.2, COVID/Influenza negative     2 liters NSS, Heparin bolus and gtt        Past Medical History:    HTN  Longstanding tobacco abuse currently 1/2 ppd but has smoked upwards of 1 ppd (has been smoking since her 20's)  Denies any previous pulmonary work-up   COVID Vaccine x 3 ([employer])  Denies having COVID during pandemic  Denies CAD, DM, HLD       SUBJECTIVE: denies CP. Denies SOB. No overnight issues    OBJECTIVE: Seen in the stress lab holding area.  No apparent distress    ROS:  Consist: Denies fevers, chills or night sweats  Heart: Denies chest pain, palpitations, lightheadedness, dizziness or syncope  Lungs: Denies SOB, cough, wheezing, orthopnea or PND  GI: Denies abdominal pain, vomiting or diarrhea    PHYSICAL EXAM:   BP (!) 129/101   Pulse 74   Temp 98.4 °F (36.9 °C)   Resp 16   Ht 5' 8\" (1.727 m)   Wt 133 lb 11.2 oz (60.6 kg) SpO2 93%   BMI 20.33 kg/m²    B/P Range last 24 hours: Systolic (65IEA), UF , Min:129 , NQP:416    Diastolic (91IGB), YJF:75, Min:52, Max:101    CONST: Well developed, well nourished female who appears of stated age. Awake, alert and cooperative. Ill appearing. ANo apparent distress  HEENT:   Head- Normocephalic, atraumatic   Eyes- Conjunctivae pink, anicteric  Throat- Oral mucosa pink and moist  Neck-  No stridor, trachea midline, no jugular venous distention. No carotid bruit  CHEST: Chest symmetrical and non-tender to palpation. No accessory muscle use or intercostal retractions  RESPIRATORY:  Lung sounds - fairly clear  CARDIOVASCULAR:     Heart Inspection- shows no noted pulsations  Heart Palpation- no heaves or thrills; PMI is non-displaced   Heart Ausculation- Regular rate and rhythm, no murmur. No s3, s4 or rub   PV: No lower extremity edema. No varicosities. Pedal pulses palpable, no clubbing or cyanosis   ABDOMEN: Soft, non-tender to light palpation. Bowel sounds present. No palpable masses no organomegaly; no abdominal bruit  MS: Good muscle strength and tone. No atrophy or abnormal movements. : Deferred  SKIN: Warm and dry no statis dermatitis or ulcers   NEURO / PSYCH: Oriented to person, place and time. Speech clear and appropriate. Follows all commands.  Pleasant affect     No intake or output data in the 24 hours ending 23 1322      Weight:   Wt Readings from Last 3 Encounters:   23 133 lb 11.2 oz (60.6 kg)     Current Inpatient Medications:   potassium bicarb-citric acid  40 mEq Oral Once    potassium bicarb-citric acid  40 mEq Oral Once    furosemide  20 mg Oral Daily    heparin (porcine)  5,000 Units SubCUTAneous Q8H    cefTRIAXone (ROCEPHIN) IV  1,000 mg IntraVENous Q24H    doxycycline hyclate  100 mg Oral 2 times per day    sodium chloride flush  10 mL IntraVENous 2 times per day    aspirin  81 mg Oral Daily    metoprolol succinate  12.5 mg Oral BID    atorvastatin  20 mg Oral Nightly       IV Infusions (if any):   sodium chloride         DIAGNOSTIC/ LABORATORY DATA:  Labs:   CBC:   No results for input(s): WBC, HGB, HCT, PLT in the last 72 hours. BMP:   Recent Labs     23  0614 23  0517    142   K 3.4* 3.4*   CO2 27 26   BUN 19 20   CREATININE 1.0 0.9   LABGLOM 57 >60   CALCIUM 8.8 8.3*     Mag:   Recent Labs     23  0614 23  0517   MG 1.4* 1.9         TFT:   Lab Results   Component Value Date    TSH 2.180 2023    T4FREE 1.04 2023      HgA1c:   Lab Results   Component Value Date    LABA1C 5.8 (H) 2023         APTT:  Recent Labs     23  1929 01/10/23  0532   APTT 63.7* 67.9*         FASTING LIPID PANEL:  Lab Results   Component Value Date/Time    CHOL 155 2023 10:57 AM    HDL 58 2023 10:57 AM    LDLCALC 79 2023 10:57 AM    TRIG 89 2023 10:57 AM         CT head 2023:  Impression   No acute intracranial abnormality. US retroperitoneal 2023:  Impression   Findings suggestive of medical renal disease without obstruction. CXR 2023: Moderate left lower lobe pneumonia. Small left pleural effusion. Bilateral lung hyperinflation. Mild cardiomegaly. CXR 1/10/2023:  Increasing bibasilar patchy infiltrates with small bilateral pleural effusions. 12 lead EK2023: Sinus rhythm with low voltage QRS, septal infarct. Ventricular rate 70, NE interval 180, QRS duration 64, QTc 436. Echo 2023:   Technically limited study. Left ventricle is normal in size . Normal left ventricle wall thickness. No regional wall motion abnormalities seen. Ejection fraction is visually estimated at 75-80%. There is doppler evidence of stage I diastolic dysfunction. Normal right ventricular size and function. Normal sized left atrium. Possibly mildly dilated right atrium. Mild aortic regurgitation   Mild tricuspid regurgitation. Moderate pulmonary hypertension.     Telemetry: SR    ASSESSMENT:   Pneumonia  Leukocytosis  Elevated pro calcitonin  Mild anemia  ? NSTEMI ( elevated troponin in the context of ALDO ) ? Type II vs type I. No ischemic ECG changes. Denies CP  PAF, converted and maintaining SR  Acute diastolic HF, ? Iatrogenic secondary to IV Fluids. Pro BNP 4,372 ( 1/9/2023 ) --> 7,749 ( 1/10/2023 )  ALDO versus ALDO on CKD:  SCr 2.3 ( on admission )  --> 1.0 ( today )  Hypokalemia  Hypomagnesemia, resolved      PLAN:  Supplement K+  Rest of cardiac medications same  Lexiscan nuclear stress in am  Further recommendations to follow pending the results of the stress test ... Possible need for cardiac catheterization discussed. ...initially stated that she will not undergo any invasive cardiac procedure but then stated that she will decide / make up her mind  pending the stress test results  Will follow    Electronically signed by Angelique Luque MD on 1/12/2023 at 1:22 PM

## 2023-01-12 NOTE — PROGRESS NOTES
Patient arrived back on floor with no O2. 77% on RA. Applied O2 at 3L with slow increase in saturation. O2 increased to 5L. Patient 92% on 5L.  Continuous pulse ox applied    93% on 4L NC Electronically signed by Constance Dolan RN on 1/12/2023 at 12:28 PM    93% on 2L NC Electronically signed by Constance Dolan RN on 1/12/2023 at 1:49 PM

## 2023-01-12 NOTE — PROGRESS NOTES
Progress note hospitalist service                                        Chrissy Anthony MD      Patient Name: Ora Delvalle                   Age:  [de-identified] y.o. Gender:   female    CC: Altered mental status    HPI: Patient was interviewed and examined in the emergency room. History is obtained via combination of patient interview and emergency room chart review. The following represents a consolidation of those 2 sources. This is an 68-year-old female who lives at home and alone presented to the emergency room delivered by her son for altered mental status. Apparently she was confused and little else could be obtained concerning her history. Direct interview indicates she does not know why she is at the hospital and she states she feels okay. Upon arrival she was found to be in atrial fibrillation with RVR versus sinus tachycardia with PACs. The electrocardiographic tracing is not available for viewing therefore cannot confirm at this time although telemetry appears to be sinus rhythm and her initial troponin was 115 and the second 1 was over 200. There was concern for NSTEMI. She is awake and alert. She is aware that she is at the hospital with does not know why. She denies any symptoms but appears to be mildly dyspneic on speech    1/9:  Patient has been evaluated by nephrology. Consensus high anion gap metabolic acidosis hydration at 125 an hour.   Cardiology consultation pending  Patient denies any chest pain or trouble breathing  Denies any abdominal pain  She appears quite comfortable awake and alert    1/10  Patient appears to be comfortable  She still has some mild dyspnea on speech  Cardiology has recommended to stop the hydration  And diurese if possible  Oxygenating well on 4 L and vitals are otherwise normal and there is no fever    1/11  Patient feels comfortable  She still is mildly dyspneic on speech  Denies any chest pain or pressure sensation  Cardiology has seen the patient and the plan is for a stress test  Oxygenating satisfactorily on 2 L which is improved from 5 L  Good response to diuretic  Plan is for another episode of diuresis    1/12  Doing ok  Off oxygen  Son by the bed side      No Known Allergies    The patient's medical records have been reviewed contingent on availability    Review of Systems:   As per HPI      Physical Examination:      Wt Readings from Last 3 Encounters:   01/12/23 133 lb 11.2 oz (60.6 kg)     Temp Readings from Last 3 Encounters:   01/12/23 98.4 °F (36.9 °C)     BP Readings from Last 3 Encounters:   01/12/23 (!) 129/101     Pulse Readings from Last 3 Encounters:   01/12/23 74       General appearance: Normal, awake, alert no distress. Skin: Color, texture, turgor normal. No rashes or lesions. Eyes: Conjunctivae/cornea clear. [employer]. Sclera non icteric. Chest: Even but reduced excursion   Lungs: Clear to auscultation. No rhonchi, crackles or rales. Basilar hypoventilation  Heart: S1 > S2. Rhythm is regular and rate is now normal no gallop rub or murmur. Abdomen: Soft, mildly protuberant, non-tender. BS normal. No masses, organomegaly. Anatomic contours appear normal.  Extremities: No deformities, edema, or skin discoloration.    Gait & balance: Based on history reported independently ambulatory at home     Labs     CBC:   Lab Results   Component Value Date/Time    WBC 12.5 01/09/2023 04:57 AM    RBC 3.38 01/09/2023 04:57 AM    HGB 10.8 01/09/2023 04:57 AM    HCT 33.8 01/09/2023 04:57 AM     01/09/2023 04:57 AM    .0 01/09/2023 04:57 AM     BMP:    Lab Results   Component Value Date/Time     01/12/2023 05:17 AM    K 3.4 01/12/2023 05:17 AM     01/12/2023 05:17 AM    CO2 26 01/12/2023 05:17 AM    BUN 20 01/12/2023 05:17 AM    CREATININE 0.9 01/12/2023 05:17 AM    GLUCOSE 104 01/12/2023 05:17 AM    CALCIUM 8.3 01/12/2023 05:17 AM     Hepatic Function Panel:    Lab Results   Component Value Date/Time ALKPHOS 116 01/07/2023 08:16 PM    AST 31 01/07/2023 08:16 PM    ALT 14 01/07/2023 08:16 PM    PROT 7.3 01/07/2023 08:16 PM    LABALBU 3.9 01/07/2023 08:16 PM    BILITOT 1.1 01/07/2023 08:16 PM     Magnesium:    Lab Results   Component Value Date/Time    MG 1.9 01/12/2023 05:17 AM     Cardiac Enzymes:   Lab Results   Component Value Date    CKTOTAL 594 (H) 01/08/2023     LDH:  No results found for: LDH  PT/INR:    Lab Results   Component Value Date/Time    PROTIME 12.8 01/07/2023 08:16 PM    INR 1.2 01/07/2023 08:16 PM     BNP: No results for input(s): BNP in the last 72 hours. TSH:   Lab Results   Component Value Date    TSH 2.180 01/08/2023      Cardiac Injury Profile:   No results for input(s): CKTOTAL, CKMB, CKMBINDEX, TROPONINI in the last 72 hours.      Lipid Profile:   Lab Results   Component Value Date/Time    TRIG 89 01/08/2023 10:57 AM    HDL 58 01/08/2023 10:57 AM    LDLCALC 79 01/08/2023 10:57 AM    CHOL 155 01/08/2023 10:57 AM      Hemoglobin A1C: No components found for: HGBA1C   U/A:   Lab Results   Component Value Date/Time    LEUKOCYTESUR Negative 01/08/2023 10:57 AM    PHUR 5.5 01/08/2023 10:57 AM    WBCUA 2-5 01/08/2023 10:57 AM    RBCUA 5-10 01/08/2023 10:57 AM    BACTERIA FEW 01/08/2023 10:57 AM    SPECGRAV >=1.030 01/08/2023 10:57 AM    BLOODU TRACE-INTACT 01/08/2023 10:57 AM    GLUCOSEU Negative 01/08/2023 10:57 AM         ADMISSION SCHEDULED MEDS:   Current Facility-Administered Medications   Medication Dose Route Frequency Provider Last Rate Last Admin    potassium bicarb-citric acid (EFFER-K) effervescent tablet 40 mEq  40 mEq Oral Once Melvin Álvarez MD        potassium bicarb-citric acid (EFFER-K) effervescent tablet 40 mEq  40 mEq Oral Once ADITYA Borges CNP        furosemide (LASIX) tablet 20 mg  20 mg Oral Daily ADITYA Mijares CNP        heparin (porcine) injection 5,000 Units  5,000 Units SubCUTAneous Alice Mednez MD   5,000 Units at 01/12/23 8791 ipratropium-albuterol (DUONEB) nebulizer solution 1 ampule  1 ampule Inhalation Q4H PRN Disha Rodriguez MD   1 ampule at 01/11/23 2102    cefTRIAXone (ROCEPHIN) 1,000 mg in sterile water 10 mL IV syringe  1,000 mg IntraVENous Q24H Disha Rodriguez MD   1,000 mg at 01/11/23 2234    doxycycline hyclate (VIBRAMYCIN) capsule 100 mg  100 mg Oral 2 times per day Vicente Everett MD   100 mg at 01/12/23 1212    sodium chloride flush 0.9 % injection 10 mL  10 mL IntraVENous 2 times per day Vicente Everett MD   10 mL at 01/12/23 1213    sodium chloride flush 0.9 % injection 10 mL  10 mL IntraVENous PRN Vicente Eveertt MD        0.9 % sodium chloride infusion   IntraVENous PRN Vicente Everett MD        ondansetron (ZOFRAN-ODT) disintegrating tablet 4 mg  4 mg Oral Q8H PRN Disha Rodriguez MD        Or    ondansetron [facility]) injection 4 mg  4 mg IntraVENous Q6H PRN Disha Rodriguez MD        magnesium hydroxide (MILK OF MAGNESIA) 400 MG/5ML suspension 30 mL  30 mL Oral Daily PRN Disha Rodriguez MD        acetaminophen (TYLENOL) tablet 650 mg  650 mg Oral Q6H PRN Disha Rodriguez MD        Or    acetaminophen (TYLENOL) suppository 650 mg  650 mg Rectal Q6H PRN Disha Rodriguez MD        aspirin EC tablet 81 mg  81 mg Oral Daily JYOTSNA Woodward   81 mg at 01/12/23 1212    metoprolol succinate (TOPROL XL) extended release tablet 12.5 mg  12.5 mg Oral BID Samreen Montano APN   12.5 mg at 01/12/23 1212    atorvastatin (LIPITOR) tablet 20 mg  20 mg Oral Nightly SHERRY WoodwardN   20 mg at 01/11/23 1954       Current  Infusions   sodium chloride         Prn Meds  ipratropium-albuterol, sodium chloride flush, sodium chloride, ondansetron **OR** ondansetron, magnesium hydroxide, acetaminophen **OR** acetaminophen    Radiology Review:  NM Cardiac Stress Test Nuclear Imaging   Final Result      XR CHEST PORTABLE   Final Result   Increasing bibasilar patchy infiltrates with small bilateral pleural   effusions. US RETROPERITONEAL COMPLETE   Final Result   Findings suggestive of medical renal disease without obstruction. CT HEAD WO CONTRAST   Final Result   No acute intracranial abnormality. RECOMMENDATIONS:   Careful clinical correlation and follow up recommended. XR CHEST PORTABLE   Final Result   Moderate left lower lobe pneumonia. Small left pleural effusion. Bilateral lung hyperinflation. Mild cardiomegaly. ASSESSMENT:  Active diagnoses treated at this admission:  Altered mental status-resolved. Metabolic encephalopathy-improving  ALDO improving with hydration  Initial electrocardiogram suggestive of atrial fibrillation with RVR.   NSTEMI versus demand ischemia related to atrial fibrillation  Left lower lobe pneumonia per chest x-ray-elevated procalcitonin/in context of renal insufficiency  High anion gap metabolic acidosis in the context of renal insufficiency versus dehydration  Another      Problem list:  Patient Active Problem List   Diagnosis    AMS (altered mental status)    NSTEMI (non-ST elevated myocardial infarction) [facility])       PLAN:  Stress test results noted  Nuclear images pending  If positive needs cath  PT to see for dc planning  Cardiology following  Replace potassium  Kymberly Spangler MD    1:02 PM  1/12/2023

## 2023-01-12 NOTE — PROCEDURES
[place] [facility] Nuclear Stress Test:    Cardiologist: Dr. Alexei Palmer EKG: Normal sinus rhythm, low voltage QRS complex, anteroseptal infarct age undetermined, abnormal EKG    Indications for study: Chest pain    1. No chest pain  2. No new arrhythmias  3. No EKG changes suggestive of stress induced ischemia  4. Nuclear images pending    Garcia Thompson MD., [facility].    [facility]) Cardiology

## 2023-01-12 NOTE — PROGRESS NOTES
Department of Internal Medicine  Nephrology  Progress Note    Events reviewed. SUBJECTIVE: We are following Mrs. Anthony Downey for ALDO. Reports feeling much better.     PHYSICAL EXAM:      Vitals:    VITALS:  BP (!) 132/52   Pulse 74   Temp 97.2 °F (36.2 °C) (Temporal)   Resp 16   Ht 5' 8\" (1.727 m)   Wt 133 lb 11.2 oz (60.6 kg)   SpO2 93%   BMI 20.33 kg/m²   24HR INTAKE/OUTPUT:  No intake or output data in the 24 hours ending 01/12/23 0839      Constitutional:  Alert and oriented, NAD  HEENT:  Normocephalic, PERRL, dry mucous membranes  Respiratory:  Diminished lung sounds  Cardiovascular/Edema:  IRRR, S1,S2  Gastrointestinal:  Soft, flat, nontender, nondistended  Neurologic:  Nonfocal, CRAIG  Skin:  Warm, dry, intact  Other:  No edema     Scheduled Meds:   potassium chloride  40 mEq Oral Once    heparin (porcine)  5,000 Units SubCUTAneous Q8H    cefTRIAXone (ROCEPHIN) IV  1,000 mg IntraVENous Q24H    doxycycline hyclate  100 mg Oral 2 times per day    sodium chloride flush  10 mL IntraVENous 2 times per day    aspirin  81 mg Oral Daily    metoprolol succinate  12.5 mg Oral BID    atorvastatin  20 mg Oral Nightly     Continuous Infusions:   sodium chloride       PRN Meds:.technetium sestamibi, regadenoson, ipratropium-albuterol, sodium chloride flush, sodium chloride, ondansetron **OR** ondansetron, magnesium hydroxide, acetaminophen **OR** acetaminophen      DATA:    CBC:   Lab Results   Component Value Date/Time    WBC 12.5 01/09/2023 04:57 AM    RBC 3.38 01/09/2023 04:57 AM    HGB 10.8 01/09/2023 04:57 AM    HCT 33.8 01/09/2023 04:57 AM    .0 01/09/2023 04:57 AM    MCH 32.0 01/09/2023 04:57 AM    MCHC 32.0 01/09/2023 04:57 AM    RDW 14.2 01/09/2023 04:57 AM     01/09/2023 04:57 AM    MPV 9.1 01/09/2023 04:57 AM     CMP:    Lab Results   Component Value Date/Time     01/12/2023 05:17 AM    K 3.4 01/12/2023 05:17 AM     01/12/2023 05:17 AM    CO2 26 01/12/2023 05:17 AM    BUN 20 01/12/2023 05:17 AM    CREATININE 0.9 01/12/2023 05:17 AM    LABGLOM >60 01/12/2023 05:17 AM    GLUCOSE 104 01/12/2023 05:17 AM    PROT 7.3 01/07/2023 08:16 PM    LABALBU 3.9 01/07/2023 08:16 PM    CALCIUM 8.3 01/12/2023 05:17 AM    BILITOT 1.1 01/07/2023 08:16 PM    ALKPHOS 116 01/07/2023 08:16 PM    AST 31 01/07/2023 08:16 PM    ALT 14 01/07/2023 08:16 PM     Magnesium:    Lab Results   Component Value Date/Time    MG 1.9 01/12/2023 05:17 AM     Phosphorus:    Lab Results   Component Value Date/Time    PHOS 4.3 01/08/2023 10:57 AM     Radiology Review:      Kidney ultrasound October 8, 2023     FINDINGS:       Kidneys: The right kidney measures 9.6 cm in length and the left kidney measures 9.2   cm in length. There is no hydronephrosis. A suggestion of increased renal echogenicity. 1.6 cm right renal cyst noted           Bladder:       Volume of 302 cc without obvious mass   Impression:  Findings suggestive of medical renal disease without obstruction. CT head without IV contrast 1/8/23   No acute intracranial abnormality. Chest x-ray January 7, 2023   Moderate left lower lobe pneumonia. Small left pleural effusion. Bilateral lung hyperinflation. Mild cardiomegaly. BRIEF SUMMARY OF INITIAL CONSULT:    Briefly Mrs. Waylon Dorantes is a 27-year-old female with history of HTN, who was admitted on January 7, 2023 after she was brought by her son for evaluation of altered mental status. In the ER she was found to have AF with RVR. CT head scan was negative and her chest x-ray showed moderate left lower lobe pneumonia. As well she was found to have elevated BUN/creatinine of 41 and 2.3 mg/L respectively, reason for this consultation. Her proBNP was 1608. There is no previous baseline creatinine level to compare. Prior to admission her medications included HCTZ and amlodipine. Problems resolved:     Altered mental status, 2/2 #4 and #1  Mixed metabolic acidosis, HAGMA, bicarbonate level 18, anion gap 21, 2/2 uremia and probably starvation ketoacidosis, and NAGMA (NS administration?,  Urinary losses of potential bicarb-ketonuria?). Lactic acid level 2.1. Bicarbonate levels are stable otherwise improved anion gap. ALDO stage I on CKD, volume responsive prerenal ALDO (poor oral intake) in the setting of HCTZ administration. Urine specific gravity >1.030. Patient was given 20 mg IV Lasix yesterday and again today, creatinine has improved 1.0 mg/dL. Hypomagnesemia 2/2 loop diuretics, replaced     IMPRESSION/RECOMMENDATIONS:     CKD stage III, kidney ultrasound with increased echogenicity. UPCR: 0.6, UACR: 62.4  Hypokalemia 2/2 loop diuretics, replaced  History of HTN, on metoprolol, holding amlodipine and HCTZ  HFpEF 75-80% with stage I diastolic dysfunction, moderate PH, mild aortic and tricuspid regurgitation (Echo 1/7/23). ProBNP 1608 > 4372 >7749  --------------------------------------------------------  Left lower lobe pneumonia, on ceftriaxone and doxycycline  Atrial fibrillation with RVR, new onset versus permanent? On heparin drip and metoprolol.   NSTEMI, cardiology following, for cardiac stress in the future    Plan:  Lasix 20 mg p.o. daily, continue upon discharge  Discontinue hydrochlorothiazide  Replace potassium  Continue to hold amlodipine  Will need to obtain BMP, CBC, magnesium and phosphorus level 1 week before appointment and follow-up in the office    Electronically signed by ADITYA Fernandez CNP on 1/12/2023 at 8:39 AM

## 2023-01-13 VITALS
HEART RATE: 76 BPM | OXYGEN SATURATION: 95 % | WEIGHT: 132.8 LBS | HEIGHT: 68 IN | DIASTOLIC BLOOD PRESSURE: 61 MMHG | BODY MASS INDEX: 20.13 KG/M2 | RESPIRATION RATE: 22 BRPM | BODY MASS INDEX: 20.13 KG/M2 | HEIGHT: 68 IN | OXYGEN SATURATION: 95 % | SYSTOLIC BLOOD PRESSURE: 113 MMHG | RESPIRATION RATE: 22 BRPM | TEMPERATURE: 96.6 F | DIASTOLIC BLOOD PRESSURE: 61 MMHG | SYSTOLIC BLOOD PRESSURE: 113 MMHG | WEIGHT: 132.8 LBS | HEART RATE: 76 BPM | TEMPERATURE: 96.6 F

## 2023-01-13 LAB
ANION GAP SERPL CALCULATED.3IONS-SCNC: 11 MMOL/L (ref 7–16)
ANION GAP SERPL CALCULATED.3IONS-SCNC: 11 MMOL/L (ref 7–16)
BUN BLDV-MCNC: 26 MG/DL (ref 6–23)
BUN BLDV-MCNC: 26 MG/DL (ref 6–23)
CALCIUM SERPL-MCNC: 8.3 MG/DL (ref 8.6–10.2)
CALCIUM SERPL-MCNC: 8.3 MG/DL (ref 8.6–10.2)
CHLORIDE BLD-SCNC: 103 MMOL/L (ref 98–107)
CHLORIDE BLD-SCNC: 103 MMOL/L (ref 98–107)
CO2: 27 MMOL/L (ref 22–29)
CO2: 27 MMOL/L (ref 22–29)
CREAT SERPL-MCNC: 1.1 MG/DL (ref 0.5–1)
CREAT SERPL-MCNC: 1.1 MG/DL (ref 0.5–1)
GFR SERPL CREATININE-BSD FRML MDRD: 51 ML/MIN/1.73
GFR SERPL CREATININE-BSD FRML MDRD: 51 ML/MIN/1.73
GLUCOSE BLD-MCNC: 101 MG/DL (ref 74–99)
GLUCOSE BLD-MCNC: 101 MG/DL (ref 74–99)
POTASSIUM REFLEX MAGNESIUM: 3.7 MMOL/L (ref 3.5–5)
POTASSIUM REFLEX MAGNESIUM: 3.7 MMOL/L (ref 3.5–5)
PRO-BNP: 3864 PG/ML (ref 0–450)
PRO-BNP: 3864 PG/ML (ref 0–450)
SARS-COV-2, NAAT: NOT DETECTED
SARS-COV-2, NAAT: NOT DETECTED
SODIUM BLD-SCNC: 141 MMOL/L (ref 132–146)
SODIUM BLD-SCNC: 141 MMOL/L (ref 132–146)

## 2023-01-13 PROCEDURE — 93005 ELECTROCARDIOGRAM TRACING: CPT | Performed by: INTERNAL MEDICINE

## 2023-01-13 PROCEDURE — 93571 IV DOP VEL&/PRESS C FLO 1ST: CPT

## 2023-01-13 PROCEDURE — 2700000000 HC OXYGEN THERAPY PER DAY

## 2023-01-13 PROCEDURE — 80048 BASIC METABOLIC PNL TOTAL CA: CPT

## 2023-01-13 PROCEDURE — 6370000000 HC RX 637 (ALT 250 FOR IP): Performed by: INTERNAL MEDICINE

## 2023-01-13 PROCEDURE — C1769 GUIDE WIRE: HCPCS

## 2023-01-13 PROCEDURE — C1887 CATHETER, GUIDING: HCPCS

## 2023-01-13 PROCEDURE — 6360000002 HC RX W HCPCS

## 2023-01-13 PROCEDURE — C1894 INTRO/SHEATH, NON-LASER: HCPCS

## 2023-01-13 PROCEDURE — 2709999900 HC NON-CHARGEABLE SUPPLY

## 2023-01-13 PROCEDURE — 6370000000 HC RX 637 (ALT 250 FOR IP): Performed by: NURSE PRACTITIONER

## 2023-01-13 PROCEDURE — 6360000002 HC RX W HCPCS: Performed by: INTERNAL MEDICINE

## 2023-01-13 PROCEDURE — 87635 SARS-COV-2 COVID-19 AMP PRB: CPT

## 2023-01-13 PROCEDURE — 83880 ASSAY OF NATRIURETIC PEPTIDE: CPT

## 2023-01-13 PROCEDURE — 93458 L HRT ARTERY/VENTRICLE ANGIO: CPT

## 2023-01-13 PROCEDURE — 94640 AIRWAY INHALATION TREATMENT: CPT

## 2023-01-13 PROCEDURE — 36415 COLL VENOUS BLD VENIPUNCTURE: CPT

## 2023-01-13 PROCEDURE — 93005 ELECTROCARDIOGRAM TRACING: CPT | Performed by: FAMILY MEDICINE

## 2023-01-13 PROCEDURE — B2111ZZ FLUOROSCOPY OF MULTIPLE CORONARY ARTERIES USING LOW OSMOLAR CONTRAST: ICD-10-PCS | Performed by: INTERNAL MEDICINE

## 2023-01-13 PROCEDURE — 2580000003 HC RX 258: Performed by: INTERNAL MEDICINE

## 2023-01-13 PROCEDURE — 93458 L HRT ARTERY/VENTRICLE ANGIO: CPT | Performed by: INTERNAL MEDICINE

## 2023-01-13 PROCEDURE — 99232 SBSQ HOSP IP/OBS MODERATE 35: CPT | Performed by: INTERNAL MEDICINE

## 2023-01-13 PROCEDURE — 4A023N7 MEASUREMENT OF CARDIAC SAMPLING AND PRESSURE, LEFT HEART, PERCUTANEOUS APPROACH: ICD-10-PCS | Performed by: INTERNAL MEDICINE

## 2023-01-13 PROCEDURE — B2151ZZ FLUOROSCOPY OF LEFT HEART USING LOW OSMOLAR CONTRAST: ICD-10-PCS | Performed by: INTERNAL MEDICINE

## 2023-01-13 PROCEDURE — 6370000000 HC RX 637 (ALT 250 FOR IP): Performed by: LICENSED PRACTICAL NURSE

## 2023-01-13 PROCEDURE — 2500000003 HC RX 250 WO HCPCS

## 2023-01-13 RX ORDER — METOPROLOL SUCCINATE 50 MG/1
50 TABLET, EXTENDED RELEASE ORAL DAILY
Status: DISCONTINUED | OUTPATIENT
Start: 2023-01-14 | End: 2023-01-13 | Stop reason: HOSPADM

## 2023-01-13 RX ORDER — ATORVASTATIN CALCIUM 20 MG/1
20 TABLET, FILM COATED ORAL NIGHTLY
Qty: 30 TABLET | Refills: 3
Start: 2023-01-13

## 2023-01-13 RX ORDER — ISOSORBIDE MONONITRATE 30 MG/1
30 TABLET, EXTENDED RELEASE ORAL DAILY
Status: DISCONTINUED | OUTPATIENT
Start: 2023-01-13 | End: 2023-01-13 | Stop reason: HOSPADM

## 2023-01-13 RX ORDER — NITROGLYCERIN 0.4 MG/1
0.4 TABLET SUBLINGUAL EVERY 5 MIN PRN
Qty: 25 TABLET | Refills: 3
Start: 2023-01-13

## 2023-01-13 RX ORDER — CEFUROXIME AXETIL 500 MG/1
500 TABLET ORAL 2 TIMES DAILY
Qty: 14 TABLET | Refills: 0
Start: 2023-01-13 | End: 2023-01-20

## 2023-01-13 RX ORDER — ASPIRIN 81 MG/1
81 TABLET ORAL DAILY
Qty: 30 TABLET | Refills: 3
Start: 2023-01-14

## 2023-01-13 RX ORDER — NITROGLYCERIN 0.4 MG/1
0.4 TABLET SUBLINGUAL EVERY 5 MIN PRN
Status: DISCONTINUED | OUTPATIENT
Start: 2023-01-13 | End: 2023-01-13 | Stop reason: HOSPADM

## 2023-01-13 RX ORDER — METOPROLOL SUCCINATE 50 MG/1
50 TABLET, EXTENDED RELEASE ORAL DAILY
Qty: 30 TABLET | Refills: 3
Start: 2023-01-14

## 2023-01-13 RX ORDER — CLOPIDOGREL BISULFATE 75 MG/1
75 TABLET ORAL DAILY
Qty: 30 TABLET | Refills: 3
Start: 2023-01-13

## 2023-01-13 RX ORDER — ASPIRIN 81 MG/1
243 TABLET, CHEWABLE ORAL ONCE
Status: COMPLETED | OUTPATIENT
Start: 2023-01-13 | End: 2023-01-13

## 2023-01-13 RX ORDER — CLOPIDOGREL BISULFATE 75 MG/1
75 TABLET ORAL DAILY
Status: DISCONTINUED | OUTPATIENT
Start: 2023-01-13 | End: 2023-01-13 | Stop reason: HOSPADM

## 2023-01-13 RX ORDER — ISOSORBIDE MONONITRATE 30 MG/1
30 TABLET, EXTENDED RELEASE ORAL DAILY
Qty: 30 TABLET | Refills: 3
Start: 2023-01-13

## 2023-01-13 RX ORDER — SODIUM CHLORIDE 9 MG/ML
INJECTION, SOLUTION INTRAVENOUS CONTINUOUS
Status: DISCONTINUED | OUTPATIENT
Start: 2023-01-13 | End: 2023-01-13 | Stop reason: HOSPADM

## 2023-01-13 RX ADMIN — DOXYCYCLINE HYCLATE 100 MG: 100 CAPSULE ORAL at 08:34

## 2023-01-13 RX ADMIN — HEPARIN SODIUM 5000 UNITS: 10000 INJECTION INTRAVENOUS; SUBCUTANEOUS at 06:37

## 2023-01-13 RX ADMIN — IPRATROPIUM BROMIDE AND ALBUTEROL SULFATE 1 AMPULE: .5; 2.5 SOLUTION RESPIRATORY (INHALATION) at 09:18

## 2023-01-13 RX ADMIN — CLOPIDOGREL BISULFATE 75 MG: 75 TABLET ORAL at 16:47

## 2023-01-13 RX ADMIN — ASPIRIN 243 MG: 81 TABLET, CHEWABLE ORAL at 10:04

## 2023-01-13 RX ADMIN — METOPROLOL SUCCINATE 12.5 MG: 25 TABLET, EXTENDED RELEASE ORAL at 08:33

## 2023-01-13 RX ADMIN — FUROSEMIDE 20 MG: 40 TABLET ORAL at 08:33

## 2023-01-13 RX ADMIN — ASPIRIN 81 MG: 81 TABLET, COATED ORAL at 08:33

## 2023-01-13 RX ADMIN — CEFTRIAXONE 1000 MG: 1 INJECTION, POWDER, FOR SOLUTION INTRAMUSCULAR; INTRAVENOUS at 08:32

## 2023-01-13 RX ADMIN — ISOSORBIDE MONONITRATE 30 MG: 30 TABLET, EXTENDED RELEASE ORAL at 16:46

## 2023-01-13 ASSESSMENT — PAIN SCALES - GENERAL
PAINLEVEL_OUTOF10: 0
PAINLEVEL_OUTOF10: 0

## 2023-01-13 NOTE — PROGRESS NOTES
Report called to [employer] at [facility]. All questions answered at this time.  Patient awaiting transportation

## 2023-01-13 NOTE — PROGRESS NOTES
Department of Internal Medicine  Nephrology Progress Note    Events reviewed. SUBJECTIVE: We are following Mrs. Ree Walls for ALDO. Reports feeling much better.     PHYSICAL EXAM:      Vitals:    VITALS:  BP (!) 138/55   Pulse 70   Temp 98.2 °F (36.8 °C) (Temporal)   Resp 17   Ht 5' 8\" (1.727 m)   Wt 132 lb 12.8 oz (60.2 kg)   SpO2 93%   BMI 20.19 kg/m²   24HR INTAKE/OUTPUT:    Intake/Output Summary (Last 24 hours) at 1/13/2023 0849  Last data filed at 1/12/2023 2014  Gross per 24 hour   Intake 0 ml   Output --   Net 0 ml         Constitutional:  Alert and oriented, NAD  HEENT:  Normocephalic, PERRL, dry mucous membranes  Respiratory:  Diminished lung sounds  Cardiovascular/Edema:  IRRR, S1,S2  Gastrointestinal:  Soft, flat, nontender, nondistended  Neurologic:  Nonfocal, CRAIG  Skin:  Warm, dry, intact  Other:  No edema     Scheduled Meds:   furosemide  20 mg Oral Daily    heparin (porcine)  5,000 Units SubCUTAneous Q8H    cefTRIAXone (ROCEPHIN) IV  1,000 mg IntraVENous Q24H    doxycycline hyclate  100 mg Oral 2 times per day    sodium chloride flush  10 mL IntraVENous 2 times per day    aspirin  81 mg Oral Daily    metoprolol succinate  12.5 mg Oral BID    atorvastatin  20 mg Oral Nightly     Continuous Infusions:   sodium chloride       PRN Meds:.ipratropium-albuterol, sodium chloride flush, sodium chloride, ondansetron **OR** ondansetron, magnesium hydroxide, acetaminophen **OR** acetaminophen      DATA:    CBC:   Lab Results   Component Value Date/Time    WBC 12.5 01/09/2023 04:57 AM    RBC 3.38 01/09/2023 04:57 AM    HGB 10.8 01/09/2023 04:57 AM    HCT 33.8 01/09/2023 04:57 AM    .0 01/09/2023 04:57 AM    MCH 32.0 01/09/2023 04:57 AM    MCHC 32.0 01/09/2023 04:57 AM    RDW 14.2 01/09/2023 04:57 AM     01/09/2023 04:57 AM    MPV 9.1 01/09/2023 04:57 AM     CMP:    Lab Results   Component Value Date/Time     01/13/2023 05:59 AM    K 3.7 01/13/2023 05:59 AM     01/13/2023 05:59 AM CO2 27 01/13/2023 05:59 AM    BUN 26 01/13/2023 05:59 AM    CREATININE 1.1 01/13/2023 05:59 AM    LABGLOM 51 01/13/2023 05:59 AM    GLUCOSE 101 01/13/2023 05:59 AM    PROT 7.3 01/07/2023 08:16 PM    LABALBU 3.9 01/07/2023 08:16 PM    CALCIUM 8.3 01/13/2023 05:59 AM    BILITOT 1.1 01/07/2023 08:16 PM    ALKPHOS 116 01/07/2023 08:16 PM    AST 31 01/07/2023 08:16 PM    ALT 14 01/07/2023 08:16 PM     Magnesium:    Lab Results   Component Value Date/Time    MG 1.9 01/12/2023 05:17 AM     Phosphorus:    Lab Results   Component Value Date/Time    PHOS 4.3 01/08/2023 10:57 AM     Radiology Review:      Kidney ultrasound October 8, 2023     FINDINGS:       Kidneys: The right kidney measures 9.6 cm in length and the left kidney measures 9.2   cm in length. There is no hydronephrosis. A suggestion of increased renal echogenicity. 1.6 cm right renal cyst noted           Bladder:       Volume of 302 cc without obvious mass   Impression:  Findings suggestive of medical renal disease without obstruction. CT head without IV contrast 1/8/23   No acute intracranial abnormality. Chest x-ray January 7, 2023   Moderate left lower lobe pneumonia. Small left pleural effusion. Bilateral lung hyperinflation. Mild cardiomegaly. BRIEF SUMMARY OF INITIAL CONSULT:    Briefly Mrs. River Fuller is a 72-year-old female with history of HTN, who was admitted on January 7, 2023 after she was brought by her son for evaluation of altered mental status. In the ER she was found to have AF with RVR. CT head scan was negative and her chest x-ray showed moderate left lower lobe pneumonia. As well she was found to have elevated BUN/creatinine of 41 and 2.3 mg/L respectively, reason for this consultation. Her proBNP was 1608. There is no previous baseline creatinine level to compare. Prior to admission her medications included HCTZ and amlodipine. Problems resolved:     Altered mental status, 2/2 #4 and #1  Mixed metabolic acidosis, HAGMA, bicarbonate level 18, anion gap 21, 2/2 uremia and probably starvation ketoacidosis, and NAGMA (NS administration?,  Urinary losses of potential bicarb-ketonuria?). Lactic acid level 2.1. Bicarbonate levels are stable otherwise improved anion gap. ALDO stage I on CKD, volume responsive prerenal ALDO (poor oral intake) in the setting of HCTZ administration. Urine specific gravity >1.030. Patient was given 20 mg IV Lasix yesterday and again today, creatinine has improved 1.0 mg/dL. Hypomagnesemia 2/2 loop diuretics, replaced   Hypokalemia 2/2 loop diuretics, replaced    IMPRESSION/RECOMMENDATIONS:     CKD stage III, kidney ultrasound with increased echogenicity. UPCR: 0.6, UACR: 62.4    History of HTN, on metoprolol, holding amlodipine and HCTZ  HFpEF 75-80% with stage I diastolic dysfunction, moderate PH, mild aortic and tricuspid regurgitation (Echo 1/7/23). ProBNP 1608 > 4372 >7749  --------------------------------------------------------  Left lower lobe pneumonia, on ceftriaxone and doxycycline  Atrial fibrillation with RVR, new onset versus permanent? On metoprolol.   NSTEMI, cardiology following, for cardiac stress in the future    Plan:  Lasix 20 mg p.o. daily, continue upon discharge  Discontinue hydrochlorothiazide  Replace potassium  Continue to hold amlodipine  Will need to obtain BMP, CBC, magnesium and phosphorus level 1 week before appointment and follow-up in the office    Electronically signed by ADITYA Rajput CNP on 1/13/2023 at 8:49 AM

## 2023-01-13 NOTE — PROGRESS NOTES
Occupational Therapy     Date:2023  Patient Name: Neeraj Rodgers  MRN: [id number]  : 1940  Room: [address] [place] [address]-A     Completed chart review & attempted to see pt with pt off the unit for testing. Will attempt to see pt at another time. Wilson Zaman.  [address] [place] [address], [address] [place] [address]

## 2023-01-13 NOTE — PROGRESS NOTES
Inpatient Cardiology Progress note     PATIENT IS BEING FOLLOWED FOR: NSTEMI    Teresa Youssef is a [de-identified] y.o. female who is not previously known to [address] [place] [address] prior to this admission. She was initially seen in consultation by Dr Khurram Goyal 1/8/2023. Wednesday 1/4/2023 went to dinner with son and was \"fine  Mother did not answer phone when son called Friday 1/6/2023 1/7/2023 in evening son went over to check on his mother. Per son patient was sitting on side of bed staring @ TV. Tried to talk with her, didn't seem herself. Was not finishing her sentences. [facility] 1/7/2023 for AMS (son requested) transported by EMS. Per ED physician patient was A/O x 4 on arrival Denies CP, SOB or Abdominal pain. GCS=14  BP 95/87 's AF RVR, RR 35, afebrile, and O2 saturation 95% on RA--> dropped to 909% and placed on 2 liters NC  ECG AF RVR  CT head nothing acute. CXR read as possible pneumonia. IV ATB's, BC x 2 drawn     Na 140, K+ 3.8, Bun/Cr 41/2.3, lactic acid 2.1, p-BNP 1608, troponin 115-->244-->261, Alk phos 116, WNL 11.2, Hgb 12.1, PLt 351, Neutrophils 82.6, INR 1.2, COVID/Influenza negative     2 liters NSS, Heparin bolus and gtt        Past Medical History:    HTN  Longstanding tobacco abuse currently 1/2 ppd but has smoked upwards of 1 ppd (has been smoking since her 20's)  Denies any previous pulmonary work-up   COVID Vaccine x 3 (Gandara Chase)  Denies having COVID during pandemic  Denies CAD, DM, HLD       SUBJECTIVE: denies CP. Denies SOB. No overnight issues    OBJECTIVE: Seen in the stress lab holding area.  No apparent distress    ROS:  Consist: Denies fevers, chills or night sweats  Heart: Denies chest pain, palpitations, lightheadedness, dizziness or syncope  Lungs: Denies SOB, cough, wheezing, orthopnea or PND  GI: Denies abdominal pain, vomiting or diarrhea    PHYSICAL EXAM:   BP (!) 138/55   Pulse 70   Temp 98.2 °F (36.8 °C) (Temporal)   Resp 17   Ht 5' 8\" (1.727 m)   Wt 132 lb 12.8 oz (60.2 kg)   SpO2 96%   BMI 20.19 kg/m²    B/P Range last 24 hours: Systolic (05ATS), MOW:736 , Min:107 , MJH:000    Diastolic (61SIY), FFD:44, Min:49, Max:101    CONST: Well developed, well nourished female who appears of stated age. Awake, alert and cooperative. Ill appearing. No apparent distress  HEENT:   Head- Normocephalic, atraumatic   Eyes- Conjunctivae pink, anicteric  Throat- Oral mucosa pink and moist  Neck-  No stridor, trachea midline, no jugular venous distention. No carotid bruit  CHEST: Chest symmetrical and non-tender to palpation. No accessory muscle use or intercostal retractions  RESPIRATORY:  Lung sounds - diminished breath sounds right base otherwise clear  CARDIOVASCULAR:     Heart Inspection- shows no noted pulsations  Heart Palpation- no heaves or thrills; PMI is non-displaced   Heart Ausculation- Regular rate and rhythm, no murmur. No s3, s4 or rub   PV: No lower extremity edema. No varicosities. Pedal pulses palpable, no clubbing or cyanosis   ABDOMEN: Soft, non-tender to light palpation. Bowel sounds present. No palpable masses no organomegaly; no abdominal bruit  MS: Good muscle strength and tone. No atrophy or abnormal movements. : Deferred  SKIN: Warm and dry no statis dermatitis or ulcers   NEURO / PSYCH: Oriented to person, place and time. Speech clear and appropriate. Follows all commands.  Flat affect       Intake/Output Summary (Last 24 hours) at 1/13/2023 1042  Last data filed at 1/12/2023 2014  Gross per 24 hour   Intake 0 ml   Output --   Net 0 ml         Weight:   Wt Readings from Last 3 Encounters:   01/13/23 132 lb 12.8 oz (60.2 kg)     Current Inpatient Medications:   furosemide  20 mg Oral Daily    heparin (porcine)  5,000 Units SubCUTAneous Q8H    cefTRIAXone (ROCEPHIN) IV  1,000 mg IntraVENous Q24H    doxycycline hyclate  100 mg Oral 2 times per day    sodium chloride flush  10 mL IntraVENous 2 times per day    aspirin  81 mg Oral Daily    metoprolol succinate  12.5 mg Oral BID    atorvastatin  20 mg Oral Nightly       IV Infusions (if any):   sodium chloride         DIAGNOSTIC/ LABORATORY DATA:  Labs:   CBC:   No results for input(s): WBC, HGB, HCT, PLT in the last 72 hours. BMP:   Recent Labs     23  0517 23  0559    141   K 3.4* 3.7   CO2 26 27   BUN 20 26*   CREATININE 0.9 1.1*   LABGLOM >60 51   CALCIUM 8.3* 8.3*     Mag:   Recent Labs     23  0614 23  0517   MG 1.4* 1.9         TFT:   Lab Results   Component Value Date    TSH 2.180 2023    T4FREE 1.04 2023      HgA1c:   Lab Results   Component Value Date    LABA1C 5.8 (H) 2023         APTT:  No results for input(s): APTT in the last 72 hours. FASTING LIPID PANEL:  Lab Results   Component Value Date/Time    CHOL 155 2023 10:57 AM    HDL 58 2023 10:57 AM    LDLCALC 79 2023 10:57 AM    TRIG 89 2023 10:57 AM         CT head 2023:  Impression   No acute intracranial abnormality. US retroperitoneal 2023:  Impression   Findings suggestive of medical renal disease without obstruction. CXR 2023: Moderate left lower lobe pneumonia. Small left pleural effusion. Bilateral lung hyperinflation. Mild cardiomegaly. CXR 1/10/2023:  Increasing bibasilar patchy infiltrates with small bilateral pleural effusions. 12 lead EK2023: Sinus rhythm with low voltage QRS, septal infarct. Ventricular rate 70, AR interval 180, QRS duration 64, QTc 436. Echo 2023:   Technically limited study. Left ventricle is normal in size . Normal left ventricle wall thickness. No regional wall motion abnormalities seen. Ejection fraction is visually estimated at 75-80%. There is doppler evidence of stage I diastolic dysfunction. Normal right ventricular size and function. Normal sized left atrium. Possibly mildly dilated right atrium. Mild aortic regurgitation   Mild tricuspid regurgitation.    Moderate pulmonary hypertension. Lexiscan MPS 1/12/2023:  1. ECG during the infusion did not change. 2.  The myocardial perfusion imaging was abnormal.  3.  The abnormality was a medium-sized, moderate completely reversible  perfusion defect involving the mid to distal inferior, inferolateral  walls suggestive ischemia. 4.  Overall left ventricular systolic function was normal with  regional wall motion abnormalities. 5.  No transient ischemic dilatation. 6.  Intermediate risk general pharmacologic stress test.        Telemetry: SR    ASSESSMENT:   Pneumonia  Leukocytosis  Elevated pro calcitonin  Mild anemia  ? NSTEMI ( elevated troponin in the context of ALDO ) ? Type II vs type I. No ischemic ECG changes. Denies CP. Abnormal stress test as outlined above  PAF, converted and maintaining SR  Acute diastolic HF, ? Iatrogenic secondary to IV Fluids. Pro BNP 4,372 ( 1/9/2023 ) --> 7,749 ( 1/10/2023 )  ALDO versus ALDO on CKD:  SCr 2.3 ( on admission )  --> 1.0 ( today )  Hypokalemia  Hypomagnesemia, resolved      PLAN:  Cath +/- PCI discussed with patient in the presence of her son.  Risks, benefits and alternative therapies to the procedure explained She / they understood and consented to proceed  Further recommendations to follow      Electronically signed by Marlene Ortiz MD on 1/13/2023 at 10:42 AM

## 2023-01-13 NOTE — PROGRESS NOTES
Informed cath lab that patient did have a few bites of breakfast and some coffee as NPO order was received after trays had been delivered.  Also informed that son is at the bedside and will be coming down with patient should they need him for consent

## 2023-01-13 NOTE — PROGRESS NOTES
Progress note hospitalist service                                        Marcel Adler MD      Patient Name: Eric Dawson                   Age:  [de-identified] y.o. Gender:   female    CC: Altered mental status    HPI: Patient was interviewed and examined in the emergency room. History is obtained via combination of patient interview and emergency room chart review. The following represents a consolidation of those 2 sources. This is an 53-year-old female who lives at home and alone presented to the emergency room delivered by her son for altered mental status. Apparently she was confused and little else could be obtained concerning her history. Direct interview indicates she does not know why she is at the hospital and she states she feels okay. Upon arrival she was found to be in atrial fibrillation with RVR versus sinus tachycardia with PACs. The electrocardiographic tracing is not available for viewing therefore cannot confirm at this time although telemetry appears to be sinus rhythm and her initial troponin was 115 and the second 1 was over 200. There was concern for NSTEMI. She is awake and alert. She is aware that she is at the hospital with does not know why. She denies any symptoms but appears to be mildly dyspneic on speech    1/9:  Patient has been evaluated by nephrology. Consensus high anion gap metabolic acidosis hydration at 125 an hour.   Cardiology consultation pending  Patient denies any chest pain or trouble breathing  Denies any abdominal pain  She appears quite comfortable awake and alert    1/10  Patient appears to be comfortable  She still has some mild dyspnea on speech  Cardiology has recommended to stop the hydration  And diurese if possible  Oxygenating well on 4 L and vitals are otherwise normal and there is no fever    1/11  Patient feels comfortable  She still is mildly dyspneic on speech  Denies any chest pain or pressure sensation  Cardiology has seen the patient and the plan is for a stress test  Oxygenating satisfactorily on 2 L which is improved from 5 L  Good response to diuretic  Plan is for another episode of diuresis    1/12  Doing ok  Off oxygen  Son by the bed side    1/13:  \"I am being held against my will\"      No Known Allergies    The patient's medical records have been reviewed contingent on availability    Review of Systems:   As per HPI      Physical Examination:      Wt Readings from Last 3 Encounters:   01/13/23 132 lb 12.8 oz (60.2 kg)     Temp Readings from Last 3 Encounters:   01/13/23 98.2 °F (36.8 °C)     BP Readings from Last 3 Encounters:   01/13/23 (!) 138/55     Pulse Readings from Last 3 Encounters:   01/13/23 70       General appearance: Normal, awake, alert no distress. Skin: Color, texture, turgor normal. No rashes or lesions. Eyes: Conjunctivae/cornea clear. [employer]. Sclera non icteric. Chest: Even but reduced excursion   Lungs: Clear to auscultation. No rhonchi, crackles or rales. Basilar hypoventilation  Heart: S1 > S2. Rhythm is regular and rate is now normal no gallop rub or murmur. Abdomen: Soft, mildly protuberant, non-tender. BS normal. No masses, organomegaly. Anatomic contours appear normal.  Extremities: No deformities, edema, or skin discoloration.    Gait & balance: Based on history reported independently ambulatory at home     Labs     CBC:   Lab Results   Component Value Date/Time    WBC 12.5 01/09/2023 04:57 AM    RBC 3.38 01/09/2023 04:57 AM    HGB 10.8 01/09/2023 04:57 AM    HCT 33.8 01/09/2023 04:57 AM     01/09/2023 04:57 AM    .0 01/09/2023 04:57 AM     BMP:    Lab Results   Component Value Date/Time     01/13/2023 05:59 AM    K 3.7 01/13/2023 05:59 AM     01/13/2023 05:59 AM    CO2 27 01/13/2023 05:59 AM    BUN 26 01/13/2023 05:59 AM    CREATININE 1.1 01/13/2023 05:59 AM    GLUCOSE 101 01/13/2023 05:59 AM    CALCIUM 8.3 01/13/2023 05:59 AM     Hepatic Function Panel:    Lab Results   Component Value Date/Time    ALKPHOS 116 01/07/2023 08:16 PM    AST 31 01/07/2023 08:16 PM    ALT 14 01/07/2023 08:16 PM    PROT 7.3 01/07/2023 08:16 PM    LABALBU 3.9 01/07/2023 08:16 PM    BILITOT 1.1 01/07/2023 08:16 PM     Magnesium:    Lab Results   Component Value Date/Time    MG 1.9 01/12/2023 05:17 AM     Cardiac Enzymes:   Lab Results   Component Value Date    CKTOTAL 594 (H) 01/08/2023     LDH:  No results found for: LDH  PT/INR:    Lab Results   Component Value Date/Time    PROTIME 12.8 01/07/2023 08:16 PM    INR 1.2 01/07/2023 08:16 PM     BNP: No results for input(s): BNP in the last 72 hours. TSH:   Lab Results   Component Value Date    TSH 2.180 01/08/2023      Cardiac Injury Profile:   No results for input(s): CKTOTAL, CKMB, CKMBINDEX, TROPONINI in the last 72 hours.      Lipid Profile:   Lab Results   Component Value Date/Time    TRIG 89 01/08/2023 10:57 AM    HDL 58 01/08/2023 10:57 AM    LDLCALC 79 01/08/2023 10:57 AM    CHOL 155 01/08/2023 10:57 AM      Hemoglobin A1C: No components found for: HGBA1C   U/A:   Lab Results   Component Value Date/Time    LEUKOCYTESUR Negative 01/08/2023 10:57 AM    PHUR 5.5 01/08/2023 10:57 AM    WBCUA 2-5 01/08/2023 10:57 AM    RBCUA 5-10 01/08/2023 10:57 AM    BACTERIA FEW 01/08/2023 10:57 AM    SPECGRAV >=1.030 01/08/2023 10:57 AM    BLOODU TRACE-INTACT 01/08/2023 10:57 AM    GLUCOSEU Negative 01/08/2023 10:57 AM         ADMISSION SCHEDULED MEDS:   Current Facility-Administered Medications   Medication Dose Route Frequency Provider Last Rate Last Admin    furosemide (LASIX) tablet 20 mg  20 mg Oral Daily ADITYA Mijares - CNP   20 mg at 01/12/23 1342    heparin (porcine) injection 5,000 Units  5,000 Units SubCUTAneous Jess Ellington MD   5,000 Units at 01/13/23 9816    ipratropium-albuterol (DUONEB) nebulizer solution 1 ampule  1 ampule Inhalation Q4H PRN Samer Lisa Shah MD   1 ampule at 01/11/23 0265    cefTRIAXone (ROCEPHIN) 1,000 mg in sterile water 10 mL IV syringe  1,000 mg IntraVENous Q24H Samer Yasmin Whatley MD   1,000 mg at 01/11/23 2234    doxycycline hyclate (VIBRAMYCIN) capsule 100 mg  100 mg Oral 2 times per day Corbin Duran MD   100 mg at 01/12/23 2038    sodium chloride flush 0.9 % injection 10 mL  10 mL IntraVENous 2 times per day Corbin Duran MD   10 mL at 01/12/23 2040    sodium chloride flush 0.9 % injection 10 mL  10 mL IntraVENous PRN Samer Yasmin Whatley MD        0.9 % sodium chloride infusion   IntraVENous PRN Corbin Duran MD        ondansetron (ZOFRAN-ODT) disintegrating tablet 4 mg  4 mg Oral Q8H PRN Samer Yasmin Whatley MD        Or    ondansetron [facility]) injection 4 mg  4 mg IntraVENous Q6H PRN Samer Yasmin Wahtley MD        magnesium hydroxide (MILK OF MAGNESIA) 400 MG/5ML suspension 30 mL  30 mL Oral Daily PRN Carlosr Yasmin Whatley MD        acetaminophen (TYLENOL) tablet 650 mg  650 mg Oral Q6H PRN Samer Yasmin Whatley MD        Or    acetaminophen (TYLENOL) suppository 650 mg  650 mg Rectal Q6H PRN Carlosr Yasmin Whatley MD        aspirin EC tablet 81 mg  81 mg Oral Daily Telford GisellaDennis Farmerer, APN   81 mg at 01/12/23 1212    metoprolol succinate (TOPROL XL) extended release tablet 12.5 mg  12.5 mg Oral BID Alto GisellaDennis Farmerer, APN   12.5 mg at 01/12/23 2038    atorvastatin (LIPITOR) tablet 20 mg  20 mg Oral Nightly Alto GisellaDennis Farmerer, APN   20 mg at 01/12/23 2038       Current  Infusions   sodium chloride         Prn Meds  ipratropium-albuterol, sodium chloride flush, sodium chloride, ondansetron **OR** ondansetron, magnesium hydroxide, acetaminophen **OR** acetaminophen    Radiology Review:  NM Cardiac Stress Test Nuclear Imaging   Final Result   1. ECG during the infusion did not change. 2.  The myocardial perfusion imaging was abnormal.   3.  The abnormality was a medium-sized, moderate completely reversible   perfusion defect involving the mid to distal inferior, inferolateral   walls suggestive ischemia.    4.  Overall left ventricular systolic function was normal with   regional wall motion abnormalities. 5.  No transient ischemic dilatation. 6.  Intermediate risk general pharmacologic stress test.      Thank you for sending your patient to this [employer]. Laura Gallardo MD, [address] [place] [address] cardiology. XR CHEST PORTABLE   Final Result   Increasing bibasilar patchy infiltrates with small bilateral pleural   effusions. US RETROPERITONEAL COMPLETE   Final Result   Findings suggestive of medical renal disease without obstruction. CT HEAD WO CONTRAST   Final Result   No acute intracranial abnormality. RECOMMENDATIONS:   Careful clinical correlation and follow up recommended. XR CHEST PORTABLE   Final Result   Moderate left lower lobe pneumonia. Small left pleural effusion. Bilateral lung hyperinflation. Mild cardiomegaly. ASSESSMENT:  Active diagnoses treated at this admission:  Altered mental status-resolved. Metabolic encephalopathy-improving  ALDO improving with hydration  Initial electrocardiogram suggestive of atrial fibrillation with RVR.   NSTEMI versus demand ischemia related to atrial fibrillation  Left lower lobe pneumonia per chest x-ray-elevated procalcitonin/in context of renal insufficiency  High anion gap metabolic acidosis in the context of renal insufficiency versus dehydration  Another      Problem list:  Patient Active Problem List   Diagnosis    AMS (altered mental status)    NSTEMI (non-ST elevated myocardial infarction) [facility])       PLAN:  Nuclear part abnormal  ?cath  Patient is extremely unpleasant today  She is not happy about being here  Discussed with son Sheba Lutz  Await cardiology input  Viet Cisneros MD    8:01 AM  1/13/2023

## 2023-01-13 NOTE — DISCHARGE INSTR - COC
Continuity of Care Form    Patient Name: Elizabeth Fonseca   :  1940  MRN:  [id number]    [facility] date:  2023  Discharge date:  23    Code Status Order: Full Code   Advance Directives:     Admitting Physician:  Delfino Nageotte, MD  PCP: No primary care provider on file. Discharging Nurse: Magali Beckham Unit/Room#: 1644/6758-A  Discharging Unit Phone Number: [phone]    Emergency Contact:   Extended Emergency Contact Information  Primary Emergency Contact: fred andrade  Mobile Phone: [phone]  Relation: Child  Preferred language: English   needed? No  Secondary Emergency Contact: fernando small  Mobile Phone: [phone]  Relation: Child  Preferred language: English   needed? No    Past Surgical History:  History reviewed. No pertinent surgical history.     Immunization History:   Immunization History   Administered Date(s) Administered    COVID-19, PFIZER PURPLE top, DILUTE for use, (age 15 y+), 30mcg/0.3mL 2021, 2021, 2021       Active Problems:  Patient Active Problem List   Diagnosis Code    AMS (altered mental status) R41.82    NSTEMI (non-ST elevated myocardial infarction) ([facility] [address] I21.4       Isolation/Infection:   Isolation            No Isolation          Patient Infection Status       Infection Onset Added Last Indicated Last Indicated By Review Planned Expiration Resolved Resolved By    None active    Resolved    COVID-19 (Rule Out) 23 Respiratory Panel, Molecular, with COVID-19 (Restricted: peds pts or suitable admitted adults) (Ordered)   23 Rule-Out Test Resulted    COVID-19 (Rule Out) 23 COVID-19, Rapid (Ordered)   23 Rule-Out Test Resulted            Nurse Assessment:  Last Vital Signs: BP (!) 138/55   Pulse 70   Temp 98.2 °F (36.8 °C) (Temporal)   Resp 17   Ht 5' 8\" (1.727 m)   Wt 132 lb 12.8 oz (60.2 kg)   SpO2 96%   BMI 20.19 kg/m²     Last documented pain score (0-10 scale): Pain Level: 0  Last Weight:   Wt Readings from Last 1 Encounters:   01/13/23 132 lb 12.8 oz (60.2 kg)     Mental Status:  alert and oriented to self and place. Disoriented at times, easily redirected    IV Access:  - None    Nursing Mobility/ADLs:  Walking   Assisted  Transfer  Assisted  Bathing  Assisted  Dressing  Assisted  Toileting  Assisted  Feeding  Independent  Med Admin  Assisted  Med Delivery   whole with water    Wound Care Documentation and Therapy:        Elimination:  Continence: Bowel: Yes  Bladder: Yes  Urinary Catheter: None   Colostomy/Ileostomy/Ileal Conduit: No       Date of Last BM: 1/12/23      Intake/Output Summary (Last 24 hours) at 1/13/2023 1446  Last data filed at 1/12/2023 2014  Gross per 24 hour   Intake 0 ml   Output --   Net 0 ml     No intake/output data recorded. Safety Concerns: At Risk for Falls    Impairments/Disabilities:      None    Nutrition Therapy:  Current Nutrition Therapy:   - Oral Diet:  Cardiac, Low Sodium (2gm), and high fiber    Routes of Feeding: Oral  Liquids: Thin Liquids  Daily Fluid Restriction: no  Last Modified Barium Swallow with Video (Video Swallowing Test): not done    Treatments at the Time of Hospital Discharge:   Respiratory Treatments: duoneb q4 PRN for SOB  Oxygen Therapy:  is on oxygen at 5 L/min per nasal cannula.  Did not wear O2 prior to admission    Ventilator:    - No ventilator support    Rehab Therapies: Physical Therapy and Occupational Therapy  Weight Bearing Status/Restrictions: No weight bearing restrictions  Other Medical Equipment (for information only, NOT a DME order):  bedside commode  Other Treatments: obtain BMP, CBC, magnesium and phosphorus level 1 week before appointment and follow-up in the office      Patient's personal belongings (please select all that are sent with patient):  Glasses, Dentures upper, undergarments, jacket x2    RN SIGNATURE:  Electronically signed by Danni Palmer RN on 1/13/23 at 4:10 PM EST    CASE MANAGEMENT/SOCIAL WORK SECTION    Inpatient Status Date: ***    Readmission Risk Assessment Score:  Readmission Risk              Risk of Unplanned Readmission:  15           Discharging to Facility/ Agency   Name: [facility]   Address:  Phone:  Fax:    Dialysis Facility (if applicable)   Name:  Address:  Dialysis Schedule:  Phone:  Fax:    / signature: Electronically signed by CATRACHITA Berrios on 1/13/2023 at 2:49 PM      PHYSICIAN SECTION    Prognosis: {Prognosis:[id number]}    Condition at Discharge: 50Jonny Dowling Patient Condition:[id number]}    Rehab Potential (if transferring to Rehab): {Prognosis:[id number]}    Recommended Labs or Other Treatments After Discharge: ***    Physician Certification: I certify the above information and transfer of Shala Bansal  is necessary for the continuing treatment of the diagnosis listed and that she requires [place] for less 30 days.      Update Admission H&P: No change in H&P    PHYSICIAN SIGNATURE:  Electronically signed by Freddie Bill MD on 1/13/23 at 2:46 PM EST

## 2023-01-13 NOTE — PROGRESS NOTES
Cath showed moderate RCA disease not hemodynamically significant by pressure wire evaluation. Medical therapy recommended ( BB therapy increased, Imdur / SL NTG added and also Plavix ( together with aspirin for 9 months to 1 year with single antiplatelet agent after that ). Lasix discontinued because of worsening renal function / prerenal azotemia. May be discharged from cardiology stand point when OK with others. Cardiology will sign off. Please call if needed.     Electronically signed by Fernando Gentile MD on 1/13/2023 at 12:14 PM

## 2023-01-13 NOTE — PROCEDURES
[address]                  [address] [place],  [facility]                            CARDIAC CATHETERIZATION    PATIENT NAME: Tanesha Zabala                     :        1940  MED REC NO:   [id number]                            ROOM:       8505  ACCOUNT NO:   [de-identified]                           ADMIT DATE: 2023  PROVIDER:     Junior Thomas MD    DATE OF PROCEDURE:  2023    PROCEDURE:  Left heart catheterization, selective coronary angiography,  pressure wire evaluation of the RCA disease. The procedure was done through a right radial approach using ultrasound  guidance. The patient received intravenous Versed and intravenous fentanyl for  sedation. INDICATION:  Non-ST-elevation myocardial infarction. Abnormal stress  test.    AUC:  8-4. PRESSURES:  Aorta 121/41 with a mean of 71. Left ventricular systolic pressure 939. Left ventricle end-diastolic pressure 4. There was no significant gradient across the aortic valve. CORONARY ANGIOGRAPHY:  Left main:  The left main artery appeared heavily calcified distally,  but there did not appear to be any significant angiographic luminal  narrowing. LAD:  The left anterior descending artery appeared heavily calcified in  its proximal and middle segment. The left anterior descending artery,  however, did not appear to have more than 20% smooth tubular mid vessel  narrowing. The rest of the LAD did not appear to have any significant  disease. It was excessively tortuous distally and wrapped around the  left ventricular apex, providing the apical third of the inferior wall. LCX:  The left circumflex is heavily calcified in its proximal segment.    It gave rise to a large first marginal branch/intermediate ramus branch  which itself was an excessively tortuous vessel and had sequential  60-70% stenoses in its proximal and middle segment at the site of two  angulations in the vessel. The mid circumflex also had a long diffusely  diseased segment with up to 50% luminal narrowing before it continued as  a large terminal lateral branch which was excessively tortuous distally. RCA:  The right coronary artery is a large dominant vessel. It had a  long diffusely diseased segment extending from approximately the  junction of the proximal third and the middle third of the vessel with  up to 70-80% luminal narrowing at the beginning of this diffusely  diseased segment. The vessel also appeared calcified in its middle  segment. The distal vessel, the posterior descending artery branch and  the posterolateral branch did not appear to have any significant  disease. 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 Pressure wire evaluation of the RCA stenosis yielded values of 0.92,  0.92 and 0.92. These values rule out the RCA disease being  hemodynamically significant. The right radial arterial sheath was removed at the end of the procedure  and a TR Band was applied with adequate hemostasis and with preservation  of pulse. The patient tolerated the procedure well and left the cardiac  catheterization laboratory in stable condition. ESTIMATED BLOOD LOSS:  20 mL. CONTRAST USED:  70 mL. CONCLUSIONS:  1. Coronary artery disease. A.  Left main calcified distally without any significant angiographic  luminal narrowing. B.  LAD, heavily calcified proximal vessel with  around 20% smooth tubular mid vessel narrowing with an excessively  tortuous distal LAD. C.  LCX, large but nondominant vessel with a sequential 60% stenosis in  the large first obtuse marginal branch/intermediate ramus branch where  the vessel is excessively tortuous and where the lesions appear to be at  two sequential bands in the vessel.   50% mid left circumflex disease  with the distal left circumflex being excessively tortuous as it  terminates into a large lateral branch. D.  RCA, large dominant vessel with long diffusely diseased segment  extending from the proximal to the mid vessel with up to 70-80% luminal  narrowing at approximately the junction of the proximal third and the  middle third of the vessel, which was not hemodynamically significant  with pressure wire yielding values of 0.92, 0.92 and 0.92 respectively. CONCLUSION:  Normal left ventricular end-diastolic pressure.         Avni Burrell MD    D: 01/13/2023 12:28:33       T: 01/13/2023 12:32:50     [email or web address]  Job#: [id number]     Doc#: [id number]    CC:

## 2023-01-13 NOTE — PROGRESS NOTES
Comprehensive Nutrition Assessment    Type and Reason for Visit:  Initial, RD Nutrition Re-Screen/LOS (LOS)    Nutrition Recommendations/Plan:   Pt. NPO for procedure. Continue current diet. Recommend and start Ensure + BID when able to optimize intake and monitor. Malnutrition Assessment:  Malnutrition Status: At risk for malnutrition (Comment) (01/13/23 1312)    Context:  Acute Illness     Findings of the 6 clinical characteristics of malnutrition:  Energy Intake:  Mild decrease in energy intake (Comment)  Weight Loss:  Unable to assess (2/2 lack of wt hx on file to assess)     Body Fat Loss:  Unable to assess (JACLYN d/t pt out of room for procedure)     Muscle Mass Loss:  Unable to assess (JACLYN d/t pt out of room for procedure)    Fluid Accumulation:  No significant fluid accumulation     Strength:  Not Performed    Nutrition Assessment:    Pt. admit d/t AMS. Noted NSTEMI vs demand ischemia related to afib w/ RVR-Noted heart cath planned for today (1/13). Adm w/ ALDO, possible PNA, PMHx of HTN. Pt. intake varied-currently NPO for procedure. Will start ONS and monitor. Nutrition Related Findings:    Oriented x2, (oriented/disoriented at times), elevated BUN, hyperglycemia, +I&O(2.5L), GI WDL, no edema Wound Type: None       Current Nutrition Intake & Therapies:    Average Meal Intake: %, 0% (x1)  Average Supplements Intake: None Ordered  Diet NPO Exceptions are: Sips of Water with Meds    Anthropometric Measures:  Height: 5' 8\" (172.7 cm)  Ideal Body Weight (IBW): 140 lbs (64 kg)    Admission Body Weight: 142 lb (64.4 kg) (1/10 BS)  Current Body Weight: 132 lb 12.8 oz (60.2 kg) (1/13 BS), 94.9 % IBW.  Weight Source: Bed Scale  Current BMI (kg/m2): 20.2        Weight Adjustment For: No Adjustment                 BMI Categories: Underweight (BMI less than 22) age over 72    Estimated Daily Nutrient Needs:  Energy Requirements Based On: Formula  Weight Used for Energy Requirements: Current  Energy (kcal/day): 1450-1550kcal (MSJ x1. 3SF)  Weight Used for Protein Requirements: Current  Protein (g/day): 84-96g (1.4-1.6g/kg)  Method Used for Fluid Requirements: 1 ml/kcal  Fluid (ml/day): 1797-1198    Nutrition Diagnosis:   Inadequate oral intake related to cognitive or neurological impairment as evidenced by poor intake prior to admission, intake 26-50%    Nutrition Interventions:   Food and/or Nutrient Delivery: Continue Current Diet, Start Oral Nutrition Supplement (Ensure + BID)  Nutrition Education/Counseling: Education not indicated  Coordination of Nutrition Care: Continue to monitor while inpatient       Goals:     Goals: PO intake 50% or greater, by next RD assessment       Nutrition Monitoring and Evaluation:   Behavioral-Environmental Outcomes: None Identified  Food/Nutrient Intake Outcomes: Food and Nutrient Intake, Supplement Intake  Physical Signs/Symptoms Outcomes: Biochemical Data, GI Status, Weight, Nutrition Focused Physical Findings, Skin    Discharge Planning:     Too soon to determine      Plan, RD  Contact: ext [phone]

## 2023-01-13 NOTE — PROGRESS NOTES
Physical Therapy  Facility/Department: [address] NEURO IMCU  Daily Treatment Note  NAME: Dearvita Vega  : 1940  MRN: [id number]  Attempted physical therapy this date however pt off the unit for testing/procedure. Will attempt again at latter date. Continue with POC.     License [id number]  DASIA HELMS

## 2023-01-13 NOTE — CARE COORDINATION
SOCIAL WORK/CASEMANAGEMENT TRANSITION OF CARE Edgardolivier Butler, [address]):  pt went down for heart cath this a.m. with son at bedside. I made a referral late yesterday to [facility] and I am waiting for a response. This a.m. I also made a referral to [facility] and asked rep to assess. Precert is needed. PT and OT notes are in from yesterday with ampac of 15 from both. CATRACHITA Rajput  1/13/2023    Pt accepted to go to [facility]. All discharge paper work is in place with pasrr done. Precert has been waived. The facility can take pt today. I sent a perfect serve to the pcp to see if he will discharge pt once she returns to the floor from her heart cath. Pcp ok with discharge and the scott will be here around 6 p.m. with o2 at 5l. Called son back and told him and told pt. Rn to run a rapid covid.  CATRACHITA Rajput

## 2023-01-14 LAB
EKG ATRIAL RATE: 102 BPM
EKG ATRIAL RATE: 102 BPM
EKG P-R INTERVAL: 148 MS
EKG P-R INTERVAL: 148 MS
EKG Q-T INTERVAL: 350 MS
EKG Q-T INTERVAL: 350 MS
EKG QRS DURATION: 64 MS
EKG QRS DURATION: 64 MS
EKG QTC CALCULATION (BAZETT): 456 MS
EKG QTC CALCULATION (BAZETT): 456 MS
EKG R AXIS: 6 DEGREES
EKG R AXIS: 6 DEGREES
EKG T AXIS: 33 DEGREES
EKG T AXIS: 33 DEGREES
EKG VENTRICULAR RATE: 102 BPM
EKG VENTRICULAR RATE: 102 BPM
SOURCE: NORMAL

## 2023-01-14 PROCEDURE — 93010 ELECTROCARDIOGRAM REPORT: CPT | Performed by: INTERNAL MEDICINE

## 2023-01-16 LAB
EKG ATRIAL RATE: 101 BPM
EKG P-R INTERVAL: 148 MS
EKG Q-T INTERVAL: 342 MS
EKG QRS DURATION: 56 MS
EKG QTC CALCULATION (BAZETT): 443 MS
EKG R AXIS: 15 DEGREES
EKG T AXIS: 34 DEGREES
EKG VENTRICULAR RATE: 101 BPM

## 2023-01-16 PROCEDURE — 93010 ELECTROCARDIOGRAM REPORT: CPT | Performed by: INTERNAL MEDICINE

## 2023-01-17 LAB
ALBUMIN SERPL-MCNC: 2.9 G/DL (ref 3.5–5.2)
ALP BLD-CCNC: 109 U/L (ref 35–104)
ALT SERPL-CCNC: 14 U/L (ref 0–32)
ANION GAP SERPL CALCULATED.3IONS-SCNC: 10 MMOL/L (ref 7–16)
AST SERPL-CCNC: 17 U/L (ref 0–31)
BASOPHILS ABSOLUTE: 0.05 E9/L (ref 0–0.2)
BASOPHILS RELATIVE PERCENT: 0.6 % (ref 0–2)
BILIRUB SERPL-MCNC: <0.2 MG/DL (ref 0–1.2)
BUN BLDV-MCNC: 28 MG/DL (ref 6–23)
CALCIUM SERPL-MCNC: 8 MG/DL (ref 8.6–10.2)
CHLORIDE BLD-SCNC: 102 MMOL/L (ref 98–107)
CHOLESTEROL, TOTAL: 117 MG/DL (ref 0–199)
CO2: 27 MMOL/L (ref 22–29)
CREAT SERPL-MCNC: 1.3 MG/DL (ref 0.5–1)
EOSINOPHILS ABSOLUTE: 0.27 E9/L (ref 0.05–0.5)
EOSINOPHILS RELATIVE PERCENT: 3 % (ref 0–6)
FOLATE: 8.1 NG/ML (ref 4.8–24.2)
GFR SERPL CREATININE-BSD FRML MDRD: 41 ML/MIN/1.73
GLUCOSE BLD-MCNC: 95 MG/DL (ref 74–99)
HCT VFR BLD CALC: 28.3 % (ref 34–48)
HDLC SERPL-MCNC: 44 MG/DL
HEMOGLOBIN: 9.4 G/DL (ref 11.5–15.5)
IMMATURE GRANULOCYTES #: 0.14 E9/L
IMMATURE GRANULOCYTES %: 1.5 % (ref 0–5)
LDL CHOLESTEROL CALCULATED: 57 MG/DL (ref 0–99)
LYMPHOCYTES ABSOLUTE: 1.95 E9/L (ref 1.5–4)
LYMPHOCYTES RELATIVE PERCENT: 21.5 % (ref 20–42)
MAGNESIUM: 1.8 MG/DL (ref 1.6–2.6)
MCH RBC QN AUTO: 31.5 PG (ref 26–35)
MCHC RBC AUTO-ENTMCNC: 33.2 % (ref 32–34.5)
MCV RBC AUTO: 95 FL (ref 80–99.9)
MONOCYTES ABSOLUTE: 0.72 E9/L (ref 0.1–0.95)
MONOCYTES RELATIVE PERCENT: 7.9 % (ref 2–12)
NEUTROPHILS ABSOLUTE: 5.93 E9/L (ref 1.8–7.3)
NEUTROPHILS RELATIVE PERCENT: 65.5 % (ref 43–80)
PDW BLD-RTO: 14 FL (ref 11.5–15)
PHOSPHORUS: 3.2 MG/DL (ref 2.5–4.5)
PLATELET # BLD: 395 E9/L (ref 130–450)
PMV BLD AUTO: 8.8 FL (ref 7–12)
POTASSIUM SERPL-SCNC: 4.1 MMOL/L (ref 3.5–5)
RBC # BLD: 2.98 E12/L (ref 3.5–5.5)
SODIUM BLD-SCNC: 139 MMOL/L (ref 132–146)
T4 FREE: 0.95 NG/DL (ref 0.93–1.7)
TOTAL PROTEIN: 5.2 G/DL (ref 6.4–8.3)
TRIGL SERPL-MCNC: 80 MG/DL (ref 0–149)
TSH SERPL DL<=0.05 MIU/L-ACNC: 4.62 UIU/ML (ref 0.27–4.2)
VITAMIN B-12: 665 PG/ML (ref 211–946)
VITAMIN D 25-HYDROXY: <5 NG/ML (ref 30–100)
VLDLC SERPL CALC-MCNC: 16 MG/DL
WBC # BLD: 9.1 E9/L (ref 4.5–11.5)

## 2023-01-18 LAB
SARS-COV-2: NOT DETECTED
SOURCE: NORMAL

## 2023-01-20 LAB
SARS-COV-2: NOT DETECTED
SOURCE: NORMAL

## 2023-01-26 NOTE — PROGRESS NOTES
Physician Progress Note      Raz Royal  CSN #:                  [id number]  :                       1940  ADMIT DATE:       2023 7:59 PM  [place] DATE:        2023 6:47 PM  RESPONDING  PROVIDER #:        Ginger Finley MD          QUERY TEXT:    Patient admitted with AMS and has \"NSTEMI versus demand ischemia\" and \"NSTEMI   ? Type II vs type I\" documented. If possible, please document in the progress   notes and discharge summary if you are evaluating and/or treating any of the   following: The medical record reflects the following:  Risk Factors: PNA, ALDO, AFIB, CKD, Metabolic encephalopathy  Clinical Indicators: Per H&P: NSTEMI versus demand ischemia related to atrial   fibrillation. Per Cardiology consult: NSTEMI in context of hypoxia, and   pneumonia. Per Cardiology PN -:  NSTEMI ( elevated troponin in the   context of ALDO ) ? Type II vs type I. No ischemic ECG changes. Denies CP. Troponin: 115, 244, 261, 264, 241.  EKG: Atrial fibrillation with rapid   ventricular response, Low voltage QRS, Cannot rule out Anterior infarct , age   undetermined, Abnormal ECG. Treatment: Cardiac Stress Test, Left heart catheterization, Nephrology   consult, 1L NS bolus, Rocephin, Doxycycline,    Thank you,  Mirella Guerrero RN [facility]  [phone]  Options provided:  -- NSTEMI  -- Type 2 MI due to AFIB  -- Type 2 MI due to ALDO  -- Type 2 MI due to PNA  -- Demand Ischemia with MI  -- Demand Ischemia only, no MI  -- Other - I will add my own diagnosis  -- Disagree - Not applicable / Not valid  -- Disagree - Clinically unable to determine / Unknown  -- Refer to Clinical Documentation Reviewer    PROVIDER RESPONSE TEXT:    This patient has a Type 2 MI due to AFIB.     Query created by: Alexandria Saravia on 2023 9:46 AM      Electronically signed by:  Ginger Finley MD 2023 12:18 PM

## 2023-01-31 NOTE — DISCHARGE SUMMARY
Physician Discharge Summary     Patient ID:  Eric Dawson  [id number]  87 y.o.  1940    Admit date: 1/7/2023    Discharge date and time: 1/13/2023  6:47 PM     Admission Diagnoses: Principal Problem:    AMS (altered mental status)  Active Problems:    NSTEMI (non-ST elevated myocardial infarction) ([facility] [address]  Resolved Problems:    * No resolved hospital problems. *      Discharge Diagnoses: Principal Problem:    AMS (altered mental status)  Active Problems:    NSTEMI (non-ST elevated myocardial infarction) ([facility] [address]  Resolved Problems:    * No resolved hospital problems. *      Condition at discharge : Stable    Consults: IP CONSULT TO INTERNAL MEDICINE  IP CONSULT TO CARDIOLOGY  IP CONSULT TO CARDIOLOGY  IP CONSULT TO NEPHROLOGY  IP CONSULT TO CARDIAC REHAB  IP CONSULT TO CARDIAC REHAB    Procedures: Heart cath    Hospital Course: Patient is a 32-year-old presents to the emergency room for altered mental status. Patient was noted to be in atrial fibrillation with rapid ventricular response versus sinus rhythm with PACs. Patient was then admitted. Troponin was elevated. There was concern for non-ST elevation MI. Cardiology was consulted. She was also noted to have renal insufficiency. Questionable left lower lobe pneumonia. Patient was given empiric antibiotics. Patient was also placed on heparin drip. Cardiology recommended stopping this. We recommended conservative management. Her procalcitonin was elevated. Patient was able to be weaned off oxygen. She was also diuresed. Stress test was abnormal.  She was then recommended heart cath. This revealed mild coronary artery disease. Medical management recommended. Patient was then discharged to subacute rehab    NM Cardiac Stress Test Nuclear Imaging   Final Result   1. ECG during the infusion did not change.     2.  The myocardial perfusion imaging was abnormal.   3.  The abnormality was a medium-sized, moderate completely reversible   perfusion defect involving the mid to distal inferior, inferolateral   walls suggestive ischemia. 4.  Overall left ventricular systolic function was normal with   regional wall motion abnormalities. 5.  No transient ischemic dilatation. 6.  Intermediate risk general pharmacologic stress test.      Thank you for sending your patient to this [employer]. Belén Schultz MD, [address] [place] [address] cardiology. XR CHEST PORTABLE   Final Result   Increasing bibasilar patchy infiltrates with small bilateral pleural   effusions. US RETROPERITONEAL COMPLETE   Final Result   Findings suggestive of medical renal disease without obstruction. CT HEAD WO CONTRAST   Final Result   No acute intracranial abnormality. RECOMMENDATIONS:   Careful clinical correlation and follow up recommended. XR CHEST PORTABLE   Final Result   Moderate left lower lobe pneumonia. Small left pleural effusion. Bilateral lung hyperinflation. Mild cardiomegaly. Results for orders placed or performed in visit on 01/19/23 (from the past 336 hour(s))   Covid-19 Ambulatory    Collection Time: 01/19/23  4:00 AM   Result Value Ref Range    SARS-CoV-2 Not Detected Not Detected    Source NP swab          Discharge Exam:  See progress note from today    Disposition: Subacute rehab    Patient Instructions:   Discharge Medication List as of 1/13/2023  5:11 PM        START taking these medications    Details   aspirin 81 MG EC tablet Take 1 tablet by mouth daily, Disp-30 tablet, R-3NO PRINT      isosorbide mononitrate (IMDUR) 30 MG extended release tablet Take 1 tablet by mouth daily, Disp-30 tablet, R-3NO PRINT      nitroGLYCERIN (NITROSTAT) 0.4 MG SL tablet Place 1 tablet under the tongue every 5 minutes as needed for Chest pain up to max of 3 total doses.  If no relief after 1 dose, call 911., Disp-25 tablet, R-3NO PRINT      atorvastatin (LIPITOR) 20 MG tablet Take 1 tablet by mouth nightly, Disp-30 tablet, R-3NO PRINT      metoprolol succinate (TOPROL XL) 50 MG extended release tablet Take 1 tablet by mouth daily, Disp-30 tablet, R-3NO PRINT      cefUROXime (CEFTIN) 500 MG tablet Take 1 tablet by mouth 2 times daily for 7 days, Disp-14 tablet, R-0NO PRINT      clopidogrel (PLAVIX) 75 MG tablet Take 1 tablet by mouth daily, Disp-30 tablet, R-3NO PRINT           STOP taking these medications       hydroCHLOROthiazide (MICROZIDE) 12.5 MG capsule Comments:   Reason for Stopping:         amLODIPine (NORVASC) 5 MG tablet Comments:   Reason for Stopping:         mirtazapine (REMERON) 15 MG tablet Comments:   Reason for Stopping:               Activity: As tolerated    Diet: Cardiac    Follow-up with ADITYA Meyer CNP [address]  [address] [address]  [phone]    Follow up in 1 month(s)  Follow up 3-4 week in office  Obtain lab work prior to office visit    [address] [place], [place] [address]MD Villasenor [address] [place], [place] [id number]    Follow up  Follow up appointment        Note that over 30 minutes was spent in preparing discharge papers, discussing discharge with patient, medication review, etc.    Signed:  Sweta Dennis MD  1/31/2023  1:51 PM

## 2023-04-24 NOTE — PROGRESS NOTES
Progress note hospitalist service                                                                                 Verito Zamora MD, [address]                   Patient Name: Rebekah Hernandes                   Age:  [de-identified] y.o. Gender:   female    CC: Altered mental status    HPI: Patient was interviewed and examined in the emergency room. History is obtained via combination of patient interview and emergency room chart review. The following represents a consolidation of those 2 sources. This is an 46-year-old female who lives at home and alone presented to the emergency room delivered by her son for altered mental status. Apparently she was confused and little else could be obtained concerning her history. Direct interview indicates she does not know why she is at the hospital and she states she feels okay. Upon arrival she was found to be in atrial fibrillation with RVR versus sinus tachycardia with PACs. The electrocardiographic tracing is not available for viewing therefore cannot confirm at this time although telemetry appears to be sinus rhythm and her initial troponin was 115 and the second 1 was over 200. There was concern for NSTEMI. She is awake and alert. She is aware that she is at the hospital with does not know why. She denies any symptoms but appears to be mildly dyspneic on speech    1/9:  Patient has been evaluated by nephrology. Consensus high anion gap metabolic acidosis hydration at 125 an hour.   Cardiology consultation pending  Patient denies any chest pain or trouble breathing  Denies any abdominal pain  She appears quite comfortable awake and alert    1/10  Patient appears to be comfortable  She still has some mild dyspnea on speech  Cardiology has recommended to stop the hydration  And diurese if possible  Oxygenating well on 4 L and vitals are otherwise normal and there is no fever        No Known Allergies    The patient's Recommended she go to [facility] for dx. Ok for most abx used to treat a sinus infection like PCN or Zpack. Instructed for her to inform [facility] that she was pregnant. She VU   medical records have been reviewed contingent on availability    Review of Systems:   General: Admits to malaise / weakness. Denies fever or chills. Cardiovascular: No chest pain, dyspnea on exertion, palpitations, syncope. Respiratory: No cough or wheezing, hemoptysis, sob, pleuritic pain. Gastrointestinal: No abdominal pain, anorexia, hematochezia, melena, hematemesis or change in bowels. Physical Examination:      Wt Readings from Last 3 Encounters:   01/10/23 142 lb (64.4 kg)     Temp Readings from Last 3 Encounters:   01/10/23 97.2 °F (36.2 °C) (Temporal)     BP Readings from Last 3 Encounters:   01/10/23 114/71     Pulse Readings from Last 3 Encounters:   01/10/23 67       General appearance: Normal, awake, alert no distress. Skin: Color, texture, turgor normal. No rashes or lesions. Eyes: Conjunctivae/cornea clear. Mega Dec. Sclera non icteric. Mouth: Lips and tongue appear normal. Dentition noted  Neck:  Symmetric. No adenopathy. T  Chest: Even but reduced excursion   Lungs: Clear to auscultation. No rhonchi, crackles or rales. Basilar hypoventilation  Heart: S1 > S2. Rhythm is regular and rate is now normal no gallop rub or murmur. Abdomen: Soft, mildly protuberant, non-tender. BS normal. No masses, organomegaly. Anatomic contours appear normal.  Extremities: No deformities, edema, or skin discoloration. Mental status: Awake, alert, cognizant of person, place, time.   Mood: Normal and appropriate affect  Gait & balance: Based on history reported independently ambulatory at home     Labs     CBC:   Lab Results   Component Value Date/Time    WBC 12.5 01/09/2023 04:57 AM    RBC 3.38 01/09/2023 04:57 AM    HGB 10.8 01/09/2023 04:57 AM    HCT 33.8 01/09/2023 04:57 AM     01/09/2023 04:57 AM    .0 01/09/2023 04:57 AM     BMP:    Lab Results   Component Value Date/Time     01/10/2023 05:32 AM    K 3.8 01/10/2023 05:32 AM     01/10/2023 05:32 AM    CO2 23 01/10/2023 05:32 AM    BUN 28 01/10/2023 05:32 AM    CREATININE 1.1 01/10/2023 05:32 AM    GLUCOSE 126 01/10/2023 05:32 AM    CALCIUM 8.6 01/10/2023 05:32 AM     Hepatic Function Panel:    Lab Results   Component Value Date/Time    ALKPHOS 116 01/07/2023 08:16 PM    AST 31 01/07/2023 08:16 PM    ALT 14 01/07/2023 08:16 PM    PROT 7.3 01/07/2023 08:16 PM    LABALBU 3.9 01/07/2023 08:16 PM    BILITOT 1.1 01/07/2023 08:16 PM     Magnesium:    Lab Results   Component Value Date/Time    MG 1.9 01/08/2023 10:57 AM     Cardiac Enzymes:   Lab Results   Component Value Date    CKTOTAL 594 (H) 01/08/2023     LDH:  No results found for: LDH  PT/INR:    Lab Results   Component Value Date/Time    PROTIME 12.8 01/07/2023 08:16 PM    INR 1.2 01/07/2023 08:16 PM     BNP: No results for input(s): BNP in the last 72 hours.    TSH:   Lab Results   Component Value Date    TSH 2.180 01/08/2023      Cardiac Injury Profile:   Recent Labs     01/08/23  1506   CKTOTAL 594*      Lipid Profile:   Lab Results   Component Value Date/Time    TRIG 89 01/08/2023 10:57 AM    HDL 58 01/08/2023 10:57 AM    LDLCALC 79 01/08/2023 10:57 AM    CHOL 155 01/08/2023 10:57 AM      Hemoglobin A1C: No components found for: HGBA1C   U/A:   Lab Results   Component Value Date/Time    LEUKOCYTESUR Negative 01/08/2023 10:57 AM    PHUR 5.5 01/08/2023 10:57 AM    WBCUA 2-5 01/08/2023 10:57 AM    RBCUA 5-10 01/08/2023 10:57 AM    BACTERIA FEW 01/08/2023 10:57 AM    SPECGRAV >=1.030 01/08/2023 10:57 AM    BLOODU TRACE-INTACT 01/08/2023 10:57 AM    GLUCOSEU Negative 01/08/2023 10:57 AM         ADMISSION SCHEDULED MEDS:   Current Facility-Administered Medications   Medication Dose Route Frequency Provider Last Rate Last Admin    heparin (porcine) injection 5,000 Units  5,000 Units SubCUTAneous Irene Love MD        ipratropium-albuterol (DUONEB) nebulizer solution 1 ampule  1 ampule Inhalation Q4H PRN Samer Ida Pavon MD   1 ampule at 01/09/23 0609    cefTRIAXone (ROCEPHIN) 1,000 mg in sterile water 10 mL IV syringe  1,000 mg IntraVENous Q24H Carlosr Yesenia Abel MD   1,000 mg at 01/09/23 2211    doxycycline hyclate (VIBRAMYCIN) capsule 100 mg  100 mg Oral 2 times per day Bran Sterling MD   100 mg at 01/10/23 6594    sodium chloride flush 0.9 % injection 10 mL  10 mL IntraVENous 2 times per day Bran Sterling MD   10 mL at 01/09/23 2027    sodium chloride flush 0.9 % injection 10 mL  10 mL IntraVENous PRN Carlosr Yesenia Abel MD        0.9 % sodium chloride infusion   IntraVENous PRN Bran Sterling MD        ondansetron (ZOFRAN-ODT) disintegrating tablet 4 mg  4 mg Oral Q8H PRN Carlosr Yesenia Abel MD        Or    ondansetron [facility]) injection 4 mg  4 mg IntraVENous Q6H PRN Carlosr Yesenia Abel MD        magnesium hydroxide (MILK OF MAGNESIA) 400 MG/5ML suspension 30 mL  30 mL Oral Daily PRN Carlosr Yesenia Abel MD        acetaminophen (TYLENOL) tablet 650 mg  650 mg Oral Q6H PRN Carlosr Yesenia Abel MD        Or    acetaminophen (TYLENOL) suppository 650 mg  650 mg Rectal Q6H PRN Carlosr Yesenia Abel MD        aspirin EC tablet 81 mg  81 mg Oral Daily Jeneelam Mittal Ginder, APN   81 mg at 01/10/23 0938    metoprolol succinate (TOPROL XL) extended release tablet 12.5 mg  12.5 mg Oral BID Jeppie Mary. Ginder, APN   12.5 mg at 01/10/23 0938    atorvastatin (LIPITOR) tablet 20 mg  20 mg Oral Nightly Jeppie Mary. Ginder, APN   20 mg at 01/09/23 2027       Current  Infusions   sodium chloride         Prn Meds  ipratropium-albuterol, sodium chloride flush, sodium chloride, ondansetron **OR** ondansetron, magnesium hydroxide, acetaminophen **OR** acetaminophen    Radiology Review:  US RETROPERITONEAL COMPLETE   Final Result   Findings suggestive of medical renal disease without obstruction. CT HEAD WO CONTRAST   Final Result   No acute intracranial abnormality. RECOMMENDATIONS:   Careful clinical correlation and follow up recommended.          XR CHEST PORTABLE   Final Result   Moderate left lower lobe pneumonia. Small left pleural effusion. Bilateral lung hyperinflation. Mild cardiomegaly. XR CHEST PORTABLE    (Results Pending)         ASSESSMENT:  Active diagnoses treated at this admission:  Altered mental status-resolved. Metabolic encephalopathy  ALDO improving with hydration  Initial electrocardiogram suggestive of atrial fibrillation with RVR.   NSTEMI versus demand ischemia related to atrial fibrillation  Left lower lobe pneumonia per chest x-ray  High anion gap metabolic acidosis in the context of renal insufficiency versus dehydration      Problem list:  Patient Active Problem List   Diagnosis    AMS (altered mental status)    NSTEMI (non-ST elevated myocardial infarction) ([facility]       PLAN:  Orders were entered at the time of admission and reviewed  Cardiology consult recommending cessation of the IV fluids and mild diuresis  Patient is on heparin and asymptomatic  Monitor electrolytes and renal function  Continue to trend troponin following initial elevation appears to be plateaued  Heparin stopped  Initiated treatment for community-acquired pneumonia to continue: Procalcitonin elevated  In the context of possible cardiac insult assess hemoglobin A1c and lipid profile    Overall the patient is improving  Pending the diagnosis of NSTEMI versus demand ischemia both in the context of acute renal insufficiency interpretation with caution  Plan for stress test prior to discharge  Will give a single dose of diuresis    See  Orders  Melisa Reina MD, Ynes Santamaria  11:40 AM  1/10/2023

## 2025-02-04 ENCOUNTER — APPOINTMENT (OUTPATIENT)
Dept: CT IMAGING | Age: 85
DRG: 175 | End: 2025-02-04
Payer: MEDICARE

## 2025-02-04 ENCOUNTER — HOSPITAL ENCOUNTER (INPATIENT)
Age: 85
LOS: 16 days | Discharge: NURSING, REHAB OR LONG-TERM CARE | DRG: 175 | End: 2025-02-20
Attending: EMERGENCY MEDICINE | Admitting: INTERNAL MEDICINE
Payer: MEDICARE

## 2025-02-04 ENCOUNTER — APPOINTMENT (OUTPATIENT)
Dept: ULTRASOUND IMAGING | Age: 85
DRG: 175 | End: 2025-02-04
Payer: MEDICARE

## 2025-02-04 ENCOUNTER — APPOINTMENT (OUTPATIENT)
Dept: GENERAL RADIOLOGY | Age: 85
DRG: 175 | End: 2025-02-04
Payer: MEDICARE

## 2025-02-04 DIAGNOSIS — I26.99 OTHER ACUTE PULMONARY EMBOLISM WITHOUT ACUTE COR PULMONALE (HCC): ICD-10-CM

## 2025-02-04 DIAGNOSIS — R06.02 SHORTNESS OF BREATH: ICD-10-CM

## 2025-02-04 DIAGNOSIS — I50.21 ACUTE SYSTOLIC CONGESTIVE HEART FAILURE (HCC): ICD-10-CM

## 2025-02-04 DIAGNOSIS — I26.99 OTHER PULMONARY EMBOLISM WITHOUT ACUTE COR PULMONALE, UNSPECIFIED CHRONICITY (HCC): Primary | ICD-10-CM

## 2025-02-04 DIAGNOSIS — R09.02 HYPOXEMIA: ICD-10-CM

## 2025-02-04 LAB
ALBUMIN SERPL-MCNC: 3.6 G/DL (ref 3.5–5.2)
ALP SERPL-CCNC: 135 U/L (ref 35–104)
ALT SERPL-CCNC: 10 U/L (ref 0–32)
ANION GAP SERPL CALCULATED.3IONS-SCNC: 10 MMOL/L (ref 7–16)
AST SERPL-CCNC: 20 U/L (ref 0–31)
B PARAP IS1001 DNA NPH QL NAA+NON-PROBE: NOT DETECTED
B PERT DNA SPEC QL NAA+PROBE: NOT DETECTED
B.E.: -4.4 MMOL/L (ref -3–3)
BACTERIA URNS QL MICRO: ABNORMAL
BASOPHILS # BLD: 0.06 K/UL (ref 0–0.2)
BASOPHILS NFR BLD: 1 % (ref 0–2)
BILIRUB SERPL-MCNC: 0.3 MG/DL (ref 0–1.2)
BILIRUB UR QL STRIP: NEGATIVE
BNP SERPL-MCNC: 924 PG/ML (ref 0–450)
BUN SERPL-MCNC: 29 MG/DL (ref 6–23)
C PNEUM DNA NPH QL NAA+NON-PROBE: NOT DETECTED
CALCIUM SERPL-MCNC: 8.5 MG/DL (ref 8.6–10.2)
CHLORIDE SERPL-SCNC: 107 MMOL/L (ref 98–107)
CLARITY UR: CLEAR
CO2 SERPL-SCNC: 24 MMOL/L (ref 22–29)
COHB: 0.6 % (ref 0–1.5)
COLOR UR: YELLOW
CREAT SERPL-MCNC: 1.9 MG/DL (ref 0.5–1)
CRITICAL: ABNORMAL
DATE ANALYZED: ABNORMAL
DATE OF COLLECTION: ABNORMAL
EOSINOPHIL # BLD: 0.65 K/UL (ref 0.05–0.5)
EOSINOPHILS RELATIVE PERCENT: 10 % (ref 0–6)
ERYTHROCYTE [DISTWIDTH] IN BLOOD BY AUTOMATED COUNT: 13.4 % (ref 11.5–15)
FLUAV RNA NPH QL NAA+NON-PROBE: NOT DETECTED
FLUBV RNA NPH QL NAA+NON-PROBE: NOT DETECTED
GFR, ESTIMATED: 26 ML/MIN/1.73M2
GLUCOSE SERPL-MCNC: 109 MG/DL (ref 74–99)
GLUCOSE UR STRIP-MCNC: NEGATIVE MG/DL
HADV DNA NPH QL NAA+NON-PROBE: NOT DETECTED
HCO3: 24.1 MMOL/L (ref 22–26)
HCOV 229E RNA NPH QL NAA+NON-PROBE: NOT DETECTED
HCOV HKU1 RNA NPH QL NAA+NON-PROBE: NOT DETECTED
HCOV NL63 RNA NPH QL NAA+NON-PROBE: NOT DETECTED
HCOV OC43 RNA NPH QL NAA+NON-PROBE: NOT DETECTED
HCT VFR BLD AUTO: 40.5 % (ref 34–48)
HGB BLD-MCNC: 12.4 G/DL (ref 11.5–15.5)
HGB UR QL STRIP.AUTO: ABNORMAL
HHB: 7.7 % (ref 0–5)
HMPV RNA NPH QL NAA+NON-PROBE: NOT DETECTED
HPIV1 RNA NPH QL NAA+NON-PROBE: NOT DETECTED
HPIV2 RNA NPH QL NAA+NON-PROBE: NOT DETECTED
HPIV3 RNA NPH QL NAA+NON-PROBE: NOT DETECTED
HPIV4 RNA NPH QL NAA+NON-PROBE: NOT DETECTED
IMM GRANULOCYTES # BLD AUTO: 0.04 K/UL (ref 0–0.58)
IMM GRANULOCYTES NFR BLD: 1 % (ref 0–5)
INFLUENZA A BY PCR: NOT DETECTED
INFLUENZA B BY PCR: NOT DETECTED
KETONES UR STRIP-MCNC: NEGATIVE MG/DL
LAB: ABNORMAL
LACTATE BLDV-SCNC: 0.7 MMOL/L (ref 0.5–2.2)
LEUKOCYTE ESTERASE UR QL STRIP: ABNORMAL
LYMPHOCYTES NFR BLD: 1 K/UL (ref 1.5–4)
LYMPHOCYTES RELATIVE PERCENT: 15 % (ref 20–42)
Lab: 1304
M PNEUMO DNA NPH QL NAA+NON-PROBE: NOT DETECTED
MAGNESIUM SERPL-MCNC: 2.1 MG/DL (ref 1.6–2.6)
MCH RBC QN AUTO: 31.6 PG (ref 26–35)
MCHC RBC AUTO-ENTMCNC: 30.6 G/DL (ref 32–34.5)
MCV RBC AUTO: 103.3 FL (ref 80–99.9)
METHB: 0.3 % (ref 0–1.5)
MODE: ABNORMAL
MONOCYTES NFR BLD: 0.72 K/UL (ref 0.1–0.95)
MONOCYTES NFR BLD: 11 % (ref 2–12)
NEUTROPHILS NFR BLD: 63 % (ref 43–80)
NEUTS SEG NFR BLD: 4.11 K/UL (ref 1.8–7.3)
NITRITE UR QL STRIP: NEGATIVE
O2 CONTENT: 16.2 ML/DL
O2 SATURATION: 92.2 % (ref 92–98.5)
O2HB: 91.4 % (ref 94–97)
OPERATOR ID: ABNORMAL
PARTIAL THROMBOPLASTIN TIME: 29.1 SEC (ref 24.5–35.1)
PARTIAL THROMBOPLASTIN TIME: 62.9 SEC (ref 24.5–35.1)
PATIENT TEMP: 37 C
PCO2: 60.3 MMHG (ref 35–45)
PH BLOOD GAS: 7.22 (ref 7.35–7.45)
PH UR STRIP: 5.5 [PH] (ref 5–8)
PLATELET # BLD AUTO: 392 K/UL (ref 130–450)
PMV BLD AUTO: 9.1 FL (ref 7–12)
PO2: 75.7 MMHG (ref 75–100)
POTASSIUM SERPL-SCNC: 4.1 MMOL/L (ref 3.5–5)
PROT SERPL-MCNC: 6.8 G/DL (ref 6.4–8.3)
PROT UR STRIP-MCNC: NEGATIVE MG/DL
RBC # BLD AUTO: 3.92 M/UL (ref 3.5–5.5)
RBC #/AREA URNS HPF: ABNORMAL /HPF
RSV RNA NPH QL NAA+NON-PROBE: NOT DETECTED
RV+EV RNA NPH QL NAA+NON-PROBE: NOT DETECTED
SARS-COV-2 RDRP RESP QL NAA+PROBE: NOT DETECTED
SARS-COV-2 RNA NPH QL NAA+NON-PROBE: NOT DETECTED
SODIUM SERPL-SCNC: 141 MMOL/L (ref 132–146)
SOURCE, BLOOD GAS: ABNORMAL
SP GR UR STRIP: 1.02 (ref 1–1.03)
SPECIMEN DESCRIPTION: NORMAL
SPECIMEN DESCRIPTION: NORMAL
THB: 12.6 G/DL (ref 11.5–16.5)
TIME ANALYZED: 1313
TROPONIN I SERPL HS-MCNC: 22 NG/L (ref 0–9)
UROBILINOGEN UR STRIP-ACNC: 0.2 EU/DL (ref 0–1)
WBC #/AREA URNS HPF: ABNORMAL /HPF
WBC OTHER # BLD: 6.6 K/UL (ref 4.5–11.5)

## 2025-02-04 PROCEDURE — 87502 INFLUENZA DNA AMP PROBE: CPT

## 2025-02-04 PROCEDURE — 2580000003 HC RX 258: Performed by: EMERGENCY MEDICINE

## 2025-02-04 PROCEDURE — 93970 EXTREMITY STUDY: CPT

## 2025-02-04 PROCEDURE — 71045 X-RAY EXAM CHEST 1 VIEW: CPT

## 2025-02-04 PROCEDURE — 6370000000 HC RX 637 (ALT 250 FOR IP): Performed by: INTERNAL MEDICINE

## 2025-02-04 PROCEDURE — 0202U NFCT DS 22 TRGT SARS-COV-2: CPT

## 2025-02-04 PROCEDURE — 96375 TX/PRO/DX INJ NEW DRUG ADDON: CPT

## 2025-02-04 PROCEDURE — 2500000003 HC RX 250 WO HCPCS: Performed by: INTERNAL MEDICINE

## 2025-02-04 PROCEDURE — 84145 PROCALCITONIN (PCT): CPT

## 2025-02-04 PROCEDURE — 6360000002 HC RX W HCPCS: Performed by: INTERNAL MEDICINE

## 2025-02-04 PROCEDURE — 87635 SARS-COV-2 COVID-19 AMP PRB: CPT

## 2025-02-04 PROCEDURE — 6370000000 HC RX 637 (ALT 250 FOR IP): Performed by: EMERGENCY MEDICINE

## 2025-02-04 PROCEDURE — 83735 ASSAY OF MAGNESIUM: CPT

## 2025-02-04 PROCEDURE — 94664 DEMO&/EVAL PT USE INHALER: CPT

## 2025-02-04 PROCEDURE — 71275 CT ANGIOGRAPHY CHEST: CPT

## 2025-02-04 PROCEDURE — 2060000000 HC ICU INTERMEDIATE R&B

## 2025-02-04 PROCEDURE — 83605 ASSAY OF LACTIC ACID: CPT

## 2025-02-04 PROCEDURE — 85025 COMPLETE CBC W/AUTO DIFF WBC: CPT

## 2025-02-04 PROCEDURE — 87086 URINE CULTURE/COLONY COUNT: CPT

## 2025-02-04 PROCEDURE — 85730 THROMBOPLASTIN TIME PARTIAL: CPT

## 2025-02-04 PROCEDURE — 6360000002 HC RX W HCPCS: Performed by: STUDENT IN AN ORGANIZED HEALTH CARE EDUCATION/TRAINING PROGRAM

## 2025-02-04 PROCEDURE — 6360000004 HC RX CONTRAST MEDICATION: Performed by: RADIOLOGY

## 2025-02-04 PROCEDURE — 2700000000 HC OXYGEN THERAPY PER DAY

## 2025-02-04 PROCEDURE — 96374 THER/PROPH/DIAG INJ IV PUSH: CPT

## 2025-02-04 PROCEDURE — 99223 1ST HOSP IP/OBS HIGH 75: CPT | Performed by: INTERNAL MEDICINE

## 2025-02-04 PROCEDURE — 99285 EMERGENCY DEPT VISIT HI MDM: CPT

## 2025-02-04 PROCEDURE — 70450 CT HEAD/BRAIN W/O DYE: CPT

## 2025-02-04 PROCEDURE — 84484 ASSAY OF TROPONIN QUANT: CPT

## 2025-02-04 PROCEDURE — 80053 COMPREHEN METABOLIC PANEL: CPT

## 2025-02-04 PROCEDURE — 87040 BLOOD CULTURE FOR BACTERIA: CPT

## 2025-02-04 PROCEDURE — 82805 BLOOD GASES W/O2 SATURATION: CPT

## 2025-02-04 PROCEDURE — 81001 URINALYSIS AUTO W/SCOPE: CPT

## 2025-02-04 PROCEDURE — 83880 ASSAY OF NATRIURETIC PEPTIDE: CPT

## 2025-02-04 PROCEDURE — 93005 ELECTROCARDIOGRAM TRACING: CPT | Performed by: EMERGENCY MEDICINE

## 2025-02-04 PROCEDURE — 94640 AIRWAY INHALATION TREATMENT: CPT

## 2025-02-04 RX ORDER — ATORVASTATIN CALCIUM 20 MG/1
20 TABLET, FILM COATED ORAL NIGHTLY
Status: DISCONTINUED | OUTPATIENT
Start: 2025-02-04 | End: 2025-02-20 | Stop reason: HOSPADM

## 2025-02-04 RX ORDER — SODIUM CHLORIDE 0.9 % (FLUSH) 0.9 %
5-40 SYRINGE (ML) INJECTION EVERY 12 HOURS SCHEDULED
Status: DISCONTINUED | OUTPATIENT
Start: 2025-02-04 | End: 2025-02-20 | Stop reason: HOSPADM

## 2025-02-04 RX ORDER — ACETAMINOPHEN 325 MG/1
650 TABLET ORAL EVERY 6 HOURS PRN
Status: DISCONTINUED | OUTPATIENT
Start: 2025-02-04 | End: 2025-02-20 | Stop reason: HOSPADM

## 2025-02-04 RX ORDER — IOPAMIDOL 755 MG/ML
75 INJECTION, SOLUTION INTRAVASCULAR
Status: COMPLETED | OUTPATIENT
Start: 2025-02-04 | End: 2025-02-04

## 2025-02-04 RX ORDER — IPRATROPIUM BROMIDE AND ALBUTEROL SULFATE 2.5; .5 MG/3ML; MG/3ML
1 SOLUTION RESPIRATORY (INHALATION)
Status: DISCONTINUED | OUTPATIENT
Start: 2025-02-04 | End: 2025-02-09

## 2025-02-04 RX ORDER — CLOPIDOGREL BISULFATE 75 MG/1
75 TABLET ORAL DAILY
Status: DISCONTINUED | OUTPATIENT
Start: 2025-02-04 | End: 2025-02-07

## 2025-02-04 RX ORDER — HEPARIN SODIUM 1000 [USP'U]/ML
80 INJECTION, SOLUTION INTRAVENOUS; SUBCUTANEOUS PRN
Status: DISCONTINUED | OUTPATIENT
Start: 2025-02-04 | End: 2025-02-13

## 2025-02-04 RX ORDER — ONDANSETRON 4 MG/1
4 TABLET, ORALLY DISINTEGRATING ORAL EVERY 8 HOURS PRN
Status: DISCONTINUED | OUTPATIENT
Start: 2025-02-04 | End: 2025-02-20 | Stop reason: HOSPADM

## 2025-02-04 RX ORDER — ACETAMINOPHEN 650 MG/1
650 SUPPOSITORY RECTAL EVERY 6 HOURS PRN
Status: DISCONTINUED | OUTPATIENT
Start: 2025-02-04 | End: 2025-02-20 | Stop reason: HOSPADM

## 2025-02-04 RX ORDER — METOPROLOL SUCCINATE 50 MG/1
50 TABLET, EXTENDED RELEASE ORAL DAILY
Status: DISCONTINUED | OUTPATIENT
Start: 2025-02-04 | End: 2025-02-20 | Stop reason: HOSPADM

## 2025-02-04 RX ORDER — HEPARIN SODIUM 1000 [USP'U]/ML
40 INJECTION, SOLUTION INTRAVENOUS; SUBCUTANEOUS PRN
Status: DISCONTINUED | OUTPATIENT
Start: 2025-02-04 | End: 2025-02-13

## 2025-02-04 RX ORDER — ASPIRIN 81 MG/1
81 TABLET ORAL DAILY
Status: DISCONTINUED | OUTPATIENT
Start: 2025-02-04 | End: 2025-02-07

## 2025-02-04 RX ORDER — SODIUM CHLORIDE 9 MG/ML
INJECTION, SOLUTION INTRAVENOUS PRN
Status: DISCONTINUED | OUTPATIENT
Start: 2025-02-04 | End: 2025-02-20 | Stop reason: HOSPADM

## 2025-02-04 RX ORDER — SODIUM CHLORIDE 0.9 % (FLUSH) 0.9 %
5-40 SYRINGE (ML) INJECTION PRN
Status: DISCONTINUED | OUTPATIENT
Start: 2025-02-04 | End: 2025-02-20 | Stop reason: HOSPADM

## 2025-02-04 RX ORDER — BUMETANIDE 0.25 MG/ML
1 INJECTION, SOLUTION INTRAMUSCULAR; INTRAVENOUS ONCE
Status: COMPLETED | OUTPATIENT
Start: 2025-02-04 | End: 2025-02-04

## 2025-02-04 RX ORDER — HEPARIN SODIUM 1000 [USP'U]/ML
80 INJECTION, SOLUTION INTRAVENOUS; SUBCUTANEOUS ONCE
Status: COMPLETED | OUTPATIENT
Start: 2025-02-04 | End: 2025-02-04

## 2025-02-04 RX ORDER — ONDANSETRON 2 MG/ML
4 INJECTION INTRAMUSCULAR; INTRAVENOUS EVERY 6 HOURS PRN
Status: DISCONTINUED | OUTPATIENT
Start: 2025-02-04 | End: 2025-02-20 | Stop reason: HOSPADM

## 2025-02-04 RX ORDER — HYDROCHLOROTHIAZIDE 12.5 MG/1
12.5 TABLET ORAL DAILY
Status: DISCONTINUED | OUTPATIENT
Start: 2025-02-04 | End: 2025-02-08

## 2025-02-04 RX ORDER — BUDESONIDE 0.5 MG/2ML
0.5 INHALANT ORAL
Status: DISCONTINUED | OUTPATIENT
Start: 2025-02-04 | End: 2025-02-20 | Stop reason: HOSPADM

## 2025-02-04 RX ORDER — ISOSORBIDE MONONITRATE 30 MG/1
30 TABLET, EXTENDED RELEASE ORAL DAILY
Status: DISCONTINUED | OUTPATIENT
Start: 2025-02-04 | End: 2025-02-20 | Stop reason: HOSPADM

## 2025-02-04 RX ORDER — 0.9 % SODIUM CHLORIDE 0.9 %
500 INTRAVENOUS SOLUTION INTRAVENOUS ONCE
Status: COMPLETED | OUTPATIENT
Start: 2025-02-04 | End: 2025-02-04

## 2025-02-04 RX ORDER — IPRATROPIUM BROMIDE AND ALBUTEROL SULFATE 2.5; .5 MG/3ML; MG/3ML
1 SOLUTION RESPIRATORY (INHALATION)
Status: COMPLETED | OUTPATIENT
Start: 2025-02-04 | End: 2025-02-04

## 2025-02-04 RX ORDER — ARFORMOTEROL TARTRATE 15 UG/2ML
15 SOLUTION RESPIRATORY (INHALATION)
Status: DISCONTINUED | OUTPATIENT
Start: 2025-02-04 | End: 2025-02-20 | Stop reason: HOSPADM

## 2025-02-04 RX ORDER — POLYETHYLENE GLYCOL 3350 17 G/17G
17 POWDER, FOR SOLUTION ORAL DAILY PRN
Status: DISCONTINUED | OUTPATIENT
Start: 2025-02-04 | End: 2025-02-20 | Stop reason: HOSPADM

## 2025-02-04 RX ORDER — HEPARIN SODIUM 10000 [USP'U]/100ML
5-30 INJECTION, SOLUTION INTRAVENOUS CONTINUOUS
Status: DISPENSED | OUTPATIENT
Start: 2025-02-04 | End: 2025-02-16

## 2025-02-04 RX ADMIN — IPRATROPIUM BROMIDE AND ALBUTEROL SULFATE 1 DOSE: 2.5; .5 SOLUTION RESPIRATORY (INHALATION) at 18:29

## 2025-02-04 RX ADMIN — HEPARIN SODIUM 18 UNITS/KG/HR: 10000 INJECTION, SOLUTION INTRAVENOUS at 13:23

## 2025-02-04 RX ADMIN — ARFORMOTEROL TARTRATE 15 MCG: 15 SOLUTION RESPIRATORY (INHALATION) at 18:29

## 2025-02-04 RX ADMIN — SODIUM CHLORIDE 500 ML: 9 INJECTION, SOLUTION INTRAVENOUS at 13:01

## 2025-02-04 RX ADMIN — IPRATROPIUM BROMIDE AND ALBUTEROL SULFATE 1 DOSE: 2.5; .5 SOLUTION RESPIRATORY (INHALATION) at 10:42

## 2025-02-04 RX ADMIN — IPRATROPIUM BROMIDE AND ALBUTEROL SULFATE 1 DOSE: 2.5; .5 SOLUTION RESPIRATORY (INHALATION) at 10:43

## 2025-02-04 RX ADMIN — SODIUM CHLORIDE, PRESERVATIVE FREE 5 ML: 5 INJECTION INTRAVENOUS at 23:40

## 2025-02-04 RX ADMIN — BUMETANIDE 1 MG: 0.25 INJECTION INTRAMUSCULAR; INTRAVENOUS at 13:18

## 2025-02-04 RX ADMIN — IPRATROPIUM BROMIDE AND ALBUTEROL SULFATE 1 DOSE: 2.5; .5 SOLUTION RESPIRATORY (INHALATION) at 16:31

## 2025-02-04 RX ADMIN — BUDESONIDE 500 MCG: 0.5 SUSPENSION RESPIRATORY (INHALATION) at 18:29

## 2025-02-04 RX ADMIN — HEPARIN SODIUM 4800 UNITS: 1000 INJECTION INTRAVENOUS; SUBCUTANEOUS at 13:20

## 2025-02-04 RX ADMIN — IOPAMIDOL 75 ML: 755 INJECTION, SOLUTION INTRAVENOUS at 12:22

## 2025-02-04 RX ADMIN — IPRATROPIUM BROMIDE AND ALBUTEROL SULFATE 1 DOSE: 2.5; .5 SOLUTION RESPIRATORY (INHALATION) at 10:41

## 2025-02-04 RX ADMIN — ATORVASTATIN CALCIUM 20 MG: 20 TABLET, FILM COATED ORAL at 23:40

## 2025-02-04 NOTE — H&P
[facility] Hospitalist Group History and Physical      CHIEF COMPLAINT:  AMS, SOB, cough     History of Present Illness:      This is an 84 year old female with past medical history of dementia, CAD, HTN, Hld who presents to ER from home after her son found her confused, coughing and wheezing. She states this has been ongoing for a few days to a week. Her son notes that on Friday he went to podiatry office for nail trim and asked for a wheelchair back to the car. She is hypoxic on arrival 84% on room air. Lab workup: Cr 1.9, Troponin 22, calcium 8.5. ABG reviewed. CT head negative. CXR showing pulmonary vascular congestion. CTA with right lower lobe segmental and subsegmental pulmonary artery emboli. She was given bumex, Duoneb, 500 mL bolus, and started on heparin drip. Decision to admit.     Informant(s) for H&P: patient     REVIEW OF SYSTEMS:  A comprehensive review of systems was negative except for: what is in the HPI    PMH:  No past medical history on file.  Surgical History:  Past Surgical History:   Procedure Laterality Date    APPENDECTOMY      TONSILLECTOMY       Medications Prior to Admission:    Prior to Admission medications    Medication Sig Start Date End Date Taking? Authorizing Provider   aspirin 81 MG EC tablet Take 1 tablet by mouth daily 1/14/23   Ian Flores MD   isosorbide mononitrate (IMDUR) 30 MG extended release tablet Take 1 tablet by mouth daily 1/13/23   Ian Flores MD   nitroGLYCERIN (NITROSTAT) 0.4 MG SL tablet Place 1 tablet under the tongue every 5 minutes as needed for Chest pain up to max of 3 total doses. If no relief after 1 dose, call 911. 1/13/23   Ian Flores MD   atorvastatin (LIPITOR) 20 MG tablet Take 1 tablet by mouth nightly 1/13/23   Ian Flores MD   metoprolol succinate (TOPROL XL) 50 MG extended release tablet Take 1 tablet by mouth daily 1/14/23   Ian Flores MD   clopidogrel (PLAVIX) 75 MG tablet Take 1

## 2025-02-04 NOTE — ED NOTES
Radiology Procedure Waiver   Name: Dori Samayoa  : 1940  MRN: [id number]    Date:  25    Time: 11:58 AM EST    Benefits of immediately proceeding with Radiology exam(s) without pre-testing outweigh the risks or are not indicated as specified below and therefore the following is/are being waived:    [] Pregnancy test   [] Patients LMP on-time and regular.   [] Patient had Tubal Ligation or has other Contraception Device.   [] Patient  is Menopausal or Premenarcheal.    [] Patient had Full or Partial Hysterectomy.    [] Protocol for Iodine allergy    [] MRI Questionnaire     [x] BUN/Creatinine   [] Patient age w/no hx of renal dysfunction.   [] Patient on Dialysis.   [] Recent Normal Labs.  Electronically signed by Cordell Celeste DO on 25 at 11:58 AM EST        Benefit outweighs risk     Cordell Celeste DO  25 [id number]

## 2025-02-05 ENCOUNTER — APPOINTMENT (OUTPATIENT)
Age: 85
DRG: 175 | End: 2025-02-05
Payer: MEDICARE

## 2025-02-05 LAB
B PARAP IS1001 DNA NPH QL NAA+NON-PROBE: NOT DETECTED
B PERT DNA SPEC QL NAA+PROBE: NOT DETECTED
B.E.: -0.5 MMOL/L (ref -3–3)
C PNEUM DNA NPH QL NAA+NON-PROBE: NOT DETECTED
COHB: 0.2 % (ref 0–1.5)
CRITICAL: ABNORMAL
DATE ANALYZED: ABNORMAL
DATE OF COLLECTION: ABNORMAL
ECHO AO ASC DIAM: 3.1 CM
ECHO AO ASCENDING AORTA INDEX: 1.79 CM/M2
ECHO AV AREA PEAK VELOCITY: 3.3 CM2
ECHO AV AREA VTI: 3.6 CM2
ECHO AV AREA/BSA PEAK VELOCITY: 1.9 CM2/M2
ECHO AV AREA/BSA VTI: 2.1 CM2/M2
ECHO AV CUSP MM: 1.8 CM
ECHO AV MEAN GRADIENT: 6 MMHG
ECHO AV MEAN VELOCITY: 1.1 M/S
ECHO AV PEAK GRADIENT: 10 MMHG
ECHO AV PEAK VELOCITY: 1.6 M/S
ECHO AV VELOCITY RATIO: 0.75
ECHO AV VTI: 30.6 CM
ECHO BSA: 1.8 M2
ECHO LA DIAMETER INDEX: 2.25 CM/M2
ECHO LA DIAMETER: 3.9 CM
ECHO LA VOL A-L A2C: 44 ML (ref 22–52)
ECHO LA VOL A-L A4C: 40 ML (ref 22–52)
ECHO LA VOL MOD A2C: 42 ML (ref 22–52)
ECHO LA VOL MOD A4C: 38 ML (ref 22–52)
ECHO LA VOLUME AREA LENGTH: 43 ML
ECHO LA VOLUME INDEX A-L A2C: 25 ML/M2 (ref 16–34)
ECHO LA VOLUME INDEX A-L A4C: 23 ML/M2 (ref 16–34)
ECHO LA VOLUME INDEX AREA LENGTH: 25 ML/M2 (ref 16–34)
ECHO LA VOLUME INDEX MOD A2C: 24 ML/M2 (ref 16–34)
ECHO LA VOLUME INDEX MOD A4C: 22 ML/M2 (ref 16–34)
ECHO LV EDV A2C: 104 ML
ECHO LV EDV A4C: 64 ML
ECHO LV EDV BP: 90 ML (ref 56–104)
ECHO LV EDV INDEX A4C: 37 ML/M2
ECHO LV EDV INDEX BP: 52 ML/M2
ECHO LV EDV NDEX A2C: 60 ML/M2
ECHO LV EF PHYSICIAN: 70 %
ECHO LV EJECTION FRACTION A2C: 66 %
ECHO LV EJECTION FRACTION A4C: 72 %
ECHO LV EJECTION FRACTION BIPLANE: 69 % (ref 55–100)
ECHO LV ESV A2C: 36 ML
ECHO LV ESV A4C: 18 ML
ECHO LV ESV BP: 28 ML (ref 19–49)
ECHO LV ESV INDEX A2C: 21 ML/M2
ECHO LV ESV INDEX A4C: 10 ML/M2
ECHO LV ESV INDEX BP: 16 ML/M2
ECHO LV FRACTIONAL SHORTENING: 35 % (ref 28–44)
ECHO LV INTERNAL DIMENSION DIASTOLE INDEX: 2.83 CM/M2
ECHO LV INTERNAL DIMENSION DIASTOLIC: 4.9 CM (ref 3.9–5.3)
ECHO LV INTERNAL DIMENSION SYSTOLIC INDEX: 1.85 CM/M2
ECHO LV INTERNAL DIMENSION SYSTOLIC: 3.2 CM
ECHO LV ISOVOLUMETRIC RELAXATION TIME (IVRT): 83 MS
ECHO LV IVSD: 1.1 CM (ref 0.6–0.9)
ECHO LV MASS 2D: 188.1 G (ref 67–162)
ECHO LV MASS INDEX 2D: 108.7 G/M2 (ref 43–95)
ECHO LV POSTERIOR WALL DIASTOLIC: 1 CM (ref 0.6–0.9)
ECHO LV RELATIVE WALL THICKNESS RATIO: 0.41
ECHO LVOT AREA: 4.2 CM2
ECHO LVOT AV VTI INDEX: 0.83
ECHO LVOT DIAM: 2.3 CM
ECHO LVOT MEAN GRADIENT: 4 MMHG
ECHO LVOT PEAK GRADIENT: 6 MMHG
ECHO LVOT PEAK VELOCITY: 1.2 M/S
ECHO LVOT STROKE VOLUME INDEX: 61 ML/M2
ECHO LVOT SV: 105.5 ML
ECHO LVOT VTI: 25.4 CM
ECHO MV A VELOCITY: 1.05 M/S
ECHO MV AREA PHT: 3.5 CM2
ECHO MV AREA VTI: 3.4 CM2
ECHO MV E DECELERATION TIME (DT): 272.6 MS
ECHO MV E VELOCITY: 0.67 M/S
ECHO MV E/A RATIO: 0.64
ECHO MV LVOT VTI INDEX: 1.22
ECHO MV MAX VELOCITY: 1.1 M/S
ECHO MV MEAN GRADIENT: 2 MMHG
ECHO MV MEAN VELOCITY: 0.6 M/S
ECHO MV PEAK GRADIENT: 5 MMHG
ECHO MV PRESSURE HALF TIME (PHT): 62 MS
ECHO MV VTI: 31.1 CM
ECHO PV MAX VELOCITY: 1.3 M/S
ECHO PV MEAN GRADIENT: 3 MMHG
ECHO PV MEAN VELOCITY: 0.8 M/S
ECHO PV PEAK GRADIENT: 6 MMHG
ECHO PV VTI: 21.6 CM
ECHO RV INTERNAL DIMENSION: 2.2 CM
ECHO RVOT MEAN GRADIENT: 1 MMHG
ECHO RVOT PEAK GRADIENT: 3 MMHG
ECHO RVOT PEAK VELOCITY: 0.9 M/S
ECHO RVOT VTI: 16.1 CM
ECHO TV REGURGITANT MAX VELOCITY: 3.25 M/S
ECHO TV REGURGITANT PEAK GRADIENT: 42 MMHG
FLUAV RNA NPH QL NAA+NON-PROBE: NOT DETECTED
FLUBV RNA NPH QL NAA+NON-PROBE: NOT DETECTED
HADV DNA NPH QL NAA+NON-PROBE: NOT DETECTED
HCO3: 24.7 MMOL/L (ref 22–26)
HCOV 229E RNA NPH QL NAA+NON-PROBE: NOT DETECTED
HCOV HKU1 RNA NPH QL NAA+NON-PROBE: NOT DETECTED
HCOV NL63 RNA NPH QL NAA+NON-PROBE: NOT DETECTED
HCOV OC43 RNA NPH QL NAA+NON-PROBE: NOT DETECTED
HHB: 9.1 % (ref 0–5)
HMPV RNA NPH QL NAA+NON-PROBE: NOT DETECTED
HPIV1 RNA NPH QL NAA+NON-PROBE: NOT DETECTED
HPIV2 RNA NPH QL NAA+NON-PROBE: NOT DETECTED
HPIV3 RNA NPH QL NAA+NON-PROBE: NOT DETECTED
HPIV4 RNA NPH QL NAA+NON-PROBE: NOT DETECTED
LAB: ABNORMAL
Lab: 1130
M PNEUMO DNA NPH QL NAA+NON-PROBE: NOT DETECTED
METHB: 0.3 % (ref 0–1.5)
MODE: ABNORMAL
O2 CONTENT: 15.7 ML/DL
O2 SATURATION: 90.9 % (ref 92–98.5)
O2HB: 90.4 % (ref 94–97)
OPERATOR ID: 8634
PARTIAL THROMBOPLASTIN TIME: 82.5 SEC (ref 24.5–35.1)
PARTIAL THROMBOPLASTIN TIME: 89.4 SEC (ref 24.5–35.1)
PARTIAL THROMBOPLASTIN TIME: 96.2 SEC (ref 24.5–35.1)
PATIENT TEMP: 37 C
PCO2: 42.9 MMHG (ref 35–45)
PH BLOOD GAS: 7.38 (ref 7.35–7.45)
PO2: 62.3 MMHG (ref 75–100)
PROCALCITONIN SERPL-MCNC: 0.13 NG/ML (ref 0–0.08)
RSV RNA NPH QL NAA+NON-PROBE: NOT DETECTED
RV+EV RNA NPH QL NAA+NON-PROBE: NOT DETECTED
SARS-COV-2 RNA NPH QL NAA+NON-PROBE: NOT DETECTED
SOURCE, BLOOD GAS: ABNORMAL
SPECIMEN DESCRIPTION: NORMAL
THB: 12.3 G/DL (ref 11.5–16.5)
TIME ANALYZED: 1135

## 2025-02-05 PROCEDURE — 6360000002 HC RX W HCPCS: Performed by: NURSE PRACTITIONER

## 2025-02-05 PROCEDURE — 5A0955A ASSISTANCE WITH RESPIRATORY VENTILATION, GREATER THAN 96 CONSECUTIVE HOURS, HIGH NASAL FLOW/VELOCITY: ICD-10-PCS | Performed by: INTERNAL MEDICINE

## 2025-02-05 PROCEDURE — 6360000002 HC RX W HCPCS: Performed by: STUDENT IN AN ORGANIZED HEALTH CARE EDUCATION/TRAINING PROGRAM

## 2025-02-05 PROCEDURE — 97161 PT EVAL LOW COMPLEX 20 MIN: CPT

## 2025-02-05 PROCEDURE — 93306 TTE W/DOPPLER COMPLETE: CPT | Performed by: INTERNAL MEDICINE

## 2025-02-05 PROCEDURE — 99232 SBSQ HOSP IP/OBS MODERATE 35: CPT | Performed by: STUDENT IN AN ORGANIZED HEALTH CARE EDUCATION/TRAINING PROGRAM

## 2025-02-05 PROCEDURE — 2700000000 HC OXYGEN THERAPY PER DAY

## 2025-02-05 PROCEDURE — 36600 WITHDRAWAL OF ARTERIAL BLOOD: CPT

## 2025-02-05 PROCEDURE — 93306 TTE W/DOPPLER COMPLETE: CPT

## 2025-02-05 PROCEDURE — 94640 AIRWAY INHALATION TREATMENT: CPT

## 2025-02-05 PROCEDURE — 6360000002 HC RX W HCPCS: Performed by: INTERNAL MEDICINE

## 2025-02-05 PROCEDURE — 0202U NFCT DS 22 TRGT SARS-COV-2: CPT

## 2025-02-05 PROCEDURE — 2060000000 HC ICU INTERMEDIATE R&B

## 2025-02-05 PROCEDURE — 97165 OT EVAL LOW COMPLEX 30 MIN: CPT

## 2025-02-05 PROCEDURE — 6370000000 HC RX 637 (ALT 250 FOR IP): Performed by: INTERNAL MEDICINE

## 2025-02-05 PROCEDURE — 82805 BLOOD GASES W/O2 SATURATION: CPT

## 2025-02-05 PROCEDURE — 85730 THROMBOPLASTIN TIME PARTIAL: CPT

## 2025-02-05 RX ORDER — METHYLPREDNISOLONE SODIUM SUCCINATE 40 MG/ML
40 INJECTION INTRAMUSCULAR; INTRAVENOUS ONCE
Status: COMPLETED | OUTPATIENT
Start: 2025-02-05 | End: 2025-02-05

## 2025-02-05 RX ORDER — PREDNISONE 20 MG/1
40 TABLET ORAL DAILY
Status: DISCONTINUED | OUTPATIENT
Start: 2025-02-05 | End: 2025-02-05

## 2025-02-05 RX ORDER — PREDNISONE 20 MG/1
40 TABLET ORAL DAILY
Status: DISCONTINUED | OUTPATIENT
Start: 2025-02-06 | End: 2025-02-06

## 2025-02-05 RX ADMIN — IPRATROPIUM BROMIDE AND ALBUTEROL SULFATE 1 DOSE: 2.5; .5 SOLUTION RESPIRATORY (INHALATION) at 21:42

## 2025-02-05 RX ADMIN — HEPARIN SODIUM 14 UNITS/KG/HR: 10000 INJECTION, SOLUTION INTRAVENOUS at 15:53

## 2025-02-05 RX ADMIN — METOPROLOL SUCCINATE 50 MG: 50 TABLET, EXTENDED RELEASE ORAL at 07:52

## 2025-02-05 RX ADMIN — IPRATROPIUM BROMIDE AND ALBUTEROL SULFATE 1 DOSE: 2.5; .5 SOLUTION RESPIRATORY (INHALATION) at 07:46

## 2025-02-05 RX ADMIN — ASPIRIN 81 MG: 81 TABLET, COATED ORAL at 07:52

## 2025-02-05 RX ADMIN — ARFORMOTEROL TARTRATE 15 MCG: 15 SOLUTION RESPIRATORY (INHALATION) at 07:46

## 2025-02-05 RX ADMIN — ARFORMOTEROL TARTRATE 15 MCG: 15 SOLUTION RESPIRATORY (INHALATION) at 21:42

## 2025-02-05 RX ADMIN — IPRATROPIUM BROMIDE AND ALBUTEROL SULFATE 1 DOSE: 2.5; .5 SOLUTION RESPIRATORY (INHALATION) at 12:42

## 2025-02-05 RX ADMIN — IPRATROPIUM BROMIDE AND ALBUTEROL SULFATE 1 DOSE: 2.5; .5 SOLUTION RESPIRATORY (INHALATION) at 16:11

## 2025-02-05 RX ADMIN — CLOPIDOGREL BISULFATE 75 MG: 75 TABLET ORAL at 07:52

## 2025-02-05 RX ADMIN — HYDROCHLOROTHIAZIDE 12.5 MG: 12.5 TABLET ORAL at 07:52

## 2025-02-05 RX ADMIN — METHYLPREDNISOLONE SODIUM SUCCINATE 40 MG: 40 INJECTION INTRAMUSCULAR; INTRAVENOUS at 10:54

## 2025-02-05 RX ADMIN — BUDESONIDE 500 MCG: 0.5 SUSPENSION RESPIRATORY (INHALATION) at 21:42

## 2025-02-05 RX ADMIN — ATORVASTATIN CALCIUM 20 MG: 20 TABLET, FILM COATED ORAL at 20:45

## 2025-02-05 RX ADMIN — ISOSORBIDE MONONITRATE 30 MG: 30 TABLET, EXTENDED RELEASE ORAL at 07:52

## 2025-02-05 RX ADMIN — BUDESONIDE 500 MCG: 0.5 SUSPENSION RESPIRATORY (INHALATION) at 07:46

## 2025-02-05 NOTE — ACP (ADVANCE CARE PLANNING)
Advance Care Planning   Healthcare Decision Maker:    Primary Decision Maker: Blaise Samayoa - Child - [phone]    Secondary Decision Maker: Jayleen Varner - [place] - [phone]    Click here to complete Healthcare Decision Makers including selection of the Healthcare Decision Maker Relationship (ie \"Primary\").

## 2025-02-05 NOTE — CARE COORDINATION
Social Work/Discharge Planning:  Met with patient ruth Welsh and completed initial assessment.  Explained Social Work role and discussed transition of care/discharge planning.  Patient lives alone in a one story house.  PTA she is independent with no adaptive device.  Patient is currently requiring six liters oxygen and RN to wean as tolerated.  Blaise states he assist his mother with her medications and meals.  He states family and friends with his mother care and meals.  Patient has a history at [facility] [place].  Blaise is requesting rehab for his mother and prefers [facility] [place].  Referral made to nahed Savage with [facility] [place] SNF and facility can accept patient.  Patient will need a pre-cert.  Electronic N-17 in epic.  Will continue to follow and assist with discharge planning.  Electronically signed by CATRACHITA Ortiz on 2/5/2025 at 2:12 PM    Addendum:  Provided update to patient ruth Welsh.  Electronically signed by CATRACHITA Ortiz on 2/5/2025 at 2:15 PM

## 2025-02-05 NOTE — DISCHARGE INSTR - COC
Continuity of Care Form    Patient Name: Dori Samayoa   :  1940  MRN:  [id number]    Admit date:  2025  Discharge date:  25    Code Status Order: Full Code   Advance Directives:   Advance Care Flowsheet Documentation             Admitting Physician:  Sebas Huerta MD  PCP: Stanley Gar MD    Discharging Nurse:   Discharging Hospital Unit/Room#: 0431/0431-A  Discharging Unit Phone Number: [phone]    Emergency Contact:   Extended Emergency Contact Information  Primary Emergency Contact: Blaise Samayoa  Mobile Phone: [phone]  Relation: Child  Preferred language: English   needed? No  Secondary Emergency Contact: Jayleen Varner  Mobile Phone: [phone]  Relation: Child  Preferred language: English   needed? No    Past Surgical History:  Past Surgical History:   Procedure Laterality Date    APPENDECTOMY      TONSILLECTOMY         Immunization History:   Immunization History   Administered Date(s) Administered    COVID-19, PFIZER PURPLE top, DILUTE for use, (age 12 y+), 30mcg/0.3mL 2021, 2021, 2021       Active Problems:  Patient Active Problem List   Diagnosis Code    AMS (altered mental status) R41.82    NSTEMI (non-ST elevated myocardial infarction) ([facility]) I21.4    Other pulmonary embolism without acute cor pulmonale ([facility]) I26.99       Isolation/Infection:   Isolation            No Isolation          Patient Infection Status       Infection Onset Added Last Indicated Last Indicated By Review Planned Expiration Resolved Resolved By    COVID-19 (Rule Out) 25 Respiratory Panel, Molecular, with COVID-19 (Restricted: peds pts or suitable admitted adults) (Ordered) 02/15/25 02/19/25                         Nurse Assessment:  Last Vital Signs: BP (!) 115/41   Pulse 82   Temp 98.5 °F (36.9 °C) (Oral)   Resp 16   Ht 1.499 m (4' 11\")   Wt 78 kg (172 lb)   SpO2 93%   BMI 34.74 kg/m²     Last documented pain score (0-10

## 2025-02-05 NOTE — ED NOTES
ED to Inpatient Handoff Report    Notified floor that electronic handoff available and patient ready for transport to room 431.    Safety Risks: falls    Patient in Restraints: no    Constant Observer or Patient : no    Telemetry Monitoring Ordered :yes           Order to transfer to unit without monitor:yes    Last MEWS: 2 Time completed: 1903    Deterioration Index Score:   Predictive Model Details          44 (Caution)  Factor Value    Calculated 2/4/2025 19:05 29% Age 84 years old    Deterioration Index Model 25% Supplemental oxygen Nasal cannula     18% Respiratory rate 22     15% Blood pH abnormal (7.220)     6% Systolic 151     2% Pulse oximetry 93 %     2% BUN abnormal (29 mg/dL)     1% Sodium 141 mmol/L     1% Potassium 4.1 mmol/L     0% Hematocrit 40.5 %     0% Pulse 77     0% Temperature 98 °F (36.7 °C)     0% WBC count 6.6 k/uL        Vitals:    02/04/25 1828 02/04/25 1829 02/04/25 1830 02/04/25 1903   BP:    (!) 151/63   Pulse: 75 71 76 77   Resp: 24 22 29 22   Temp:    98 °F (36.7 °C)   TempSrc:       SpO2:    93%   Weight:           Repeat PTT sent.

## 2025-02-06 ENCOUNTER — APPOINTMENT (OUTPATIENT)
Dept: GENERAL RADIOLOGY | Age: 85
DRG: 175 | End: 2025-02-06
Payer: MEDICARE

## 2025-02-06 LAB
ANION GAP SERPL CALCULATED.3IONS-SCNC: 10 MMOL/L (ref 7–16)
B.E.: -0.6 MMOL/L (ref -3–3)
BNP SERPL-MCNC: 1682 PG/ML (ref 0–450)
BUN SERPL-MCNC: 26 MG/DL (ref 6–23)
CALCIUM SERPL-MCNC: 8.3 MG/DL (ref 8.6–10.2)
CHLORIDE SERPL-SCNC: 104 MMOL/L (ref 98–107)
CO2 SERPL-SCNC: 24 MMOL/L (ref 22–29)
COHB: 0.3 % (ref 0–1.5)
CREAT SERPL-MCNC: 1.3 MG/DL (ref 0.5–1)
CRITICAL: ABNORMAL
DATE ANALYZED: ABNORMAL
DATE OF COLLECTION: ABNORMAL
EKG ATRIAL RATE: 83 BPM
EKG P AXIS: 54 DEGREES
EKG P-R INTERVAL: 198 MS
EKG Q-T INTERVAL: 378 MS
EKG QRS DURATION: 70 MS
EKG QTC CALCULATION (BAZETT): 444 MS
EKG R AXIS: 6 DEGREES
EKG T AXIS: 28 DEGREES
EKG VENTRICULAR RATE: 83 BPM
ERYTHROCYTE [DISTWIDTH] IN BLOOD BY AUTOMATED COUNT: 13.4 % (ref 11.5–15)
GFR, ESTIMATED: 41 ML/MIN/1.73M2
GLUCOSE SERPL-MCNC: 142 MG/DL (ref 74–99)
HCO3: 24.5 MMOL/L (ref 22–26)
HCT VFR BLD AUTO: 37.1 % (ref 34–48)
HGB BLD-MCNC: 11.4 G/DL (ref 11.5–15.5)
HHB: 9.5 % (ref 0–5)
LAB: ABNORMAL
Lab: 1210
MCH RBC QN AUTO: 32 PG (ref 26–35)
MCHC RBC AUTO-ENTMCNC: 30.7 G/DL (ref 32–34.5)
MCV RBC AUTO: 104.2 FL (ref 80–99.9)
METHB: 0.3 % (ref 0–1.5)
MICROORGANISM SPEC CULT: ABNORMAL
MICROORGANISM SPEC CULT: ABNORMAL
MODE: ABNORMAL
O2 CONTENT: 15.7 ML/DL
O2 SATURATION: 90.4 % (ref 92–98.5)
O2HB: 89.9 % (ref 94–97)
OPERATOR ID: ABNORMAL
PARTIAL THROMBOPLASTIN TIME: 125.4 SEC (ref 24.5–35.1)
PARTIAL THROMBOPLASTIN TIME: 33 SEC (ref 24.5–35.1)
PARTIAL THROMBOPLASTIN TIME: 67.6 SEC (ref 24.5–35.1)
PATIENT TEMP: 37 C
PCO2: 42.1 MMHG (ref 35–45)
PH BLOOD GAS: 7.38 (ref 7.35–7.45)
PLATELET # BLD AUTO: 348 K/UL (ref 130–450)
PMV BLD AUTO: 8.7 FL (ref 7–12)
PO2: 61.8 MMHG (ref 75–100)
POTASSIUM SERPL-SCNC: 4.1 MMOL/L (ref 3.5–5)
RBC # BLD AUTO: 3.56 M/UL (ref 3.5–5.5)
SODIUM SERPL-SCNC: 138 MMOL/L (ref 132–146)
SOURCE, BLOOD GAS: ABNORMAL
SPECIMEN DESCRIPTION: ABNORMAL
THB: 12.4 G/DL (ref 11.5–16.5)
TIME ANALYZED: 1224
WBC OTHER # BLD: 5.9 K/UL (ref 4.5–11.5)

## 2025-02-06 PROCEDURE — 2060000000 HC ICU INTERMEDIATE R&B

## 2025-02-06 PROCEDURE — 6360000002 HC RX W HCPCS: Performed by: NURSE PRACTITIONER

## 2025-02-06 PROCEDURE — 6370000000 HC RX 637 (ALT 250 FOR IP): Performed by: INTERNAL MEDICINE

## 2025-02-06 PROCEDURE — 2500000003 HC RX 250 WO HCPCS: Performed by: INTERNAL MEDICINE

## 2025-02-06 PROCEDURE — 93010 ELECTROCARDIOGRAM REPORT: CPT | Performed by: INTERNAL MEDICINE

## 2025-02-06 PROCEDURE — 85027 COMPLETE CBC AUTOMATED: CPT

## 2025-02-06 PROCEDURE — 6360000002 HC RX W HCPCS: Performed by: INTERNAL MEDICINE

## 2025-02-06 PROCEDURE — 6370000000 HC RX 637 (ALT 250 FOR IP): Performed by: NURSE PRACTITIONER

## 2025-02-06 PROCEDURE — 6360000002 HC RX W HCPCS: Performed by: STUDENT IN AN ORGANIZED HEALTH CARE EDUCATION/TRAINING PROGRAM

## 2025-02-06 PROCEDURE — 99232 SBSQ HOSP IP/OBS MODERATE 35: CPT | Performed by: STUDENT IN AN ORGANIZED HEALTH CARE EDUCATION/TRAINING PROGRAM

## 2025-02-06 PROCEDURE — 94640 AIRWAY INHALATION TREATMENT: CPT

## 2025-02-06 PROCEDURE — 80048 BASIC METABOLIC PNL TOTAL CA: CPT

## 2025-02-06 PROCEDURE — 94669 MECHANICAL CHEST WALL OSCILL: CPT

## 2025-02-06 PROCEDURE — 85730 THROMBOPLASTIN TIME PARTIAL: CPT

## 2025-02-06 PROCEDURE — 36415 COLL VENOUS BLD VENIPUNCTURE: CPT

## 2025-02-06 PROCEDURE — 82805 BLOOD GASES W/O2 SATURATION: CPT

## 2025-02-06 PROCEDURE — 71045 X-RAY EXAM CHEST 1 VIEW: CPT

## 2025-02-06 PROCEDURE — 83880 ASSAY OF NATRIURETIC PEPTIDE: CPT

## 2025-02-06 PROCEDURE — 2700000000 HC OXYGEN THERAPY PER DAY

## 2025-02-06 RX ORDER — METHYLPREDNISOLONE SODIUM SUCCINATE 40 MG/ML
40 INJECTION INTRAMUSCULAR; INTRAVENOUS EVERY 12 HOURS
Status: DISCONTINUED | OUTPATIENT
Start: 2025-02-06 | End: 2025-02-06

## 2025-02-06 RX ORDER — FUROSEMIDE 10 MG/ML
40 INJECTION INTRAMUSCULAR; INTRAVENOUS ONCE
Status: COMPLETED | OUTPATIENT
Start: 2025-02-06 | End: 2025-02-06

## 2025-02-06 RX ORDER — METHYLPREDNISOLONE SODIUM SUCCINATE 40 MG/ML
40 INJECTION INTRAMUSCULAR; INTRAVENOUS EVERY 12 HOURS
Status: DISCONTINUED | OUTPATIENT
Start: 2025-02-06 | End: 2025-02-20 | Stop reason: HOSPADM

## 2025-02-06 RX ADMIN — IPRATROPIUM BROMIDE AND ALBUTEROL SULFATE 1 DOSE: 2.5; .5 SOLUTION RESPIRATORY (INHALATION) at 12:30

## 2025-02-06 RX ADMIN — ATORVASTATIN CALCIUM 20 MG: 20 TABLET, FILM COATED ORAL at 21:06

## 2025-02-06 RX ADMIN — HYDROCHLOROTHIAZIDE 12.5 MG: 12.5 TABLET ORAL at 07:58

## 2025-02-06 RX ADMIN — FUROSEMIDE 40 MG: 10 INJECTION, SOLUTION INTRAMUSCULAR; INTRAVENOUS at 13:39

## 2025-02-06 RX ADMIN — ISOSORBIDE MONONITRATE 30 MG: 30 TABLET, EXTENDED RELEASE ORAL at 07:58

## 2025-02-06 RX ADMIN — METHYLPREDNISOLONE SODIUM SUCCINATE 40 MG: 40 INJECTION, POWDER, LYOPHILIZED, FOR SOLUTION INTRAMUSCULAR; INTRAVENOUS at 11:56

## 2025-02-06 RX ADMIN — IPRATROPIUM BROMIDE AND ALBUTEROL SULFATE 1 DOSE: 2.5; .5 SOLUTION RESPIRATORY (INHALATION) at 15:35

## 2025-02-06 RX ADMIN — IPRATROPIUM BROMIDE AND ALBUTEROL SULFATE 1 DOSE: 2.5; .5 SOLUTION RESPIRATORY (INHALATION) at 18:58

## 2025-02-06 RX ADMIN — BUDESONIDE 500 MCG: 0.5 SUSPENSION RESPIRATORY (INHALATION) at 18:58

## 2025-02-06 RX ADMIN — HEPARIN SODIUM 16 UNITS/KG/HR: 10000 INJECTION, SOLUTION INTRAVENOUS at 08:02

## 2025-02-06 RX ADMIN — HEPARIN SODIUM 13 UNITS/KG/HR: 10000 INJECTION, SOLUTION INTRAVENOUS at 16:45

## 2025-02-06 RX ADMIN — ASPIRIN 81 MG: 81 TABLET, COATED ORAL at 07:58

## 2025-02-06 RX ADMIN — SODIUM CHLORIDE, PRESERVATIVE FREE 10 ML: 5 INJECTION INTRAVENOUS at 21:06

## 2025-02-06 RX ADMIN — METHYLPREDNISOLONE SODIUM SUCCINATE 40 MG: 40 INJECTION, POWDER, LYOPHILIZED, FOR SOLUTION INTRAMUSCULAR; INTRAVENOUS at 21:06

## 2025-02-06 RX ADMIN — CLOPIDOGREL BISULFATE 75 MG: 75 TABLET ORAL at 07:58

## 2025-02-06 RX ADMIN — BUDESONIDE 500 MCG: 0.5 SUSPENSION RESPIRATORY (INHALATION) at 09:06

## 2025-02-06 RX ADMIN — ARFORMOTEROL TARTRATE 15 MCG: 15 SOLUTION RESPIRATORY (INHALATION) at 09:05

## 2025-02-06 RX ADMIN — HEPARIN SODIUM 4800 UNITS: 1000 INJECTION INTRAVENOUS; SUBCUTANEOUS at 07:59

## 2025-02-06 RX ADMIN — ARFORMOTEROL TARTRATE 15 MCG: 15 SOLUTION RESPIRATORY (INHALATION) at 18:58

## 2025-02-06 RX ADMIN — IPRATROPIUM BROMIDE AND ALBUTEROL SULFATE 1 DOSE: 2.5; .5 SOLUTION RESPIRATORY (INHALATION) at 09:05

## 2025-02-06 RX ADMIN — PREDNISONE 40 MG: 20 TABLET ORAL at 07:58

## 2025-02-06 NOTE — CARE COORDINATION
Social Work/Discharge Planning:  Chart reviewed.  Patient on four liters oxygen per nursing.  Patient accepted at [facility] [place] and will need a pre-cert.  Called liaison Janette with [facility] and requested for facility to start pre-cert.  Electronic N-17 in epic and 23736 completed.  Will continue to follow and wait for pre-cert.  Electronically signed by CATRACHITA Ortiz on 2/6/2025 at 11:00 AM    Addendum:  Received notification patient is now requiring 14 liters oxygen.  RN to wean as tolerated.  Will continue to follow.  Electronically signed by CATRACHITA Ortiz on 2/6/2025 at 11:10 AM    Addendum:  Janette with [facility] [place] called with pre-cert approval.  Pre-cert is good until Saturday 2/8.  Janette states patient oxygen will need to be at eight liters or less.  Will continue to follow.  Electronically signed by CATRACHITA Ortiz on 2/6/2025 at 12:27 PM

## 2025-02-07 PROBLEM — I48.91 ATRIAL FIBRILLATION WITH RVR (HCC): Status: ACTIVE | Noted: 2025-02-07

## 2025-02-07 LAB
ANION GAP SERPL CALCULATED.3IONS-SCNC: 11 MMOL/L (ref 7–16)
BUN SERPL-MCNC: 33 MG/DL (ref 6–23)
CALCIUM SERPL-MCNC: 8.9 MG/DL (ref 8.6–10.2)
CHLORIDE SERPL-SCNC: 106 MMOL/L (ref 98–107)
CO2 SERPL-SCNC: 27 MMOL/L (ref 22–29)
CREAT SERPL-MCNC: 1.7 MG/DL (ref 0.5–1)
GFR, ESTIMATED: 31 ML/MIN/1.73M2
GLUCOSE SERPL-MCNC: 147 MG/DL (ref 74–99)
MAGNESIUM SERPL-MCNC: 2 MG/DL (ref 1.6–2.6)
PARTIAL THROMBOPLASTIN TIME: 51.4 SEC (ref 24.5–35.1)
PARTIAL THROMBOPLASTIN TIME: 56.4 SEC (ref 24.5–35.1)
POTASSIUM SERPL-SCNC: 4.4 MMOL/L (ref 3.5–5)
SODIUM SERPL-SCNC: 144 MMOL/L (ref 132–146)
T4 FREE SERPL-MCNC: 1 NG/DL (ref 0.9–1.7)
TSH SERPL DL<=0.05 MIU/L-ACNC: 1.01 UIU/ML (ref 0.27–4.2)

## 2025-02-07 PROCEDURE — 93005 ELECTROCARDIOGRAM TRACING: CPT

## 2025-02-07 PROCEDURE — 6370000000 HC RX 637 (ALT 250 FOR IP): Performed by: INTERNAL MEDICINE

## 2025-02-07 PROCEDURE — 99233 SBSQ HOSP IP/OBS HIGH 50: CPT | Performed by: STUDENT IN AN ORGANIZED HEALTH CARE EDUCATION/TRAINING PROGRAM

## 2025-02-07 PROCEDURE — 94640 AIRWAY INHALATION TREATMENT: CPT

## 2025-02-07 PROCEDURE — 84439 ASSAY OF FREE THYROXINE: CPT

## 2025-02-07 PROCEDURE — 2500000003 HC RX 250 WO HCPCS: Performed by: INTERNAL MEDICINE

## 2025-02-07 PROCEDURE — 85730 THROMBOPLASTIN TIME PARTIAL: CPT

## 2025-02-07 PROCEDURE — 80048 BASIC METABOLIC PNL TOTAL CA: CPT

## 2025-02-07 PROCEDURE — 2700000000 HC OXYGEN THERAPY PER DAY

## 2025-02-07 PROCEDURE — 84443 ASSAY THYROID STIM HORMONE: CPT

## 2025-02-07 PROCEDURE — 99222 1ST HOSP IP/OBS MODERATE 55: CPT | Performed by: NURSE PRACTITIONER

## 2025-02-07 PROCEDURE — 93005 ELECTROCARDIOGRAM TRACING: CPT | Performed by: STUDENT IN AN ORGANIZED HEALTH CARE EDUCATION/TRAINING PROGRAM

## 2025-02-07 PROCEDURE — 6360000002 HC RX W HCPCS: Performed by: STUDENT IN AN ORGANIZED HEALTH CARE EDUCATION/TRAINING PROGRAM

## 2025-02-07 PROCEDURE — 94669 MECHANICAL CHEST WALL OSCILL: CPT

## 2025-02-07 PROCEDURE — 97535 SELF CARE MNGMENT TRAINING: CPT

## 2025-02-07 PROCEDURE — 83735 ASSAY OF MAGNESIUM: CPT

## 2025-02-07 PROCEDURE — 97530 THERAPEUTIC ACTIVITIES: CPT

## 2025-02-07 PROCEDURE — 99223 1ST HOSP IP/OBS HIGH 75: CPT | Performed by: INTERNAL MEDICINE

## 2025-02-07 PROCEDURE — 2500000003 HC RX 250 WO HCPCS

## 2025-02-07 PROCEDURE — 2060000000 HC ICU INTERMEDIATE R&B

## 2025-02-07 PROCEDURE — APPSS60 APP SPLIT SHARED TIME 46-60 MINUTES

## 2025-02-07 PROCEDURE — 6360000002 HC RX W HCPCS: Performed by: INTERNAL MEDICINE

## 2025-02-07 RX ORDER — METOPROLOL TARTRATE 1 MG/ML
5 INJECTION, SOLUTION INTRAVENOUS ONCE
Status: COMPLETED | OUTPATIENT
Start: 2025-02-07 | End: 2025-02-07

## 2025-02-07 RX ADMIN — BUDESONIDE 500 MCG: 0.5 SUSPENSION RESPIRATORY (INHALATION) at 09:49

## 2025-02-07 RX ADMIN — HEPARIN SODIUM 13 UNITS/KG/HR: 10000 INJECTION, SOLUTION INTRAVENOUS at 09:24

## 2025-02-07 RX ADMIN — ATORVASTATIN CALCIUM 20 MG: 20 TABLET, FILM COATED ORAL at 21:09

## 2025-02-07 RX ADMIN — ARFORMOTEROL TARTRATE 15 MCG: 15 SOLUTION RESPIRATORY (INHALATION) at 19:25

## 2025-02-07 RX ADMIN — SODIUM CHLORIDE, PRESERVATIVE FREE 10 ML: 5 INJECTION INTRAVENOUS at 09:26

## 2025-02-07 RX ADMIN — METHYLPREDNISOLONE SODIUM SUCCINATE 40 MG: 40 INJECTION, POWDER, LYOPHILIZED, FOR SOLUTION INTRAMUSCULAR; INTRAVENOUS at 09:26

## 2025-02-07 RX ADMIN — IPRATROPIUM BROMIDE AND ALBUTEROL SULFATE 1 DOSE: 2.5; .5 SOLUTION RESPIRATORY (INHALATION) at 19:25

## 2025-02-07 RX ADMIN — ISOSORBIDE MONONITRATE 30 MG: 30 TABLET, EXTENDED RELEASE ORAL at 09:26

## 2025-02-07 RX ADMIN — ASPIRIN 81 MG: 81 TABLET, COATED ORAL at 09:26

## 2025-02-07 RX ADMIN — METOPROLOL TARTRATE 5 MG: 5 INJECTION INTRAVENOUS at 03:48

## 2025-02-07 RX ADMIN — IPRATROPIUM BROMIDE AND ALBUTEROL SULFATE 1 DOSE: 2.5; .5 SOLUTION RESPIRATORY (INHALATION) at 09:48

## 2025-02-07 RX ADMIN — CLOPIDOGREL BISULFATE 75 MG: 75 TABLET ORAL at 09:26

## 2025-02-07 RX ADMIN — METHYLPREDNISOLONE SODIUM SUCCINATE 40 MG: 40 INJECTION, POWDER, LYOPHILIZED, FOR SOLUTION INTRAMUSCULAR; INTRAVENOUS at 21:10

## 2025-02-07 RX ADMIN — ARFORMOTEROL TARTRATE 15 MCG: 15 SOLUTION RESPIRATORY (INHALATION) at 09:48

## 2025-02-07 RX ADMIN — SODIUM CHLORIDE, PRESERVATIVE FREE 10 ML: 5 INJECTION INTRAVENOUS at 21:11

## 2025-02-07 RX ADMIN — IPRATROPIUM BROMIDE AND ALBUTEROL SULFATE 1 DOSE: 2.5; .5 SOLUTION RESPIRATORY (INHALATION) at 15:50

## 2025-02-07 RX ADMIN — METOPROLOL SUCCINATE 50 MG: 50 TABLET, EXTENDED RELEASE ORAL at 09:26

## 2025-02-07 RX ADMIN — IPRATROPIUM BROMIDE AND ALBUTEROL SULFATE 1 DOSE: 2.5; .5 SOLUTION RESPIRATORY (INHALATION) at 13:15

## 2025-02-07 RX ADMIN — METOPROLOL TARTRATE 5 MG: 5 INJECTION INTRAVENOUS at 02:09

## 2025-02-07 RX ADMIN — HYDROCHLOROTHIAZIDE 12.5 MG: 12.5 TABLET ORAL at 09:26

## 2025-02-07 NOTE — CARE COORDINATION
Social Work/Discharge Planning:  Chart reviewed.  Patient on 15 liters high flow oxygen and RN to wean as tolerated.  Patient pre-cert is good until tomorrow and patient will need to be on eight liters or less of oxygen.  Will continue to follow.  Electronically signed by CATRACHITA Ortiz on 2/7/2025 at 2:53 PM

## 2025-02-07 NOTE — CONSULTS
Palliative Care Department  [phone]  Palliative Care Initial Consult  Provider Isha Garcia, APRN - CNP    Dori Samayoa  [id number]  Hospital Day: 4  Date of Initial Consult: 2/7/2025  Referring Provider: Enrique Albrecht MD  Palliative Medicine was consulted for assistance with: Goals of care    HPI:   Dori Samayoa is a 84 y.o. with a medical history of dementia, HFpEF, COPD CAD, HTN and HLD who was admitted on 2/4/2025 from home with a CHIEF COMPLAINT of altered mental status and shortness of breath. Her son notes that on Friday he went to podiatry office for nail trim and asked for a wheelchair back to the car. She is hypoxic on arrival 84% on room air. CT head negative. CXR showing pulmonary vascular congestion. CTA with right lower lobe segmental and subsegmental pulmonary artery emboli.  Patient was started on a heparin drip and admitted for further medical management.  ASSESSMENT/PLAN:     Pertinent Hospital Diagnoses     Acute hypoxic respiratory failure  Unprovoked right PE  COPD  HFpEF  Dementia    Palliative Care Encounter / Counseling Regarding Goals of Care  Please see detailed goals of care discussion as below  At this time, Dori Samayoa, Does Not have capacity for medical decision-making.  Capacity is time limited and situation/question specific  During encounter Blaise was surrogate medical decision-maker  Outcome of goals of care meeting:   Continue full code status  Code status Full Code  Advanced Directives: no POA or living will in epic  Surrogate/Legal NOK:  Blaise Samayoa (child) [phone]  Jayleen Varner (child) [phone]    Spiritual assessment: no spiritual distress identified  Bereavement and grief: to be determined  Referrals to: none today  SUBJECTIVE:     Current medical issues leading to Palliative Medicine involvement include   Active Hospital Problems    Diagnosis Date Noted    Other pulmonary embolism without acute cor pulmonale (HCC) [I26.99] 02/04/2025 
abnormality.  There is no acute abnormality of the thoracic aorta. Lungs/pleura: The lungs are without acute process.  Subsegmental atelectasis versus trace edema.  Hyperinflated lungs with upper lobe predominant pleuroparenchymal scarring and emphysematous changes.  Central bronchiectasis.   No focal consolidation or pulmonary edema.  No evidence of pleural effusion or pneumothorax. Upper Abdomen: Limited images of the upper abdomen are unremarkable. Soft Tissues/Bones: No acute bone or soft tissue abnormality.     1. Right lower lobe segmental and subsegmental pulmonary artery emboli.  No evidence for right heart strain 2. Emphysema. 3. Subsegmental atelectasis versus trace edema at the lung bases with mosaicism superimposed upon chronic changes of COPD without pleural effusion. RECOMMENDATIONS: Communications: Findings discussed with Dr. Gray by Dr. Guajardo via phone on 02/04/2025 at 1247 hours     XR CHEST PORTABLE    Result Date: 2/4/2025  EXAMINATION: ONE XRAY VIEW OF THE CHEST 2/4/2025 11:26 am COMPARISON: No prior studies available. HISTORY: ORDERING SYSTEM PROVIDED HISTORY: SOB cough TECHNOLOGIST PROVIDED HISTORY: Reason for exam:->SOB cough FINDINGS: The heart is mildly enlarged.  Pulmonary vessels appear congested.  There are bibasilar opacities and mild to moderate left pleural effusion.  No large pneumothorax.     1. Cardiomegaly with pulmonary vascular congestion. 2. Bibasilar opacities and mild to moderate left pleural effusion.     CT HEAD WO CONTRAST    Result Date: 2/4/2025  EXAMINATION: CT OF THE HEAD WITHOUT CONTRAST 2/4/2025 11:13 am TECHNIQUE: CT of the head was performed without the administration of intravenous contrast. Automated exposure control, iterative reconstruction, and/or weight based adjustment of the mA/kV was utilized to reduce the radiation dose to as low as reasonably achievable. COMPARISON: None. HISTORY: ORDERING SYSTEM PROVIDED HISTORY: AMS TECHNOLOGIST PROVIDED HISTORY: 
33*   CREATININE 1.3* 1.7*   LABGLOM 41* 31*   CALCIUM 8.3* 8.9     Mag:   Recent Labs     02/04/25  1056   MG 2.1       TSH:   Lab Results   Component Value Date    TSH 1.01 02/07/2025       HgA1c:   Lab Results   Component Value Date    LABA1C 5.8 (H) 01/08/2023       PRO-BNP:   Lab Results   Component Value Date    PROBNP 1,682 (H) 02/06/2025    PROBNP 924 (H) 02/04/2025    PROBNP 3,864 (H) 01/13/2023    PROBNP 7,749 (H) 01/10/2023    PROBNP 4,372 (H) 01/09/2023    PROBNP 1,608 (H) 01/07/2023       APTT:  Recent Labs     02/06/25  2245 02/07/25  0500   APTT 51.4* 56.4*     HS TROP:  Lab Results   Component Value Date/Time    TROPHS 22 02/04/2025 10:56 AM    TROPHS 241 01/08/2023 03:06 PM    TROPHS 264 01/08/2023 08:00 AM    TROPHS 261 01/08/2023 01:34 AM    TROPHS 244 01/07/2023 11:52 PM    TROPHS 115 01/07/2023 08:16 PM     LIPID PANEL:  Lab Results   Component Value Date    CHOL 117 01/17/2023    TRIG 80 01/17/2023    HDL 44 01/17/2023    LDL 57 01/17/2023    VLDL 16 01/17/2023      LIVER PROFILE:  Recent Labs     02/04/25  1056   AST 20   ALT 10       [id number] Score for Atrial Fibrillation Stroke Risk   Risk   Factors  Component Value   C CHF Yes 1   H HTN yes 1   A2 Age >= 75 Yes,  (84 y.o.) 2   D DM No 0   S2 Prior Stroke/TIA No 0   V Vascular Disease Yes 1   A Age 65-74 No,  (84 y.o.) 0   Sc Sex female 1    [id number]  Score  6   Score last updated 2/7/25 9:52 AM EST    Click here for a link to the UpToDate guideline \"Atrial Fibrillation: Anticoagulation therapy to prevent embolization    Disclaimer: Risk Score calculation is dependent on accuracy of patient problem list and past encounter diagnosis.         HAS-BLED Score                            Risk Factors      Points   Hypertension  Uncontrolled, >160 mmHg systolic   No   Renal disease  Dialysis, transplant, Cr>2.26 mg/dL or >200 µmol/L   No=0   Liver disease   Cirrhosis or bilirubin >2x normal with AST/ALT/AP >3x normal   No=0   Stroke

## 2025-02-08 ENCOUNTER — APPOINTMENT (OUTPATIENT)
Dept: GENERAL RADIOLOGY | Age: 85
DRG: 175 | End: 2025-02-08
Payer: MEDICARE

## 2025-02-08 PROBLEM — J96.01 ACUTE RESPIRATORY FAILURE WITH HYPOXIA (HCC): Status: ACTIVE | Noted: 2025-02-08

## 2025-02-08 PROBLEM — I25.10 CORONARY ARTERY DISEASE INVOLVING NATIVE CORONARY ARTERY OF NATIVE HEART WITHOUT ANGINA PECTORIS: Status: ACTIVE | Noted: 2025-02-08

## 2025-02-08 PROBLEM — N18.9 CHRONIC KIDNEY DISEASE: Status: ACTIVE | Noted: 2025-02-08

## 2025-02-08 PROBLEM — I48.0 PAF (PAROXYSMAL ATRIAL FIBRILLATION) (HCC): Status: ACTIVE | Noted: 2025-02-08

## 2025-02-08 PROBLEM — I50.33 ACUTE ON CHRONIC DIASTOLIC CONGESTIVE HEART FAILURE (HCC): Status: ACTIVE | Noted: 2025-02-08

## 2025-02-08 LAB
B.E.: 3 MMOL/L (ref -3–3)
BNP SERPL-MCNC: 3680 PG/ML (ref 0–450)
COHB: 0.3 % (ref 0–1.5)
CRITICAL: ABNORMAL
DATE ANALYZED: ABNORMAL
DATE OF COLLECTION: ABNORMAL
EKG ATRIAL RATE: 117 BPM
EKG ATRIAL RATE: 56 BPM
EKG P AXIS: 44 DEGREES
EKG P-R INTERVAL: 178 MS
EKG Q-T INTERVAL: 340 MS
EKG Q-T INTERVAL: 424 MS
EKG QRS DURATION: 72 MS
EKG QRS DURATION: 80 MS
EKG QTC CALCULATION (BAZETT): 409 MS
EKG QTC CALCULATION (BAZETT): 478 MS
EKG R AXIS: -15 DEGREES
EKG R AXIS: -2 DEGREES
EKG T AXIS: -20 DEGREES
EKG T AXIS: 3 DEGREES
EKG VENTRICULAR RATE: 119 BPM
EKG VENTRICULAR RATE: 56 BPM
ERYTHROCYTE [DISTWIDTH] IN BLOOD BY AUTOMATED COUNT: 13.6 % (ref 11.5–15)
FIO2: 100 %
HCO3: 27.7 MMOL/L (ref 22–26)
HCT VFR BLD AUTO: 36.2 % (ref 34–48)
HGB BLD-MCNC: 11.2 G/DL (ref 11.5–15.5)
HHB: 8.3 % (ref 0–5)
LAB: ABNORMAL
Lab: 1145
MCH RBC QN AUTO: 31.7 PG (ref 26–35)
MCHC RBC AUTO-ENTMCNC: 30.9 G/DL (ref 32–34.5)
MCV RBC AUTO: 102.5 FL (ref 80–99.9)
METHB: 0.3 % (ref 0–1.5)
MODE: ABNORMAL
O2 CONTENT: 15.6 ML/DL
O2 SATURATION: 91.6 % (ref 92–98.5)
O2HB: 91.1 % (ref 94–97)
OPERATOR ID: 101
PARTIAL THROMBOPLASTIN TIME: 19.8 SEC (ref 24.5–35.1)
PARTIAL THROMBOPLASTIN TIME: 60.8 SEC (ref 24.5–35.1)
PATIENT TEMP: 37 C
PCO2: 42.8 MMHG (ref 35–45)
PFO2: 0.63 MMHG/%
PH BLOOD GAS: 7.43 (ref 7.35–7.45)
PLATELET # BLD AUTO: 397 K/UL (ref 130–450)
PMV BLD AUTO: 8.9 FL (ref 7–12)
PO2: 63.4 MMHG (ref 75–100)
RBC # BLD AUTO: 3.53 M/UL (ref 3.5–5.5)
RI(T): 9.57
SOURCE, BLOOD GAS: ABNORMAL
THB: 12.2 G/DL (ref 11.5–16.5)
TIME ANALYZED: 1209
WBC OTHER # BLD: 10.9 K/UL (ref 4.5–11.5)

## 2025-02-08 PROCEDURE — 6370000000 HC RX 637 (ALT 250 FOR IP)

## 2025-02-08 PROCEDURE — 93010 ELECTROCARDIOGRAM REPORT: CPT | Performed by: INTERNAL MEDICINE

## 2025-02-08 PROCEDURE — 6360000002 HC RX W HCPCS: Performed by: STUDENT IN AN ORGANIZED HEALTH CARE EDUCATION/TRAINING PROGRAM

## 2025-02-08 PROCEDURE — 82805 BLOOD GASES W/O2 SATURATION: CPT

## 2025-02-08 PROCEDURE — 71045 X-RAY EXAM CHEST 1 VIEW: CPT

## 2025-02-08 PROCEDURE — 2700000000 HC OXYGEN THERAPY PER DAY

## 2025-02-08 PROCEDURE — 6370000000 HC RX 637 (ALT 250 FOR IP): Performed by: INTERNAL MEDICINE

## 2025-02-08 PROCEDURE — 2060000000 HC ICU INTERMEDIATE R&B

## 2025-02-08 PROCEDURE — 85027 COMPLETE CBC AUTOMATED: CPT

## 2025-02-08 PROCEDURE — 6360000002 HC RX W HCPCS

## 2025-02-08 PROCEDURE — 94640 AIRWAY INHALATION TREATMENT: CPT

## 2025-02-08 PROCEDURE — 85730 THROMBOPLASTIN TIME PARTIAL: CPT

## 2025-02-08 PROCEDURE — 6360000002 HC RX W HCPCS: Performed by: INTERNAL MEDICINE

## 2025-02-08 PROCEDURE — 99232 SBSQ HOSP IP/OBS MODERATE 35: CPT | Performed by: STUDENT IN AN ORGANIZED HEALTH CARE EDUCATION/TRAINING PROGRAM

## 2025-02-08 PROCEDURE — 94668 MNPJ CHEST WALL SBSQ: CPT

## 2025-02-08 PROCEDURE — 94669 MECHANICAL CHEST WALL OSCILL: CPT

## 2025-02-08 PROCEDURE — 83880 ASSAY OF NATRIURETIC PEPTIDE: CPT

## 2025-02-08 PROCEDURE — 94667 MNPJ CHEST WALL 1ST: CPT

## 2025-02-08 PROCEDURE — 2500000003 HC RX 250 WO HCPCS: Performed by: INTERNAL MEDICINE

## 2025-02-08 RX ORDER — IPRATROPIUM BROMIDE AND ALBUTEROL SULFATE 2.5; .5 MG/3ML; MG/3ML
1 SOLUTION RESPIRATORY (INHALATION) ONCE
Status: COMPLETED | OUTPATIENT
Start: 2025-02-08 | End: 2025-02-08

## 2025-02-08 RX ORDER — ALBUTEROL SULFATE 0.83 MG/ML
2.5 SOLUTION RESPIRATORY (INHALATION)
Status: DISCONTINUED | OUTPATIENT
Start: 2025-02-08 | End: 2025-02-09

## 2025-02-08 RX ORDER — FUROSEMIDE 40 MG/1
40 TABLET ORAL DAILY
Status: DISCONTINUED | OUTPATIENT
Start: 2025-02-08 | End: 2025-02-09

## 2025-02-08 RX ORDER — ACETYLCYSTEINE 200 MG/ML
600 SOLUTION ORAL; RESPIRATORY (INHALATION)
Status: DISCONTINUED | OUTPATIENT
Start: 2025-02-08 | End: 2025-02-16

## 2025-02-08 RX ORDER — METOPROLOL SUCCINATE 25 MG/1
25 TABLET, EXTENDED RELEASE ORAL
Status: DISCONTINUED | OUTPATIENT
Start: 2025-02-08 | End: 2025-02-20 | Stop reason: HOSPADM

## 2025-02-08 RX ORDER — METHYLPREDNISOLONE SODIUM SUCCINATE 40 MG/ML
40 INJECTION INTRAMUSCULAR; INTRAVENOUS ONCE
Status: COMPLETED | OUTPATIENT
Start: 2025-02-08 | End: 2025-02-08

## 2025-02-08 RX ADMIN — ACETYLCYSTEINE 600 MG: 200 SOLUTION ORAL; RESPIRATORY (INHALATION) at 15:56

## 2025-02-08 RX ADMIN — ARFORMOTEROL TARTRATE 15 MCG: 15 SOLUTION RESPIRATORY (INHALATION) at 21:46

## 2025-02-08 RX ADMIN — ARFORMOTEROL TARTRATE 15 MCG: 15 SOLUTION RESPIRATORY (INHALATION) at 07:56

## 2025-02-08 RX ADMIN — ALBUTEROL SULFATE 2.5 MG: 0.83 SOLUTION RESPIRATORY (INHALATION) at 15:54

## 2025-02-08 RX ADMIN — FUROSEMIDE 40 MG: 40 TABLET ORAL at 09:07

## 2025-02-08 RX ADMIN — IPRATROPIUM BROMIDE AND ALBUTEROL SULFATE 1 DOSE: 2.5; .5 SOLUTION RESPIRATORY (INHALATION) at 05:15

## 2025-02-08 RX ADMIN — ATORVASTATIN CALCIUM 20 MG: 20 TABLET, FILM COATED ORAL at 21:51

## 2025-02-08 RX ADMIN — IPRATROPIUM BROMIDE AND ALBUTEROL SULFATE 1 DOSE: 2.5; .5 SOLUTION RESPIRATORY (INHALATION) at 21:46

## 2025-02-08 RX ADMIN — METHYLPREDNISOLONE SODIUM SUCCINATE 40 MG: 40 INJECTION, POWDER, LYOPHILIZED, FOR SOLUTION INTRAMUSCULAR; INTRAVENOUS at 21:51

## 2025-02-08 RX ADMIN — IPRATROPIUM BROMIDE AND ALBUTEROL SULFATE 1 DOSE: 2.5; .5 SOLUTION RESPIRATORY (INHALATION) at 07:56

## 2025-02-08 RX ADMIN — METHYLPREDNISOLONE SODIUM SUCCINATE 40 MG: 40 INJECTION, POWDER, LYOPHILIZED, FOR SOLUTION INTRAMUSCULAR; INTRAVENOUS at 09:08

## 2025-02-08 RX ADMIN — ISOSORBIDE MONONITRATE 30 MG: 30 TABLET, EXTENDED RELEASE ORAL at 09:07

## 2025-02-08 RX ADMIN — HEPARIN SODIUM 13 UNITS/KG/HR: 10000 INJECTION, SOLUTION INTRAVENOUS at 17:31

## 2025-02-08 RX ADMIN — METOPROLOL SUCCINATE 50 MG: 50 TABLET, EXTENDED RELEASE ORAL at 09:08

## 2025-02-08 RX ADMIN — METOPROLOL SUCCINATE 25 MG: 25 TABLET, EXTENDED RELEASE ORAL at 01:24

## 2025-02-08 RX ADMIN — SODIUM CHLORIDE, PRESERVATIVE FREE 10 ML: 5 INJECTION INTRAVENOUS at 21:51

## 2025-02-08 RX ADMIN — METHYLPREDNISOLONE SODIUM SUCCINATE 40 MG: 40 INJECTION, POWDER, LYOPHILIZED, FOR SOLUTION INTRAMUSCULAR; INTRAVENOUS at 04:56

## 2025-02-08 RX ADMIN — SODIUM CHLORIDE, PRESERVATIVE FREE 10 ML: 5 INJECTION INTRAVENOUS at 09:08

## 2025-02-08 RX ADMIN — IPRATROPIUM BROMIDE AND ALBUTEROL SULFATE 1 DOSE: 2.5; .5 SOLUTION RESPIRATORY (INHALATION) at 12:50

## 2025-02-08 RX ADMIN — ACETYLCYSTEINE 600 MG: 200 SOLUTION ORAL; RESPIRATORY (INHALATION) at 21:51

## 2025-02-09 LAB
ALBUMIN SERPL-MCNC: 3.3 G/DL (ref 3.5–5.2)
ALP SERPL-CCNC: 103 U/L (ref 35–104)
ALT SERPL-CCNC: 11 U/L (ref 0–32)
ANION GAP SERPL CALCULATED.3IONS-SCNC: 11 MMOL/L (ref 7–16)
AST SERPL-CCNC: 17 U/L (ref 0–31)
BASOPHILS # BLD: 0 K/UL (ref 0–0.2)
BASOPHILS NFR BLD: 0 % (ref 0–2)
BILIRUB SERPL-MCNC: 0.3 MG/DL (ref 0–1.2)
BUN SERPL-MCNC: 48 MG/DL (ref 6–23)
CALCIUM SERPL-MCNC: 8.6 MG/DL (ref 8.6–10.2)
CHLORIDE SERPL-SCNC: 102 MMOL/L (ref 98–107)
CO2 SERPL-SCNC: 28 MMOL/L (ref 22–29)
CREAT SERPL-MCNC: 1.8 MG/DL (ref 0.5–1)
EOSINOPHIL # BLD: 0 K/UL (ref 0.05–0.5)
EOSINOPHILS RELATIVE PERCENT: 0 % (ref 0–6)
ERYTHROCYTE [DISTWIDTH] IN BLOOD BY AUTOMATED COUNT: 13.6 % (ref 11.5–15)
GFR, ESTIMATED: 28 ML/MIN/1.73M2
GLUCOSE SERPL-MCNC: 129 MG/DL (ref 74–99)
HCT VFR BLD AUTO: 37.9 % (ref 34–48)
HGB BLD-MCNC: 11.4 G/DL (ref 11.5–15.5)
LYMPHOCYTES NFR BLD: 0.66 K/UL (ref 1.5–4)
LYMPHOCYTES RELATIVE PERCENT: 6 % (ref 20–42)
MCH RBC QN AUTO: 30.9 PG (ref 26–35)
MCHC RBC AUTO-ENTMCNC: 30.1 G/DL (ref 32–34.5)
MCV RBC AUTO: 102.7 FL (ref 80–99.9)
MICROORGANISM SPEC CULT: NORMAL
MICROORGANISM SPEC CULT: NORMAL
MONOCYTES NFR BLD: 0.28 K/UL (ref 0.1–0.95)
MONOCYTES NFR BLD: 3 % (ref 2–12)
MYELOCYTES ABSOLUTE COUNT: 0.09 K/UL
MYELOCYTES: 1 %
NEUTROPHILS NFR BLD: 90 % (ref 43–80)
NEUTS SEG NFR BLD: 9.76 K/UL (ref 1.8–7.3)
PARTIAL THROMBOPLASTIN TIME: 49.9 SEC (ref 24.5–35.1)
PLATELET # BLD AUTO: 404 K/UL (ref 130–450)
PMV BLD AUTO: 9.1 FL (ref 7–12)
POTASSIUM SERPL-SCNC: 4.4 MMOL/L (ref 3.5–5)
PROT SERPL-MCNC: 6.2 G/DL (ref 6.4–8.3)
RBC # BLD AUTO: 3.69 M/UL (ref 3.5–5.5)
RBC # BLD: ABNORMAL 10*6/UL
SERVICE CMNT-IMP: NORMAL
SERVICE CMNT-IMP: NORMAL
SODIUM SERPL-SCNC: 141 MMOL/L (ref 132–146)
SPECIMEN DESCRIPTION: NORMAL
SPECIMEN DESCRIPTION: NORMAL
WBC OTHER # BLD: 10.8 K/UL (ref 4.5–11.5)

## 2025-02-09 PROCEDURE — 99233 SBSQ HOSP IP/OBS HIGH 50: CPT | Performed by: STUDENT IN AN ORGANIZED HEALTH CARE EDUCATION/TRAINING PROGRAM

## 2025-02-09 PROCEDURE — 2500000003 HC RX 250 WO HCPCS

## 2025-02-09 PROCEDURE — 80053 COMPREHEN METABOLIC PANEL: CPT

## 2025-02-09 PROCEDURE — 6370000000 HC RX 637 (ALT 250 FOR IP): Performed by: INTERNAL MEDICINE

## 2025-02-09 PROCEDURE — 6360000002 HC RX W HCPCS

## 2025-02-09 PROCEDURE — 94668 MNPJ CHEST WALL SBSQ: CPT

## 2025-02-09 PROCEDURE — 2060000000 HC ICU INTERMEDIATE R&B

## 2025-02-09 PROCEDURE — 6360000002 HC RX W HCPCS: Performed by: STUDENT IN AN ORGANIZED HEALTH CARE EDUCATION/TRAINING PROGRAM

## 2025-02-09 PROCEDURE — 94640 AIRWAY INHALATION TREATMENT: CPT

## 2025-02-09 PROCEDURE — 6360000002 HC RX W HCPCS: Performed by: INTERNAL MEDICINE

## 2025-02-09 PROCEDURE — 2700000000 HC OXYGEN THERAPY PER DAY

## 2025-02-09 PROCEDURE — 85025 COMPLETE CBC W/AUTO DIFF WBC: CPT

## 2025-02-09 PROCEDURE — 85730 THROMBOPLASTIN TIME PARTIAL: CPT

## 2025-02-09 PROCEDURE — 2500000003 HC RX 250 WO HCPCS: Performed by: INTERNAL MEDICINE

## 2025-02-09 PROCEDURE — 94669 MECHANICAL CHEST WALL OSCILL: CPT

## 2025-02-09 RX ORDER — ALBUTEROL SULFATE 0.83 MG/ML
2.5 SOLUTION RESPIRATORY (INHALATION) EVERY 4 HOURS
Status: DISCONTINUED | OUTPATIENT
Start: 2025-02-09 | End: 2025-02-20 | Stop reason: HOSPADM

## 2025-02-09 RX ORDER — FUROSEMIDE 10 MG/ML
20 INJECTION INTRAMUSCULAR; INTRAVENOUS 2 TIMES DAILY
Status: DISCONTINUED | OUTPATIENT
Start: 2025-02-09 | End: 2025-02-20 | Stop reason: HOSPADM

## 2025-02-09 RX ORDER — GUAIFENESIN 200 MG/10ML
400 LIQUID ORAL EVERY 4 HOURS
Status: DISCONTINUED | OUTPATIENT
Start: 2025-02-09 | End: 2025-02-20 | Stop reason: HOSPADM

## 2025-02-09 RX ORDER — ALBUTEROL SULFATE 0.83 MG/ML
2.5 SOLUTION RESPIRATORY (INHALATION) EVERY 6 HOURS PRN
Status: DISCONTINUED | OUTPATIENT
Start: 2025-02-09 | End: 2025-02-20 | Stop reason: HOSPADM

## 2025-02-09 RX ADMIN — ISOSORBIDE MONONITRATE 30 MG: 30 TABLET, EXTENDED RELEASE ORAL at 07:59

## 2025-02-09 RX ADMIN — GUAIFENESIN 400 MG: 200 SOLUTION ORAL at 15:52

## 2025-02-09 RX ADMIN — ATORVASTATIN CALCIUM 20 MG: 20 TABLET, FILM COATED ORAL at 20:36

## 2025-02-09 RX ADMIN — ALBUTEROL SULFATE 2.5 MG: 2.5 SOLUTION RESPIRATORY (INHALATION) at 12:24

## 2025-02-09 RX ADMIN — GUAIFENESIN 400 MG: 200 SOLUTION ORAL at 11:30

## 2025-02-09 RX ADMIN — ALBUTEROL SULFATE 2.5 MG: 2.5 SOLUTION RESPIRATORY (INHALATION) at 08:18

## 2025-02-09 RX ADMIN — GUAIFENESIN 400 MG: 200 SOLUTION ORAL at 07:59

## 2025-02-09 RX ADMIN — ALBUTEROL SULFATE 2.5 MG: 2.5 SOLUTION RESPIRATORY (INHALATION) at 15:55

## 2025-02-09 RX ADMIN — FUROSEMIDE 40 MG: 40 TABLET ORAL at 07:59

## 2025-02-09 RX ADMIN — ACETYLCYSTEINE 600 MG: 200 SOLUTION ORAL; RESPIRATORY (INHALATION) at 08:18

## 2025-02-09 RX ADMIN — ACETYLCYSTEINE 600 MG: 200 SOLUTION ORAL; RESPIRATORY (INHALATION) at 21:19

## 2025-02-09 RX ADMIN — METHYLPREDNISOLONE SODIUM SUCCINATE 40 MG: 40 INJECTION, POWDER, LYOPHILIZED, FOR SOLUTION INTRAMUSCULAR; INTRAVENOUS at 08:01

## 2025-02-09 RX ADMIN — ACETYLCYSTEINE 600 MG: 200 SOLUTION ORAL; RESPIRATORY (INHALATION) at 15:55

## 2025-02-09 RX ADMIN — GUAIFENESIN 400 MG: 200 SOLUTION ORAL at 20:37

## 2025-02-09 RX ADMIN — FUROSEMIDE 20 MG: 10 INJECTION, SOLUTION INTRAMUSCULAR; INTRAVENOUS at 17:43

## 2025-02-09 RX ADMIN — ACETYLCYSTEINE 600 MG: 200 SOLUTION ORAL; RESPIRATORY (INHALATION) at 12:24

## 2025-02-09 RX ADMIN — ALBUTEROL SULFATE 2.5 MG: 2.5 SOLUTION RESPIRATORY (INHALATION) at 21:16

## 2025-02-09 RX ADMIN — ARFORMOTEROL TARTRATE 15 MCG: 15 SOLUTION RESPIRATORY (INHALATION) at 21:15

## 2025-02-09 RX ADMIN — ARFORMOTEROL TARTRATE 15 MCG: 15 SOLUTION RESPIRATORY (INHALATION) at 08:18

## 2025-02-09 RX ADMIN — SODIUM CHLORIDE, PRESERVATIVE FREE 10 ML: 5 INJECTION INTRAVENOUS at 08:00

## 2025-02-09 RX ADMIN — METHYLPREDNISOLONE SODIUM SUCCINATE 40 MG: 40 INJECTION, POWDER, LYOPHILIZED, FOR SOLUTION INTRAMUSCULAR; INTRAVENOUS at 20:36

## 2025-02-10 ENCOUNTER — APPOINTMENT (OUTPATIENT)
Dept: GENERAL RADIOLOGY | Age: 85
DRG: 175 | End: 2025-02-10
Payer: MEDICARE

## 2025-02-10 ENCOUNTER — APPOINTMENT (OUTPATIENT)
Dept: CT IMAGING | Age: 85
DRG: 175 | End: 2025-02-10
Payer: MEDICARE

## 2025-02-10 LAB
ALBUMIN SERPL-MCNC: 3.4 G/DL (ref 3.5–5.2)
ALP SERPL-CCNC: 107 U/L (ref 35–104)
ALT SERPL-CCNC: 13 U/L (ref 0–32)
ANION GAP SERPL CALCULATED.3IONS-SCNC: 16 MMOL/L (ref 7–16)
AST SERPL-CCNC: 16 U/L (ref 0–31)
BASOPHILS # BLD: 0 K/UL (ref 0–0.2)
BASOPHILS NFR BLD: 0 % (ref 0–2)
BILIRUB SERPL-MCNC: 0.3 MG/DL (ref 0–1.2)
BNP SERPL-MCNC: 1180 PG/ML (ref 0–450)
BUN SERPL-MCNC: 53 MG/DL (ref 6–23)
CALCIUM SERPL-MCNC: 8.6 MG/DL (ref 8.6–10.2)
CHLORIDE SERPL-SCNC: 100 MMOL/L (ref 98–107)
CO2 SERPL-SCNC: 26 MMOL/L (ref 22–29)
CREAT SERPL-MCNC: 1.8 MG/DL (ref 0.5–1)
EOSINOPHIL # BLD: 0 K/UL (ref 0.05–0.5)
EOSINOPHILS RELATIVE PERCENT: 0 % (ref 0–6)
ERYTHROCYTE [DISTWIDTH] IN BLOOD BY AUTOMATED COUNT: 13.5 % (ref 11.5–15)
GFR, ESTIMATED: 27 ML/MIN/1.73M2
GLUCOSE SERPL-MCNC: 137 MG/DL (ref 74–99)
HCT VFR BLD AUTO: 39.1 % (ref 34–48)
HGB BLD-MCNC: 12 G/DL (ref 11.5–15.5)
LYMPHOCYTES NFR BLD: 0.43 K/UL (ref 1.5–4)
LYMPHOCYTES RELATIVE PERCENT: 4 % (ref 20–42)
MCH RBC QN AUTO: 31.3 PG (ref 26–35)
MCHC RBC AUTO-ENTMCNC: 30.7 G/DL (ref 32–34.5)
MCV RBC AUTO: 102.1 FL (ref 80–99.9)
METAMYELOCYTES ABSOLUTE COUNT: 0.11 K/UL (ref 0–0.12)
METAMYELOCYTES: 1 % (ref 0–1)
MONOCYTES NFR BLD: 0.11 K/UL (ref 0.1–0.95)
MONOCYTES NFR BLD: 1 % (ref 2–12)
NEUTROPHILS NFR BLD: 95 % (ref 43–80)
NEUTS SEG NFR BLD: 11.65 K/UL (ref 1.8–7.3)
PARTIAL THROMBOPLASTIN TIME: 54.4 SEC (ref 24.5–35.1)
PLATELET # BLD AUTO: 411 K/UL (ref 130–450)
PMV BLD AUTO: 9.2 FL (ref 7–12)
POTASSIUM SERPL-SCNC: 4 MMOL/L (ref 3.5–5)
PROT SERPL-MCNC: 6.5 G/DL (ref 6.4–8.3)
RBC # BLD AUTO: 3.83 M/UL (ref 3.5–5.5)
RBC # BLD: ABNORMAL 10*6/UL
SODIUM SERPL-SCNC: 142 MMOL/L (ref 132–146)
WBC OTHER # BLD: 12.3 K/UL (ref 4.5–11.5)

## 2025-02-10 PROCEDURE — 83880 ASSAY OF NATRIURETIC PEPTIDE: CPT

## 2025-02-10 PROCEDURE — 2500000003 HC RX 250 WO HCPCS: Performed by: INTERNAL MEDICINE

## 2025-02-10 PROCEDURE — 6360000002 HC RX W HCPCS: Performed by: STUDENT IN AN ORGANIZED HEALTH CARE EDUCATION/TRAINING PROGRAM

## 2025-02-10 PROCEDURE — 94668 MNPJ CHEST WALL SBSQ: CPT

## 2025-02-10 PROCEDURE — 99232 SBSQ HOSP IP/OBS MODERATE 35: CPT | Performed by: STUDENT IN AN ORGANIZED HEALTH CARE EDUCATION/TRAINING PROGRAM

## 2025-02-10 PROCEDURE — 6370000000 HC RX 637 (ALT 250 FOR IP): Performed by: INTERNAL MEDICINE

## 2025-02-10 PROCEDURE — 6360000002 HC RX W HCPCS: Performed by: INTERNAL MEDICINE

## 2025-02-10 PROCEDURE — 2700000000 HC OXYGEN THERAPY PER DAY

## 2025-02-10 PROCEDURE — 2500000003 HC RX 250 WO HCPCS

## 2025-02-10 PROCEDURE — 80053 COMPREHEN METABOLIC PANEL: CPT

## 2025-02-10 PROCEDURE — 6360000002 HC RX W HCPCS

## 2025-02-10 PROCEDURE — 36415 COLL VENOUS BLD VENIPUNCTURE: CPT

## 2025-02-10 PROCEDURE — 71045 X-RAY EXAM CHEST 1 VIEW: CPT

## 2025-02-10 PROCEDURE — 2060000000 HC ICU INTERMEDIATE R&B

## 2025-02-10 PROCEDURE — 94669 MECHANICAL CHEST WALL OSCILL: CPT

## 2025-02-10 PROCEDURE — 94640 AIRWAY INHALATION TREATMENT: CPT

## 2025-02-10 PROCEDURE — 85730 THROMBOPLASTIN TIME PARTIAL: CPT

## 2025-02-10 PROCEDURE — 85025 COMPLETE CBC W/AUTO DIFF WBC: CPT

## 2025-02-10 PROCEDURE — 6370000000 HC RX 637 (ALT 250 FOR IP)

## 2025-02-10 RX ORDER — SODIUM CHLORIDE 9 MG/ML
INJECTION, SOLUTION INTRAVENOUS CONTINUOUS
Status: ACTIVE | OUTPATIENT
Start: 2025-02-10 | End: 2025-02-11

## 2025-02-10 RX ADMIN — GUAIFENESIN 400 MG: 200 SOLUTION ORAL at 09:22

## 2025-02-10 RX ADMIN — ALBUTEROL SULFATE 2.5 MG: 2.5 SOLUTION RESPIRATORY (INHALATION) at 00:21

## 2025-02-10 RX ADMIN — ACETYLCYSTEINE 600 MG: 200 SOLUTION ORAL; RESPIRATORY (INHALATION) at 08:02

## 2025-02-10 RX ADMIN — ACETYLCYSTEINE 600 MG: 200 SOLUTION ORAL; RESPIRATORY (INHALATION) at 15:44

## 2025-02-10 RX ADMIN — ARFORMOTEROL TARTRATE 15 MCG: 15 SOLUTION RESPIRATORY (INHALATION) at 08:02

## 2025-02-10 RX ADMIN — ISOSORBIDE MONONITRATE 30 MG: 30 TABLET, EXTENDED RELEASE ORAL at 09:22

## 2025-02-10 RX ADMIN — GUAIFENESIN 400 MG: 200 SOLUTION ORAL at 20:15

## 2025-02-10 RX ADMIN — ALBUTEROL SULFATE 2.5 MG: 2.5 SOLUTION RESPIRATORY (INHALATION) at 04:54

## 2025-02-10 RX ADMIN — ACETYLCYSTEINE 600 MG: 200 SOLUTION ORAL; RESPIRATORY (INHALATION) at 04:56

## 2025-02-10 RX ADMIN — ACETYLCYSTEINE 600 MG: 200 SOLUTION ORAL; RESPIRATORY (INHALATION) at 19:48

## 2025-02-10 RX ADMIN — FUROSEMIDE 20 MG: 10 INJECTION, SOLUTION INTRAMUSCULAR; INTRAVENOUS at 09:22

## 2025-02-10 RX ADMIN — SODIUM CHLORIDE, PRESERVATIVE FREE 10 ML: 5 INJECTION INTRAVENOUS at 09:23

## 2025-02-10 RX ADMIN — GUAIFENESIN 400 MG: 200 SOLUTION ORAL at 23:00

## 2025-02-10 RX ADMIN — SODIUM CHLORIDE, PRESERVATIVE FREE 10 ML: 5 INJECTION INTRAVENOUS at 20:15

## 2025-02-10 RX ADMIN — ALBUTEROL SULFATE 2.5 MG: 2.5 SOLUTION RESPIRATORY (INHALATION) at 08:02

## 2025-02-10 RX ADMIN — GUAIFENESIN 400 MG: 200 SOLUTION ORAL at 16:43

## 2025-02-10 RX ADMIN — GUAIFENESIN 400 MG: 200 SOLUTION ORAL at 01:26

## 2025-02-10 RX ADMIN — METHYLPREDNISOLONE SODIUM SUCCINATE 40 MG: 40 INJECTION, POWDER, LYOPHILIZED, FOR SOLUTION INTRAMUSCULAR; INTRAVENOUS at 23:00

## 2025-02-10 RX ADMIN — METHYLPREDNISOLONE SODIUM SUCCINATE 40 MG: 40 INJECTION, POWDER, LYOPHILIZED, FOR SOLUTION INTRAMUSCULAR; INTRAVENOUS at 09:22

## 2025-02-10 RX ADMIN — ALBUTEROL SULFATE 2.5 MG: 2.5 SOLUTION RESPIRATORY (INHALATION) at 19:48

## 2025-02-10 RX ADMIN — ARFORMOTEROL TARTRATE 15 MCG: 15 SOLUTION RESPIRATORY (INHALATION) at 19:48

## 2025-02-10 RX ADMIN — ACETYLCYSTEINE 600 MG: 200 SOLUTION ORAL; RESPIRATORY (INHALATION) at 12:12

## 2025-02-10 RX ADMIN — HEPARIN SODIUM 13 UNITS/KG/HR: 10000 INJECTION, SOLUTION INTRAVENOUS at 03:21

## 2025-02-10 RX ADMIN — GUAIFENESIN 400 MG: 200 SOLUTION ORAL at 13:00

## 2025-02-10 RX ADMIN — ALBUTEROL SULFATE 2.5 MG: 2.5 SOLUTION RESPIRATORY (INHALATION) at 12:12

## 2025-02-10 RX ADMIN — GUAIFENESIN 400 MG: 200 SOLUTION ORAL at 03:21

## 2025-02-10 RX ADMIN — ATORVASTATIN CALCIUM 20 MG: 20 TABLET, FILM COATED ORAL at 20:15

## 2025-02-10 RX ADMIN — METOPROLOL SUCCINATE 50 MG: 50 TABLET, EXTENDED RELEASE ORAL at 09:22

## 2025-02-10 RX ADMIN — ALBUTEROL SULFATE 2.5 MG: 2.5 SOLUTION RESPIRATORY (INHALATION) at 15:44

## 2025-02-10 ASSESSMENT — PAIN SCALES - GENERAL: PAINLEVEL_OUTOF10: 0

## 2025-02-11 ENCOUNTER — APPOINTMENT (OUTPATIENT)
Dept: CT IMAGING | Age: 85
DRG: 175 | End: 2025-02-11
Payer: MEDICARE

## 2025-02-11 LAB
ALBUMIN SERPL-MCNC: 3.1 G/DL (ref 3.5–5.2)
ALP SERPL-CCNC: 109 U/L (ref 35–104)
ALT SERPL-CCNC: 12 U/L (ref 0–32)
ANION GAP SERPL CALCULATED.3IONS-SCNC: 13 MMOL/L (ref 7–16)
AST SERPL-CCNC: 15 U/L (ref 0–31)
BASOPHILS # BLD: 0 K/UL (ref 0–0.2)
BASOPHILS NFR BLD: 0 % (ref 0–2)
BILIRUB SERPL-MCNC: 0.3 MG/DL (ref 0–1.2)
BUN SERPL-MCNC: 55 MG/DL (ref 6–23)
CALCIUM SERPL-MCNC: 8.4 MG/DL (ref 8.6–10.2)
CHLORIDE SERPL-SCNC: 98 MMOL/L (ref 98–107)
CO2 SERPL-SCNC: 28 MMOL/L (ref 22–29)
CREAT SERPL-MCNC: 1.7 MG/DL (ref 0.5–1)
EOSINOPHIL # BLD: 0 K/UL (ref 0.05–0.5)
EOSINOPHILS RELATIVE PERCENT: 0 % (ref 0–6)
ERYTHROCYTE [DISTWIDTH] IN BLOOD BY AUTOMATED COUNT: 13.5 % (ref 11.5–15)
GFR, ESTIMATED: 30 ML/MIN/1.73M2
GLUCOSE SERPL-MCNC: 118 MG/DL (ref 74–99)
HCT VFR BLD AUTO: 38.9 % (ref 34–48)
HGB BLD-MCNC: 12.3 G/DL (ref 11.5–15.5)
LYMPHOCYTES NFR BLD: 0 K/UL (ref 1.5–4)
LYMPHOCYTES RELATIVE PERCENT: 0 % (ref 20–42)
MCH RBC QN AUTO: 32.1 PG (ref 26–35)
MCHC RBC AUTO-ENTMCNC: 31.6 G/DL (ref 32–34.5)
MCV RBC AUTO: 101.6 FL (ref 80–99.9)
METAMYELOCYTES ABSOLUTE COUNT: 0.13 K/UL (ref 0–0.12)
METAMYELOCYTES: 1 % (ref 0–1)
MONOCYTES NFR BLD: 0.91 K/UL (ref 0.1–0.95)
MONOCYTES NFR BLD: 6 % (ref 2–12)
NEUTROPHILS NFR BLD: 93 % (ref 43–80)
NEUTS SEG NFR BLD: 13.86 K/UL (ref 1.8–7.3)
PLATELET # BLD AUTO: 399 K/UL (ref 130–450)
PMV BLD AUTO: 9.2 FL (ref 7–12)
POTASSIUM SERPL-SCNC: 4.8 MMOL/L (ref 3.5–5)
PROT SERPL-MCNC: 6 G/DL (ref 6.4–8.3)
RBC # BLD AUTO: 3.83 M/UL (ref 3.5–5.5)
RBC # BLD: ABNORMAL 10*6/UL
SODIUM SERPL-SCNC: 139 MMOL/L (ref 132–146)
WBC OTHER # BLD: 14.9 K/UL (ref 4.5–11.5)

## 2025-02-11 PROCEDURE — 80053 COMPREHEN METABOLIC PANEL: CPT

## 2025-02-11 PROCEDURE — 2060000000 HC ICU INTERMEDIATE R&B

## 2025-02-11 PROCEDURE — 2500000003 HC RX 250 WO HCPCS

## 2025-02-11 PROCEDURE — 94669 MECHANICAL CHEST WALL OSCILL: CPT

## 2025-02-11 PROCEDURE — 6360000002 HC RX W HCPCS: Performed by: STUDENT IN AN ORGANIZED HEALTH CARE EDUCATION/TRAINING PROGRAM

## 2025-02-11 PROCEDURE — 6360000002 HC RX W HCPCS: Performed by: INTERNAL MEDICINE

## 2025-02-11 PROCEDURE — 94660 CPAP INITIATION&MGMT: CPT

## 2025-02-11 PROCEDURE — 2700000000 HC OXYGEN THERAPY PER DAY

## 2025-02-11 PROCEDURE — 99233 SBSQ HOSP IP/OBS HIGH 50: CPT | Performed by: STUDENT IN AN ORGANIZED HEALTH CARE EDUCATION/TRAINING PROGRAM

## 2025-02-11 PROCEDURE — 6360000002 HC RX W HCPCS

## 2025-02-11 PROCEDURE — 6370000000 HC RX 637 (ALT 250 FOR IP)

## 2025-02-11 PROCEDURE — 94640 AIRWAY INHALATION TREATMENT: CPT

## 2025-02-11 PROCEDURE — 92610 EVALUATE SWALLOWING FUNCTION: CPT

## 2025-02-11 PROCEDURE — 2500000003 HC RX 250 WO HCPCS: Performed by: INTERNAL MEDICINE

## 2025-02-11 PROCEDURE — 6370000000 HC RX 637 (ALT 250 FOR IP): Performed by: STUDENT IN AN ORGANIZED HEALTH CARE EDUCATION/TRAINING PROGRAM

## 2025-02-11 PROCEDURE — 71275 CT ANGIOGRAPHY CHEST: CPT

## 2025-02-11 PROCEDURE — 6360000004 HC RX CONTRAST MEDICATION: Performed by: RADIOLOGY

## 2025-02-11 PROCEDURE — 85025 COMPLETE CBC W/AUTO DIFF WBC: CPT

## 2025-02-11 PROCEDURE — 6370000000 HC RX 637 (ALT 250 FOR IP): Performed by: INTERNAL MEDICINE

## 2025-02-11 PROCEDURE — 2580000003 HC RX 258

## 2025-02-11 PROCEDURE — 36415 COLL VENOUS BLD VENIPUNCTURE: CPT

## 2025-02-11 PROCEDURE — 94668 MNPJ CHEST WALL SBSQ: CPT

## 2025-02-11 RX ORDER — IOPAMIDOL 755 MG/ML
75 INJECTION, SOLUTION INTRAVASCULAR
Status: COMPLETED | OUTPATIENT
Start: 2025-02-11 | End: 2025-02-11

## 2025-02-11 RX ORDER — OXYCODONE AND ACETAMINOPHEN 5; 325 MG/1; MG/1
1 TABLET ORAL ONCE
Status: COMPLETED | OUTPATIENT
Start: 2025-02-11 | End: 2025-02-11

## 2025-02-11 RX ORDER — VANCOMYCIN 750 MG/150ML
750 INJECTION, SOLUTION INTRAVENOUS EVERY 24 HOURS
Status: DISCONTINUED | OUTPATIENT
Start: 2025-02-12 | End: 2025-02-15

## 2025-02-11 RX ORDER — SODIUM CHLORIDE 9 MG/ML
20 INJECTION, SOLUTION INTRAMUSCULAR; INTRAVENOUS; SUBCUTANEOUS EVERY 8 HOURS
Status: DISCONTINUED | OUTPATIENT
Start: 2025-02-11 | End: 2025-02-18

## 2025-02-11 RX ORDER — PIPERACILLIN SODIUM, TAZOBACTAM SODIUM 4; .5 G/20ML; G/20ML
4500 INJECTION, POWDER, LYOPHILIZED, FOR SOLUTION INTRAVENOUS EVERY 8 HOURS
Status: DISCONTINUED | OUTPATIENT
Start: 2025-02-11 | End: 2025-02-18

## 2025-02-11 RX ADMIN — ARFORMOTEROL TARTRATE 15 MCG: 15 SOLUTION RESPIRATORY (INHALATION) at 07:38

## 2025-02-11 RX ADMIN — ACETYLCYSTEINE 600 MG: 200 SOLUTION ORAL; RESPIRATORY (INHALATION) at 12:16

## 2025-02-11 RX ADMIN — GUAIFENESIN 400 MG: 200 SOLUTION ORAL at 08:42

## 2025-02-11 RX ADMIN — SODIUM CHLORIDE, PRESERVATIVE FREE 10 ML: 5 INJECTION INTRAVENOUS at 20:45

## 2025-02-11 RX ADMIN — GUAIFENESIN 400 MG: 200 SOLUTION ORAL at 12:28

## 2025-02-11 RX ADMIN — ARFORMOTEROL TARTRATE 15 MCG: 15 SOLUTION RESPIRATORY (INHALATION) at 19:54

## 2025-02-11 RX ADMIN — SODIUM CHLORIDE: 9 INJECTION, SOLUTION INTRAVENOUS at 09:53

## 2025-02-11 RX ADMIN — OXYCODONE HYDROCHLORIDE AND ACETAMINOPHEN 1 TABLET: 5; 325 TABLET ORAL at 14:20

## 2025-02-11 RX ADMIN — ALBUTEROL SULFATE 2.5 MG: 2.5 SOLUTION RESPIRATORY (INHALATION) at 12:17

## 2025-02-11 RX ADMIN — ALBUTEROL SULFATE 2.5 MG: 2.5 SOLUTION RESPIRATORY (INHALATION) at 16:29

## 2025-02-11 RX ADMIN — ISOSORBIDE MONONITRATE 30 MG: 30 TABLET, EXTENDED RELEASE ORAL at 08:42

## 2025-02-11 RX ADMIN — ALBUTEROL SULFATE 2.5 MG: 2.5 SOLUTION RESPIRATORY (INHALATION) at 01:17

## 2025-02-11 RX ADMIN — ACETYLCYSTEINE 600 MG: 200 SOLUTION ORAL; RESPIRATORY (INHALATION) at 19:54

## 2025-02-11 RX ADMIN — ATORVASTATIN CALCIUM 20 MG: 20 TABLET, FILM COATED ORAL at 20:45

## 2025-02-11 RX ADMIN — METHYLPREDNISOLONE SODIUM SUCCINATE 40 MG: 40 INJECTION, POWDER, LYOPHILIZED, FOR SOLUTION INTRAMUSCULAR; INTRAVENOUS at 08:42

## 2025-02-11 RX ADMIN — GUAIFENESIN 400 MG: 200 SOLUTION ORAL at 16:56

## 2025-02-11 RX ADMIN — ALBUTEROL SULFATE 2.5 MG: 2.5 SOLUTION RESPIRATORY (INHALATION) at 07:38

## 2025-02-11 RX ADMIN — IOPAMIDOL 75 ML: 755 INJECTION, SOLUTION INTRAVENOUS at 11:57

## 2025-02-11 RX ADMIN — SODIUM CHLORIDE, PRESERVATIVE FREE 10 ML: 5 INJECTION INTRAVENOUS at 22:55

## 2025-02-11 RX ADMIN — SODIUM CHLORIDE, PRESERVATIVE FREE 20 ML: 5 INJECTION INTRAVENOUS at 16:57

## 2025-02-11 RX ADMIN — PIPERACILLIN AND TAZOBACTAM 4500 MG: 4; .5 INJECTION, POWDER, LYOPHILIZED, FOR SOLUTION INTRAVENOUS at 16:57

## 2025-02-11 RX ADMIN — VANCOMYCIN HYDROCHLORIDE 1500 MG: 10 INJECTION, POWDER, LYOPHILIZED, FOR SOLUTION INTRAVENOUS at 17:03

## 2025-02-11 RX ADMIN — ACETYLCYSTEINE 600 MG: 200 SOLUTION ORAL; RESPIRATORY (INHALATION) at 07:40

## 2025-02-11 RX ADMIN — HEPARIN SODIUM 13 UNITS/KG/HR: 10000 INJECTION, SOLUTION INTRAVENOUS at 16:56

## 2025-02-11 RX ADMIN — SODIUM CHLORIDE, PRESERVATIVE FREE 10 ML: 5 INJECTION INTRAVENOUS at 08:44

## 2025-02-11 RX ADMIN — ALBUTEROL SULFATE 2.5 MG: 2.5 SOLUTION RESPIRATORY (INHALATION) at 04:12

## 2025-02-11 RX ADMIN — METOPROLOL SUCCINATE 50 MG: 50 TABLET, EXTENDED RELEASE ORAL at 08:42

## 2025-02-11 RX ADMIN — ACETYLCYSTEINE 600 MG: 200 SOLUTION ORAL; RESPIRATORY (INHALATION) at 16:29

## 2025-02-11 RX ADMIN — METHYLPREDNISOLONE SODIUM SUCCINATE 40 MG: 40 INJECTION, POWDER, LYOPHILIZED, FOR SOLUTION INTRAMUSCULAR; INTRAVENOUS at 22:55

## 2025-02-11 RX ADMIN — ACETYLCYSTEINE 600 MG: 200 SOLUTION ORAL; RESPIRATORY (INHALATION) at 04:12

## 2025-02-11 RX ADMIN — ALBUTEROL SULFATE 2.5 MG: 2.5 SOLUTION RESPIRATORY (INHALATION) at 19:54

## 2025-02-11 RX ADMIN — ACETYLCYSTEINE 600 MG: 200 SOLUTION ORAL; RESPIRATORY (INHALATION) at 01:20

## 2025-02-11 RX ADMIN — GUAIFENESIN 400 MG: 200 SOLUTION ORAL at 04:00

## 2025-02-11 RX ADMIN — GUAIFENESIN 400 MG: 200 SOLUTION ORAL at 20:45

## 2025-02-11 ASSESSMENT — PAIN DESCRIPTION - ORIENTATION: ORIENTATION: RIGHT

## 2025-02-11 ASSESSMENT — PAIN DESCRIPTION - LOCATION: LOCATION: BACK

## 2025-02-11 ASSESSMENT — PAIN DESCRIPTION - DESCRIPTORS: DESCRIPTORS: ACHING;DISCOMFORT

## 2025-02-11 ASSESSMENT — PAIN SCALES - GENERAL
PAINLEVEL_OUTOF10: 0
PAINLEVEL_OUTOF10: 8
PAINLEVEL_OUTOF10: 0
PAINLEVEL_OUTOF10: 0

## 2025-02-12 LAB
ANION GAP SERPL CALCULATED.3IONS-SCNC: 10 MMOL/L (ref 7–16)
BNP SERPL-MCNC: 631 PG/ML (ref 0–450)
BUN SERPL-MCNC: 47 MG/DL (ref 6–23)
CALCIUM SERPL-MCNC: 8.8 MG/DL (ref 8.6–10.2)
CHLORIDE SERPL-SCNC: 100 MMOL/L (ref 98–107)
CO2 SERPL-SCNC: 28 MMOL/L (ref 22–29)
CREAT SERPL-MCNC: 1.6 MG/DL (ref 0.5–1)
ERYTHROCYTE [DISTWIDTH] IN BLOOD BY AUTOMATED COUNT: 13.7 % (ref 11.5–15)
GFR, ESTIMATED: 31 ML/MIN/1.73M2
GLUCOSE SERPL-MCNC: 113 MG/DL (ref 74–99)
HCT VFR BLD AUTO: 37.4 % (ref 34–48)
HGB BLD-MCNC: 11.6 G/DL (ref 11.5–15.5)
MCH RBC QN AUTO: 31.8 PG (ref 26–35)
MCHC RBC AUTO-ENTMCNC: 31 G/DL (ref 32–34.5)
MCV RBC AUTO: 102.5 FL (ref 80–99.9)
PARTIAL THROMBOPLASTIN TIME: 46.6 SEC (ref 24.5–35.1)
PARTIAL THROMBOPLASTIN TIME: 84.6 SEC (ref 24.5–35.1)
PARTIAL THROMBOPLASTIN TIME: 90 SEC (ref 24.5–35.1)
PLATELET # BLD AUTO: 369 K/UL (ref 130–450)
PMV BLD AUTO: 9.3 FL (ref 7–12)
POTASSIUM SERPL-SCNC: 4.9 MMOL/L (ref 3.5–5)
RBC # BLD AUTO: 3.65 M/UL (ref 3.5–5.5)
SODIUM SERPL-SCNC: 138 MMOL/L (ref 132–146)
WBC OTHER # BLD: 15.1 K/UL (ref 4.5–11.5)

## 2025-02-12 PROCEDURE — 36415 COLL VENOUS BLD VENIPUNCTURE: CPT

## 2025-02-12 PROCEDURE — 94668 MNPJ CHEST WALL SBSQ: CPT

## 2025-02-12 PROCEDURE — 2500000003 HC RX 250 WO HCPCS

## 2025-02-12 PROCEDURE — 85730 THROMBOPLASTIN TIME PARTIAL: CPT

## 2025-02-12 PROCEDURE — 6360000002 HC RX W HCPCS: Performed by: INTERNAL MEDICINE

## 2025-02-12 PROCEDURE — 6370000000 HC RX 637 (ALT 250 FOR IP): Performed by: INTERNAL MEDICINE

## 2025-02-12 PROCEDURE — 83880 ASSAY OF NATRIURETIC PEPTIDE: CPT

## 2025-02-12 PROCEDURE — 80048 BASIC METABOLIC PNL TOTAL CA: CPT

## 2025-02-12 PROCEDURE — 2500000003 HC RX 250 WO HCPCS: Performed by: INTERNAL MEDICINE

## 2025-02-12 PROCEDURE — 94640 AIRWAY INHALATION TREATMENT: CPT

## 2025-02-12 PROCEDURE — 6360000002 HC RX W HCPCS

## 2025-02-12 PROCEDURE — 6360000002 HC RX W HCPCS: Performed by: STUDENT IN AN ORGANIZED HEALTH CARE EDUCATION/TRAINING PROGRAM

## 2025-02-12 PROCEDURE — 2060000000 HC ICU INTERMEDIATE R&B

## 2025-02-12 PROCEDURE — 97530 THERAPEUTIC ACTIVITIES: CPT

## 2025-02-12 PROCEDURE — 94669 MECHANICAL CHEST WALL OSCILL: CPT

## 2025-02-12 PROCEDURE — 6370000000 HC RX 637 (ALT 250 FOR IP)

## 2025-02-12 PROCEDURE — 94660 CPAP INITIATION&MGMT: CPT

## 2025-02-12 PROCEDURE — 87081 CULTURE SCREEN ONLY: CPT

## 2025-02-12 PROCEDURE — 99231 SBSQ HOSP IP/OBS SF/LOW 25: CPT | Performed by: NURSE PRACTITIONER

## 2025-02-12 PROCEDURE — 2700000000 HC OXYGEN THERAPY PER DAY

## 2025-02-12 PROCEDURE — 2580000003 HC RX 258

## 2025-02-12 PROCEDURE — 85027 COMPLETE CBC AUTOMATED: CPT

## 2025-02-12 PROCEDURE — 5A09357 ASSISTANCE WITH RESPIRATORY VENTILATION, LESS THAN 24 CONSECUTIVE HOURS, CONTINUOUS POSITIVE AIRWAY PRESSURE: ICD-10-PCS | Performed by: INTERNAL MEDICINE

## 2025-02-12 PROCEDURE — 99232 SBSQ HOSP IP/OBS MODERATE 35: CPT | Performed by: INTERNAL MEDICINE

## 2025-02-12 RX ADMIN — ACETYLCYSTEINE 600 MG: 200 SOLUTION ORAL; RESPIRATORY (INHALATION) at 08:12

## 2025-02-12 RX ADMIN — ALBUTEROL SULFATE 2.5 MG: 2.5 SOLUTION RESPIRATORY (INHALATION) at 08:10

## 2025-02-12 RX ADMIN — HEPARIN SODIUM 2400 UNITS: 1000 INJECTION INTRAVENOUS; SUBCUTANEOUS at 10:30

## 2025-02-12 RX ADMIN — GUAIFENESIN 400 MG: 200 SOLUTION ORAL at 08:12

## 2025-02-12 RX ADMIN — ARFORMOTEROL TARTRATE 15 MCG: 15 SOLUTION RESPIRATORY (INHALATION) at 08:10

## 2025-02-12 RX ADMIN — ACETYLCYSTEINE 600 MG: 200 SOLUTION ORAL; RESPIRATORY (INHALATION) at 01:33

## 2025-02-12 RX ADMIN — PIPERACILLIN AND TAZOBACTAM 4500 MG: 4; .5 INJECTION, POWDER, LYOPHILIZED, FOR SOLUTION INTRAVENOUS at 23:50

## 2025-02-12 RX ADMIN — ACETYLCYSTEINE 600 MG: 200 SOLUTION ORAL; RESPIRATORY (INHALATION) at 15:51

## 2025-02-12 RX ADMIN — PIPERACILLIN AND TAZOBACTAM 4500 MG: 4; .5 INJECTION, POWDER, LYOPHILIZED, FOR SOLUTION INTRAVENOUS at 00:00

## 2025-02-12 RX ADMIN — ALBUTEROL SULFATE 2.5 MG: 2.5 SOLUTION RESPIRATORY (INHALATION) at 19:41

## 2025-02-12 RX ADMIN — METHYLPREDNISOLONE SODIUM SUCCINATE 40 MG: 40 INJECTION, POWDER, LYOPHILIZED, FOR SOLUTION INTRAMUSCULAR; INTRAVENOUS at 23:50

## 2025-02-12 RX ADMIN — ALBUTEROL SULFATE 2.5 MG: 2.5 SOLUTION RESPIRATORY (INHALATION) at 01:23

## 2025-02-12 RX ADMIN — PIPERACILLIN AND TAZOBACTAM 4500 MG: 4; .5 INJECTION, POWDER, LYOPHILIZED, FOR SOLUTION INTRAVENOUS at 15:56

## 2025-02-12 RX ADMIN — ISOSORBIDE MONONITRATE 30 MG: 30 TABLET, EXTENDED RELEASE ORAL at 08:12

## 2025-02-12 RX ADMIN — ACETYLCYSTEINE 600 MG: 200 SOLUTION ORAL; RESPIRATORY (INHALATION) at 19:41

## 2025-02-12 RX ADMIN — SODIUM CHLORIDE, PRESERVATIVE FREE 10 ML: 5 INJECTION INTRAVENOUS at 08:12

## 2025-02-12 RX ADMIN — SODIUM CHLORIDE, PRESERVATIVE FREE 20 ML: 5 INJECTION INTRAVENOUS at 00:00

## 2025-02-12 RX ADMIN — ACETYLCYSTEINE 600 MG: 200 SOLUTION ORAL; RESPIRATORY (INHALATION) at 04:05

## 2025-02-12 RX ADMIN — SODIUM CHLORIDE, PRESERVATIVE FREE 10 ML: 5 INJECTION INTRAVENOUS at 00:00

## 2025-02-12 RX ADMIN — ARFORMOTEROL TARTRATE 15 MCG: 15 SOLUTION RESPIRATORY (INHALATION) at 19:41

## 2025-02-12 RX ADMIN — SODIUM CHLORIDE, PRESERVATIVE FREE 20 ML: 5 INJECTION INTRAVENOUS at 23:50

## 2025-02-12 RX ADMIN — SODIUM CHLORIDE, PRESERVATIVE FREE 20 ML: 5 INJECTION INTRAVENOUS at 08:13

## 2025-02-12 RX ADMIN — METHYLPREDNISOLONE SODIUM SUCCINATE 40 MG: 40 INJECTION, POWDER, LYOPHILIZED, FOR SOLUTION INTRAMUSCULAR; INTRAVENOUS at 08:12

## 2025-02-12 RX ADMIN — SODIUM CHLORIDE, PRESERVATIVE FREE 20 ML: 5 INJECTION INTRAVENOUS at 15:57

## 2025-02-12 RX ADMIN — ALBUTEROL SULFATE 2.5 MG: 2.5 SOLUTION RESPIRATORY (INHALATION) at 12:31

## 2025-02-12 RX ADMIN — METOPROLOL SUCCINATE 50 MG: 50 TABLET, EXTENDED RELEASE ORAL at 08:12

## 2025-02-12 RX ADMIN — ALBUTEROL SULFATE 2.5 MG: 2.5 SOLUTION RESPIRATORY (INHALATION) at 15:51

## 2025-02-12 RX ADMIN — ALBUTEROL SULFATE 2.5 MG: 2.5 SOLUTION RESPIRATORY (INHALATION) at 04:05

## 2025-02-12 RX ADMIN — VANCOMYCIN 750 MG: 750 INJECTION, SOLUTION INTRAVENOUS at 08:19

## 2025-02-12 RX ADMIN — ACETYLCYSTEINE 600 MG: 200 SOLUTION ORAL; RESPIRATORY (INHALATION) at 12:31

## 2025-02-12 RX ADMIN — PIPERACILLIN AND TAZOBACTAM 4500 MG: 4; .5 INJECTION, POWDER, LYOPHILIZED, FOR SOLUTION INTRAVENOUS at 08:12

## 2025-02-12 RX ADMIN — SODIUM CHLORIDE, PRESERVATIVE FREE 10 ML: 5 INJECTION INTRAVENOUS at 20:45

## 2025-02-12 ASSESSMENT — PAIN SCALES - GENERAL
PAINLEVEL_OUTOF10: 0

## 2025-02-12 NOTE — CARE COORDINATION
Social Work/Discharge Planning:  Chart reviewed.  Patient requiring 100% FiO2 on 60 liters oxygen.  Plan is [facility] [place] for rehab when medically stable.  Patient pre-cert  .  Will continue to follow.  Electronically signed by CATRACHITA Ortiz on 2025 at 3:43 PM

## 2025-02-13 LAB
PARTIAL THROMBOPLASTIN TIME: 50.2 SEC (ref 24.5–35.1)
PARTIAL THROMBOPLASTIN TIME: 66.9 SEC (ref 24.5–35.1)
VANCOMYCIN SERPL-MCNC: 14.9 UG/ML (ref 5–40)

## 2025-02-13 PROCEDURE — 6360000002 HC RX W HCPCS: Performed by: INTERNAL MEDICINE

## 2025-02-13 PROCEDURE — 6360000002 HC RX W HCPCS

## 2025-02-13 PROCEDURE — 6360000002 HC RX W HCPCS: Performed by: STUDENT IN AN ORGANIZED HEALTH CARE EDUCATION/TRAINING PROGRAM

## 2025-02-13 PROCEDURE — 80202 ASSAY OF VANCOMYCIN: CPT

## 2025-02-13 PROCEDURE — 94640 AIRWAY INHALATION TREATMENT: CPT

## 2025-02-13 PROCEDURE — 2500000003 HC RX 250 WO HCPCS

## 2025-02-13 PROCEDURE — 94660 CPAP INITIATION&MGMT: CPT

## 2025-02-13 PROCEDURE — 2580000003 HC RX 258

## 2025-02-13 PROCEDURE — 2700000000 HC OXYGEN THERAPY PER DAY

## 2025-02-13 PROCEDURE — 2500000003 HC RX 250 WO HCPCS: Performed by: INTERNAL MEDICINE

## 2025-02-13 PROCEDURE — 94668 MNPJ CHEST WALL SBSQ: CPT

## 2025-02-13 PROCEDURE — 99231 SBSQ HOSP IP/OBS SF/LOW 25: CPT | Performed by: INTERNAL MEDICINE

## 2025-02-13 PROCEDURE — 99232 SBSQ HOSP IP/OBS MODERATE 35: CPT | Performed by: NURSE PRACTITIONER

## 2025-02-13 PROCEDURE — 85730 THROMBOPLASTIN TIME PARTIAL: CPT

## 2025-02-13 PROCEDURE — 2060000000 HC ICU INTERMEDIATE R&B

## 2025-02-13 PROCEDURE — 94669 MECHANICAL CHEST WALL OSCILL: CPT

## 2025-02-13 RX ADMIN — VANCOMYCIN 750 MG: 750 INJECTION, SOLUTION INTRAVENOUS at 11:04

## 2025-02-13 RX ADMIN — PIPERACILLIN AND TAZOBACTAM 4500 MG: 4; .5 INJECTION, POWDER, LYOPHILIZED, FOR SOLUTION INTRAVENOUS at 08:50

## 2025-02-13 RX ADMIN — ALBUTEROL SULFATE 2.5 MG: 2.5 SOLUTION RESPIRATORY (INHALATION) at 19:52

## 2025-02-13 RX ADMIN — ALBUTEROL SULFATE 2.5 MG: 2.5 SOLUTION RESPIRATORY (INHALATION) at 11:48

## 2025-02-13 RX ADMIN — ARFORMOTEROL TARTRATE 15 MCG: 15 SOLUTION RESPIRATORY (INHALATION) at 19:52

## 2025-02-13 RX ADMIN — PIPERACILLIN AND TAZOBACTAM 4500 MG: 4; .5 INJECTION, POWDER, LYOPHILIZED, FOR SOLUTION INTRAVENOUS at 16:58

## 2025-02-13 RX ADMIN — ALBUTEROL SULFATE 2.5 MG: 2.5 SOLUTION RESPIRATORY (INHALATION) at 08:02

## 2025-02-13 RX ADMIN — ALBUTEROL SULFATE 2.5 MG: 2.5 SOLUTION RESPIRATORY (INHALATION) at 15:48

## 2025-02-13 RX ADMIN — ACETYLCYSTEINE 600 MG: 200 SOLUTION ORAL; RESPIRATORY (INHALATION) at 08:04

## 2025-02-13 RX ADMIN — SODIUM CHLORIDE, PRESERVATIVE FREE 20 ML: 5 INJECTION INTRAVENOUS at 16:58

## 2025-02-13 RX ADMIN — ALBUTEROL SULFATE 2.5 MG: 2.5 SOLUTION RESPIRATORY (INHALATION) at 00:41

## 2025-02-13 RX ADMIN — ACETYLCYSTEINE 600 MG: 200 SOLUTION ORAL; RESPIRATORY (INHALATION) at 19:52

## 2025-02-13 RX ADMIN — SODIUM CHLORIDE, PRESERVATIVE FREE 20 ML: 5 INJECTION INTRAVENOUS at 08:51

## 2025-02-13 RX ADMIN — ACETYLCYSTEINE 600 MG: 200 SOLUTION ORAL; RESPIRATORY (INHALATION) at 00:41

## 2025-02-13 RX ADMIN — SODIUM CHLORIDE, PRESERVATIVE FREE 10 ML: 5 INJECTION INTRAVENOUS at 08:00

## 2025-02-13 RX ADMIN — ARFORMOTEROL TARTRATE 15 MCG: 15 SOLUTION RESPIRATORY (INHALATION) at 08:02

## 2025-02-13 RX ADMIN — ACETYLCYSTEINE 600 MG: 200 SOLUTION ORAL; RESPIRATORY (INHALATION) at 15:48

## 2025-02-13 RX ADMIN — HEPARIN SODIUM 11 UNITS/KG/HR: 10000 INJECTION, SOLUTION INTRAVENOUS at 07:22

## 2025-02-13 RX ADMIN — ACETYLCYSTEINE 600 MG: 200 SOLUTION ORAL; RESPIRATORY (INHALATION) at 11:48

## 2025-02-13 RX ADMIN — SODIUM CHLORIDE, PRESERVATIVE FREE 10 ML: 5 INJECTION INTRAVENOUS at 21:00

## 2025-02-13 RX ADMIN — METHYLPREDNISOLONE SODIUM SUCCINATE 40 MG: 40 INJECTION, POWDER, LYOPHILIZED, FOR SOLUTION INTRAMUSCULAR; INTRAVENOUS at 21:00

## 2025-02-13 RX ADMIN — ACETYLCYSTEINE 600 MG: 200 SOLUTION ORAL; RESPIRATORY (INHALATION) at 04:51

## 2025-02-13 RX ADMIN — METHYLPREDNISOLONE SODIUM SUCCINATE 40 MG: 40 INJECTION, POWDER, LYOPHILIZED, FOR SOLUTION INTRAMUSCULAR; INTRAVENOUS at 08:51

## 2025-02-13 RX ADMIN — ALBUTEROL SULFATE 2.5 MG: 2.5 SOLUTION RESPIRATORY (INHALATION) at 04:51

## 2025-02-13 ASSESSMENT — PAIN SCALES - GENERAL: PAINLEVEL_OUTOF10: 0

## 2025-02-14 ENCOUNTER — APPOINTMENT (OUTPATIENT)
Dept: GENERAL RADIOLOGY | Age: 85
DRG: 175 | End: 2025-02-14
Payer: MEDICARE

## 2025-02-14 LAB
ANION GAP SERPL CALCULATED.3IONS-SCNC: 12 MMOL/L (ref 7–16)
BUN SERPL-MCNC: 44 MG/DL (ref 6–23)
CALCIUM SERPL-MCNC: 8.7 MG/DL (ref 8.6–10.2)
CHLORIDE SERPL-SCNC: 104 MMOL/L (ref 98–107)
CO2 SERPL-SCNC: 23 MMOL/L (ref 22–29)
CREAT SERPL-MCNC: 1.4 MG/DL (ref 0.5–1)
GFR, ESTIMATED: 36 ML/MIN/1.73M2
GLUCOSE SERPL-MCNC: 121 MG/DL (ref 74–99)
MICROORGANISM SPEC CULT: NORMAL
PARTIAL THROMBOPLASTIN TIME: 45.5 SEC (ref 24.5–35.1)
PARTIAL THROMBOPLASTIN TIME: 54.5 SEC (ref 24.5–35.1)
PARTIAL THROMBOPLASTIN TIME: 72.4 SEC (ref 24.5–35.1)
POTASSIUM SERPL-SCNC: 4.6 MMOL/L (ref 3.5–5)
SODIUM SERPL-SCNC: 139 MMOL/L (ref 132–146)
SPECIMEN DESCRIPTION: NORMAL

## 2025-02-14 PROCEDURE — 2580000003 HC RX 258

## 2025-02-14 PROCEDURE — 6370000000 HC RX 637 (ALT 250 FOR IP): Performed by: INTERNAL MEDICINE

## 2025-02-14 PROCEDURE — 6360000002 HC RX W HCPCS: Performed by: INTERNAL MEDICINE

## 2025-02-14 PROCEDURE — 6360000002 HC RX W HCPCS

## 2025-02-14 PROCEDURE — 99232 SBSQ HOSP IP/OBS MODERATE 35: CPT | Performed by: INTERNAL MEDICINE

## 2025-02-14 PROCEDURE — 2700000000 HC OXYGEN THERAPY PER DAY

## 2025-02-14 PROCEDURE — 2500000003 HC RX 250 WO HCPCS: Performed by: INTERNAL MEDICINE

## 2025-02-14 PROCEDURE — 94660 CPAP INITIATION&MGMT: CPT

## 2025-02-14 PROCEDURE — 2500000003 HC RX 250 WO HCPCS: Performed by: RADIOLOGY

## 2025-02-14 PROCEDURE — 83880 ASSAY OF NATRIURETIC PEPTIDE: CPT

## 2025-02-14 PROCEDURE — 92526 ORAL FUNCTION THERAPY: CPT

## 2025-02-14 PROCEDURE — 2060000000 HC ICU INTERMEDIATE R&B

## 2025-02-14 PROCEDURE — 99231 SBSQ HOSP IP/OBS SF/LOW 25: CPT

## 2025-02-14 PROCEDURE — 85730 THROMBOPLASTIN TIME PARTIAL: CPT

## 2025-02-14 PROCEDURE — 2500000003 HC RX 250 WO HCPCS

## 2025-02-14 PROCEDURE — 6360000002 HC RX W HCPCS: Performed by: STUDENT IN AN ORGANIZED HEALTH CARE EDUCATION/TRAINING PROGRAM

## 2025-02-14 PROCEDURE — 92611 MOTION FLUOROSCOPY/SWALLOW: CPT

## 2025-02-14 PROCEDURE — 36415 COLL VENOUS BLD VENIPUNCTURE: CPT

## 2025-02-14 PROCEDURE — 74230 X-RAY XM SWLNG FUNCJ C+: CPT

## 2025-02-14 PROCEDURE — 94640 AIRWAY INHALATION TREATMENT: CPT

## 2025-02-14 PROCEDURE — 80048 BASIC METABOLIC PNL TOTAL CA: CPT

## 2025-02-14 RX ORDER — HEPARIN SODIUM 1000 [USP'U]/ML
80 INJECTION, SOLUTION INTRAVENOUS; SUBCUTANEOUS PRN
Status: DISCONTINUED | OUTPATIENT
Start: 2025-02-14 | End: 2025-02-20 | Stop reason: HOSPADM

## 2025-02-14 RX ORDER — HEPARIN SODIUM 1000 [USP'U]/ML
40 INJECTION, SOLUTION INTRAVENOUS; SUBCUTANEOUS PRN
Status: DISCONTINUED | OUTPATIENT
Start: 2025-02-14 | End: 2025-02-20 | Stop reason: HOSPADM

## 2025-02-14 RX ORDER — HEPARIN SODIUM 1000 [USP'U]/ML
40 INJECTION, SOLUTION INTRAVENOUS; SUBCUTANEOUS PRN
Status: DISCONTINUED | OUTPATIENT
Start: 2025-02-14 | End: 2025-02-14 | Stop reason: DRUGHIGH

## 2025-02-14 RX ADMIN — GUAIFENESIN 400 MG: 200 SOLUTION ORAL at 13:04

## 2025-02-14 RX ADMIN — ACETYLCYSTEINE 600 MG: 200 SOLUTION ORAL; RESPIRATORY (INHALATION) at 12:23

## 2025-02-14 RX ADMIN — SODIUM CHLORIDE, PRESERVATIVE FREE 20 ML: 5 INJECTION INTRAVENOUS at 00:20

## 2025-02-14 RX ADMIN — ATORVASTATIN CALCIUM 20 MG: 20 TABLET, FILM COATED ORAL at 20:30

## 2025-02-14 RX ADMIN — PIPERACILLIN AND TAZOBACTAM 4500 MG: 4; .5 INJECTION, POWDER, LYOPHILIZED, FOR SOLUTION INTRAVENOUS at 08:34

## 2025-02-14 RX ADMIN — SODIUM CHLORIDE, PRESERVATIVE FREE 10 ML: 5 INJECTION INTRAVENOUS at 09:00

## 2025-02-14 RX ADMIN — ACETYLCYSTEINE 600 MG: 200 SOLUTION ORAL; RESPIRATORY (INHALATION) at 19:44

## 2025-02-14 RX ADMIN — BARIUM SULFATE 10 ML: 400 PASTE ORAL at 11:00

## 2025-02-14 RX ADMIN — SODIUM CHLORIDE, PRESERVATIVE FREE 10 ML: 5 INJECTION INTRAVENOUS at 20:30

## 2025-02-14 RX ADMIN — ALBUTEROL SULFATE 2.5 MG: 2.5 SOLUTION RESPIRATORY (INHALATION) at 08:28

## 2025-02-14 RX ADMIN — ALBUTEROL SULFATE 2.5 MG: 2.5 SOLUTION RESPIRATORY (INHALATION) at 12:23

## 2025-02-14 RX ADMIN — SODIUM CHLORIDE, PRESERVATIVE FREE 20 ML: 5 INJECTION INTRAVENOUS at 17:47

## 2025-02-14 RX ADMIN — ALBUTEROL SULFATE 2.5 MG: 2.5 SOLUTION RESPIRATORY (INHALATION) at 19:44

## 2025-02-14 RX ADMIN — SODIUM CHLORIDE, PRESERVATIVE FREE 20 ML: 5 INJECTION INTRAVENOUS at 08:34

## 2025-02-14 RX ADMIN — BARIUM SULFATE 70 G: 0.81 POWDER, FOR SUSPENSION ORAL at 11:00

## 2025-02-14 RX ADMIN — ACETYLCYSTEINE 600 MG: 200 SOLUTION ORAL; RESPIRATORY (INHALATION) at 08:29

## 2025-02-14 RX ADMIN — ALBUTEROL SULFATE 2.5 MG: 2.5 SOLUTION RESPIRATORY (INHALATION) at 15:48

## 2025-02-14 RX ADMIN — ACETYLCYSTEINE 600 MG: 200 SOLUTION ORAL; RESPIRATORY (INHALATION) at 04:27

## 2025-02-14 RX ADMIN — METHYLPREDNISOLONE SODIUM SUCCINATE 40 MG: 40 INJECTION, POWDER, LYOPHILIZED, FOR SOLUTION INTRAMUSCULAR; INTRAVENOUS at 08:35

## 2025-02-14 RX ADMIN — PIPERACILLIN AND TAZOBACTAM 4500 MG: 4; .5 INJECTION, POWDER, LYOPHILIZED, FOR SOLUTION INTRAVENOUS at 00:20

## 2025-02-14 RX ADMIN — GUAIFENESIN 400 MG: 200 SOLUTION ORAL at 20:30

## 2025-02-14 RX ADMIN — ARFORMOTEROL TARTRATE 15 MCG: 15 SOLUTION RESPIRATORY (INHALATION) at 08:28

## 2025-02-14 RX ADMIN — ALBUTEROL SULFATE 2.5 MG: 2.5 SOLUTION RESPIRATORY (INHALATION) at 04:27

## 2025-02-14 RX ADMIN — HEPARIN SODIUM 13 UNITS/KG/HR: 10000 INJECTION, SOLUTION INTRAVENOUS at 20:29

## 2025-02-14 RX ADMIN — ARFORMOTEROL TARTRATE 15 MCG: 15 SOLUTION RESPIRATORY (INHALATION) at 19:44

## 2025-02-14 RX ADMIN — GUAIFENESIN 400 MG: 200 SOLUTION ORAL at 16:00

## 2025-02-14 RX ADMIN — BARIUM SULFATE 120 ML: 400 SUSPENSION ORAL at 11:00

## 2025-02-14 RX ADMIN — HEPARIN SODIUM 2400 UNITS: 1000 INJECTION INTRAVENOUS; SUBCUTANEOUS at 08:44

## 2025-02-14 RX ADMIN — ACETYLCYSTEINE 600 MG: 200 SOLUTION ORAL; RESPIRATORY (INHALATION) at 15:49

## 2025-02-14 RX ADMIN — METOPROLOL SUCCINATE 25 MG: 25 TABLET, EXTENDED RELEASE ORAL at 20:30

## 2025-02-14 RX ADMIN — PIPERACILLIN AND TAZOBACTAM 4500 MG: 4; .5 INJECTION, POWDER, LYOPHILIZED, FOR SOLUTION INTRAVENOUS at 17:47

## 2025-02-14 RX ADMIN — VANCOMYCIN 750 MG: 750 INJECTION, SOLUTION INTRAVENOUS at 08:52

## 2025-02-14 ASSESSMENT — PAIN SCALES - GENERAL: PAINLEVEL_OUTOF10: 0

## 2025-02-14 NOTE — CARE COORDINATION
Social Work/Discharge Planning:  Chart reviewed.  Patient was requiring 12 liters high flow oxygen.  Patient had a video swallow today.  She is now requiring 55% FiO2 on 60 liters oxygen.  Palliative signed off.  Plan remains to [facility] when medically stable. Patient will need a pre-cert.  Will continue to follow.  Electronically signed by CATRACHITA Ortiz on 2/14/2025 at 3:42 PM

## 2025-02-15 ENCOUNTER — APPOINTMENT (OUTPATIENT)
Dept: GENERAL RADIOLOGY | Age: 85
DRG: 175 | End: 2025-02-15
Payer: MEDICARE

## 2025-02-15 LAB
B PARAP IS1001 DNA NPH QL NAA+NON-PROBE: NOT DETECTED
B PERT DNA SPEC QL NAA+PROBE: NOT DETECTED
BNP SERPL-MCNC: 1177 PG/ML (ref 0–450)
C PNEUM DNA NPH QL NAA+NON-PROBE: NOT DETECTED
FLUAV RNA NPH QL NAA+NON-PROBE: NOT DETECTED
FLUBV RNA NPH QL NAA+NON-PROBE: NOT DETECTED
HADV DNA NPH QL NAA+NON-PROBE: NOT DETECTED
HCOV 229E RNA NPH QL NAA+NON-PROBE: NOT DETECTED
HCOV HKU1 RNA NPH QL NAA+NON-PROBE: NOT DETECTED
HCOV NL63 RNA NPH QL NAA+NON-PROBE: NOT DETECTED
HCOV OC43 RNA NPH QL NAA+NON-PROBE: NOT DETECTED
HMPV RNA NPH QL NAA+NON-PROBE: NOT DETECTED
HPIV1 RNA NPH QL NAA+NON-PROBE: NOT DETECTED
HPIV2 RNA NPH QL NAA+NON-PROBE: NOT DETECTED
HPIV3 RNA NPH QL NAA+NON-PROBE: NOT DETECTED
HPIV4 RNA NPH QL NAA+NON-PROBE: NOT DETECTED
M PNEUMO DNA NPH QL NAA+NON-PROBE: NOT DETECTED
PARTIAL THROMBOPLASTIN TIME: 79.5 SEC (ref 24.5–35.1)
RSV RNA NPH QL NAA+NON-PROBE: NOT DETECTED
RV+EV RNA NPH QL NAA+NON-PROBE: NOT DETECTED
SARS-COV-2 RNA NPH QL NAA+NON-PROBE: NOT DETECTED
SPECIMEN DESCRIPTION: NORMAL

## 2025-02-15 PROCEDURE — 2580000003 HC RX 258

## 2025-02-15 PROCEDURE — 6370000000 HC RX 637 (ALT 250 FOR IP): Performed by: INTERNAL MEDICINE

## 2025-02-15 PROCEDURE — 0202U NFCT DS 22 TRGT SARS-COV-2: CPT

## 2025-02-15 PROCEDURE — 6360000002 HC RX W HCPCS

## 2025-02-15 PROCEDURE — 6360000002 HC RX W HCPCS: Performed by: INTERNAL MEDICINE

## 2025-02-15 PROCEDURE — 94669 MECHANICAL CHEST WALL OSCILL: CPT

## 2025-02-15 PROCEDURE — 94660 CPAP INITIATION&MGMT: CPT

## 2025-02-15 PROCEDURE — 2700000000 HC OXYGEN THERAPY PER DAY

## 2025-02-15 PROCEDURE — 2500000003 HC RX 250 WO HCPCS

## 2025-02-15 PROCEDURE — 85730 THROMBOPLASTIN TIME PARTIAL: CPT

## 2025-02-15 PROCEDURE — 99232 SBSQ HOSP IP/OBS MODERATE 35: CPT | Performed by: INTERNAL MEDICINE

## 2025-02-15 PROCEDURE — 71045 X-RAY EXAM CHEST 1 VIEW: CPT

## 2025-02-15 PROCEDURE — 2060000000 HC ICU INTERMEDIATE R&B

## 2025-02-15 PROCEDURE — 6360000002 HC RX W HCPCS: Performed by: STUDENT IN AN ORGANIZED HEALTH CARE EDUCATION/TRAINING PROGRAM

## 2025-02-15 PROCEDURE — 94640 AIRWAY INHALATION TREATMENT: CPT

## 2025-02-15 PROCEDURE — 2500000003 HC RX 250 WO HCPCS: Performed by: INTERNAL MEDICINE

## 2025-02-15 PROCEDURE — 94668 MNPJ CHEST WALL SBSQ: CPT

## 2025-02-15 RX ORDER — FUROSEMIDE 10 MG/ML
40 INJECTION INTRAMUSCULAR; INTRAVENOUS ONCE
Status: COMPLETED | OUTPATIENT
Start: 2025-02-15 | End: 2025-02-15

## 2025-02-15 RX ADMIN — ARFORMOTEROL TARTRATE 15 MCG: 15 SOLUTION RESPIRATORY (INHALATION) at 19:35

## 2025-02-15 RX ADMIN — METHYLPREDNISOLONE SODIUM SUCCINATE 40 MG: 40 INJECTION, POWDER, LYOPHILIZED, FOR SOLUTION INTRAMUSCULAR; INTRAVENOUS at 21:53

## 2025-02-15 RX ADMIN — SODIUM CHLORIDE, PRESERVATIVE FREE 10 ML: 5 INJECTION INTRAVENOUS at 00:25

## 2025-02-15 RX ADMIN — SODIUM CHLORIDE, PRESERVATIVE FREE 20 ML: 5 INJECTION INTRAVENOUS at 09:50

## 2025-02-15 RX ADMIN — PIPERACILLIN AND TAZOBACTAM 4500 MG: 4; .5 INJECTION, POWDER, LYOPHILIZED, FOR SOLUTION INTRAVENOUS at 00:25

## 2025-02-15 RX ADMIN — GUAIFENESIN 400 MG: 200 SOLUTION ORAL at 09:39

## 2025-02-15 RX ADMIN — METHYLPREDNISOLONE SODIUM SUCCINATE 40 MG: 40 INJECTION, POWDER, LYOPHILIZED, FOR SOLUTION INTRAMUSCULAR; INTRAVENOUS at 09:39

## 2025-02-15 RX ADMIN — ACETYLCYSTEINE 600 MG: 200 SOLUTION ORAL; RESPIRATORY (INHALATION) at 12:52

## 2025-02-15 RX ADMIN — ACETYLCYSTEINE 600 MG: 200 SOLUTION ORAL; RESPIRATORY (INHALATION) at 03:46

## 2025-02-15 RX ADMIN — ALBUTEROL SULFATE 2.5 MG: 2.5 SOLUTION RESPIRATORY (INHALATION) at 08:26

## 2025-02-15 RX ADMIN — GUAIFENESIN 400 MG: 200 SOLUTION ORAL at 15:50

## 2025-02-15 RX ADMIN — ACETYLCYSTEINE 600 MG: 200 SOLUTION ORAL; RESPIRATORY (INHALATION) at 08:26

## 2025-02-15 RX ADMIN — PIPERACILLIN AND TAZOBACTAM 4500 MG: 4; .5 INJECTION, POWDER, LYOPHILIZED, FOR SOLUTION INTRAVENOUS at 09:39

## 2025-02-15 RX ADMIN — ALBUTEROL SULFATE 2.5 MG: 2.5 SOLUTION RESPIRATORY (INHALATION) at 03:46

## 2025-02-15 RX ADMIN — ACETYLCYSTEINE 600 MG: 200 SOLUTION ORAL; RESPIRATORY (INHALATION) at 15:44

## 2025-02-15 RX ADMIN — ALBUTEROL SULFATE 2.5 MG: 2.5 SOLUTION RESPIRATORY (INHALATION) at 15:44

## 2025-02-15 RX ADMIN — GUAIFENESIN 400 MG: 200 SOLUTION ORAL at 12:17

## 2025-02-15 RX ADMIN — ACETYLCYSTEINE 600 MG: 200 SOLUTION ORAL; RESPIRATORY (INHALATION) at 19:35

## 2025-02-15 RX ADMIN — GUAIFENESIN 400 MG: 200 SOLUTION ORAL at 21:52

## 2025-02-15 RX ADMIN — ARFORMOTEROL TARTRATE 15 MCG: 15 SOLUTION RESPIRATORY (INHALATION) at 08:26

## 2025-02-15 RX ADMIN — SODIUM CHLORIDE, PRESERVATIVE FREE 10 ML: 5 INJECTION INTRAVENOUS at 21:53

## 2025-02-15 RX ADMIN — PIPERACILLIN AND TAZOBACTAM 4500 MG: 4; .5 INJECTION, POWDER, LYOPHILIZED, FOR SOLUTION INTRAVENOUS at 15:50

## 2025-02-15 RX ADMIN — ATORVASTATIN CALCIUM 20 MG: 20 TABLET, FILM COATED ORAL at 21:53

## 2025-02-15 RX ADMIN — SODIUM CHLORIDE, PRESERVATIVE FREE 10 ML: 5 INJECTION INTRAVENOUS at 15:50

## 2025-02-15 RX ADMIN — METOPROLOL SUCCINATE 25 MG: 25 TABLET, EXTENDED RELEASE ORAL at 21:52

## 2025-02-15 RX ADMIN — ALBUTEROL SULFATE 2.5 MG: 2.5 SOLUTION RESPIRATORY (INHALATION) at 19:34

## 2025-02-15 RX ADMIN — SODIUM CHLORIDE, PRESERVATIVE FREE 10 ML: 5 INJECTION INTRAVENOUS at 09:39

## 2025-02-15 RX ADMIN — ALBUTEROL SULFATE 2.5 MG: 2.5 SOLUTION RESPIRATORY (INHALATION) at 12:52

## 2025-02-15 RX ADMIN — FUROSEMIDE 40 MG: 10 INJECTION, SOLUTION INTRAMUSCULAR; INTRAVENOUS at 15:50

## 2025-02-15 RX ADMIN — SODIUM CHLORIDE, PRESERVATIVE FREE 20 ML: 5 INJECTION INTRAVENOUS at 00:25

## 2025-02-15 RX ADMIN — METHYLPREDNISOLONE SODIUM SUCCINATE 40 MG: 40 INJECTION, POWDER, LYOPHILIZED, FOR SOLUTION INTRAMUSCULAR; INTRAVENOUS at 00:25

## 2025-02-15 RX ADMIN — SODIUM CHLORIDE, PRESERVATIVE FREE 20 ML: 5 INJECTION INTRAVENOUS at 15:50

## 2025-02-15 RX ADMIN — ALBUTEROL SULFATE 2.5 MG: 2.5 SOLUTION RESPIRATORY (INHALATION) at 00:47

## 2025-02-15 RX ADMIN — ISOSORBIDE MONONITRATE 30 MG: 30 TABLET, EXTENDED RELEASE ORAL at 09:39

## 2025-02-15 ASSESSMENT — PAIN SCALES - GENERAL
PAINLEVEL_OUTOF10: 0

## 2025-02-16 LAB
ERYTHROCYTE [DISTWIDTH] IN BLOOD BY AUTOMATED COUNT: 13.7 % (ref 11.5–15)
HCT VFR BLD AUTO: 39.7 % (ref 34–48)
HGB BLD-MCNC: 12.4 G/DL (ref 11.5–15.5)
MCH RBC QN AUTO: 31.9 PG (ref 26–35)
MCHC RBC AUTO-ENTMCNC: 31.2 G/DL (ref 32–34.5)
MCV RBC AUTO: 102.1 FL (ref 80–99.9)
PARTIAL THROMBOPLASTIN TIME: 63.6 SEC (ref 24.5–35.1)
PARTIAL THROMBOPLASTIN TIME: 65 SEC (ref 24.5–35.1)
PARTIAL THROMBOPLASTIN TIME: 84.5 SEC (ref 24.5–35.1)
PLATELET # BLD AUTO: 347 K/UL (ref 130–450)
PMV BLD AUTO: 9.4 FL (ref 7–12)
RBC # BLD AUTO: 3.89 M/UL (ref 3.5–5.5)
VANCOMYCIN SERPL-MCNC: 11.8 UG/ML (ref 5–40)
WBC OTHER # BLD: 14.3 K/UL (ref 4.5–11.5)

## 2025-02-16 PROCEDURE — 2500000003 HC RX 250 WO HCPCS: Performed by: INTERNAL MEDICINE

## 2025-02-16 PROCEDURE — 2060000000 HC ICU INTERMEDIATE R&B

## 2025-02-16 PROCEDURE — 6360000002 HC RX W HCPCS: Performed by: INTERNAL MEDICINE

## 2025-02-16 PROCEDURE — 2580000003 HC RX 258

## 2025-02-16 PROCEDURE — 85027 COMPLETE CBC AUTOMATED: CPT

## 2025-02-16 PROCEDURE — 6360000002 HC RX W HCPCS

## 2025-02-16 PROCEDURE — 6370000000 HC RX 637 (ALT 250 FOR IP): Performed by: INTERNAL MEDICINE

## 2025-02-16 PROCEDURE — 80202 ASSAY OF VANCOMYCIN: CPT

## 2025-02-16 PROCEDURE — 99232 SBSQ HOSP IP/OBS MODERATE 35: CPT | Performed by: INTERNAL MEDICINE

## 2025-02-16 PROCEDURE — 94640 AIRWAY INHALATION TREATMENT: CPT

## 2025-02-16 PROCEDURE — 94660 CPAP INITIATION&MGMT: CPT

## 2025-02-16 PROCEDURE — 94668 MNPJ CHEST WALL SBSQ: CPT

## 2025-02-16 PROCEDURE — 85730 THROMBOPLASTIN TIME PARTIAL: CPT

## 2025-02-16 PROCEDURE — 2700000000 HC OXYGEN THERAPY PER DAY

## 2025-02-16 PROCEDURE — 6360000002 HC RX W HCPCS: Performed by: STUDENT IN AN ORGANIZED HEALTH CARE EDUCATION/TRAINING PROGRAM

## 2025-02-16 PROCEDURE — 97530 THERAPEUTIC ACTIVITIES: CPT

## 2025-02-16 PROCEDURE — 2500000003 HC RX 250 WO HCPCS

## 2025-02-16 RX ORDER — HEPARIN SODIUM 10000 [USP'U]/100ML
5-30 INJECTION, SOLUTION INTRAVENOUS CONTINUOUS
Status: DISCONTINUED | OUTPATIENT
Start: 2025-02-16 | End: 2025-02-20 | Stop reason: HOSPADM

## 2025-02-16 RX ORDER — ACETYLCYSTEINE 100 MG/ML
600 SOLUTION ORAL; RESPIRATORY (INHALATION) 2 TIMES DAILY
Status: DISCONTINUED | OUTPATIENT
Start: 2025-02-16 | End: 2025-02-20 | Stop reason: HOSPADM

## 2025-02-16 RX ORDER — FUROSEMIDE 10 MG/ML
40 INJECTION INTRAMUSCULAR; INTRAVENOUS ONCE
Status: COMPLETED | OUTPATIENT
Start: 2025-02-16 | End: 2025-02-16

## 2025-02-16 RX ADMIN — PIPERACILLIN AND TAZOBACTAM 4500 MG: 4; .5 INJECTION, POWDER, LYOPHILIZED, FOR SOLUTION INTRAVENOUS at 08:02

## 2025-02-16 RX ADMIN — GUAIFENESIN 400 MG: 200 SOLUTION ORAL at 10:43

## 2025-02-16 RX ADMIN — ALBUTEROL SULFATE 2.5 MG: 2.5 SOLUTION RESPIRATORY (INHALATION) at 16:16

## 2025-02-16 RX ADMIN — SODIUM CHLORIDE, PRESERVATIVE FREE 20 ML: 5 INJECTION INTRAVENOUS at 08:01

## 2025-02-16 RX ADMIN — ATORVASTATIN CALCIUM 20 MG: 20 TABLET, FILM COATED ORAL at 22:05

## 2025-02-16 RX ADMIN — METHYLPREDNISOLONE SODIUM SUCCINATE 40 MG: 40 INJECTION, POWDER, LYOPHILIZED, FOR SOLUTION INTRAMUSCULAR; INTRAVENOUS at 22:05

## 2025-02-16 RX ADMIN — ALBUTEROL SULFATE 2.5 MG: 2.5 SOLUTION RESPIRATORY (INHALATION) at 21:34

## 2025-02-16 RX ADMIN — ISOSORBIDE MONONITRATE 30 MG: 30 TABLET, EXTENDED RELEASE ORAL at 08:01

## 2025-02-16 RX ADMIN — ACETYLCYSTEINE 600 MG: 200 SOLUTION ORAL; RESPIRATORY (INHALATION) at 08:12

## 2025-02-16 RX ADMIN — GUAIFENESIN 400 MG: 200 SOLUTION ORAL at 00:25

## 2025-02-16 RX ADMIN — HEPARIN SODIUM 11.02 UNITS/KG/HR: 10000 INJECTION, SOLUTION INTRAVENOUS at 12:23

## 2025-02-16 RX ADMIN — SODIUM CHLORIDE, PRESERVATIVE FREE 20 ML: 5 INJECTION INTRAVENOUS at 15:36

## 2025-02-16 RX ADMIN — SODIUM CHLORIDE, PRESERVATIVE FREE 10 ML: 5 INJECTION INTRAVENOUS at 22:05

## 2025-02-16 RX ADMIN — ALBUTEROL SULFATE 2.5 MG: 2.5 SOLUTION RESPIRATORY (INHALATION) at 08:12

## 2025-02-16 RX ADMIN — PIPERACILLIN AND TAZOBACTAM 4500 MG: 4; .5 INJECTION, POWDER, LYOPHILIZED, FOR SOLUTION INTRAVENOUS at 00:25

## 2025-02-16 RX ADMIN — GUAIFENESIN 400 MG: 200 SOLUTION ORAL at 20:12

## 2025-02-16 RX ADMIN — FUROSEMIDE 40 MG: 10 INJECTION, SOLUTION INTRAMUSCULAR; INTRAVENOUS at 15:40

## 2025-02-16 RX ADMIN — ACETYLCYSTEINE 600 MG: 200 SOLUTION ORAL; RESPIRATORY (INHALATION) at 12:30

## 2025-02-16 RX ADMIN — METOPROLOL SUCCINATE 25 MG: 25 TABLET, EXTENDED RELEASE ORAL at 22:03

## 2025-02-16 RX ADMIN — SODIUM CHLORIDE, PRESERVATIVE FREE 20 ML: 5 INJECTION INTRAVENOUS at 00:25

## 2025-02-16 RX ADMIN — ALBUTEROL SULFATE 2.5 MG: 2.5 SOLUTION RESPIRATORY (INHALATION) at 00:38

## 2025-02-16 RX ADMIN — ACETYLCYSTEINE 600 MG: 200 SOLUTION ORAL; RESPIRATORY (INHALATION) at 21:35

## 2025-02-16 RX ADMIN — METHYLPREDNISOLONE SODIUM SUCCINATE 40 MG: 40 INJECTION, POWDER, LYOPHILIZED, FOR SOLUTION INTRAMUSCULAR; INTRAVENOUS at 10:43

## 2025-02-16 RX ADMIN — PIPERACILLIN AND TAZOBACTAM 4500 MG: 4; .5 INJECTION, POWDER, LYOPHILIZED, FOR SOLUTION INTRAVENOUS at 15:36

## 2025-02-16 RX ADMIN — ALBUTEROL SULFATE 2.5 MG: 2.5 SOLUTION RESPIRATORY (INHALATION) at 12:30

## 2025-02-16 RX ADMIN — SODIUM CHLORIDE, PRESERVATIVE FREE 10 ML: 5 INJECTION INTRAVENOUS at 08:03

## 2025-02-16 RX ADMIN — GUAIFENESIN 400 MG: 200 SOLUTION ORAL at 15:35

## 2025-02-16 RX ADMIN — ARFORMOTEROL TARTRATE 15 MCG: 15 SOLUTION RESPIRATORY (INHALATION) at 08:13

## 2025-02-16 RX ADMIN — ACETYLCYSTEINE 600 MG: 200 SOLUTION ORAL; RESPIRATORY (INHALATION) at 04:19

## 2025-02-16 RX ADMIN — ARFORMOTEROL TARTRATE 15 MCG: 15 SOLUTION RESPIRATORY (INHALATION) at 21:34

## 2025-02-16 RX ADMIN — ACETYLCYSTEINE 600 MG: 200 SOLUTION ORAL; RESPIRATORY (INHALATION) at 00:38

## 2025-02-16 RX ADMIN — GUAIFENESIN 400 MG: 200 SOLUTION ORAL at 08:01

## 2025-02-16 RX ADMIN — GUAIFENESIN 400 MG: 200 SOLUTION ORAL at 04:53

## 2025-02-16 RX ADMIN — HEPARIN SODIUM 11.02 UNITS/KG/HR: 10000 INJECTION, SOLUTION INTRAVENOUS at 12:20

## 2025-02-16 RX ADMIN — ALBUTEROL SULFATE 2.5 MG: 2.5 SOLUTION RESPIRATORY (INHALATION) at 04:19

## 2025-02-16 ASSESSMENT — PAIN SCALES - GENERAL
PAINLEVEL_OUTOF10: 0
PAINLEVEL_OUTOF10: 0

## 2025-02-17 LAB
ANION GAP SERPL CALCULATED.3IONS-SCNC: 15 MMOL/L (ref 7–16)
BNP SERPL-MCNC: 857 PG/ML (ref 0–450)
BUN SERPL-MCNC: 56 MG/DL (ref 6–23)
CALCIUM SERPL-MCNC: 8.5 MG/DL (ref 8.6–10.2)
CHLORIDE SERPL-SCNC: 102 MMOL/L (ref 98–107)
CO2 SERPL-SCNC: 21 MMOL/L (ref 22–29)
CREAT SERPL-MCNC: 2 MG/DL (ref 0.5–1)
GFR, ESTIMATED: 25 ML/MIN/1.73M2
GLUCOSE SERPL-MCNC: 135 MG/DL (ref 74–99)
PARTIAL THROMBOPLASTIN TIME: 46.1 SEC (ref 24.5–35.1)
POTASSIUM SERPL-SCNC: 4.5 MMOL/L (ref 3.5–5)
SODIUM SERPL-SCNC: 138 MMOL/L (ref 132–146)

## 2025-02-17 PROCEDURE — 2500000003 HC RX 250 WO HCPCS: Performed by: INTERNAL MEDICINE

## 2025-02-17 PROCEDURE — 6360000002 HC RX W HCPCS: Performed by: INTERNAL MEDICINE

## 2025-02-17 PROCEDURE — 2060000000 HC ICU INTERMEDIATE R&B

## 2025-02-17 PROCEDURE — 97530 THERAPEUTIC ACTIVITIES: CPT

## 2025-02-17 PROCEDURE — 2500000003 HC RX 250 WO HCPCS

## 2025-02-17 PROCEDURE — 6360000002 HC RX W HCPCS

## 2025-02-17 PROCEDURE — 2580000003 HC RX 258

## 2025-02-17 PROCEDURE — 85730 THROMBOPLASTIN TIME PARTIAL: CPT

## 2025-02-17 PROCEDURE — 94660 CPAP INITIATION&MGMT: CPT

## 2025-02-17 PROCEDURE — 6360000002 HC RX W HCPCS: Performed by: STUDENT IN AN ORGANIZED HEALTH CARE EDUCATION/TRAINING PROGRAM

## 2025-02-17 PROCEDURE — 99233 SBSQ HOSP IP/OBS HIGH 50: CPT | Performed by: STUDENT IN AN ORGANIZED HEALTH CARE EDUCATION/TRAINING PROGRAM

## 2025-02-17 PROCEDURE — 80048 BASIC METABOLIC PNL TOTAL CA: CPT

## 2025-02-17 PROCEDURE — 97535 SELF CARE MNGMENT TRAINING: CPT

## 2025-02-17 PROCEDURE — 36415 COLL VENOUS BLD VENIPUNCTURE: CPT

## 2025-02-17 PROCEDURE — 94640 AIRWAY INHALATION TREATMENT: CPT

## 2025-02-17 PROCEDURE — 2700000000 HC OXYGEN THERAPY PER DAY

## 2025-02-17 PROCEDURE — 94669 MECHANICAL CHEST WALL OSCILL: CPT

## 2025-02-17 PROCEDURE — 6370000000 HC RX 637 (ALT 250 FOR IP): Performed by: INTERNAL MEDICINE

## 2025-02-17 PROCEDURE — 83880 ASSAY OF NATRIURETIC PEPTIDE: CPT

## 2025-02-17 RX ADMIN — ALBUTEROL SULFATE 2.5 MG: 2.5 SOLUTION RESPIRATORY (INHALATION) at 15:35

## 2025-02-17 RX ADMIN — METOPROLOL SUCCINATE 25 MG: 25 TABLET, EXTENDED RELEASE ORAL at 21:25

## 2025-02-17 RX ADMIN — ALBUTEROL SULFATE 2.5 MG: 2.5 SOLUTION RESPIRATORY (INHALATION) at 03:53

## 2025-02-17 RX ADMIN — SODIUM CHLORIDE, PRESERVATIVE FREE 20 ML: 5 INJECTION INTRAVENOUS at 16:54

## 2025-02-17 RX ADMIN — ARFORMOTEROL TARTRATE 15 MCG: 15 SOLUTION RESPIRATORY (INHALATION) at 19:36

## 2025-02-17 RX ADMIN — ALBUTEROL SULFATE 2.5 MG: 2.5 SOLUTION RESPIRATORY (INHALATION) at 19:36

## 2025-02-17 RX ADMIN — PIPERACILLIN AND TAZOBACTAM 4500 MG: 4; .5 INJECTION, POWDER, LYOPHILIZED, FOR SOLUTION INTRAVENOUS at 00:12

## 2025-02-17 RX ADMIN — GUAIFENESIN 400 MG: 200 SOLUTION ORAL at 16:55

## 2025-02-17 RX ADMIN — SODIUM CHLORIDE, PRESERVATIVE FREE 10 ML: 5 INJECTION INTRAVENOUS at 21:24

## 2025-02-17 RX ADMIN — ACETYLCYSTEINE 600 MG: 200 SOLUTION ORAL; RESPIRATORY (INHALATION) at 07:55

## 2025-02-17 RX ADMIN — PIPERACILLIN AND TAZOBACTAM 4500 MG: 4; .5 INJECTION, POWDER, LYOPHILIZED, FOR SOLUTION INTRAVENOUS at 09:13

## 2025-02-17 RX ADMIN — SODIUM CHLORIDE, PRESERVATIVE FREE 20 ML: 5 INJECTION INTRAVENOUS at 09:13

## 2025-02-17 RX ADMIN — ATORVASTATIN CALCIUM 20 MG: 20 TABLET, FILM COATED ORAL at 21:25

## 2025-02-17 RX ADMIN — METHYLPREDNISOLONE SODIUM SUCCINATE 40 MG: 40 INJECTION, POWDER, LYOPHILIZED, FOR SOLUTION INTRAMUSCULAR; INTRAVENOUS at 21:24

## 2025-02-17 RX ADMIN — GUAIFENESIN 400 MG: 200 SOLUTION ORAL at 00:12

## 2025-02-17 RX ADMIN — SODIUM CHLORIDE, PRESERVATIVE FREE 20 ML: 5 INJECTION INTRAVENOUS at 00:12

## 2025-02-17 RX ADMIN — GUAIFENESIN 400 MG: 200 SOLUTION ORAL at 21:24

## 2025-02-17 RX ADMIN — ALBUTEROL SULFATE 2.5 MG: 2.5 SOLUTION RESPIRATORY (INHALATION) at 12:12

## 2025-02-17 RX ADMIN — METHYLPREDNISOLONE SODIUM SUCCINATE 40 MG: 40 INJECTION, POWDER, LYOPHILIZED, FOR SOLUTION INTRAMUSCULAR; INTRAVENOUS at 09:14

## 2025-02-17 RX ADMIN — HEPARIN SODIUM 3400 UNITS: 1000 INJECTION INTRAVENOUS; SUBCUTANEOUS at 19:16

## 2025-02-17 RX ADMIN — GUAIFENESIN 400 MG: 200 SOLUTION ORAL at 09:13

## 2025-02-17 RX ADMIN — PIPERACILLIN AND TAZOBACTAM 4500 MG: 4; .5 INJECTION, POWDER, LYOPHILIZED, FOR SOLUTION INTRAVENOUS at 16:54

## 2025-02-17 RX ADMIN — SODIUM CHLORIDE, PRESERVATIVE FREE 20 ML: 5 INJECTION INTRAVENOUS at 09:14

## 2025-02-17 RX ADMIN — ACETYLCYSTEINE 600 MG: 200 SOLUTION ORAL; RESPIRATORY (INHALATION) at 19:37

## 2025-02-17 RX ADMIN — ISOSORBIDE MONONITRATE 30 MG: 30 TABLET, EXTENDED RELEASE ORAL at 09:13

## 2025-02-17 RX ADMIN — ALBUTEROL SULFATE 2.5 MG: 2.5 SOLUTION RESPIRATORY (INHALATION) at 00:51

## 2025-02-17 RX ADMIN — ALBUTEROL SULFATE 2.5 MG: 2.5 SOLUTION RESPIRATORY (INHALATION) at 07:55

## 2025-02-17 RX ADMIN — ARFORMOTEROL TARTRATE 15 MCG: 15 SOLUTION RESPIRATORY (INHALATION) at 07:55

## 2025-02-17 RX ADMIN — GUAIFENESIN 400 MG: 200 SOLUTION ORAL at 04:13

## 2025-02-17 ASSESSMENT — PAIN SCALES - GENERAL
PAINLEVEL_OUTOF10: 0

## 2025-02-17 NOTE — CARE COORDINATION
Social Work/Discharge Planning:  Chart reviewed.  Patient on heated high flow 55% FiO2 on 60 liters oxygen.  Referral made to liaison Rachid with [facility].  Rachid states patient has LTAC criteria and they will start pre-cert.  Called patient son Blaise (ph: [phone]) and provided him with an update.  Blaise is agreeable to [facility] if approved by insurance.  Janette with [facility] [place] is still following patient.  Will continue to follow and wait for LTAC pre-cert.  Electronically signed by CATRACHITA Ortiz on 2/17/2025 at 2:06 PM

## 2025-02-18 LAB
ALBUMIN SERPL-MCNC: 3.3 G/DL (ref 3.5–5.2)
ALP SERPL-CCNC: 74 U/L (ref 35–104)
ALT SERPL-CCNC: 10 U/L (ref 0–32)
ANION GAP SERPL CALCULATED.3IONS-SCNC: 14 MMOL/L (ref 7–16)
AST SERPL-CCNC: 12 U/L (ref 0–31)
BILIRUB DIRECT SERPL-MCNC: <0.2 MG/DL (ref 0–0.3)
BILIRUB INDIRECT SERPL-MCNC: ABNORMAL MG/DL (ref 0–1)
BILIRUB SERPL-MCNC: 0.5 MG/DL (ref 0–1.2)
BUN SERPL-MCNC: 59 MG/DL (ref 6–23)
CALCIUM SERPL-MCNC: 8.5 MG/DL (ref 8.6–10.2)
CHLORIDE SERPL-SCNC: 97 MMOL/L (ref 98–107)
CO2 SERPL-SCNC: 24 MMOL/L (ref 22–29)
CREAT SERPL-MCNC: 2.1 MG/DL (ref 0.5–1)
ERYTHROCYTE [DISTWIDTH] IN BLOOD BY AUTOMATED COUNT: 13.7 % (ref 11.5–15)
GFR, ESTIMATED: 23 ML/MIN/1.73M2
GLUCOSE SERPL-MCNC: 106 MG/DL (ref 74–99)
HCT VFR BLD AUTO: 39.4 % (ref 34–48)
HGB BLD-MCNC: 12.2 G/DL (ref 11.5–15.5)
MCH RBC QN AUTO: 32 PG (ref 26–35)
MCHC RBC AUTO-ENTMCNC: 31 G/DL (ref 32–34.5)
MCV RBC AUTO: 103.4 FL (ref 80–99.9)
PARTIAL THROMBOPLASTIN TIME: 106.8 SEC (ref 24.5–35.1)
PARTIAL THROMBOPLASTIN TIME: 130 SEC (ref 24.5–35.1)
PARTIAL THROMBOPLASTIN TIME: 50.4 SEC (ref 24.5–35.1)
PARTIAL THROMBOPLASTIN TIME: 53.1 SEC (ref 24.5–35.1)
PLATELET # BLD AUTO: 333 K/UL (ref 130–450)
PMV BLD AUTO: 9.5 FL (ref 7–12)
POTASSIUM SERPL-SCNC: 4.1 MMOL/L (ref 3.5–5)
PROCALCITONIN SERPL-MCNC: 0.11 NG/ML (ref 0–0.08)
PROT SERPL-MCNC: 6.1 G/DL (ref 6.4–8.3)
RBC # BLD AUTO: 3.81 M/UL (ref 3.5–5.5)
SODIUM SERPL-SCNC: 135 MMOL/L (ref 132–146)
WBC OTHER # BLD: 16.6 K/UL (ref 4.5–11.5)

## 2025-02-18 PROCEDURE — 6360000002 HC RX W HCPCS: Performed by: INTERNAL MEDICINE

## 2025-02-18 PROCEDURE — 94668 MNPJ CHEST WALL SBSQ: CPT

## 2025-02-18 PROCEDURE — 85027 COMPLETE CBC AUTOMATED: CPT

## 2025-02-18 PROCEDURE — 99232 SBSQ HOSP IP/OBS MODERATE 35: CPT | Performed by: STUDENT IN AN ORGANIZED HEALTH CARE EDUCATION/TRAINING PROGRAM

## 2025-02-18 PROCEDURE — 94669 MECHANICAL CHEST WALL OSCILL: CPT

## 2025-02-18 PROCEDURE — 97530 THERAPEUTIC ACTIVITIES: CPT

## 2025-02-18 PROCEDURE — 80053 COMPREHEN METABOLIC PANEL: CPT

## 2025-02-18 PROCEDURE — 2700000000 HC OXYGEN THERAPY PER DAY

## 2025-02-18 PROCEDURE — 2060000000 HC ICU INTERMEDIATE R&B

## 2025-02-18 PROCEDURE — 82248 BILIRUBIN DIRECT: CPT

## 2025-02-18 PROCEDURE — 6370000000 HC RX 637 (ALT 250 FOR IP): Performed by: INTERNAL MEDICINE

## 2025-02-18 PROCEDURE — 94660 CPAP INITIATION&MGMT: CPT

## 2025-02-18 PROCEDURE — 2580000003 HC RX 258

## 2025-02-18 PROCEDURE — 2500000003 HC RX 250 WO HCPCS: Performed by: INTERNAL MEDICINE

## 2025-02-18 PROCEDURE — 36415 COLL VENOUS BLD VENIPUNCTURE: CPT

## 2025-02-18 PROCEDURE — 6360000002 HC RX W HCPCS: Performed by: STUDENT IN AN ORGANIZED HEALTH CARE EDUCATION/TRAINING PROGRAM

## 2025-02-18 PROCEDURE — 84145 PROCALCITONIN (PCT): CPT

## 2025-02-18 PROCEDURE — 6360000002 HC RX W HCPCS

## 2025-02-18 PROCEDURE — 94640 AIRWAY INHALATION TREATMENT: CPT

## 2025-02-18 PROCEDURE — 2500000003 HC RX 250 WO HCPCS

## 2025-02-18 PROCEDURE — 85730 THROMBOPLASTIN TIME PARTIAL: CPT

## 2025-02-18 PROCEDURE — 92526 ORAL FUNCTION THERAPY: CPT

## 2025-02-18 RX ORDER — SODIUM CHLORIDE 9 MG/ML
20 INJECTION, SOLUTION INTRAMUSCULAR; INTRAVENOUS; SUBCUTANEOUS EVERY 8 HOURS
Status: DISCONTINUED | OUTPATIENT
Start: 2025-02-18 | End: 2025-02-19

## 2025-02-18 RX ORDER — PIPERACILLIN SODIUM, TAZOBACTAM SODIUM 4; .5 G/20ML; G/20ML
4500 INJECTION, POWDER, LYOPHILIZED, FOR SOLUTION INTRAVENOUS EVERY 8 HOURS
Status: COMPLETED | OUTPATIENT
Start: 2025-02-18 | End: 2025-02-18

## 2025-02-18 RX ADMIN — SODIUM CHLORIDE, PRESERVATIVE FREE 20 ML: 5 INJECTION INTRAVENOUS at 02:07

## 2025-02-18 RX ADMIN — ACETYLCYSTEINE 600 MG: 200 SOLUTION ORAL; RESPIRATORY (INHALATION) at 19:42

## 2025-02-18 RX ADMIN — SODIUM CHLORIDE, PRESERVATIVE FREE 20 ML: 5 INJECTION INTRAVENOUS at 23:17

## 2025-02-18 RX ADMIN — GUAIFENESIN 400 MG: 200 SOLUTION ORAL at 01:13

## 2025-02-18 RX ADMIN — METHYLPREDNISOLONE SODIUM SUCCINATE 40 MG: 40 INJECTION, POWDER, LYOPHILIZED, FOR SOLUTION INTRAMUSCULAR; INTRAVENOUS at 08:13

## 2025-02-18 RX ADMIN — GUAIFENESIN 400 MG: 200 SOLUTION ORAL at 23:15

## 2025-02-18 RX ADMIN — SODIUM CHLORIDE, PRESERVATIVE FREE 10 ML: 5 INJECTION INTRAVENOUS at 19:54

## 2025-02-18 RX ADMIN — METHYLPREDNISOLONE SODIUM SUCCINATE 40 MG: 40 INJECTION, POWDER, LYOPHILIZED, FOR SOLUTION INTRAMUSCULAR; INTRAVENOUS at 23:15

## 2025-02-18 RX ADMIN — PIPERACILLIN AND TAZOBACTAM 4500 MG: 4; .5 INJECTION, POWDER, LYOPHILIZED, FOR SOLUTION INTRAVENOUS at 01:13

## 2025-02-18 RX ADMIN — GUAIFENESIN 400 MG: 200 SOLUTION ORAL at 15:47

## 2025-02-18 RX ADMIN — GUAIFENESIN 400 MG: 200 SOLUTION ORAL at 08:13

## 2025-02-18 RX ADMIN — ALBUTEROL SULFATE 2.5 MG: 2.5 SOLUTION RESPIRATORY (INHALATION) at 19:42

## 2025-02-18 RX ADMIN — ACETYLCYSTEINE 600 MG: 200 SOLUTION ORAL; RESPIRATORY (INHALATION) at 08:25

## 2025-02-18 RX ADMIN — ALBUTEROL SULFATE 2.5 MG: 2.5 SOLUTION RESPIRATORY (INHALATION) at 00:19

## 2025-02-18 RX ADMIN — SODIUM CHLORIDE, PRESERVATIVE FREE 10 ML: 5 INJECTION INTRAVENOUS at 08:13

## 2025-02-18 RX ADMIN — ARFORMOTEROL TARTRATE 15 MCG: 15 SOLUTION RESPIRATORY (INHALATION) at 19:43

## 2025-02-18 RX ADMIN — ALBUTEROL SULFATE 2.5 MG: 2.5 SOLUTION RESPIRATORY (INHALATION) at 08:25

## 2025-02-18 RX ADMIN — SODIUM CHLORIDE, PRESERVATIVE FREE 20 ML: 5 INJECTION INTRAVENOUS at 08:13

## 2025-02-18 RX ADMIN — SODIUM CHLORIDE, PRESERVATIVE FREE 20 ML: 5 INJECTION INTRAVENOUS at 15:46

## 2025-02-18 RX ADMIN — METOPROLOL SUCCINATE 25 MG: 25 TABLET, EXTENDED RELEASE ORAL at 19:54

## 2025-02-18 RX ADMIN — ARFORMOTEROL TARTRATE 15 MCG: 15 SOLUTION RESPIRATORY (INHALATION) at 08:25

## 2025-02-18 RX ADMIN — PIPERACILLIN AND TAZOBACTAM 4500 MG: 4; .5 INJECTION, POWDER, LYOPHILIZED, FOR SOLUTION INTRAVENOUS at 08:13

## 2025-02-18 RX ADMIN — ALBUTEROL SULFATE 2.5 MG: 2.5 SOLUTION RESPIRATORY (INHALATION) at 12:11

## 2025-02-18 RX ADMIN — ALBUTEROL SULFATE 2.5 MG: 2.5 SOLUTION RESPIRATORY (INHALATION) at 15:55

## 2025-02-18 RX ADMIN — ALBUTEROL SULFATE 2.5 MG: 2.5 SOLUTION RESPIRATORY (INHALATION) at 03:48

## 2025-02-18 RX ADMIN — ATORVASTATIN CALCIUM 20 MG: 20 TABLET, FILM COATED ORAL at 19:54

## 2025-02-18 RX ADMIN — PIPERACILLIN AND TAZOBACTAM 4500 MG: 4; .5 INJECTION, POWDER, LYOPHILIZED, FOR SOLUTION INTRAVENOUS at 15:46

## 2025-02-18 RX ADMIN — GUAIFENESIN 400 MG: 200 SOLUTION ORAL at 19:54

## 2025-02-18 RX ADMIN — ISOSORBIDE MONONITRATE 30 MG: 30 TABLET, EXTENDED RELEASE ORAL at 08:12

## 2025-02-18 RX ADMIN — GUAIFENESIN 400 MG: 200 SOLUTION ORAL at 03:13

## 2025-02-18 ASSESSMENT — PAIN SCALES - GENERAL: PAINLEVEL_OUTOF10: 0

## 2025-02-18 NOTE — CARE COORDINATION
Social Work/Discharge Planning:  Chart reviewed.  Patient on heated high flow 55% FiO2 on 55 liters oxygen.  Rachid with [facility] states pre-cert is still pending for LTAC.  Will continue to follow and wait for pre-cert.  Electronically signed by CATRACHITA Ortiz on 2/18/2025 at 12:24 PM

## 2025-02-19 LAB — PARTIAL THROMBOPLASTIN TIME: 52.8 SEC (ref 24.5–35.1)

## 2025-02-19 PROCEDURE — 94669 MECHANICAL CHEST WALL OSCILL: CPT

## 2025-02-19 PROCEDURE — 94668 MNPJ CHEST WALL SBSQ: CPT

## 2025-02-19 PROCEDURE — 6360000002 HC RX W HCPCS

## 2025-02-19 PROCEDURE — 85730 THROMBOPLASTIN TIME PARTIAL: CPT

## 2025-02-19 PROCEDURE — 6370000000 HC RX 637 (ALT 250 FOR IP): Performed by: INTERNAL MEDICINE

## 2025-02-19 PROCEDURE — 99232 SBSQ HOSP IP/OBS MODERATE 35: CPT | Performed by: STUDENT IN AN ORGANIZED HEALTH CARE EDUCATION/TRAINING PROGRAM

## 2025-02-19 PROCEDURE — 2500000003 HC RX 250 WO HCPCS

## 2025-02-19 PROCEDURE — 6360000002 HC RX W HCPCS: Performed by: INTERNAL MEDICINE

## 2025-02-19 PROCEDURE — 97530 THERAPEUTIC ACTIVITIES: CPT

## 2025-02-19 PROCEDURE — 2500000003 HC RX 250 WO HCPCS: Performed by: INTERNAL MEDICINE

## 2025-02-19 PROCEDURE — 6360000002 HC RX W HCPCS: Performed by: STUDENT IN AN ORGANIZED HEALTH CARE EDUCATION/TRAINING PROGRAM

## 2025-02-19 PROCEDURE — 36415 COLL VENOUS BLD VENIPUNCTURE: CPT

## 2025-02-19 PROCEDURE — 2700000000 HC OXYGEN THERAPY PER DAY

## 2025-02-19 PROCEDURE — 2060000000 HC ICU INTERMEDIATE R&B

## 2025-02-19 PROCEDURE — 94660 CPAP INITIATION&MGMT: CPT

## 2025-02-19 PROCEDURE — 94640 AIRWAY INHALATION TREATMENT: CPT

## 2025-02-19 RX ADMIN — ISOSORBIDE MONONITRATE 30 MG: 30 TABLET, EXTENDED RELEASE ORAL at 10:18

## 2025-02-19 RX ADMIN — ATORVASTATIN CALCIUM 20 MG: 20 TABLET, FILM COATED ORAL at 20:33

## 2025-02-19 RX ADMIN — ARFORMOTEROL TARTRATE 15 MCG: 15 SOLUTION RESPIRATORY (INHALATION) at 08:32

## 2025-02-19 RX ADMIN — HEPARIN SODIUM 10.02 UNITS/KG/HR: 10000 INJECTION, SOLUTION INTRAVENOUS at 23:58

## 2025-02-19 RX ADMIN — ACETYLCYSTEINE 600 MG: 200 SOLUTION ORAL; RESPIRATORY (INHALATION) at 08:32

## 2025-02-19 RX ADMIN — METHYLPREDNISOLONE SODIUM SUCCINATE 40 MG: 40 INJECTION, POWDER, LYOPHILIZED, FOR SOLUTION INTRAMUSCULAR; INTRAVENOUS at 20:34

## 2025-02-19 RX ADMIN — METHYLPREDNISOLONE SODIUM SUCCINATE 40 MG: 40 INJECTION, POWDER, LYOPHILIZED, FOR SOLUTION INTRAMUSCULAR; INTRAVENOUS at 10:19

## 2025-02-19 RX ADMIN — ALBUTEROL SULFATE 2.5 MG: 2.5 SOLUTION RESPIRATORY (INHALATION) at 15:56

## 2025-02-19 RX ADMIN — ALBUTEROL SULFATE 2.5 MG: 2.5 SOLUTION RESPIRATORY (INHALATION) at 00:12

## 2025-02-19 RX ADMIN — ALBUTEROL SULFATE 2.5 MG: 2.5 SOLUTION RESPIRATORY (INHALATION) at 20:16

## 2025-02-19 RX ADMIN — GUAIFENESIN 400 MG: 200 SOLUTION ORAL at 20:33

## 2025-02-19 RX ADMIN — ALBUTEROL SULFATE 2.5 MG: 2.5 SOLUTION RESPIRATORY (INHALATION) at 03:54

## 2025-02-19 RX ADMIN — PETROLATUM: 420 OINTMENT TOPICAL at 14:26

## 2025-02-19 RX ADMIN — GUAIFENESIN 400 MG: 200 SOLUTION ORAL at 10:15

## 2025-02-19 RX ADMIN — SODIUM CHLORIDE, PRESERVATIVE FREE 10 ML: 5 INJECTION INTRAVENOUS at 20:33

## 2025-02-19 RX ADMIN — ACETYLCYSTEINE 600 MG: 200 SOLUTION ORAL; RESPIRATORY (INHALATION) at 20:15

## 2025-02-19 RX ADMIN — ARFORMOTEROL TARTRATE 15 MCG: 15 SOLUTION RESPIRATORY (INHALATION) at 20:16

## 2025-02-19 RX ADMIN — METOPROLOL SUCCINATE 25 MG: 25 TABLET, EXTENDED RELEASE ORAL at 20:33

## 2025-02-19 RX ADMIN — ALBUTEROL SULFATE 2.5 MG: 2.5 SOLUTION RESPIRATORY (INHALATION) at 12:54

## 2025-02-19 RX ADMIN — GUAIFENESIN 400 MG: 200 SOLUTION ORAL at 17:26

## 2025-02-19 RX ADMIN — GUAIFENESIN 400 MG: 200 SOLUTION ORAL at 03:07

## 2025-02-19 RX ADMIN — SODIUM CHLORIDE, PRESERVATIVE FREE 10 ML: 5 INJECTION INTRAVENOUS at 10:20

## 2025-02-19 RX ADMIN — ALBUTEROL SULFATE 2.5 MG: 2.5 SOLUTION RESPIRATORY (INHALATION) at 08:32

## 2025-02-19 RX ADMIN — GUAIFENESIN 400 MG: 200 SOLUTION ORAL at 14:25

## 2025-02-19 NOTE — CARE COORDINATION
Social Work/Discharge Planning:  Received call from Tana with [employer] stating Peer to Peer is being offered.  Physician can call (ph: [phone]) by 8:00am tomorrow.  Physician will need patient name, date of birth and Member ID ([id number]).  Tana states updated clinicals will need to be faxed (fax: [phone]) prior to Physician submitting for peer to peer.  Updated charge nurse.  Will continue to follow.  Electronically signed by CATRACHITA Ortiz on 2/19/2025 at 2:13 PM    Addendum:  Faxed updated clinicals to [facility] [employer].  Electronically signed by CATRACHITA Ortiz on 2/19/2025 at 3:12 PM

## 2025-02-19 NOTE — CARE COORDINATION
Social Work/Discharge Planning:  Chart reviewed.  Patient on 50% FiO2 on 55 liters oxygen.  Archid with [facility] states pre-cert is still pending.  Will continue to follow and wait for pre-cert.  Electronically signed by CATRACHITA Ortiz on 2/19/2025 at 11:00 AM

## 2025-02-20 ENCOUNTER — HOSPITAL ENCOUNTER (OUTPATIENT)
Dept: INPATIENT UNIT | Age: 85
Discharge: NURSING, REHAB OR LONG-TERM CARE | End: 2025-03-04
Attending: INTERNAL MEDICINE | Admitting: INTERNAL MEDICINE

## 2025-02-20 VITALS
OXYGEN SATURATION: 93 % | HEART RATE: 92 BPM | RESPIRATION RATE: 18 BRPM | WEIGHT: 181.7 LBS | TEMPERATURE: 97.7 F | SYSTOLIC BLOOD PRESSURE: 144 MMHG | DIASTOLIC BLOOD PRESSURE: 62 MMHG | HEIGHT: 59 IN | BODY MASS INDEX: 36.63 KG/M2

## 2025-02-20 LAB
ANION GAP SERPL CALCULATED.3IONS-SCNC: 12 MMOL/L (ref 7–16)
BUN SERPL-MCNC: 57 MG/DL (ref 6–23)
CALCIUM SERPL-MCNC: 8.9 MG/DL (ref 8.6–10.2)
CHLORIDE SERPL-SCNC: 102 MMOL/L (ref 98–107)
CO2 SERPL-SCNC: 24 MMOL/L (ref 22–29)
CREAT SERPL-MCNC: 1.8 MG/DL (ref 0.5–1)
ERYTHROCYTE [DISTWIDTH] IN BLOOD BY AUTOMATED COUNT: 13.8 % (ref 11.5–15)
GFR, ESTIMATED: 29 ML/MIN/1.73M2
GLUCOSE SERPL-MCNC: 132 MG/DL (ref 74–99)
HCT VFR BLD AUTO: 37 % (ref 34–48)
HGB BLD-MCNC: 11.5 G/DL (ref 11.5–15.5)
MCH RBC QN AUTO: 31.3 PG (ref 26–35)
MCHC RBC AUTO-ENTMCNC: 31.1 G/DL (ref 32–34.5)
MCV RBC AUTO: 100.8 FL (ref 80–99.9)
PARTIAL THROMBOPLASTIN TIME: 46.2 SEC (ref 24.5–35.1)
PARTIAL THROMBOPLASTIN TIME: 53.7 SEC (ref 24.5–35.1)
PARTIAL THROMBOPLASTIN TIME: 92.9 SEC (ref 24.5–35.1)
PLATELET # BLD AUTO: 314 K/UL (ref 130–450)
PMV BLD AUTO: 9.5 FL (ref 7–12)
POTASSIUM SERPL-SCNC: 5 MMOL/L (ref 3.5–5)
RBC # BLD AUTO: 3.67 M/UL (ref 3.5–5.5)
SODIUM SERPL-SCNC: 138 MMOL/L (ref 132–146)
WBC OTHER # BLD: 16.5 K/UL (ref 4.5–11.5)

## 2025-02-20 PROCEDURE — 2700000000 HC OXYGEN THERAPY PER DAY

## 2025-02-20 PROCEDURE — 6360000002 HC RX W HCPCS: Performed by: STUDENT IN AN ORGANIZED HEALTH CARE EDUCATION/TRAINING PROGRAM

## 2025-02-20 PROCEDURE — 2500000003 HC RX 250 WO HCPCS: Performed by: INTERNAL MEDICINE

## 2025-02-20 PROCEDURE — 6370000000 HC RX 637 (ALT 250 FOR IP): Performed by: INTERNAL MEDICINE

## 2025-02-20 PROCEDURE — 80048 BASIC METABOLIC PNL TOTAL CA: CPT

## 2025-02-20 PROCEDURE — 85027 COMPLETE CBC AUTOMATED: CPT

## 2025-02-20 PROCEDURE — 99239 HOSP IP/OBS DSCHRG MGMT >30: CPT | Performed by: STUDENT IN AN ORGANIZED HEALTH CARE EDUCATION/TRAINING PROGRAM

## 2025-02-20 PROCEDURE — 36415 COLL VENOUS BLD VENIPUNCTURE: CPT

## 2025-02-20 PROCEDURE — 94668 MNPJ CHEST WALL SBSQ: CPT

## 2025-02-20 PROCEDURE — 6360000002 HC RX W HCPCS: Performed by: INTERNAL MEDICINE

## 2025-02-20 PROCEDURE — 2500000003 HC RX 250 WO HCPCS

## 2025-02-20 PROCEDURE — 85730 THROMBOPLASTIN TIME PARTIAL: CPT

## 2025-02-20 PROCEDURE — 94669 MECHANICAL CHEST WALL OSCILL: CPT

## 2025-02-20 PROCEDURE — 94640 AIRWAY INHALATION TREATMENT: CPT

## 2025-02-20 PROCEDURE — 94660 CPAP INITIATION&MGMT: CPT

## 2025-02-20 PROCEDURE — 6360000002 HC RX W HCPCS

## 2025-02-20 RX ORDER — PREDNISONE 10 MG/1
TABLET ORAL
Qty: 10 TABLET | Refills: 0 | Status: ON HOLD | OUTPATIENT
Start: 2025-02-20 | End: 2025-02-24

## 2025-02-20 RX ORDER — METOPROLOL SUCCINATE 25 MG/1
25 TABLET, EXTENDED RELEASE ORAL
Qty: 30 TABLET | Refills: 3 | Status: ON HOLD | OUTPATIENT
Start: 2025-02-20

## 2025-02-20 RX ADMIN — ALBUTEROL SULFATE 2.5 MG: 2.5 SOLUTION RESPIRATORY (INHALATION) at 12:08

## 2025-02-20 RX ADMIN — ATORVASTATIN CALCIUM 20 MG: 20 TABLET, FILM COATED ORAL at 19:55

## 2025-02-20 RX ADMIN — METHYLPREDNISOLONE SODIUM SUCCINATE 40 MG: 40 INJECTION, POWDER, LYOPHILIZED, FOR SOLUTION INTRAMUSCULAR; INTRAVENOUS at 08:55

## 2025-02-20 RX ADMIN — ACETYLCYSTEINE 600 MG: 200 SOLUTION ORAL; RESPIRATORY (INHALATION) at 08:16

## 2025-02-20 RX ADMIN — SODIUM CHLORIDE, PRESERVATIVE FREE 10 ML: 5 INJECTION INTRAVENOUS at 19:55

## 2025-02-20 RX ADMIN — ACETYLCYSTEINE 600 MG: 200 SOLUTION ORAL; RESPIRATORY (INHALATION) at 20:03

## 2025-02-20 RX ADMIN — GUAIFENESIN 400 MG: 200 SOLUTION ORAL at 08:55

## 2025-02-20 RX ADMIN — ALBUTEROL SULFATE 2.5 MG: 2.5 SOLUTION RESPIRATORY (INHALATION) at 00:47

## 2025-02-20 RX ADMIN — ARFORMOTEROL TARTRATE 15 MCG: 15 SOLUTION RESPIRATORY (INHALATION) at 20:03

## 2025-02-20 RX ADMIN — METOPROLOL SUCCINATE 25 MG: 25 TABLET, EXTENDED RELEASE ORAL at 19:55

## 2025-02-20 RX ADMIN — ALBUTEROL SULFATE 2.5 MG: 2.5 SOLUTION RESPIRATORY (INHALATION) at 20:04

## 2025-02-20 RX ADMIN — HEPARIN SODIUM 3400 UNITS: 1000 INJECTION INTRAVENOUS; SUBCUTANEOUS at 05:08

## 2025-02-20 RX ADMIN — SODIUM CHLORIDE, PRESERVATIVE FREE 10 ML: 5 INJECTION INTRAVENOUS at 08:55

## 2025-02-20 RX ADMIN — ISOSORBIDE MONONITRATE 30 MG: 30 TABLET, EXTENDED RELEASE ORAL at 08:55

## 2025-02-20 RX ADMIN — ALBUTEROL SULFATE 2.5 MG: 2.5 SOLUTION RESPIRATORY (INHALATION) at 08:16

## 2025-02-20 RX ADMIN — ALBUTEROL SULFATE 2.5 MG: 2.5 SOLUTION RESPIRATORY (INHALATION) at 15:16

## 2025-02-20 RX ADMIN — GUAIFENESIN 400 MG: 200 SOLUTION ORAL at 04:30

## 2025-02-20 RX ADMIN — METHYLPREDNISOLONE SODIUM SUCCINATE 40 MG: 40 INJECTION, POWDER, LYOPHILIZED, FOR SOLUTION INTRAMUSCULAR; INTRAVENOUS at 19:57

## 2025-02-20 RX ADMIN — GUAIFENESIN 400 MG: 200 SOLUTION ORAL at 19:55

## 2025-02-20 RX ADMIN — PETROLATUM: 420 OINTMENT TOPICAL at 00:00

## 2025-02-20 RX ADMIN — ARFORMOTEROL TARTRATE 15 MCG: 15 SOLUTION RESPIRATORY (INHALATION) at 08:16

## 2025-02-20 NOTE — PLAN OF CARE
Problem: ABCDS Injury Assessment  Goal: Absence of physical injury  2/13/2025 0427 by Rubin Holm RN  Outcome: Progressing     Problem: Discharge Planning  Goal: Discharge to home or other facility with appropriate resources  2/13/2025 0427 by Rubin Holm RN  Outcome: Progressing     Problem: Confusion  Goal: Confusion, delirium, dementia, or psychosis is improved or at baseline  Description: INTERVENTIONS:  1. Assess for possible contributors to thought disturbance, including medications, impaired vision or hearing, underlying metabolic abnormalities, dehydration, psychiatric diagnoses, and notify attending LIP  2. [place] high risk fall precautions, as indicated  3. Provide frequent short contacts to provide reality reorientation, refocusing and direction  4. Decrease environmental stimuli, including noise as appropriate  5. Monitor and intervene to maintain adequate nutrition, hydration, elimination, sleep and activity  6. If unable to ensure safety without constant attention obtain sitter and review sitter guidelines with assigned personnel  7. Initiate Psychosocial CNS and Spiritual Care consult, as indicated  2/13/2025 0427 by Rubin Holm RN  Outcome: Progressing     Problem: Safety - Adult  Goal: Free from fall injury  2/13/2025 0427 by Rubin Holm RN  Outcome: Progressing     Problem: Skin/Tissue Integrity  Goal: Skin integrity remains intact  Description: 1.  Monitor for areas of redness and/or skin breakdown  2.  Assess vascular access sites hourly  3.  Every 4-6 hours minimum:  Change oxygen saturation probe site  4.  Every 4-6 hours:  If on nasal continuous positive airway pressure, respiratory therapy assess nares and determine need for appliance change or resting period  2/13/2025 0427 by Rubin Holm RN  Outcome: Progressing     Problem: Chronic Conditions and Co-morbidities  Goal: Patient's chronic conditions and co-morbidity symptoms are monitored and maintained or improved  2/13/2025 
  Problem: ABCDS Injury Assessment  Goal: Absence of physical injury  2/13/2025 1123 by Agustin Sierra RN  Outcome: Progressing  2/13/2025 0427 by Rubin Holm RN  Outcome: Progressing  2/13/2025 0226 by Merle Benitez  Outcome: Progressing     Problem: Discharge Planning  Goal: Discharge to home or other facility with appropriate resources  2/13/2025 1123 by Agustin Sierra RN  Outcome: Progressing  2/13/2025 0427 by Rubin Holm RN  Outcome: Progressing  2/13/2025 0226 by Merle Benitez  Outcome: Progressing     Problem: Confusion  Goal: Confusion, delirium, dementia, or psychosis is improved or at baseline  Description: INTERVENTIONS:  1. Assess for possible contributors to thought disturbance, including medications, impaired vision or hearing, underlying metabolic abnormalities, dehydration, psychiatric diagnoses, and notify attending LIP  2. [place] high risk fall precautions, as indicated  3. Provide frequent short contacts to provide reality reorientation, refocusing and direction  4. Decrease environmental stimuli, including noise as appropriate  5. Monitor and intervene to maintain adequate nutrition, hydration, elimination, sleep and activity  6. If unable to ensure safety without constant attention obtain sitter and review sitter guidelines with assigned personnel  7. Initiate Psychosocial CNS and Spiritual Care consult, as indicated  2/13/2025 1123 by Agustin Sierra RN  Outcome: Progressing  2/13/2025 0427 by Rubin Holm RN  Outcome: Progressing  2/13/2025 0226 by Merle Benitez  Outcome: Progressing     Problem: Safety - Adult  Goal: Free from fall injury  2/13/2025 1123 by Agustin Sierra RN  Outcome: Progressing  2/13/2025 0427 by Rubin Holm RN  Outcome: Progressing  2/13/2025 0226 by Merle Benitez  Outcome: Progressing     Problem: Skin/Tissue Integrity  Goal: Skin integrity remains intact  Description: 1.  Monitor for areas of redness and/or skin breakdown  2.  Assess vascular access 
  Problem: ABCDS Injury Assessment  Goal: Absence of physical injury  2/18/2025 0915 by Cornelia Menjivar RN  Outcome: Progressing     Problem: Discharge Planning  Goal: Discharge to home or other facility with appropriate resources  2/18/2025 0915 by Cornelia Menjivar RN  Outcome: Progressing     Problem: Confusion  Goal: Confusion, delirium, dementia, or psychosis is improved or at baseline  Description: INTERVENTIONS:  1. Assess for possible contributors to thought disturbance, including medications, impaired vision or hearing, underlying metabolic abnormalities, dehydration, psychiatric diagnoses, and notify attending LIP  2. [place] high risk fall precautions, as indicated  3. Provide frequent short contacts to provide reality reorientation, refocusing and direction  4. Decrease environmental stimuli, including noise as appropriate  5. Monitor and intervene to maintain adequate nutrition, hydration, elimination, sleep and activity  6. If unable to ensure safety without constant attention obtain sitter and review sitter guidelines with assigned personnel  7. Initiate Psychosocial CNS and Spiritual Care consult, as indicated  2/18/2025 0915 by Cornelia Menjivar RN  Outcome: Progressing     Problem: Safety - Adult  Goal: Free from fall injury  2/18/2025 0915 by Cornelia Menjivar RN  Outcome: Progressing     Problem: Skin/Tissue Integrity  Goal: Skin integrity remains intact  Description: 1.  Monitor for areas of redness and/or skin breakdown  2.  Assess vascular access sites hourly  3.  Every 4-6 hours minimum:  Change oxygen saturation probe site  4.  Every 4-6 hours:  If on nasal continuous positive airway pressure, respiratory therapy assess nares and determine need for appliance change or resting period  2/18/2025 0915 by Cornelia Menjivar RN  Outcome: Progressing     Problem: Chronic Conditions and Co-morbidities  Goal: Patient's chronic conditions and co-morbidity symptoms are monitored and maintained or 
  Problem: ABCDS Injury Assessment  Goal: Absence of physical injury  2/18/2025 2205 by Tana Chamorro RN  Outcome: Progressing  Flowsheets (Taken 2/18/2025 1945)  Absence of Physical Injury: Implement safety measures based on patient assessment  2/18/2025 0915 by Cornelia Menjivar RN  Outcome: Progressing     Problem: Discharge Planning  Goal: Discharge to home or other facility with appropriate resources  2/18/2025 2205 by Tana Chamorro RN  Outcome: Progressing  Flowsheets (Taken 2/18/2025 1945)  Discharge to home or other facility with appropriate resources: Identify barriers to discharge with patient and caregiver  2/18/2025 0915 by Cornelia Menjivar RN  Outcome: Progressing     Problem: Confusion  Goal: Confusion, delirium, dementia, or psychosis is improved or at baseline  Description: INTERVENTIONS:  1. Assess for possible contributors to thought disturbance, including medications, impaired vision or hearing, underlying metabolic abnormalities, dehydration, psychiatric diagnoses, and notify attending LIP  2. [place] high risk fall precautions, as indicated  3. Provide frequent short contacts to provide reality reorientation, refocusing and direction  4. Decrease environmental stimuli, including noise as appropriate  5. Monitor and intervene to maintain adequate nutrition, hydration, elimination, sleep and activity  6. If unable to ensure safety without constant attention obtain sitter and review sitter guidelines with assigned personnel  7. Initiate Psychosocial CNS and Spiritual Care consult, as indicated  2/18/2025 2205 by Tana Chamorro RN  Outcome: Progressing  2/18/2025 0915 by Cornelia Menjivar RN  Outcome: Progressing     Problem: Safety - Adult  Goal: Free from fall injury  2/18/2025 2205 by Tana Chamorro RN  Outcome: Progressing  Flowsheets (Taken 2/18/2025 1945)  Free From Fall Injury: Instruct family/caregiver on patient safety  2/18/2025 0915 by Cornelia Menjivar RN  Outcome: 
  Problem: ABCDS Injury Assessment  Goal: Absence of physical injury  2/19/2025 2301 by Meghana Oropeza, RN  Outcome: Progressing     Problem: Discharge Planning  Goal: Discharge to home or other facility with appropriate resources  2/19/2025 2301 by Meghana Oropeza, RN  Outcome: Progressing     Problem: Confusion  Goal: Confusion, delirium, dementia, or psychosis is improved or at baseline  Description: INTERVENTIONS:  1. Assess for possible contributors to thought disturbance, including medications, impaired vision or hearing, underlying metabolic abnormalities, dehydration, psychiatric diagnoses, and notify attending LIP  2. [place] high risk fall precautions, as indicated  3. Provide frequent short contacts to provide reality reorientation, refocusing and direction  4. Decrease environmental stimuli, including noise as appropriate  5. Monitor and intervene to maintain adequate nutrition, hydration, elimination, sleep and activity  6. If unable to ensure safety without constant attention obtain sitter and review sitter guidelines with assigned personnel  7. Initiate Psychosocial CNS and Spiritual Care consult, as indicated  2/19/2025 2301 by Meghana Oropeza, RN  Outcome: Progressing     Problem: Safety - Adult  Goal: Free from fall injury  2/19/2025 2301 by Meghana Oropeza, RN  Outcome: Progressing     Problem: Skin/Tissue Integrity  Goal: Skin integrity remains intact  Description: 1.  Monitor for areas of redness and/or skin breakdown  2.  Assess vascular access sites hourly  3.  Every 4-6 hours minimum:  Change oxygen saturation probe site  4.  Every 4-6 hours:  If on nasal continuous positive airway pressure, respiratory therapy assess nares and determine need for appliance change or resting period  2/19/2025 2301 by Meghana Oropeza, RN  Outcome: Progressing     Problem: Chronic Conditions and Co-morbidities  Goal: Patient's chronic conditions and co-morbidity symptoms are monitored and maintained or 
  Problem: ABCDS Injury Assessment  Goal: Absence of physical injury  2/20/2025 1021 by Cornelia Menjivar RN  Outcome: Progressing     Problem: Discharge Planning  Goal: Discharge to home or other facility with appropriate resources  2/20/2025 1021 by Cornelia Menjivar RN  Outcome: Progressing     Problem: Confusion  Goal: Confusion, delirium, dementia, or psychosis is improved or at baseline  Description: INTERVENTIONS:  1. Assess for possible contributors to thought disturbance, including medications, impaired vision or hearing, underlying metabolic abnormalities, dehydration, psychiatric diagnoses, and notify attending LIP  2. [place] high risk fall precautions, as indicated  3. Provide frequent short contacts to provide reality reorientation, refocusing and direction  4. Decrease environmental stimuli, including noise as appropriate  5. Monitor and intervene to maintain adequate nutrition, hydration, elimination, sleep and activity  6. If unable to ensure safety without constant attention obtain sitter and review sitter guidelines with assigned personnel  7. Initiate Psychosocial CNS and Spiritual Care consult, as indicated  2/20/2025 1021 by Cornelia Menjivar RN  Outcome: Progressing     Problem: Safety - Adult  Goal: Free from fall injury  2/20/2025 1021 by Cornelia Menjivar RN  Outcome: Progressing     Problem: Skin/Tissue Integrity  Goal: Skin integrity remains intact  Description: 1.  Monitor for areas of redness and/or skin breakdown  2.  Assess vascular access sites hourly  3.  Every 4-6 hours minimum:  Change oxygen saturation probe site  4.  Every 4-6 hours:  If on nasal continuous positive airway pressure, respiratory therapy assess nares and determine need for appliance change or resting period  2/20/2025 1021 by Cornelia Menjivar RN  Outcome: Progressing     Problem: Chronic Conditions and Co-morbidities  Goal: Patient's chronic conditions and co-morbidity symptoms are monitored and maintained or 
  Problem: ABCDS Injury Assessment  Goal: Absence of physical injury  2/5/2025 2223 by Emil Raya RN  Outcome: Progressing  2/5/2025 1802 by Regine Malloy RN  Outcome: Progressing     Problem: Discharge Planning  Goal: Discharge to home or other facility with appropriate resources  2/5/2025 2223 by Emil Raya RN  Outcome: Progressing  2/5/2025 1802 by Regine Malloy RN  Outcome: Progressing     Problem: Confusion  Goal: Confusion, delirium, dementia, or psychosis is improved or at baseline  Description: INTERVENTIONS:  1. Assess for possible contributors to thought disturbance, including medications, impaired vision or hearing, underlying metabolic abnormalities, dehydration, psychiatric diagnoses, and notify attending LIP  2. [place] high risk fall precautions, as indicated  3. Provide frequent short contacts to provide reality reorientation, refocusing and direction  4. Decrease environmental stimuli, including noise as appropriate  5. Monitor and intervene to maintain adequate nutrition, hydration, elimination, sleep and activity  6. If unable to ensure safety without constant attention obtain sitter and review sitter guidelines with assigned personnel  7. Initiate Psychosocial CNS and Spiritual Care consult, as indicated  2/5/2025 2223 by Emil Raya RN  Outcome: Progressing  2/5/2025 1802 by Regine Malloy RN  Outcome: Progressing     Problem: Safety - Adult  Goal: Free from fall injury  2/5/2025 2223 by Emil Raya RN  Outcome: Progressing  2/5/2025 1802 by Regine Malloy RN  Outcome: Progressing     
  Problem: ABCDS Injury Assessment  Goal: Absence of physical injury  2/8/2025 1118 by Agustin Sierra RN  Outcome: Progressing  2/7/2025 2122 by Merle Estevez RN  Outcome: Progressing     Problem: Discharge Planning  Goal: Discharge to home or other facility with appropriate resources  2/8/2025 1118 by Agustin Sierra RN  Outcome: Progressing  2/7/2025 2122 by Merle Estevez RN  Outcome: Progressing     Problem: Confusion  Goal: Confusion, delirium, dementia, or psychosis is improved or at baseline  Description: INTERVENTIONS:  1. Assess for possible contributors to thought disturbance, including medications, impaired vision or hearing, underlying metabolic abnormalities, dehydration, psychiatric diagnoses, and notify attending LIP  2. [place] high risk fall precautions, as indicated  3. Provide frequent short contacts to provide reality reorientation, refocusing and direction  4. Decrease environmental stimuli, including noise as appropriate  5. Monitor and intervene to maintain adequate nutrition, hydration, elimination, sleep and activity  6. If unable to ensure safety without constant attention obtain sitter and review sitter guidelines with assigned personnel  7. Initiate Psychosocial CNS and Spiritual Care consult, as indicated  2/8/2025 1118 by Agustin Sierra RN  Outcome: Progressing  2/7/2025 2122 by Merle Estevez RN  Outcome: Progressing     Problem: Safety - Adult  Goal: Free from fall injury  2/8/2025 1118 by Agustin Sierra RN  Outcome: Progressing  2/7/2025 2122 by Merle Estevez RN  Outcome: Progressing     Problem: Skin/Tissue Integrity  Goal: Skin integrity remains intact  Description: 1.  Monitor for areas of redness and/or skin breakdown  2.  Assess vascular access sites hourly  3.  Every 4-6 hours minimum:  Change oxygen saturation probe site  4.  Every 4-6 hours:  If on nasal continuous positive airway pressure, respiratory therapy assess nares and determine need for 
  Problem: ABCDS Injury Assessment  Goal: Absence of physical injury  Outcome: Progressing     Problem: Discharge Planning  Goal: Discharge to home or other facility with appropriate resources  Outcome: Progressing     Problem: Confusion  Goal: Confusion, delirium, dementia, or psychosis is improved or at baseline  Description: INTERVENTIONS:  1. Assess for possible contributors to thought disturbance, including medications, impaired vision or hearing, underlying metabolic abnormalities, dehydration, psychiatric diagnoses, and notify attending LIP  2. [place] high risk fall precautions, as indicated  3. Provide frequent short contacts to provide reality reorientation, refocusing and direction  4. Decrease environmental stimuli, including noise as appropriate  5. Monitor and intervene to maintain adequate nutrition, hydration, elimination, sleep and activity  6. If unable to ensure safety without constant attention obtain sitter and review sitter guidelines with assigned personnel  7. Initiate Psychosocial CNS and Spiritual Care consult, as indicated  Outcome: Progressing     Problem: Safety - Adult  Goal: Free from fall injury  Outcome: Progressing     
  Problem: ABCDS Injury Assessment  Goal: Absence of physical injury  Outcome: Progressing     Problem: Discharge Planning  Goal: Discharge to home or other facility with appropriate resources  Outcome: Progressing     Problem: Confusion  Goal: Confusion, delirium, dementia, or psychosis is improved or at baseline  Description: INTERVENTIONS:  1. Assess for possible contributors to thought disturbance, including medications, impaired vision or hearing, underlying metabolic abnormalities, dehydration, psychiatric diagnoses, and notify attending LIP  2. [place] high risk fall precautions, as indicated  3. Provide frequent short contacts to provide reality reorientation, refocusing and direction  4. Decrease environmental stimuli, including noise as appropriate  5. Monitor and intervene to maintain adequate nutrition, hydration, elimination, sleep and activity  6. If unable to ensure safety without constant attention obtain sitter and review sitter guidelines with assigned personnel  7. Initiate Psychosocial CNS and Spiritual Care consult, as indicated  Outcome: Progressing     Problem: Safety - Adult  Goal: Free from fall injury  Outcome: Progressing     Problem: Skin/Tissue Integrity  Goal: Skin integrity remains intact  Description: 1.  Monitor for areas of redness and/or skin breakdown  2.  Assess vascular access sites hourly  3.  Every 4-6 hours minimum:  Change oxygen saturation probe site  4.  Every 4-6 hours:  If on nasal continuous positive airway pressure, respiratory therapy assess nares and determine need for appliance change or resting period  Outcome: Progressing     Problem: Chronic Conditions and Co-morbidities  Goal: Patient's chronic conditions and co-morbidity symptoms are monitored and maintained or improved  Outcome: Progressing     Problem: Pain  Goal: Verbalizes/displays adequate comfort level or baseline comfort level  Outcome: Progressing     Problem: Nutrition Deficit:  Goal: Optimize 
  Problem: ABCDS Injury Assessment  Goal: Absence of physical injury  Outcome: Progressing     Problem: Discharge Planning  Goal: Discharge to home or other facility with appropriate resources  Outcome: Progressing     Problem: Confusion  Goal: Confusion, delirium, dementia, or psychosis is improved or at baseline  Description: INTERVENTIONS:  1. Assess for possible contributors to thought disturbance, including medications, impaired vision or hearing, underlying metabolic abnormalities, dehydration, psychiatric diagnoses, and notify attending LIP  2. [place] high risk fall precautions, as indicated  3. Provide frequent short contacts to provide reality reorientation, refocusing and direction  4. Decrease environmental stimuli, including noise as appropriate  5. Monitor and intervene to maintain adequate nutrition, hydration, elimination, sleep and activity  6. If unable to ensure safety without constant attention obtain sitter and review sitter guidelines with assigned personnel  7. Initiate Psychosocial CNS and Spiritual Care consult, as indicated  Outcome: Progressing     Problem: Safety - Adult  Goal: Free from fall injury  Outcome: Progressing     Problem: Skin/Tissue Integrity  Goal: Skin integrity remains intact  Description: 1.  Monitor for areas of redness and/or skin breakdown  2.  Assess vascular access sites hourly  3.  Every 4-6 hours minimum:  Change oxygen saturation probe site  4.  Every 4-6 hours:  If on nasal continuous positive airway pressure, respiratory therapy assess nares and determine need for appliance change or resting period  Outcome: Progressing     Problem: Chronic Conditions and Co-morbidities  Goal: Patient's chronic conditions and co-morbidity symptoms are monitored and maintained or improved  Outcome: Progressing     Problem: Pain  Goal: Verbalizes/displays adequate comfort level or baseline comfort level  Outcome: Progressing     Problem: Nutrition Deficit:  Goal: Optimize 
  Problem: ABCDS Injury Assessment  Goal: Absence of physical injury  Outcome: Progressing     Problem: Discharge Planning  Goal: Discharge to home or other facility with appropriate resources  Outcome: Progressing     Problem: Confusion  Goal: Confusion, delirium, dementia, or psychosis is improved or at baseline  Description: INTERVENTIONS:  1. Assess for possible contributors to thought disturbance, including medications, impaired vision or hearing, underlying metabolic abnormalities, dehydration, psychiatric diagnoses, and notify attending LIP  2. [place] high risk fall precautions, as indicated  3. Provide frequent short contacts to provide reality reorientation, refocusing and direction  4. Decrease environmental stimuli, including noise as appropriate  5. Monitor and intervene to maintain adequate nutrition, hydration, elimination, sleep and activity  6. If unable to ensure safety without constant attention obtain sitter and review sitter guidelines with assigned personnel  7. Initiate Psychosocial CNS and Spiritual Care consult, as indicated  Outcome: Progressing     Problem: Safety - Adult  Goal: Free from fall injury  Outcome: Progressing     Problem: Skin/Tissue Integrity  Goal: Skin integrity remains intact  Description: 1.  Monitor for areas of redness and/or skin breakdown  2.  Assess vascular access sites hourly  3.  Every 4-6 hours minimum:  Change oxygen saturation probe site  4.  Every 4-6 hours:  If on nasal continuous positive airway pressure, respiratory therapy assess nares and determine need for appliance change or resting period  Outcome: Progressing     Problem: Chronic Conditions and Co-morbidities  Goal: Patient's chronic conditions and co-morbidity symptoms are monitored and maintained or improved  Outcome: Progressing     
  Problem: ABCDS Injury Assessment  Goal: Absence of physical injury  Outcome: Progressing     Problem: Discharge Planning  Goal: Discharge to home or other facility with appropriate resources  Outcome: Progressing     Problem: Confusion  Goal: Confusion, delirium, dementia, or psychosis is improved or at baseline  Description: INTERVENTIONS:  1. Assess for possible contributors to thought disturbance, including medications, impaired vision or hearing, underlying metabolic abnormalities, dehydration, psychiatric diagnoses, and notify attending LIP  2. [place] high risk fall precautions, as indicated  3. Provide frequent short contacts to provide reality reorientation, refocusing and direction  4. Decrease environmental stimuli, including noise as appropriate  5. Monitor and intervene to maintain adequate nutrition, hydration, elimination, sleep and activity  6. If unable to ensure safety without constant attention obtain sitter and review sitter guidelines with assigned personnel  7. Initiate Psychosocial CNS and Spiritual Care consult, as indicated  Outcome: Progressing     Problem: Safety - Adult  Goal: Free from fall injury  Outcome: Progressing     Problem: Skin/Tissue Integrity  Goal: Skin integrity remains intact  Description: 1.  Monitor for areas of redness and/or skin breakdown  2.  Assess vascular access sites hourly  3.  Every 4-6 hours minimum:  Change oxygen saturation probe site  4.  Every 4-6 hours:  If on nasal continuous positive airway pressure, respiratory therapy assess nares and determine need for appliance change or resting period  Outcome: Progressing  Flowsheets (Taken 2/16/2025 0800)  Skin Integrity Remains Intact: Monitor for areas of redness and/or skin breakdown     Problem: Chronic Conditions and Co-morbidities  Goal: Patient's chronic conditions and co-morbidity symptoms are monitored and maintained or improved  Outcome: Progressing     Problem: Pain  Goal: Verbalizes/displays 
  Problem: ABCDS Injury Assessment  Goal: Absence of physical injury  Outcome: Progressing     Problem: Discharge Planning  Goal: Discharge to home or other facility with appropriate resources  Outcome: Progressing     Problem: Confusion  Goal: Confusion, delirium, dementia, or psychosis is improved or at baseline  Description: INTERVENTIONS:  1. Assess for possible contributors to thought disturbance, including medications, impaired vision or hearing, underlying metabolic abnormalities, dehydration, psychiatric diagnoses, and notify attending LIP  2. [place] high risk fall precautions, as indicated  3. Provide frequent short contacts to provide reality reorientation, refocusing and direction  4. Decrease environmental stimuli, including noise as appropriate  5. Monitor and intervene to maintain adequate nutrition, hydration, elimination, sleep and activity  6. If unable to ensure safety without constant attention obtain sitter and review sitter guidelines with assigned personnel  7. Initiate Psychosocial CNS and Spiritual Care consult, as indicated  Outcome: Progressing     Problem: Safety - Adult  Goal: Free from fall injury  Outcome: Progressing     Problem: Skin/Tissue Integrity  Goal: Skin integrity remains intact  Description: 1.  Monitor for areas of redness and/or skin breakdown  2.  Assess vascular access sites hourly  3.  Every 4-6 hours minimum:  Change oxygen saturation probe site  4.  Every 4-6 hours:  If on nasal continuous positive airway pressure, respiratory therapy assess nares and determine need for appliance change or resting period  Outcome: Progressing     Problem: Chronic Conditions and Co-morbidities  Goal: Patient's chronic conditions and co-morbidity symptoms are monitored and maintained or improved  Outcome: Progressing     Problem: Pain  Goal: Verbalizes/displays adequate comfort level or baseline comfort level  Outcome: Progressing     Problem: Nutrition Deficit:  Goal: Optimize 
  Problem: ABCDS Injury Assessment  Goal: Absence of physical injury  Outcome: Progressing     Problem: Discharge Planning  Goal: Discharge to home or other facility with appropriate resources  Outcome: Progressing     Problem: Confusion  Goal: Confusion, delirium, dementia, or psychosis is improved or at baseline  Description: INTERVENTIONS:  1. Assess for possible contributors to thought disturbance, including medications, impaired vision or hearing, underlying metabolic abnormalities, dehydration, psychiatric diagnoses, and notify attending LIP  2. [place] high risk fall precautions, as indicated  3. Provide frequent short contacts to provide reality reorientation, refocusing and direction  4. Decrease environmental stimuli, including noise as appropriate  5. Monitor and intervene to maintain adequate nutrition, hydration, elimination, sleep and activity  6. If unable to ensure safety without constant attention obtain sitter and review sitter guidelines with assigned personnel  7. Initiate Psychosocial CNS and Spiritual Care consult, as indicated  Outcome: Progressing     Problem: Safety - Adult  Goal: Free from fall injury  Outcome: Progressing     
  Problem: ABCDS Injury Assessment  Goal: Absence of physical injury  Outcome: Progressing     Problem: Discharge Planning  Goal: Discharge to home or other facility with appropriate resources  Outcome: Progressing     Problem: Confusion  Goal: Confusion, delirium, dementia, or psychosis is improved or at baseline  Description: INTERVENTIONS:  1. Assess for possible contributors to thought disturbance, including medications, impaired vision or hearing, underlying metabolic abnormalities, dehydration, psychiatric diagnoses, and notify attending LIP  2. [place] high risk fall precautions, as indicated  3. Provide frequent short contacts to provide reality reorientation, refocusing and direction  4. Decrease environmental stimuli, including noise as appropriate  5. Monitor and intervene to maintain adequate nutrition, hydration, elimination, sleep and activity  6. If unable to ensure safety without constant attention obtain sitter and review sitter guidelines with assigned personnel  7. Initiate Psychosocial CNS and Spiritual Care consult, as indicated  Outcome: Progressing     Problem: Safety - Adult  Goal: Free from fall injury  Outcome: Progressing     
  Problem: ABCDS Injury Assessment  Goal: Absence of physical injury  Outcome: Progressing     Problem: Discharge Planning  Goal: Discharge to home or other facility with appropriate resources  Outcome: Progressing     Problem: Confusion  Goal: Confusion, delirium, dementia, or psychosis is improved or at baseline  Description: INTERVENTIONS:  1. Assess for possible contributors to thought disturbance, including medications, impaired vision or hearing, underlying metabolic abnormalities, dehydration, psychiatric diagnoses, and notify attending LIP  2. [place] high risk fall precautions, as indicated  3. Provide frequent short contacts to provide reality reorientation, refocusing and direction  4. Decrease environmental stimuli, including noise as appropriate  5. Monitor and intervene to maintain adequate nutrition, hydration, elimination, sleep and activity  6. If unable to ensure safety without constant attention obtain sitter and review sitter guidelines with assigned personnel  7. Initiate Psychosocial CNS and Spiritual Care consult, as indicated  Outcome: Progressing     Problem: Safety - Adult  Goal: Free from fall injury  Outcome: Progressing     Problem: Skin/Tissue Integrity  Goal: Skin integrity remains intact  Description: 1.  Monitor for areas of redness and/or skin breakdown  2.  Assess vascular access sites hourly  3.  Every 4-6 hours minimum:  Change oxygen saturation probe site  4.  Every 4-6 hours:  If on nasal continuous positive airway pressure, respiratory therapy assess nares and determine need for appliance change or resting period  Outcome: Progressing     Problem: Chronic Conditions and Co-morbidities  Goal: Patient's chronic conditions and co-morbidity symptoms are monitored and maintained or improved  Outcome: Progressing     Problem: Pain  Goal: Verbalizes/displays adequate comfort level or baseline comfort level  Outcome: Progressing     
  Problem: ABCDS Injury Assessment  Goal: Absence of physical injury  Outcome: Progressing     Problem: Discharge Planning  Goal: Discharge to home or other facility with appropriate resources  Outcome: Progressing     Problem: Confusion  Goal: Confusion, delirium, dementia, or psychosis is improved or at baseline  Description: INTERVENTIONS:  1. Assess for possible contributors to thought disturbance, including medications, impaired vision or hearing, underlying metabolic abnormalities, dehydration, psychiatric diagnoses, and notify attending LIP  2. [place] high risk fall precautions, as indicated  3. Provide frequent short contacts to provide reality reorientation, refocusing and direction  4. Decrease environmental stimuli, including noise as appropriate  5. Monitor and intervene to maintain adequate nutrition, hydration, elimination, sleep and activity  6. If unable to ensure safety without constant attention obtain sitter and review sitter guidelines with assigned personnel  7. Initiate Psychosocial CNS and Spiritual Care consult, as indicated  Outcome: Progressing     Problem: Safety - Adult  Goal: Free from fall injury  Outcome: Progressing     Problem: Skin/Tissue Integrity  Goal: Skin integrity remains intact  Description: 1.  Monitor for areas of redness and/or skin breakdown  2.  Assess vascular access sites hourly  3.  Every 4-6 hours minimum:  Change oxygen saturation probe site  4.  Every 4-6 hours:  If on nasal continuous positive airway pressure, respiratory therapy assess nares and determine need for appliance change or resting period  Outcome: Progressing     
nutritional status  Outcome: Progressing     
needs:   Assess nutritional status and recommend course of action   Monitor oral intake, labs, and treatment plans   Recommend appropriate diets, oral nutritional supplements, and vitamin/mineral supplements  2/17/2025 [id number] by Cornelia Menjivar RN  Outcome: Progressing

## 2025-02-20 NOTE — CARE COORDINATION
Social Work/Discharge Planning:  Rachid with [facility] still waiting for equipment to be delivered and requesting discharge for 5:00pm.  Called [facility] and arranged ambulance transport for 7:00pm which is the best time they have available.  Notified patient, her son Blaise, charge nurse and Rachid with [facility] of updated discharge time.  Electronically signed by CATRACHITA Ortiz on 2/20/2025 at 3:00 PM

## 2025-02-20 NOTE — DISCHARGE SUMMARY
[facility] Hospitalist Physician Discharge Summary       [facility]  [address] [id number] [phone]          Activity level: As tolerated     Dispo: Home      Condition on discharge: Stable     Patient ID:  Dori Samayoa  [id number]  84 y.o.  1940    Admit date: 2/4/2025    Discharge date and time:  2/20/2025  11:02 AM    Admission Diagnoses: Principal Problem:    Other pulmonary embolism without acute cor pulmonale (HCC)  Active Problems:    Atrial fibrillation with RVR (HCC)    Acute on chronic diastolic congestive heart failure (HCC)    Acute respiratory failure with hypoxia (HCC)    Coronary artery disease involving native coronary artery of native heart without angina pectoris    PAF (paroxysmal atrial fibrillation) (HCC)    Chronic kidney disease  Resolved Problems:    * No resolved hospital problems. *      Discharge Diagnoses: Principal Problem:    Other pulmonary embolism without acute cor pulmonale (HCC)  Active Problems:    Atrial fibrillation with RVR (HCC)    Acute on chronic diastolic congestive heart failure (HCC)    Acute respiratory failure with hypoxia (HCC)    Coronary artery disease involving native coronary artery of native heart without angina pectoris    PAF (paroxysmal atrial fibrillation) (HCC)    Chronic kidney disease  Resolved Problems:    * No resolved hospital problems. *      Consults:  IP CONSULT TO INTERNAL MEDICINE  IP CONSULT TO PULMONOLOGY  IP CONSULT TO CARDIOLOGY  IP CONSULT TO PALLIATIVE CARE  IP CONSULT TO PHARMACY  IP CONSULT TO VASCULAR ACCESS TEAM  IP CONSULT TO VASCULAR ACCESS TEAM  IP CONSULT TO VASCULAR ACCESS TEAM    Hospital Course:   Patient Dori Samayoa is a 84 y.o. presented with Shortness of breath [R06.02]  Hypoxemia [R09.02]  Acute systolic congestive heart failure (HCC) [I50.21]  Other acute pulmonary embolism without acute cor pulmonale (HCC) [I26.99]  Other pulmonary embolism

## 2025-02-20 NOTE — CARE COORDINATION
Social Work/Discharge Planning:  Rachid with [facility] approved LTAC.  Notified charge nurse.  Will continue to follow.  Electronically signed by CATRACHITA Ortiz on 2/20/2025 at 7:42 AM

## 2025-02-20 NOTE — CARE COORDINATION
Social Work/Discharge Planning:  Rachid with [facility] states patient to room 109B.  Called [facility] and placed patient on [facility].  Updated patient son Blaise (ph: [phone]).  Will continue to follow.  Electronically signed by CATRACHITA Ortiz on 2/20/2025 at 10:05 AM    Addendum:  Unit arranged ambulance transport for 3:00pm.  Notified patient, her son Blaise, and Rachid with [facility] of discharge time.  Electronically signed by CATRACHITA Ortiz on 2/20/2025 at 1:00 PM

## 2025-02-21 LAB
ALBUMIN SERPL-MCNC: 3.4 G/DL (ref 3.5–5.2)
ALP SERPL-CCNC: 80 U/L (ref 35–104)
ALT SERPL-CCNC: 15 U/L (ref 0–32)
ANION GAP SERPL CALCULATED.3IONS-SCNC: 10 MMOL/L (ref 7–16)
AST SERPL-CCNC: 18 U/L (ref 0–31)
BILIRUB SERPL-MCNC: 0.3 MG/DL (ref 0–1.2)
BUN SERPL-MCNC: 58 MG/DL (ref 6–23)
CALCIUM SERPL-MCNC: 8.7 MG/DL (ref 8.6–10.2)
CHLORIDE SERPL-SCNC: 101 MMOL/L (ref 98–107)
CO2 SERPL-SCNC: 25 MMOL/L (ref 22–29)
CREAT SERPL-MCNC: 1.6 MG/DL (ref 0.5–1)
ERYTHROCYTE [DISTWIDTH] IN BLOOD BY AUTOMATED COUNT: 14.1 % (ref 11.5–15)
GFR, ESTIMATED: 31 ML/MIN/1.73M2
GLUCOSE SERPL-MCNC: 121 MG/DL (ref 74–99)
HCT VFR BLD AUTO: 38.1 % (ref 34–48)
HGB BLD-MCNC: 12 G/DL (ref 11.5–15.5)
INR PPP: 0.9
MAGNESIUM SERPL-MCNC: 2.3 MG/DL (ref 1.6–2.6)
MCH RBC QN AUTO: 31.9 PG (ref 26–35)
MCHC RBC AUTO-ENTMCNC: 31.5 G/DL (ref 32–34.5)
MCV RBC AUTO: 101.3 FL (ref 80–99.9)
PARTIAL THROMBOPLASTIN TIME: 33.9 SEC (ref 24.5–35.1)
PHOSPHATE SERPL-MCNC: 3.3 MG/DL (ref 2.5–4.5)
PLATELET # BLD AUTO: 328 K/UL (ref 130–450)
PMV BLD AUTO: 9.8 FL (ref 7–12)
POTASSIUM SERPL-SCNC: 5.4 MMOL/L (ref 3.5–5)
PREALB SERPL-MCNC: 27 MG/DL (ref 20–40)
PROT SERPL-MCNC: 6 G/DL (ref 6.4–8.3)
PROTHROMBIN TIME: 10.2 SEC (ref 9.3–12.4)
RBC # BLD AUTO: 3.76 M/UL (ref 3.5–5.5)
SODIUM SERPL-SCNC: 136 MMOL/L (ref 132–146)
TSH SERPL DL<=0.05 MIU/L-ACNC: 1.99 UIU/ML (ref 0.27–4.2)
WBC OTHER # BLD: 16.6 K/UL (ref 4.5–11.5)

## 2025-02-21 PROCEDURE — 84443 ASSAY THYROID STIM HORMONE: CPT

## 2025-02-21 PROCEDURE — 83735 ASSAY OF MAGNESIUM: CPT

## 2025-02-21 PROCEDURE — 80053 COMPREHEN METABOLIC PANEL: CPT

## 2025-02-21 PROCEDURE — 85730 THROMBOPLASTIN TIME PARTIAL: CPT

## 2025-02-21 PROCEDURE — 84100 ASSAY OF PHOSPHORUS: CPT

## 2025-02-21 PROCEDURE — 84134 ASSAY OF PREALBUMIN: CPT

## 2025-02-21 PROCEDURE — 85027 COMPLETE CBC AUTOMATED: CPT

## 2025-02-21 PROCEDURE — 85610 PROTHROMBIN TIME: CPT

## 2025-02-21 NOTE — PROGRESS NOTES
[facility] Hospitalist Progress Note    Admitting Date and Time: 2/4/2025 10:30 AM  Admit Dx: Shortness of breath [R06.02]  Hypoxemia [R09.02]  Acute systolic congestive heart failure (HCC) [I50.21]  Other acute pulmonary embolism without acute cor pulmonale (HCC) [I26.99]  Other pulmonary embolism without acute cor pulmonale, unspecified chronicity (HCC) [I26.99]  Other pulmonary embolism without acute cor pulmonale (HCC) [I26.99]    Subjective:  Patient is being followed for Shortness of breath [R06.02]  Hypoxemia [R09.02]  Acute systolic congestive heart failure (HCC) [I50.21]  Other acute pulmonary embolism without acute cor pulmonale (HCC) [I26.99]  Other pulmonary embolism without acute cor pulmonale, unspecified chronicity (HCC) [I26.99]  Other pulmonary embolism without acute cor pulmonale (HCC) [I26.99]   Pt seen & examined, poor historian, forgetful.  Worsened breathing, on Airvo.   Per RN: No major concerns.    ROS: denies fever, chills, cp, sob, n/v, HA unless stated above.      acetylcysteine  600 mg Inhalation BID    albuterol  2.5 mg Nebulization Q4H    guaiFENesin  400 mg Oral Q4H    [Held by provider] furosemide  20 mg IntraVENous BID    metoprolol succinate  25 mg Oral QHS    methylPREDNISolone  40 mg IntraVENous Q12H    atorvastatin  20 mg Oral Nightly    isosorbide mononitrate  30 mg Oral Daily    [Held by provider] metoprolol succinate  50 mg Oral Daily    sodium chloride flush  5-40 mL IntraVENous 2 times per day    arformoterol tartrate  15 mcg Nebulization BID RT    [Held by provider] budesonide  0.5 mg Nebulization BID RT     heparin (porcine), 80 Units/kg, PRN  heparin (porcine), 40 Units/kg, PRN  sodium chloride, 1 spray, PRN  albuterol, 2.5 mg, Q6H PRN  sodium chloride flush, 5-40 mL, PRN  sodium chloride, , PRN  ondansetron, 4 mg, Q8H PRN   Or  ondansetron, 4 mg, Q6H PRN  polyethylene glycol, 17 g, Daily PRN  acetaminophen, 650 mg, Q6H PRN   Or  acetaminophen, 650 mg, Q6H 
       [facility] Hospitalist Progress Note    Admitting Date and Time: 2/4/2025 10:30 AM  Admit Dx: Shortness of breath [R06.02]  Hypoxemia [R09.02]  Acute systolic congestive heart failure (HCC) [I50.21]  Other acute pulmonary embolism without acute cor pulmonale (HCC) [I26.99]  Other pulmonary embolism without acute cor pulmonale, unspecified chronicity (HCC) [I26.99]  Other pulmonary embolism without acute cor pulmonale (HCC) [I26.99]    Subjective:  Patient is being followed for Shortness of breath [R06.02]  Hypoxemia [R09.02]  Acute systolic congestive heart failure (HCC) [I50.21]  Other acute pulmonary embolism without acute cor pulmonale (HCC) [I26.99]  Other pulmonary embolism without acute cor pulmonale, unspecified chronicity (HCC) [I26.99]  Other pulmonary embolism without acute cor pulmonale (HCC) [I26.99]   Pt seen & examined, poor historian, forgetful.  Worsened breathing, on Airvo.   Per RN: No major concerns.    ROS: denies fever, chills, cp, sob, n/v, HA unless stated above.      albuterol  2.5 mg Nebulization Q4H    guaiFENesin  400 mg Oral Q4H    furosemide  40 mg Oral Daily    metoprolol succinate  25 mg Oral QHS    acetylcysteine  600 mg Inhalation Q4H RT    methylPREDNISolone  40 mg IntraVENous Q12H    atorvastatin  20 mg Oral Nightly    isosorbide mononitrate  30 mg Oral Daily    metoprolol succinate  50 mg Oral Daily    sodium chloride flush  5-40 mL IntraVENous 2 times per day    arformoterol tartrate  15 mcg Nebulization BID RT    [Held by provider] budesonide  0.5 mg Nebulization BID RT     albuterol, 2.5 mg, Q6H PRN  heparin (porcine), 80 Units/kg, PRN  heparin (porcine), 40 Units/kg, PRN  sodium chloride flush, 5-40 mL, PRN  sodium chloride, , PRN  ondansetron, 4 mg, Q8H PRN   Or  ondansetron, 4 mg, Q6H PRN  polyethylene glycol, 17 g, Daily PRN  acetaminophen, 650 mg, Q6H PRN   Or  acetaminophen, 650 mg, Q6H PRN         Objective:    BP (!) 129/58   Pulse 56   Temp 98.3 °F (36.8 °C) 
       [facility] Hospitalist Progress Note    Admitting Date and Time: 2/4/2025 10:30 AM  Admit Dx: Shortness of breath [R06.02]  Hypoxemia [R09.02]  Acute systolic congestive heart failure (HCC) [I50.21]  Other acute pulmonary embolism without acute cor pulmonale (HCC) [I26.99]  Other pulmonary embolism without acute cor pulmonale, unspecified chronicity (HCC) [I26.99]  Other pulmonary embolism without acute cor pulmonale (HCC) [I26.99]    Subjective:  Patient is being followed for Shortness of breath [R06.02]  Hypoxemia [R09.02]  Acute systolic congestive heart failure (HCC) [I50.21]  Other acute pulmonary embolism without acute cor pulmonale (HCC) [I26.99]  Other pulmonary embolism without acute cor pulmonale, unspecified chronicity (HCC) [I26.99]  Other pulmonary embolism without acute cor pulmonale (HCC) [I26.99]   Pt seen & examined, poor historian, forgetful.  Worsened breathing, on Airvo.   Per RN: No major concerns.    ROS: denies fever, chills, cp, sob, n/v, HA unless stated above.      piperacillin-tazobactam  4,500 mg IntraVENous Q8H    And    sodium chloride (PF)  20 mL IntraVENous Q8H    acetylcysteine  600 mg Inhalation BID    albuterol  2.5 mg Nebulization Q4H    guaiFENesin  400 mg Oral Q4H    [Held by provider] furosemide  20 mg IntraVENous BID    metoprolol succinate  25 mg Oral QHS    methylPREDNISolone  40 mg IntraVENous Q12H    atorvastatin  20 mg Oral Nightly    isosorbide mononitrate  30 mg Oral Daily    [Held by provider] metoprolol succinate  50 mg Oral Daily    sodium chloride flush  5-40 mL IntraVENous 2 times per day    arformoterol tartrate  15 mcg Nebulization BID RT    [Held by provider] budesonide  0.5 mg Nebulization BID RT     heparin (porcine), 80 Units/kg, PRN  heparin (porcine), 40 Units/kg, PRN  sodium chloride, 1 spray, PRN  albuterol, 2.5 mg, Q6H PRN  sodium chloride flush, 5-40 mL, PRN  sodium chloride, , PRN  ondansetron, 4 mg, Q8H PRN   Or  ondansetron, 4 mg, Q6H 
       [facility] Hospitalist Progress Note    Admitting Date and Time: 2/4/2025 10:30 AM  Admit Dx: Shortness of breath [R06.02]  Hypoxemia [R09.02]  Acute systolic congestive heart failure (HCC) [I50.21]  Other acute pulmonary embolism without acute cor pulmonale (HCC) [I26.99]  Other pulmonary embolism without acute cor pulmonale, unspecified chronicity (HCC) [I26.99]  Other pulmonary embolism without acute cor pulmonale (HCC) [I26.99]    Subjective:  Patient is being followed for Shortness of breath [R06.02]  Hypoxemia [R09.02]  Acute systolic congestive heart failure (HCC) [I50.21]  Other acute pulmonary embolism without acute cor pulmonale (HCC) [I26.99]  Other pulmonary embolism without acute cor pulmonale, unspecified chronicity (HCC) [I26.99]  Other pulmonary embolism without acute cor pulmonale (HCC) [I26.99]   Pt seen & examined, poor historian, forgetful.  Worsened breathing, on Airvo.   Per RN: No major concerns.    ROS: denies fever, chills, cp, sob, n/v, HA unless stated above.      acetylcysteine  600 mg Inhalation BID    piperacillin-tazobactam  4,500 mg IntraVENous Q8H    And    sodium chloride (PF)  20 mL IntraVENous Q8H    albuterol  2.5 mg Nebulization Q4H    guaiFENesin  400 mg Oral Q4H    [Held by provider] furosemide  20 mg IntraVENous BID    metoprolol succinate  25 mg Oral QHS    methylPREDNISolone  40 mg IntraVENous Q12H    atorvastatin  20 mg Oral Nightly    isosorbide mononitrate  30 mg Oral Daily    [Held by provider] metoprolol succinate  50 mg Oral Daily    sodium chloride flush  5-40 mL IntraVENous 2 times per day    arformoterol tartrate  15 mcg Nebulization BID RT    [Held by provider] budesonide  0.5 mg Nebulization BID RT     heparin (porcine), 80 Units/kg, PRN  heparin (porcine), 40 Units/kg, PRN  sodium chloride, 1 spray, PRN  albuterol, 2.5 mg, Q6H PRN  sodium chloride flush, 5-40 mL, PRN  sodium chloride, , PRN  ondansetron, 4 mg, Q8H PRN   Or  ondansetron, 4 mg, Q6H 
       [facility] Hospitalist Progress Note    Admitting Date and Time: 2/4/2025 10:30 AM  Admit Dx: Shortness of breath [R06.02]  Hypoxemia [R09.02]  Acute systolic congestive heart failure (HCC) [I50.21]  Other acute pulmonary embolism without acute cor pulmonale (HCC) [I26.99]  Other pulmonary embolism without acute cor pulmonale, unspecified chronicity (HCC) [I26.99]  Other pulmonary embolism without acute cor pulmonale (HCC) [I26.99]    Subjective:  Patient is being followed for Shortness of breath [R06.02]  Hypoxemia [R09.02]  Acute systolic congestive heart failure (HCC) [I50.21]  Other acute pulmonary embolism without acute cor pulmonale (HCC) [I26.99]  Other pulmonary embolism without acute cor pulmonale, unspecified chronicity (HCC) [I26.99]  Other pulmonary embolism without acute cor pulmonale (HCC) [I26.99]   Pt seen & examined, poor historian, forgetful.  Worsened breathing, on Airvo.   Per RN: No major concerns.    ROS: denies fever, chills, cp, sob, n/v, HA unless stated above.      albuterol  2.5 mg Nebulization Q4H    guaiFENesin  400 mg Oral Q4H    furosemide  20 mg IntraVENous BID    metoprolol succinate  25 mg Oral QHS    acetylcysteine  600 mg Inhalation Q4H RT    methylPREDNISolone  40 mg IntraVENous Q12H    atorvastatin  20 mg Oral Nightly    isosorbide mononitrate  30 mg Oral Daily    metoprolol succinate  50 mg Oral Daily    sodium chloride flush  5-40 mL IntraVENous 2 times per day    arformoterol tartrate  15 mcg Nebulization BID RT    [Held by provider] budesonide  0.5 mg Nebulization BID RT     albuterol, 2.5 mg, Q6H PRN  heparin (porcine), 80 Units/kg, PRN  heparin (porcine), 40 Units/kg, PRN  sodium chloride flush, 5-40 mL, PRN  sodium chloride, , PRN  ondansetron, 4 mg, Q8H PRN   Or  ondansetron, 4 mg, Q6H PRN  polyethylene glycol, 17 g, Daily PRN  acetaminophen, 650 mg, Q6H PRN   Or  acetaminophen, 650 mg, Q6H PRN         Objective:    /63   Pulse 63   Temp 97.9 °F (36.6 
       [facility] Hospitalist Progress Note    Admitting Date and Time: 2/4/2025 10:30 AM  Admit Dx: Shortness of breath [R06.02]  Hypoxemia [R09.02]  Acute systolic congestive heart failure (HCC) [I50.21]  Other acute pulmonary embolism without acute cor pulmonale (HCC) [I26.99]  Other pulmonary embolism without acute cor pulmonale, unspecified chronicity (HCC) [I26.99]  Other pulmonary embolism without acute cor pulmonale (HCC) [I26.99]    Subjective:  Patient is being followed for Shortness of breath [R06.02]  Hypoxemia [R09.02]  Acute systolic congestive heart failure (HCC) [I50.21]  Other acute pulmonary embolism without acute cor pulmonale (HCC) [I26.99]  Other pulmonary embolism without acute cor pulmonale, unspecified chronicity (HCC) [I26.99]  Other pulmonary embolism without acute cor pulmonale (HCC) [I26.99]   Pt feels better, poor historian, forgetful.  Worsened breathing, desaturated from 5 to 15L.   Per RN: No major concerns.    ROS: denies fever, chills, cp, sob, n/v, HA unless stated above.      methylPREDNISolone  40 mg IntraVENous Q12H    atorvastatin  20 mg Oral Nightly    [Held by provider] hydroCHLOROthiazide  12.5 mg Oral Daily    isosorbide mononitrate  30 mg Oral Daily    metoprolol succinate  50 mg Oral Daily    sodium chloride flush  5-40 mL IntraVENous 2 times per day    ipratropium 0.5 mg-albuterol 2.5 mg  1 Dose Inhalation Q4H WA RT    arformoterol tartrate  15 mcg Nebulization BID RT    [Held by provider] budesonide  0.5 mg Nebulization BID RT     heparin (porcine), 80 Units/kg, PRN  heparin (porcine), 40 Units/kg, PRN  sodium chloride flush, 5-40 mL, PRN  sodium chloride, , PRN  ondansetron, 4 mg, Q8H PRN   Or  ondansetron, 4 mg, Q6H PRN  polyethylene glycol, 17 g, Daily PRN  acetaminophen, 650 mg, Q6H PRN   Or  acetaminophen, 650 mg, Q6H PRN         Objective:    /63   Pulse 67   Temp 97.9 °F (36.6 °C) (Oral)   Resp 20   Ht 1.499 m (4' 11\")   Wt 78.4 kg (172 lb 12.8 oz)  
       [facility] Hospitalist Progress Note    Admitting Date and Time: 2/4/2025 10:30 AM  Admit Dx: Shortness of breath [R06.02]  Hypoxemia [R09.02]  Acute systolic congestive heart failure (HCC) [I50.21]  Other acute pulmonary embolism without acute cor pulmonale (HCC) [I26.99]  Other pulmonary embolism without acute cor pulmonale, unspecified chronicity (HCC) [I26.99]  Other pulmonary embolism without acute cor pulmonale (HCC) [I26.99]    Subjective:  Patient is being followed for Shortness of breath [R06.02]  Hypoxemia [R09.02]  Acute systolic congestive heart failure (HCC) [I50.21]  Other acute pulmonary embolism without acute cor pulmonale (HCC) [I26.99]  Other pulmonary embolism without acute cor pulmonale, unspecified chronicity (HCC) [I26.99]  Other pulmonary embolism without acute cor pulmonale (HCC) [I26.99]   Pt feels better, poor historian, forgetful, family at bedside updated.   Per RN: No major concerns.    ROS: denies fever, chills, cp, sob, n/v, HA unless stated above.      aspirin  81 mg Oral Daily    atorvastatin  20 mg Oral Nightly    clopidogrel  75 mg Oral Daily    hydroCHLOROthiazide  12.5 mg Oral Daily    isosorbide mononitrate  30 mg Oral Daily    metoprolol succinate  50 mg Oral Daily    sodium chloride flush  5-40 mL IntraVENous 2 times per day    ipratropium 0.5 mg-albuterol 2.5 mg  1 Dose Inhalation Q4H WA RT    arformoterol tartrate  15 mcg Nebulization BID RT    budesonide  0.5 mg Nebulization BID RT     heparin (porcine), 80 Units/kg, PRN  heparin (porcine), 40 Units/kg, PRN  sodium chloride flush, 5-40 mL, PRN  sodium chloride, , PRN  ondansetron, 4 mg, Q8H PRN   Or  ondansetron, 4 mg, Q6H PRN  polyethylene glycol, 17 g, Daily PRN  acetaminophen, 650 mg, Q6H PRN   Or  acetaminophen, 650 mg, Q6H PRN         Objective:    BP (!) 173/54   Pulse 82   Temp 99 °F (37.2 °C) (Axillary)   Resp 16   Wt 78 kg (172 lb)   SpO2 90%   BMI 26.15 kg/m²     General Appearance: alert and 
       [facility] Hospitalist Progress Note    Admitting Date and Time: 2/4/2025 10:30 AM  Admit Dx: Shortness of breath [R06.02]  Hypoxemia [R09.02]  Acute systolic congestive heart failure (HCC) [I50.21]  Other acute pulmonary embolism without acute cor pulmonale (HCC) [I26.99]  Other pulmonary embolism without acute cor pulmonale, unspecified chronicity (HCC) [I26.99]  Other pulmonary embolism without acute cor pulmonale (HCC) [I26.99]    Subjective:  Patient is being followed for Shortness of breath [R06.02]  Hypoxemia [R09.02]  Acute systolic congestive heart failure (HCC) [I50.21]  Other acute pulmonary embolism without acute cor pulmonale (HCC) [I26.99]  Other pulmonary embolism without acute cor pulmonale, unspecified chronicity (HCC) [I26.99]  Other pulmonary embolism without acute cor pulmonale (HCC) [I26.99]   Pt feels better, poor historian, forgetful.  Worsened breathing, desaturated from 5 to 14L.   Per RN: No major concerns.    ROS: denies fever, chills, cp, sob, n/v, HA unless stated above.      methylPREDNISolone  40 mg IntraVENous Q12H    furosemide  40 mg IntraVENous Once    aspirin  81 mg Oral Daily    atorvastatin  20 mg Oral Nightly    clopidogrel  75 mg Oral Daily    hydroCHLOROthiazide  12.5 mg Oral Daily    isosorbide mononitrate  30 mg Oral Daily    metoprolol succinate  50 mg Oral Daily    sodium chloride flush  5-40 mL IntraVENous 2 times per day    ipratropium 0.5 mg-albuterol 2.5 mg  1 Dose Inhalation Q4H WA RT    arformoterol tartrate  15 mcg Nebulization BID RT    budesonide  0.5 mg Nebulization BID RT     heparin (porcine), 80 Units/kg, PRN  heparin (porcine), 40 Units/kg, PRN  sodium chloride flush, 5-40 mL, PRN  sodium chloride, , PRN  ondansetron, 4 mg, Q8H PRN   Or  ondansetron, 4 mg, Q6H PRN  polyethylene glycol, 17 g, Daily PRN  acetaminophen, 650 mg, Q6H PRN   Or  acetaminophen, 650 mg, Q6H PRN         Objective:    BP (!) 117/59   Pulse 65   Temp 98.2 °F (36.8 °C) (Oral)  
    [facility] Pulmonary Progress Note  SEB  Admit Date: 2/4/2025                            PCP: Stanley Gar MD  Principal Problem:    Other pulmonary embolism without acute cor pulmonale (HCC)  Active Problems:    Atrial fibrillation with RVR (HCC)    Acute on chronic diastolic congestive heart failure (HCC)    Acute respiratory failure with hypoxia    Coronary artery disease involving native coronary artery of native heart without angina pectoris    PAF (paroxysmal atrial fibrillation) (HCC)    Chronic kidney disease  Resolved Problems:    * No resolved hospital problems. *      Subjective:  On Airvo, FiO2 down to 55% pox 95%  Afebrile, remains pleasantly confused denying any respiratory symptoms     Medications:   heparin (PORCINE) Infusion 11.018 Units/kg/hr (02/16/25 1649)    sodium chloride          acetylcysteine  600 mg Inhalation BID    piperacillin-tazobactam  4,500 mg IntraVENous Q8H    And    sodium chloride (PF)  20 mL IntraVENous Q8H    albuterol  2.5 mg Nebulization Q4H    guaiFENesin  400 mg Oral Q4H    [Held by provider] furosemide  20 mg IntraVENous BID    metoprolol succinate  25 mg Oral QHS    methylPREDNISolone  40 mg IntraVENous Q12H    atorvastatin  20 mg Oral Nightly    isosorbide mononitrate  30 mg Oral Daily    [Held by provider] metoprolol succinate  50 mg Oral Daily    sodium chloride flush  5-40 mL IntraVENous 2 times per day    arformoterol tartrate  15 mcg Nebulization BID RT    [Held by provider] budesonide  0.5 mg Nebulization BID RT       Vitals:  VITALS:  BP (!) 122/54   Pulse 60   Temp 98 °F (36.7 °C) (Oral)   Resp 18   Ht 1.499 m (4' 11\")   Wt 74 kg (163 lb 1.6 oz) Comment: without IWONA pump  SpO2 95%   BMI 32.94 kg/m²   24HR INTAKE/OUTPUT:    Intake/Output Summary (Last 24 hours) at 2/17/2025 1042  Last data filed at 2/17/2025 0634  Gross per 24 hour   Intake 344.56 ml   Output 1 ml   Net 343.56 ml       CURRENT PULSE OXIMETRY:  SpO2: 95 %  24HR PULSE OXIMETRY RANGE:  
    [facility] Pulmonary Progress Note  SEB  Admit Date: 2/4/2025                            PCP: Stanley Gar MD  Principal Problem:    Other pulmonary embolism without acute cor pulmonale (HCC)  Active Problems:    Atrial fibrillation with RVR (HCC)    Acute on chronic diastolic congestive heart failure (HCC)    Acute respiratory failure with hypoxia    Coronary artery disease involving native coronary artery of native heart without angina pectoris    PAF (paroxysmal atrial fibrillation) ([facility])    Chronic kidney disease  Resolved Problems:    * No resolved hospital problems. *      Subjective:  Remains on Airvo, FiO2 down to 55% pox 93%  Much more oriented today. Has occasional shortness of breath with positional changes; denies cough or wheezing  Awaiting pre-cert to [facility]     Medications:   heparin (PORCINE) Infusion 10.018 Units/kg/hr (02/18/25 0505)    sodium chloride          piperacillin-tazobactam  4,500 mg IntraVENous Q8H    And    sodium chloride (PF)  20 mL IntraVENous Q8H    acetylcysteine  600 mg Inhalation BID    albuterol  2.5 mg Nebulization Q4H    guaiFENesin  400 mg Oral Q4H    [Held by provider] furosemide  20 mg IntraVENous BID    metoprolol succinate  25 mg Oral QHS    methylPREDNISolone  40 mg IntraVENous Q12H    atorvastatin  20 mg Oral Nightly    isosorbide mononitrate  30 mg Oral Daily    [Held by provider] metoprolol succinate  50 mg Oral Daily    sodium chloride flush  5-40 mL IntraVENous 2 times per day    arformoterol tartrate  15 mcg Nebulization BID RT    [Held by provider] budesonide  0.5 mg Nebulization BID RT       Vitals:  VITALS:  BP (!) 126/58   Pulse 62   Temp 97.5 °F (36.4 °C) (Oral)   Resp 20   Ht 1.499 m (4' 11\")   Wt 74 kg (163 lb 1.6 oz) Comment: without IWONA pump  SpO2 93%   BMI 32.94 kg/m²   24HR INTAKE/OUTPUT:    Intake/Output Summary (Last 24 hours) at 2/18/2025 0913  Last data filed at 2/18/2025 0603  Gross per 24 hour   Intake 222 ml   Output 3 ml   Net 219 
    [facility] Pulmonary Progress Note  SEB  Admit Date: 2025                            PCP: Stanley Gar MD  Principal Problem:    Other pulmonary embolism without acute cor pulmonale (HCC)  Active Problems:    Atrial fibrillation with RVR (HCC)    Acute on chronic diastolic congestive heart failure (HCC)    Acute respiratory failure with hypoxia (HCC)    Coronary artery disease involving native coronary artery of native heart without angina pectoris    PAF (paroxysmal atrial fibrillation) (HCC)    Chronic kidney disease  Resolved Problems:    * No resolved hospital problems. *      Subjective:  Remains on Airvo, 55L, 50% FiO2, pox 93%  Patient denies SOB, cough. Has no complaints  She is confused  Plan for discharge to [facility] today    Medications:   heparin (PORCINE) Infusion 12.02 Units/kg/hr (25 0507)    sodium chloride          acetylcysteine  600 mg Inhalation BID    albuterol  2.5 mg Nebulization Q4H    guaiFENesin  400 mg Oral Q4H    [Held by provider] furosemide  20 mg IntraVENous BID    metoprolol succinate  25 mg Oral QHS    methylPREDNISolone  40 mg IntraVENous Q12H    atorvastatin  20 mg Oral Nightly    isosorbide mononitrate  30 mg Oral Daily    [Held by provider] metoprolol succinate  50 mg Oral Daily    sodium chloride flush  5-40 mL IntraVENous 2 times per day    arformoterol tartrate  15 mcg Nebulization BID RT    [Held by provider] budesonide  0.5 mg Nebulization BID RT       Vitals:  VITALS:  BP (!) 150/58   Pulse 58   Temp 97.7 °F (36.5 °C) (Oral)   Resp 20   Ht 1.499 m (4' 11\")   Wt 82.4 kg (181 lb 11.2 oz)   SpO2 96%   BMI 36.70 kg/m²   24HR INTAKE/OUTPUT:    Intake/Output Summary (Last 24 hours) at 2025 1013  Last data filed at 2025 0508  Gross per 24 hour   Intake 366 ml   Output 500 ml   Net -134 ml       CURRENT PULSE OXIMETRY:  SpO2: 96 %  24HR PULSE OXIMETRY RANGE:  SpO2  Av.6 %  Min: 93 %  Max: 96 %  CVP:    VENT SETTINGS:   Vent 
    [facility] Pulmonary Progress Note  SEB  Admit Date: 2/4/2025                            PCP: Stanley Gar MD  Principal Problem:    Other pulmonary embolism without acute cor pulmonale (HCC)  Active Problems:    Atrial fibrillation with RVR (HCC)    Acute on chronic diastolic congestive heart failure (HCC)    Acute respiratory failure with hypoxia    Coronary artery disease involving native coronary artery of native heart without angina pectoris    PAF (paroxysmal atrial fibrillation) (HCC)    Chronic kidney disease  Resolved Problems:    * No resolved hospital problems. *      Subjective:    Patient seen resting in bed on Airvo, 70% FiO2  She is very confused this morning, Asking when she can go home  She denies SOB or cough    Medications:   heparin (PORCINE) Infusion 13 Units/kg/hr (02/15/25 0606)    sodium chloride          piperacillin-tazobactam  4,500 mg IntraVENous Q8H    And    sodium chloride (PF)  20 mL IntraVENous Q8H    vancomycin  750 mg IntraVENous Q24H    albuterol  2.5 mg Nebulization Q4H    guaiFENesin  400 mg Oral Q4H    [Held by provider] furosemide  20 mg IntraVENous BID    metoprolol succinate  25 mg Oral QHS    acetylcysteine  600 mg Inhalation Q4H RT    methylPREDNISolone  40 mg IntraVENous Q12H    atorvastatin  20 mg Oral Nightly    isosorbide mononitrate  30 mg Oral Daily    [Held by provider] metoprolol succinate  50 mg Oral Daily    sodium chloride flush  5-40 mL IntraVENous 2 times per day    arformoterol tartrate  15 mcg Nebulization BID RT    [Held by provider] budesonide  0.5 mg Nebulization BID RT       Vitals:  VITALS:  BP (!) 140/74   Pulse 61   Temp 97.3 °F (36.3 °C) (Axillary)   Resp 20   Ht 1.499 m (4' 11\")   Wt 74.7 kg (164 lb 11.2 oz) Comment: w/o lo air loss pump  SpO2 92%   BMI 33.27 kg/m²   24HR INTAKE/OUTPUT:    Intake/Output Summary (Last 24 hours) at 2/15/2025 0948  Last data filed at 2/15/2025 0606  Gross per 24 hour   Intake 691.39 ml   Output 800 ml 
    [facility] Pulmonary Progress Note  SEB  Admit Date: 2025                            PCP: Stanley Gar MD  Principal Problem:    Other pulmonary embolism without acute cor pulmonale (HCC)  Active Problems:    Atrial fibrillation with RVR (HCC)    Acute on chronic diastolic congestive heart failure (HCC)    Acute respiratory failure with hypoxia    Coronary artery disease involving native coronary artery of native heart without angina pectoris    PAF (paroxysmal atrial fibrillation) (HCC)    Chronic kidney disease  Resolved Problems:    * No resolved hospital problems. *      Subjective:  Weaned down to 12LHF- pox 92%  Plans for MBS per RN  Breathing is better per patient  No cough or wz     Medications:   heparin (PORCINE) Infusion 13 Units/kg/hr (25 0847)    sodium chloride          piperacillin-tazobactam  4,500 mg IntraVENous Q8H    And    sodium chloride (PF)  20 mL IntraVENous Q8H    vancomycin  750 mg IntraVENous Q24H    albuterol  2.5 mg Nebulization Q4H    guaiFENesin  400 mg Oral Q4H    [Held by provider] furosemide  20 mg IntraVENous BID    metoprolol succinate  25 mg Oral QHS    acetylcysteine  600 mg Inhalation Q4H RT    methylPREDNISolone  40 mg IntraVENous Q12H    atorvastatin  20 mg Oral Nightly    isosorbide mononitrate  30 mg Oral Daily    [Held by provider] metoprolol succinate  50 mg Oral Daily    sodium chloride flush  5-40 mL IntraVENous 2 times per day    arformoterol tartrate  15 mcg Nebulization BID RT    [Held by provider] budesonide  0.5 mg Nebulization BID RT       Vitals:  VITALS:  BP (!) 167/71   Pulse 60   Temp 98.4 °F (36.9 °C) (Oral)   Resp 18   Ht 1.499 m (4' 11\")   Wt 85.5 kg (188 lb 6.4 oz)   SpO2 91%   BMI 38.05 kg/m²   24HR INTAKE/OUTPUT:  No intake or output data in the 24 hours ending 25 0908    CURRENT PULSE OXIMETRY:  SpO2: 91 %  24HR PULSE OXIMETRY RANGE:  SpO2  Av.5 %  Min: 87 %  Max: 91 %  CVP:    VENT SETTINGS:   Vent 
    [facility] Pulmonary Progress Note  SEB  Admit Date: 2/4/2025                            PCP: Stanley Gar MD  Principal Problem:    Other pulmonary embolism without acute cor pulmonale (HCC)  Active Problems:    Atrial fibrillation with RVR (HCC)    Acute on chronic diastolic congestive heart failure (HCC)    Acute respiratory failure with hypoxia    Coronary artery disease involving native coronary artery of native heart without angina pectoris    PAF (paroxysmal atrial fibrillation) (HCC)    Chronic kidney disease  Resolved Problems:    * No resolved hospital problems. *      Subjective:    Patient seen resting in bed on Airvo, 65 % FiO2  Decent diuresis yesterday with the use of IV Lasix    Medications:   heparin (PORCINE) Infusion 11.018 Units/kg/hr (02/16/25 1223)    sodium chloride          acetylcysteine  600 mg Inhalation BID    piperacillin-tazobactam  4,500 mg IntraVENous Q8H    And    sodium chloride (PF)  20 mL IntraVENous Q8H    albuterol  2.5 mg Nebulization Q4H    guaiFENesin  400 mg Oral Q4H    [Held by provider] furosemide  20 mg IntraVENous BID    metoprolol succinate  25 mg Oral QHS    methylPREDNISolone  40 mg IntraVENous Q12H    atorvastatin  20 mg Oral Nightly    isosorbide mononitrate  30 mg Oral Daily    [Held by provider] metoprolol succinate  50 mg Oral Daily    sodium chloride flush  5-40 mL IntraVENous 2 times per day    arformoterol tartrate  15 mcg Nebulization BID RT    [Held by provider] budesonide  0.5 mg Nebulization BID RT       Vitals:  VITALS:  BP (!) 128/53   Pulse 64   Temp 98.3 °F (36.8 °C) (Oral)   Resp 20   Ht 1.499 m (4' 11\")   Wt 76.7 kg (169 lb) Comment: without IWONA pump  SpO2 94%   BMI 34.13 kg/m²   24HR INTAKE/OUTPUT:    Intake/Output Summary (Last 24 hours) at 2/16/2025 1609  Last data filed at 2/16/2025 1230  Gross per 24 hour   Intake 283.25 ml   Output 400 ml   Net -116.75 ml       CURRENT PULSE OXIMETRY:  SpO2: 94 %  24HR PULSE OXIMETRY RANGE:  SpO2  
    Progress  Note  Chief Complaint   Patient presents with    Shortness of Breath     Pt was 79% on room air when EMS arrived. Pt denies SOB. Currently 84% on room air.      Historical Issues:  Current Facility-Administered Medications   Medication Dose Route Frequency Provider Last Rate Last Admin    heparin (porcine) injection 6,800 Units  80 Units/kg IntraVENous PRN Nino Greco MD        heparin (porcine) injection 3,400 Units  40 Units/kg IntraVENous PRN Nino Greco MD        sodium chloride (OCEAN, BABY AYR) 0.65 % nasal spray 1 spray  1 spray Each Nostril PRN Lorenza Cr MD        piperacillin-tazobactam (ZOSYN) injection 4,500 mg  4,500 mg IntraVENous Q8H [place], Skye, APRN - CNP   4,500 mg at 02/14/25 0834    And    sodium chloride (PF) 0.9 % injection 20 mL  20 mL IntraVENous Q8H [place], Skye, APRN - CNP   20 mL at 02/14/25 0834    vancomycin (VANCOCIN) 750 mg in 150 mL IVPB  750 mg IntraVENous Q24H [place], Skye, APRN - CNP   Stopped at 02/14/25 0950    albuterol (PROVENTIL) (2.5 MG/3ML) 0.083% nebulizer solution 2.5 mg  2.5 mg Nebulization Q6H PRN Skye [place], APRN - CNP        albuterol (PROVENTIL) (2.5 MG/3ML) 0.083% nebulizer solution 2.5 mg  2.5 mg Nebulization Q4H [place], Skye, APRN - CNP   2.5 mg at 02/14/25 1223    guaiFENesin (ROBITUSSIN) 100 MG/5ML liquid 400 mg  400 mg Oral Q4H [place], Skye, APRN - CNP   400 mg at 02/14/25 1304    [Held by provider] furosemide (LASIX) injection 20 mg  20 mg IntraVENous BID Lorenza Cr MD   20 mg at 02/10/25 0922    metoprolol succinate (TOPROL XL) extended release tablet 25 mg  25 mg Oral QHS Stan Esquivel MD   25 mg at 02/08/25 0124    acetylcysteine (MUCOMYST) 20 % solution 600 mg  600 mg Inhalation Q4H RT Skye [place] APRN - CNP   600 mg at 02/14/25 1223    methylPREDNISolone sodium succ (SOLU-MEDROL) injection 40 mg  40 mg IntraVENous Q12H Enrique Albrecht MD   40 mg at 02/14/25 0835    heparin 25,000 units in dextrose 5% 250 mL 
    Progress  Note  Chief Complaint   Patient presents with    Shortness of Breath     Pt was 79% on room air when EMS arrived. Pt denies SOB. Currently 84% on room air.      Historical Issues:  Current Facility-Administered Medications   Medication Dose Route Frequency Provider Last Rate Last Admin    heparin (porcine) injection 6,800 Units  80 Units/kg IntraVENous PRN Nino Greco MD        heparin (porcine) injection 3,400 Units  40 Units/kg IntraVENous PRN Nino Greco MD        sodium chloride (OCEAN, BABY AYR) 0.65 % nasal spray 1 spray  1 spray Each Nostril PRN Lorenza Cr MD        piperacillin-tazobactam (ZOSYN) injection 4,500 mg  4,500 mg IntraVENous Q8H [place], Skye, APRN - CNP   4,500 mg at 02/15/25 0939    And    sodium chloride (PF) 0.9 % injection 20 mL  20 mL IntraVENous Q8H [place], Skye, APRN - CNP   20 mL at 02/15/25 0950    albuterol (PROVENTIL) (2.5 MG/3ML) 0.083% nebulizer solution 2.5 mg  2.5 mg Nebulization Q6H PRN [place], Skye, APRN - CNP        albuterol (PROVENTIL) (2.5 MG/3ML) 0.083% nebulizer solution 2.5 mg  2.5 mg Nebulization Q4H [place], Skye, APRN - CNP   2.5 mg at 02/15/25 1252    guaiFENesin (ROBITUSSIN) 100 MG/5ML liquid 400 mg  400 mg Oral Q4H [place], Skye, APRN - CNP   400 mg at 02/15/25 1217    [Held by provider] furosemide (LASIX) injection 20 mg  20 mg IntraVENous BID Lorenza Cr MD   20 mg at 02/10/25 0922    metoprolol succinate (TOPROL XL) extended release tablet 25 mg  25 mg Oral QHS Stan Esquivel MD   25 mg at 02/14/25 2030    acetylcysteine (MUCOMYST) 20 % solution 600 mg  600 mg Inhalation Q4H RT [place], Skye, APRN - CNP   600 mg at 02/15/25 1252    methylPREDNISolone sodium succ (SOLU-MEDROL) injection 40 mg  40 mg IntraVENous Q12H Enrique Albrecht MD   40 mg at 02/15/25 0939    heparin 25,000 units in dextrose 5% 250 mL (premix) infusion  5-30 Units/kg/hr IntraVENous Continuous Nino Greco MD 7.8 mL/hr at 02/15/25 0606 13 Units/kg/hr at 02/15/25 
    Progress  Note  Chief Complaint   Patient presents with    Shortness of Breath     Pt was 79% on room air when EMS arrived. Pt denies SOB. Currently 84% on room air.      Historical Issues:  Current Facility-Administered Medications   Medication Dose Route Frequency Provider Last Rate Last Admin    heparin 25,000 units in dextrose 5% 250 mL (premix) infusion  5-30 Units/kg/hr IntraVENous Continuous Nino Greco MD 6.6 mL/hr at 02/16/25 1223 11.018 Units/kg/hr at 02/16/25 1223    heparin (porcine) injection 6,800 Units  80 Units/kg IntraVENous PRN Nino Greco MD        heparin (porcine) injection 3,400 Units  40 Units/kg IntraVENous PRN Nion Greco MD        sodium chloride (OCEAN, BABY AYR) 0.65 % nasal spray 1 spray  1 spray Each Nostril PRN Lorenza Cr MD        piperacillin-tazobactam (ZOSYN) injection 4,500 mg  4,500 mg IntraVENous Q8H [place], Skye, APRN - CNP   4,500 mg at 02/16/25 0802    And    sodium chloride (PF) 0.9 % injection 20 mL  20 mL IntraVENous Q8H [place], Skye, APRN - CNP   20 mL at 02/16/25 0801    albuterol (PROVENTIL) (2.5 MG/3ML) 0.083% nebulizer solution 2.5 mg  2.5 mg Nebulization Q6H PRN [place], Skye, APRN - CNP        albuterol (PROVENTIL) (2.5 MG/3ML) 0.083% nebulizer solution 2.5 mg  2.5 mg Nebulization Q4H [place], Skye, APRN - CNP   2.5 mg at 02/16/25 1230    guaiFENesin (ROBITUSSIN) 100 MG/5ML liquid 400 mg  400 mg Oral Q4H [place], Skye, APRN - CNP   400 mg at 02/16/25 1043    [Held by provider] furosemide (LASIX) injection 20 mg  20 mg IntraVENous BID Lorenza Cr MD   20 mg at 02/10/25 0922    metoprolol succinate (TOPROL XL) extended release tablet 25 mg  25 mg Oral QHS Stan Esquivel MD   25 mg at 02/15/25 2152    acetylcysteine (MUCOMYST) 20 % solution 600 mg  600 mg Inhalation Q4H RT Skye [place] APRN - CNP   600 mg at 02/16/25 1230    methylPREDNISolone sodium succ (SOLU-MEDROL) injection 40 mg  40 mg IntraVENous Q12H Enrique Albrecht MD   40 mg at 
    Progress  Note  Chief Complaint   Patient presents with    Shortness of Breath     Pt was 79% on room air when EMS arrived. Pt denies SOB. Currently 84% on room air.      Historical Issues:  Current Facility-Administered Medications   Medication Dose Route Frequency Provider Last Rate Last Admin    sodium chloride (OCEAN, BABY AYR) 0.65 % nasal spray 1 spray  1 spray Each Nostril PRN Lorenza Cr MD        piperacillin-tazobactam (ZOSYN) injection 4,500 mg  4,500 mg IntraVENous Q8H [place], Skye, APRN - CNP   4,500 mg at 02/13/25 0850    And    sodium chloride (PF) 0.9 % injection 20 mL  20 mL IntraVENous Q8H [place], Skye, APRN - CNP   20 mL at 02/13/25 0851    vancomycin (VANCOCIN) 750 mg in 150 mL IVPB  750 mg IntraVENous Q24H [place], Skye, APRN - CNP   Stopped at 02/13/25 1309    albuterol (PROVENTIL) (2.5 MG/3ML) 0.083% nebulizer solution 2.5 mg  2.5 mg Nebulization Q6H PRN [place], Skye, APRN - CNP        albuterol (PROVENTIL) (2.5 MG/3ML) 0.083% nebulizer solution 2.5 mg  2.5 mg Nebulization Q4H [place], Skye, APRN - CNP   2.5 mg at 02/13/25 1148    guaiFENesin (ROBITUSSIN) 100 MG/5ML liquid 400 mg  400 mg Oral Q4H [place], Skye, APRN - CNP   400 mg at 02/12/25 0812    [Held by provider] furosemide (LASIX) injection 20 mg  20 mg IntraVENous BID Lorenza Cr MD   20 mg at 02/10/25 0922    metoprolol succinate (TOPROL XL) extended release tablet 25 mg  25 mg Oral QHS Stan Esquivel MD   25 mg at 02/08/25 0124    acetylcysteine (MUCOMYST) 20 % solution 600 mg  600 mg Inhalation Q4H RT WorthamGiuseppe tubbsin, APRN - CNP   600 mg at 02/13/25 1148    methylPREDNISolone sodium succ (SOLU-MEDROL) injection 40 mg  40 mg IntraVENous Q12H Enrique Albrecht MD   40 mg at 02/13/25 0851    heparin 25,000 units in dextrose 5% 250 mL (premix) infusion  5-30 Units/kg/hr IntraVENous Continuous Nino Greco MD 6.6 mL/hr at 02/13/25 0722 11 Units/kg/hr at 02/13/25 0722    atorvastatin (LIPITOR) tablet 20 mg  20 mg Oral 
    Pulmonary Progress Note    Admit Date: 2/4/2025                            PCP: Stanley Gar MD  Principal Problem:    Other pulmonary embolism without acute cor pulmonale (HCC)  Active Problems:    Atrial fibrillation with RVR (HCC)    Acute on chronic diastolic congestive heart failure (HCC)    Acute respiratory failure with hypoxia    Coronary artery disease involving native coronary artery of native heart without angina pectoris    PAF (paroxysmal atrial fibrillation) (Formerly [facility])    Chronic kidney disease  Resolved Problems:    * No resolved hospital problems. *      Subjective:  Patient remains on 60L, Fio2 100%  Overnight when lying on left side, patient desaturated to 88-89% and once repositioned her saturations improved now maintaining 93%  She remains confused, difficult to assess ROS  Discussed with charge RN     Medications:   heparin (PORCINE) Infusion 13 Units/kg/hr (02/08/25 2396)    sodium chloride          furosemide  40 mg Oral Daily    metoprolol succinate  25 mg Oral QHS    albuterol  2.5 mg Nebulization Q4H WA RT    acetylcysteine  600 mg Inhalation Q4H RT    methylPREDNISolone  40 mg IntraVENous Q12H    atorvastatin  20 mg Oral Nightly    isosorbide mononitrate  30 mg Oral Daily    metoprolol succinate  50 mg Oral Daily    sodium chloride flush  5-40 mL IntraVENous 2 times per day    ipratropium 0.5 mg-albuterol 2.5 mg  1 Dose Inhalation Q4H WA RT    arformoterol tartrate  15 mcg Nebulization BID RT    [Held by provider] budesonide  0.5 mg Nebulization BID RT       Vitals:  VITALS:  /80   Pulse 50   Temp 97.3 °F (36.3 °C)   Resp 16   Ht 1.499 m (4' 11\")   Wt 76.9 kg (169 lb 9.6 oz)   SpO2 91%   PF (!) 22 L/min   BMI 34.26 kg/m²   24HR INTAKE/OUTPUT:    Intake/Output Summary (Last 24 hours) at 2/9/2025 0766  Last data filed at 2/9/2025 0345  Gross per 24 hour   Intake 250 ml   Output 1300 ml   Net -1050 ml     CURRENT PULSE OXIMETRY:  SpO2: 91 %  24HR PULSE OXIMETRY RANGE:  SpO2  
    Pulmonary Progress Note    Admit Date: 2/4/2025                            PCP: Stanley Gar MD  Principal Problem:    Other pulmonary embolism without acute cor pulmonale (HCC)  Active Problems:    Atrial fibrillation with RVR (HCC)    Acute on chronic diastolic congestive heart failure (HCC)    Acute respiratory failure with hypoxia    Coronary artery disease involving native coronary artery of native heart without angina pectoris    PAF (paroxysmal atrial fibrillation) (HCC)    Chronic kidney disease  Resolved Problems:    * No resolved hospital problems. *      Subjective:  Patient remains on 60L, Fio2 100% with saturations 90%  CTA ordered yesterday along with NS infusion @ 1cc/kg/hr x 24 hrs and lasix held   Patient remains alert but confused denies any respiratory complaints   Discussed with charge and bedside RN     Medications:   sodium chloride 75 mL/hr at 02/11/25 0953    heparin (PORCINE) Infusion 13 Units/kg/hr (02/11/25 0633)    sodium chloride          albuterol  2.5 mg Nebulization Q4H    guaiFENesin  400 mg Oral Q4H    [Held by provider] furosemide  20 mg IntraVENous BID    metoprolol succinate  25 mg Oral QHS    acetylcysteine  600 mg Inhalation Q4H RT    methylPREDNISolone  40 mg IntraVENous Q12H    atorvastatin  20 mg Oral Nightly    isosorbide mononitrate  30 mg Oral Daily    metoprolol succinate  50 mg Oral Daily    sodium chloride flush  5-40 mL IntraVENous 2 times per day    arformoterol tartrate  15 mcg Nebulization BID RT    [Held by provider] budesonide  0.5 mg Nebulization BID RT       Vitals:  VITALS:  BP (!) 142/59   Pulse 59   Temp 97.9 °F (36.6 °C) (Oral)   Resp 20   Ht 1.499 m (4' 11\")   Wt 77.7 kg (171 lb 4.8 oz)   SpO2 90%   BMI 34.60 kg/m²   24HR INTAKE/OUTPUT:    Intake/Output Summary (Last 24 hours) at 2/11/2025 1009  Last data filed at 2/11/2025 0633  Gross per 24 hour   Intake 168.14 ml   Output 800 ml   Net -631.86 ml     CURRENT PULSE OXIMETRY:  SpO2: 90 
    Pulmonary Progress Note    Admit Date: 2/4/2025                            PCP: Stanley Gar MD  Principal Problem:    Other pulmonary embolism without acute cor pulmonale (HCC)  Active Problems:    Atrial fibrillation with RVR (HCC)    Acute on chronic diastolic congestive heart failure (HCC)    Acute respiratory failure with hypoxia    Coronary artery disease involving native coronary artery of native heart without angina pectoris    PAF (paroxysmal atrial fibrillation) (HCC)    Chronic kidney disease  Resolved Problems:    * No resolved hospital problems. *      Subjective:  Patient seen resting in bed  Remains on 60L, Fio2 100% with saturations 94%  Desaturated to 88% overnight  Confused, denies any respiratory complaints   Discussed with bedside RN. Staff and family have been assisting patient with flutter valve    Medications:   heparin (PORCINE) Infusion 15 Units/kg/hr (02/12/25 1031)    sodium chloride          piperacillin-tazobactam  4,500 mg IntraVENous Q8H    And    sodium chloride (PF)  20 mL IntraVENous Q8H    vancomycin  750 mg IntraVENous Q24H    albuterol  2.5 mg Nebulization Q4H    guaiFENesin  400 mg Oral Q4H    [Held by provider] furosemide  20 mg IntraVENous BID    metoprolol succinate  25 mg Oral QHS    acetylcysteine  600 mg Inhalation Q4H RT    methylPREDNISolone  40 mg IntraVENous Q12H    atorvastatin  20 mg Oral Nightly    isosorbide mononitrate  30 mg Oral Daily    [Held by provider] metoprolol succinate  50 mg Oral Daily    sodium chloride flush  5-40 mL IntraVENous 2 times per day    arformoterol tartrate  15 mcg Nebulization BID RT    [Held by provider] budesonide  0.5 mg Nebulization BID RT       Vitals:  VITALS:  BP (!) 111/55   Pulse 66   Temp 98.1 °F (36.7 °C) (Tympanic)   Resp 20   Ht 1.499 m (4' 11\")   Wt 78.2 kg (172 lb 4.8 oz)   SpO2 94%   BMI 34.80 kg/m²   24HR INTAKE/OUTPUT:    Intake/Output Summary (Last 24 hours) at 2/12/2025 1152  Last data filed at 2/12/2025 
    Pulmonary Progress Note    Admit Date: 2025                            PCP: Stanley Gar MD  Principal Problem:    Other pulmonary embolism without acute cor pulmonale (HCC)  Active Problems:    Atrial fibrillation with RVR (HCC)    Acute on chronic diastolic congestive heart failure (HCC)    Acute respiratory failure with hypoxia    Coronary artery disease involving native coronary artery of native heart without angina pectoris    PAF (paroxysmal atrial fibrillation) (Formerly [facility])    Chronic kidney disease  Resolved Problems:    * No resolved hospital problems. *      Subjective:  Overnight patient's oxygen requirements escalated now requiring Airvo 60L, FiO2 100% previously on 15L HF  XR chest now with RLL atelectasis. ABG showing hypoxia  Patient pleasantly confused, grandson at bedside.   Feeling better, daughter at bedside     Medications:   heparin (PORCINE) Infusion 13 Units/kg/hr (25 033)    sodium chloride          furosemide  40 mg Oral Daily    metoprolol succinate  25 mg Oral QHS    methylPREDNISolone  40 mg IntraVENous Q12H    atorvastatin  20 mg Oral Nightly    isosorbide mononitrate  30 mg Oral Daily    metoprolol succinate  50 mg Oral Daily    sodium chloride flush  5-40 mL IntraVENous 2 times per day    ipratropium 0.5 mg-albuterol 2.5 mg  1 Dose Inhalation Q4H WA RT    arformoterol tartrate  15 mcg Nebulization BID RT    [Held by provider] budesonide  0.5 mg Nebulization BID RT       Vitals:  VITALS:  /83   Pulse 66   Temp 98.1 °F (36.7 °C) (Oral)   Resp 20   Ht 1.499 m (4' 11\")   Wt 78.4 kg (172 lb 12.8 oz)   SpO2 94%   BMI 34.90 kg/m²   24HR INTAKE/OUTPUT:    Intake/Output Summary (Last 24 hours) at 2025 1314  Last data filed at 2025 0337  Gross per 24 hour   Intake 541.31 ml   Output 400 ml   Net 141.31 ml     CURRENT PULSE OXIMETRY:  SpO2: 94 %  24HR PULSE OXIMETRY RANGE:  SpO2  Av.9 %  Min: 84 %  Max: 95 %  CVP:    VENT SETTINGS:      Additional Respiratory 
    Pulmonary Progress Note    Admit Date: 2025                            PCP: Stanley Gar MD  Principal Problem:    Other pulmonary embolism without acute cor pulmonale (HCC)  Active Problems:    Atrial fibrillation with RVR (HCC)    Acute on chronic diastolic congestive heart failure (HCC)    Acute respiratory failure with hypoxia    Coronary artery disease involving native coronary artery of native heart without angina pectoris    PAF (paroxysmal atrial fibrillation) (Formerly [facility])    Chronic kidney disease  Resolved Problems:    * No resolved hospital problems. *      Subjective:  Patient remains on 60L, Fio2 100% with saturations 92-93%  She is pleasantly confused denying any respiratory symptoms    Medications:   heparin (PORCINE) Infusion 13 Units/kg/hr (02/10/25 0654)    sodium chloride          albuterol  2.5 mg Nebulization Q4H    guaiFENesin  400 mg Oral Q4H    furosemide  20 mg IntraVENous BID    metoprolol succinate  25 mg Oral QHS    acetylcysteine  600 mg Inhalation Q4H RT    methylPREDNISolone  40 mg IntraVENous Q12H    atorvastatin  20 mg Oral Nightly    isosorbide mononitrate  30 mg Oral Daily    metoprolol succinate  50 mg Oral Daily    sodium chloride flush  5-40 mL IntraVENous 2 times per day    arformoterol tartrate  15 mcg Nebulization BID RT    [Held by provider] budesonide  0.5 mg Nebulization BID RT       Vitals:  VITALS:  /63   Pulse 63   Temp 97.9 °F (36.6 °C) (Oral)   Resp 20   Ht 1.499 m (4' 11\")   Wt 77.6 kg (171 lb)   SpO2 93%   PF (!) 22 L/min   BMI 34.54 kg/m²   24HR INTAKE/OUTPUT:    Intake/Output Summary (Last 24 hours) at 2/10/2025 1007  Last data filed at 2/10/2025 0654  Gross per 24 hour   Intake 518.1 ml   Output --   Net 518.1 ml     CURRENT PULSE OXIMETRY:  SpO2: 93 %  24HR PULSE OXIMETRY RANGE:  SpO2  Av.7 %  Min: 87 %  Max: 93 %  CVP:    VENT SETTINGS:   Vent Information  Equipment Changed: (S) Other (comment) (nasal prongs)  Additional Respiratory 
    Pulmonary Progress Note    Admit Date: 2025                            PCP: Stanley Gar MD  Principal Problem:    Other pulmonary embolism without acute cor pulmonale (HCC)  Resolved Problems:    * No resolved hospital problems. *      Subjective:    Patient seen resting in bed on 15L HFNC, pox last documented 90%  She denies SOB. Reports occasional dry cough. Denies wheezing  Family not present    Medications:   heparin (PORCINE) Infusion 13 Units/kg/hr (25 0924)    sodium chloride          methylPREDNISolone  40 mg IntraVENous Q12H    atorvastatin  20 mg Oral Nightly    [Held by provider] hydroCHLOROthiazide  12.5 mg Oral Daily    isosorbide mononitrate  30 mg Oral Daily    metoprolol succinate  50 mg Oral Daily    sodium chloride flush  5-40 mL IntraVENous 2 times per day    ipratropium 0.5 mg-albuterol 2.5 mg  1 Dose Inhalation Q4H WA RT    arformoterol tartrate  15 mcg Nebulization BID RT    budesonide  0.5 mg Nebulization BID RT       Vitals:  VITALS:  /63   Pulse 68   Temp 97.9 °F (36.6 °C) (Oral)   Resp 20   Ht 1.499 m (4' 11\")   Wt 78.4 kg (172 lb 12.8 oz)   SpO2 90%   BMI 34.90 kg/m²   24HR INTAKE/OUTPUT:    Intake/Output Summary (Last 24 hours) at 2025 1236  Last data filed at 2025 0535  Gross per 24 hour   Intake 420 ml   Output 850 ml   Net -430 ml     CURRENT PULSE OXIMETRY:  SpO2: 90 %  24HR PULSE OXIMETRY RANGE:  SpO2  Av.2 %  Min: 90 %  Max: 98 %  CVP:    VENT SETTINGS:      Additional Respiratory Assessments  Pulse: 68  Respirations: 20  SpO2: 90 %    EXAM:  General: No distress. Alert, Appears comfortable, on 15L HFNC  Eyes: No sclera icterus. No conjunctival injection.  ENT: No discharge.  Neck: Trachea midline  Resp: No accessory muscle use. No crackles.+ wheezing upper lung fields, No rhonchi.   CV: Regular rate. Regular rhythm. No mumur or rub.    ABD: Non-tender. Non-distended. Normal bowel sounds.   Skin: Warm and dry. No nodule on exposed 
    Pulmonary Progress Note    Admit Date: 2025                            PCP: Stanley Gar MD  Principal Problem:    Other pulmonary embolism without acute cor pulmonale (HCC)  Resolved Problems:    * No resolved hospital problems. *      Subjective:    Patient seen resting in bed on 6L, pox 93%  She denies SOB. Reports occasional dry cough. Denies wheezing  Family not present    Medications:   heparin (PORCINE) Infusion 16 Units/kg/hr (25 0802)    sodium chloride          predniSONE  40 mg Oral Daily    aspirin  81 mg Oral Daily    atorvastatin  20 mg Oral Nightly    clopidogrel  75 mg Oral Daily    hydroCHLOROthiazide  12.5 mg Oral Daily    isosorbide mononitrate  30 mg Oral Daily    metoprolol succinate  50 mg Oral Daily    sodium chloride flush  5-40 mL IntraVENous 2 times per day    ipratropium 0.5 mg-albuterol 2.5 mg  1 Dose Inhalation Q4H WA RT    arformoterol tartrate  15 mcg Nebulization BID RT    budesonide  0.5 mg Nebulization BID RT       Vitals:  VITALS:  BP (!) 137/51   Pulse 70   Temp 97.9 °F (36.6 °C) (Oral)   Resp 18   Ht 1.499 m (4' 11\")   Wt 78.4 kg (172 lb 12.8 oz)   SpO2 91%   BMI 34.90 kg/m²   24HR INTAKE/OUTPUT:    Intake/Output Summary (Last 24 hours) at 2025 1042  Last data filed at 2025 0808  Gross per 24 hour   Intake 480 ml   Output 700 ml   Net -220 ml     CURRENT PULSE OXIMETRY:  SpO2: 91 %  24HR PULSE OXIMETRY RANGE:  SpO2  Av.3 %  Min: 91 %  Max: 95 %  CVP:    VENT SETTINGS:      Additional Respiratory Assessments  Pulse: 70  Respirations: 18  SpO2: 91 %    EXAM:  General: No distress. Alert, 6L  Eyes: No sclera icterus. No conjunctival injection.  ENT: No discharge.  Neck: Trachea midline  Resp: No accessory muscle use. No crackles.+ wheezing upper lung fields, No rhonchi.   CV: Regular rate. Regular rhythm. No mumur or rub.    ABD: Non-tender. Non-distended. Normal bowel sounds.   Skin: Warm and dry. No nodule on exposed extremities. No rash on 
  Palliative Care Department  [phone]  Palliative Care Initial Consult  Provider Isha Garcia, APRN - CNP    Dori Samayoa  [id number]  Hospital Day: 9  Date of Initial Consult: 2/7/2025  Referring Provider: Enrique Albrecht MD  Palliative Medicine was consulted for assistance with: Goals of care    HPI:   Dori Samayoa is a 84 y.o. with a medical history of dementia, HFpEF, COPD CAD, HTN and HLD who was admitted on 2/4/2025 from home with a CHIEF COMPLAINT of altered mental status and shortness of breath. Her son notes that on Friday he went to podiatry office for nail trim and asked for a wheelchair back to the car. She is hypoxic on arrival 84% on room air. CT head negative. CXR showing pulmonary vascular congestion. CTA with right lower lobe segmental and subsegmental pulmonary artery emboli.  Patient was started on a heparin drip and admitted for further medical management.  ASSESSMENT/PLAN:     Pertinent Hospital Diagnoses     Acute hypoxic respiratory failure  Unprovoked right PE  COPD  HFpEF  Dementia    Palliative Care Encounter / Counseling Regarding Goals of Care  Please see detailed goals of care discussion as below  At this time, Dori Samayoa, Does Not have capacity for medical decision-making.  Capacity is time limited and situation/question specific  During encounter Blaise was surrogate medical decision-maker  Outcome of goals of care meeting:   Continue full code status  Code status Full Code  Advanced Directives: no POA or living will in epic  Surrogate/Legal NOK:  Blaise Samayoa (child) [phone]  Jayleen Varner (child) [phone]    Spiritual assessment: no spiritual distress identified  Bereavement and grief: to be determined  Referrals to: none today  SUBJECTIVE:     Current medical issues leading to Palliative Medicine involvement include   Active Hospital Problems    Diagnosis Date Noted    Acute on chronic diastolic congestive heart failure (HCC) [I50.33] 02/08/2025    Acute 
  Palliative Care Department  [phone]  Palliative Care Initial Consult  Provider Isha Garcia, APRN - CNP    Dori Samayoa  [id number]  Hospital Day: 10  Date of Initial Consult: 2/7/2025  Referring Provider: Enrique Albrecht MD  Palliative Medicine was consulted for assistance with: Goals of care    HPI:   Dori Samayoa is a 84 y.o. with a medical history of dementia, HFpEF, COPD CAD, HTN and HLD who was admitted on 2/4/2025 from home with a CHIEF COMPLAINT of altered mental status and shortness of breath. Her son notes that on Friday he went to podiatry office for nail trim and asked for a wheelchair back to the car. She is hypoxic on arrival 84% on room air. CT head negative. CXR showing pulmonary vascular congestion. CTA with right lower lobe segmental and subsegmental pulmonary artery emboli.  Patient was started on a heparin drip and admitted for further medical management.  ASSESSMENT/PLAN:     Pertinent Hospital Diagnoses     Acute hypoxic respiratory failure  Unprovoked right PE  COPD  HFpEF  Dementia    Palliative Care Encounter / Counseling Regarding Goals of Care  Please see detailed goals of care discussion as below  At this time, Dori Samayoa, Does Not have capacity for medical decision-making.  Capacity is time limited and situation/question specific  During encounter Blaise was surrogate medical decision-maker  Outcome of goals of care meeting:   Continue full code status  Code status Full Code  Advanced Directives: no POA or living will in epic  Surrogate/Legal NOK:  Blaise Samayoa (child) [phone]  Jayleen Varner (child) [phone]    Spiritual assessment: no spiritual distress identified  Bereavement and grief: to be determined  Referrals to: none today  SUBJECTIVE:     Current medical issues leading to Palliative Medicine involvement include   Active Hospital Problems    Diagnosis Date Noted    Acute on chronic diastolic congestive heart failure (HCC) [I50.33] 02/08/2025    Acute 
  Palliative Care Department  [phone]  Palliative Care Progress Note  Provider Frances Nunez, APRN - CNP    Dori Samayoa  [id number]  Hospital Day: 11  Date of Initial Consult: 2/7/2025  Referring Provider: Enrique Albrecht MD  Palliative Medicine was consulted for assistance with: Goals of care    HPI:   Dori Samayoa is a 84 y.o. with a medical history of dementia, HFpEF, COPD CAD, HTN and HLD who was admitted on 2/4/2025 from home with a CHIEF COMPLAINT of altered mental status and shortness of breath. Her son notes that on Friday he went to podiatry office for nail trim and asked for a wheelchair back to the car. She is hypoxic on arrival 84% on room air. CT head negative. CXR showing pulmonary vascular congestion. CTA with right lower lobe segmental and subsegmental pulmonary artery emboli.  Patient was started on a heparin drip and admitted for further medical management.  ASSESSMENT/PLAN:     Pertinent Hospital Diagnoses     Acute hypoxic respiratory failure  Unprovoked right PE  COPD  HFpEF  Dementia    Palliative Care Encounter / Counseling Regarding Goals of Care  Please see detailed goals of care discussion as below  At this time, Dori Samayoa, Does Not have capacity for medical decision-making.  Capacity is time limited and situation/question specific  During encounter Blaise was surrogate medical decision-maker  Outcome of goals of care meeting:   Continue full code status  Passed MBSS today  Code status Full Code  Advanced Directives: no POA or living will in epic  Surrogate/Legal NOK:  Blaise Samayoa (child) [phone]  Jayleen Varner (child) [phone]    Spiritual assessment: no spiritual distress identified  Bereavement and grief: to be determined  Referrals to: none today  SUBJECTIVE:     Current medical issues leading to Palliative Medicine involvement include   Active Hospital Problems    Diagnosis Date Noted    Acute on chronic diastolic congestive heart failure (HCC) [I50.33] 
  Physician Progress Note      PATIENT:               JODI HERRERA  CSN #:                  [id number]  :                       1940  ADMIT DATE:       2025 10:30 AM  DISCH DATE:  RESPONDING  PROVIDER #:        Enrique Albrecht MD          QUERY TEXT:    Pt admitted with a PE. Pt noted to have AMS. If possible, please document in   the progress notes and discharge summary if you are evaluating and / or   treating any of the following:    The medical record reflects the following:  Risk Factors: PE, COPD exac, hypoxia, dementia  Clinical Indicators: into ed with shortness of breath, had noted AMS with   hypoxia (sat 79% initially and up to 95% after O2 applied); on  the son   states she is more back to baseline now; sat 90%; CT head-no evidence of an   acute or subacute intracranial abnormality  Treatment: O2 at 4-5l/nc, CT head    Thank you,  Iron VILLANUEVA, DAGO [email or web address]    Thank you,  Iron VILLANUEVA, DAGO [email or web address]  Options provided:  -- Metabolic encephalopathy  -- Delirium due to, Please specify cause.  -- Other - I will add my own diagnosis  -- Disagree - Not applicable / Not valid  -- Disagree - Clinically unable to determine / Unknown  -- Refer to Clinical Documentation Reviewer    PROVIDER RESPONSE TEXT:    This patient has metabolic encephalopathy.    Query created by: Iron Collins on 2025 9:01 AM      Electronically signed by:  Enrique Albrecht MD 2025 11:31 AM          
  Physician Progress Note      PATIENT:               JODI HERRERA  CSN #:                  [id number]  :                       1940  ADMIT DATE:       2025 10:30 AM  DISCH DATE:  RESPONDING  PROVIDER #:        Enrique Albrecht MD        QUERY TEXT:    Stage of Chronic Kidney Disease: Please provide further specificity, if known.    Clinical indicators include: bun, creatinine, probnp, ckd  Options provided:  -- Chronic kidney disease stage 1  -- Chronic kidney disease stage 2  -- Chronic kidney disease stage 3  -- Chronic kidney disease stage 3a  -- Chronic kidney disease stage 3b  -- Chronic kidney disease stage 4  -- Chronic kidney disease stage 5  -- Chronic kidney disease stage 5, requiring dialysis  -- End stage renal disease  -- Other - I will add my own diagnosis  -- Disagree - Not applicable / Not valid  -- Disagree - Clinically Unable to determine / Unknown        PROVIDER RESPONSE TEXT:    The patient has chronic kidney disease stage 3b.      Electronically signed by:  Enrique Albrecht MD 2025 10:55 AM          
  Physician Progress Note      PATIENT:               JODI HERRERA  CSN #:                  [id number]  :                       1940  ADMIT DATE:       2025 10:30 AM  DISCH DATE:  RESPONDING  PROVIDER #:        Enrique Albrecht MD          QUERY TEXT:    Patient admitted with a PE, noted to have atrial fibrillation and is   maintained on heparin. If possible, please document in progress notes and   discharge summary if you are evaluating and/or treating any of the following:?  ?  The medical record reflects the following:  Risk Factors: 84 year old female with hx of htn, CHF, and PE  Clinical Indicators: afib  Treatment: heparin    Thank you,  Iron RN, CCDS  [email or web address]  Options provided:  -- Secondary hypercoagulable state in a patient with atrial fibrillation  -- Other - I will add my own diagnosis  -- Disagree - Not applicable / Not valid  -- Disagree - Clinically unable to determine / Unknown  -- Refer to Clinical Documentation Reviewer    PROVIDER RESPONSE TEXT:    This patient has secondary hypercoagulable state in a patient with atrial   fibrillation.    Query created by: Iron Collins on 2025 8:51 AM      Electronically signed by:  Enrique Albrecht MD 2025 8:54 AM          
  Speech Language Pathology  NAME:  Dori Samayoa  :  1940  DATE: 2025  ROOM:  [address] [place] [address]-A    Chart reviewed; Patient is NPO per physician order due to new pneumonia and recent chest X-Ray. Physician plans for patient to be NPO until MBSS is complete.     Patient was referred for a modified barium swallow study on 25. ST acknowledged order and discussed with RN that she is unable to be transported on the heated high flow oxygen. ST plans to complete the order once the patient 's respiratory status improves. Please contact with any further questions.       Thank You,   Glen Pavon MSCCC/SLP  Speech Language Pathologist  [id number]               
2 RN skin check completed by Charo VILLANUEVA and Cornelia VILLANUEVA  
2 RN skin check completed by Charo VILLANUEVA and Leslie VILLANUEVA  
2 RN skin check done by Una VILLANUEVA and Charo VILLANUEVA.   
2 RN skin check done by Una VILLANUEVA and Migdalia VILLANUEVA.   
2 skin check done by Una VILLANUEVA and Gregor VILLANUEVA.   
4 Eyes Skin Assessment     NAME:  Dori Samayoa  YOB: 1940  MEDICAL RECORD NUMBER:  [id number]    The patient is being assessed for  Admission    I agree that at least one RN has performed a thorough Head to Toe Skin Assessment on the patient. ALL assessment sites listed below have been assessed.      Areas assessed by both nurses:    Head, Face, Ears, Shoulders, Back, Chest, Arms, Elbows, Hands, Sacrum. Buttock, Coccyx, Ischium, Legs. Feet and Heels, and Under Medical Devices         Does the Patient have a Wound? No noted wound(s)       Parish Prevention initiated by RN: Yes  Wound Care Orders initiated by RN: No    Pressure Injury (Stage 3,4, Unstageable, DTI, NWPT, and Complex wounds) if present, place Wound referral order by RN under : No    New Ostomies, if present place, Ostomy referral order under : No     Nurse 1 eSignature: Electronically signed by DELMI SHIELDS RN on 2/5/25 at 1:45 AM EST    **SHARE this note so that the co-signing nurse can place an eSignature**    Nurse 2 eSignature: Electronically signed by Patsy Moore RN on 2/5/25 at 1:46 AM EST    
4eyes skin check done by this RN and RICH Bundy during bedside handoff. No new areas of skin breakdown.   
Assumed care of patient at 0330. Patient on both hi flow nasal cannula at 15L and non rebreather. Patients SPO2 remaining 87-89%. Patient still alert but very sleepy. Christine NP notified of continued hypoxia. Heated hi flow ordered.     At this time 0415 patient on 60L 100% on the heated hi flow and non rebreather. SPO2 90%  
Bedside handoff and skin check completed with RICH Heart. No new skin issues at this time.   
Bedside shift report completed with Meghana SHRESTHA RN. Skin assessed. SOS in place. No new issues at this time.   
Bedside shift report completed with Meghana SHRESTHA RN. Skin assessed. SOS in place. No new skin issues at this time.   
Bedside shift report completed with Taylor RAM, Skin assessed, no new skin issues at this time.   
Bedside shift report given to Taylor RAM, Skin checked. No new skin issues at this time.   
Bipap on standby. Pt refuses.   
CTA pulmonary result sent via [employer] serve to Dr. Cr.   
Call placed to Belén Lee CNP, to notify her of patients increased oxygen demands and recent ABG results.   
Call placed to pharmacist regarding patients weight being incorrect in MAR with heparin gtt order, pharmacist states since patient has been on heparin gtt longer than 1 day that there is no need to change the weight in the MAR and to continue adjusting the heparin gtt as we have been.   
Cardiology Consult placed.  
Christine METZGER notified of patient going into afib rvr.   
Comprehensive Nutrition Assessment    Type and Reason for Visit:  Initial, LOS    Nutrition Recommendations/Plan:   Continue NPO, ADAT  ONS w/ diet  Will continue to monitor while inpatient     Malnutrition Assessment:  Malnutrition Status:  At risk for malnutrition (02/11/25 1003)    Context:  Acute Illness     Findings of the 6 clinical characteristics of malnutrition:  Energy Intake:  75% or less of estimated energy requirements for 7 or more days  Weight Loss:  Unable to assess (no wt hx in EMR <1 yr)     Body Fat Loss:  Unable to assess (d/t adv age)     Muscle Mass Loss:  Unable to assess    Fluid Accumulation:  Unable to assess (d/t multifactorial)     Strength:  Not Performed    Nutrition Assessment:    Pt w/ acute hypoxic and hypercapnic resp failure 2/2 PE; underlying COPD w/ exacerbation; RLL atelectasis. Hx CAD, dementia, HFpEF. May benefit from bronch per pulm. Currently NPO, was consuming variable amounts of meals (0-25%, 51-75%).  Continue NPO, ADAT. ONS w/ diet. Will continue to monitor.    Nutrition Related Findings:    A&O x2, missing teeth, abd soft/rounded/nontender/distended, +BS, BLE trace edema, -1.3 L Wound Type: None       Current Nutrition Intake & Therapies:    Average Meal Intake: NPO, 0%, 1-25%, 51-75% (prior to NPO status)  Average Supplements Intake: NPO  Diet NPO    Anthropometric Measures:  Height: 149.9 cm (4' 11\")  Ideal Body Weight (IBW): 95 lbs (43 kg)    Admission Body Weight: 78 kg (172 lb) (2/4 unspecified method)  Current Body Weight: 77.7 kg (171 lb 4.8 oz) (2/11), 180.3 % IBW. Weight Source: Bed scale  Current BMI (kg/m2): 34.6  Usual Body Weight:  (no wt hx in EMR <1 yr)     Weight Adjustment For: No Adjustment     BMI Categories: Obese Class 1 (BMI 30.0-34.9)    Estimated Daily Nutrient Needs:  Energy Requirements Based On: Formula  Weight Used for Energy Requirements: Current  Energy (kcal/day): 0968-8125  Weight Used for Protein Requirements: Ideal  Protein (g/day): 
Comprehensive Nutrition Assessment    Type and Reason for Visit:  Reassess    Nutrition Recommendations/Plan:   Continue current diet and ONS, as tolerated and per SLP recommendation  Continue inpatient monitoring POC     Malnutrition Assessment:  Malnutrition Status:  At risk for malnutrition (02/11/25 1003)    Context:  Acute Illness     Findings of the 6 clinical characteristics of malnutrition:  Energy Intake:  75% or less of estimated energy requirements for 7 or more days  Weight Loss:  Unable to assess (no wt hx in EMR <1 yr)     Body Fat Loss:  Unable to assess (d/t adv age)     Muscle Mass Loss:  Unable to assess    Fluid Accumulation:  Unable to assess (d/t multifactorial)     Strength:  Not Performed    Nutrition Assessment:    Pt s/p 2/14 MBS and SLP rec Small and bite sized Dysphagia diet with Thin Liquids-No Straws. ALDO/CKD worsening. Remains pleasantly confused. Continues on Airvo. Will add ONS to current diet to optimize nutrient intake, as PO <50% most meals at this time.    Nutrition Related Findings:    A&Ox2, missing teeth, soft distended abd +BS, -I/O 1.3L, trace edema,  BUN/creat 56/2.0, gluc 135 Wound Type: None       Current Nutrition Intake & Therapies:    Average Meal Intake: 26-50%, 1-25%  Average Supplements Intake: None Ordered  ADULT DIET; Dysphagia - Soft and Bite Sized; No Drinking Straws  ADULT ORAL NUTRITION SUPPLEMENT; Dinner, Lunch; Frozen Oral Supplement  ADULT ORAL NUTRITION SUPPLEMENT; Breakfast; Standard High Calorie/High Protein Oral Supplement    Anthropometric Measures:  Height: 149.9 cm (4' 11\")  Ideal Body Weight (IBW): 95 lbs (43 kg)    Admission Body Weight: 78 kg (172 lb) (2/4 unspecified method)  Current Body Weight: 74 kg (163 lb 2.3 oz) (wt down w/decreased fluid balance), 180.3 % IBW. Weight Source: Bed scale (2/17)  Current BMI (kg/m2): 32.9  Usual Body Weight:  (no wt hx in EMR <1 yr)        Weight Adjustment For: No Adjustment                 BMI Categories: 
Database initiated. Patient is confused, she comes in from home alone. States she uses no assistive devices and is RA at baseline. She is hard of hearing.     
Dr. Albrecht made aware of paint complaint of severe back pain. Order received.   
Dr. Greco informed of SLP recommendations following MBS. Diet orders received.    
Entered pt's room and pt had O2 off,  not alarming, unknown how long O2 was off. Pt O2 sat 84% on 14L hi etienne n/c. Pt not in distress, speaking appropriately to this RN and answering questions. Placed 15L non rebreather on pt along with 15L hi flow cannula. Pt O2 sat now 89-90%. Notified Christine Fleming NP and Dr. Cr with pulmonology of increased O2 demands. Awaiting response.  
Handoff skin check with RICH Vieira completed.  No areas of breakdown identified.   
Initial Inpatient Wound Care    Admit Date: 2/4/2025 10:30 AM    Reason for consult:  heel purple nonblanchable 0.4cm by 0.7 cm    Significant history:  see H&P    Findings:  awake, verbal. Very friendly    Rt heel with light purple area. Skin turgor normal  Doubt it is pressure related  Plan:continue heel floating  Plan for transfer to [facility]  MENDOZA Mooney, Wound Care        Nora Gaviria RN 2/20/2025 12:39 PM      
Message sent to Dr. Cr notifying of patient's oxygen saturation maintaining 88-89% while asleep with the heated high flow at 100% FiO2 60 L.   
Message sent to Dr. Cr regarding patients O2 stat.   
Message sent to wound care regarding consult   
New consult placed to Dr. Mccabe.   
New consult placed w palliative care for goals of care   
Nurse to nurse given to Juanito at [facility].   
Occupational Therapy  OCCUPATIONAL THERAPY INITIAL EVALUATION    [facility]   [address], [place]         Date:2025                                                  Patient Name: Dori Samayoa    MRN: [id number]    : 1940    Room: [address] [place] [address]      Evaluating OT: Fallon Lopez OTR/L [id number]       Referring Provider:Charo Larson APRN - NP      Specific Provider Orders/Date: OT eval and treat       Diagnosis: Pulmonary Embolism and Congestive Heart Failure    Surgery: none      Pertinent Medical History: none on file       Precautions:  Fall Risk, O2    Assessment of current deficits    [x] Functional mobility  [x]ADLs  [x] Strength               [x]Cognition    [x] Functional transfers   [x] IADLs         [x] Safety Awareness   [x]Endurance    [] Fine Coordination              [x] Balance      [] Vision/perception   []Sensation     []Gross Motor Coordination  [] ROM  [] Delirium                   [] Motor Control     OT PLAN OF CARE   OT POC based on physician orders, patient diagnosis and results of clinical assessment    Frequency/Duration: 2-5 days/wk for 2 -4 weeks PRN   Specific OT Treatment Interventions to include:   * Instruction/training on adapted ADL techniques and AE recommendations to increase functional independence within precautions       * Training on energy conservation strategies, correct breathing pattern and techniques to improve independence/tolerance for self-care routine  * Functional transfer/mobility training/DME recommendations for increased independence, safety, and fall prevention  * Patient/Family education to increase follow through with safety techniques and functional independence  * Recommendation of environmental modifications for increased safety with functional transfers/mobility and ADLs  * Cognitive retraining/development of therapeutic activities to improve problem solving, judgement, memory, 
Occupational Therapy  OT BEDSIDE TREATMENT NOTE      Date:2025  Patient Name: Dori Samayoa  MRN: [id number]  : 1940  Room: [address] [place] [address]     Evaluating OT: Fallon Lopez OTR/HENRIETTA [id number]        Referring Provider:Charo Larson APRN - NP       Specific Provider Orders/Date: OT eval and treat        Diagnosis: Pulmonary Embolism and Congestive Heart Failure    Surgery: none      Pertinent Medical History: none on file        Precautions:  Fall Risk, O2    Assessment of current deficits    [x] Functional mobility            [x]ADLs           [x] Strength                  [x]Cognition    [x] Functional transfers          [x] IADLs         [x] Safety Awareness   [x]Endurance    [] Fine Coordination                         [x] Balance      [] Vision/perception   []Sensation      []Gross Motor Coordination             [] ROM           [] Delirium                   [] Motor Control      OT PLAN OF CARE   OT POC based on physician orders, patient diagnosis and results of clinical assessment     Frequency/Duration: 2-5 days/wk for 2 -4 weeks PRN   Specific OT Treatment Interventions to include:   * Instruction/training on adapted ADL techniques and AE recommendations to increase functional independence within precautions       * Training on energy conservation strategies, correct breathing pattern and techniques to improve independence/tolerance for self-care routine  * Functional transfer/mobility training/DME recommendations for increased independence, safety, and fall prevention  * Patient/Family education to increase follow through with safety techniques and functional independence  * Recommendation of environmental modifications for increased safety with functional transfers/mobility and ADLs  * Cognitive retraining/development of therapeutic activities to improve problem solving, judgement, memory, and attention for increased safety/participation in ADL/IADL tasks  * Therapeutic exercise to improve 
Occupational Therapy  OT BEDSIDE TREATMENT NOTE      Date:2025  Patient Name: Dori Samayoa  MRN: [id number]  : 1940  Room: [address] [place] [address]       Evaluating OT: Fallon Lopez OTR/HENRIETTA [id number]        Referring Provider:Charo Larson APRN - NP       Specific Provider Orders/Date: OT eval and treat        Diagnosis: Pulmonary Embolism and Congestive Heart Failure    Surgery: none      Pertinent Medical History: none on file        Precautions:  Fall Risk, Airvo    Assessment of current deficits    [x] Functional mobility            [x]ADLs           [x] Strength                  [x]Cognition    [x] Functional transfers          [x] IADLs         [x] Safety Awareness   [x]Endurance    [] Fine Coordination                         [x] Balance      [] Vision/perception   []Sensation      []Gross Motor Coordination             [] ROM           [] Delirium                   [] Motor Control      OT PLAN OF CARE   OT POC based on physician orders, patient diagnosis and results of clinical assessment     Frequency/Duration: 2-5 days/wk for 2 -4 weeks PRN   Specific OT Treatment Interventions to include:   * Instruction/training on adapted ADL techniques and AE recommendations to increase functional independence within precautions       * Training on energy conservation strategies, correct breathing pattern and techniques to improve independence/tolerance for self-care routine  * Functional transfer/mobility training/DME recommendations for increased independence, safety, and fall prevention  * Patient/Family education to increase follow through with safety techniques and functional independence  * Recommendation of environmental modifications for increased safety with functional transfers/mobility and ADLs  * Cognitive retraining/development of therapeutic activities to improve problem solving, judgement, memory, and attention for increased safety/participation in ADL/IADL tasks  * Therapeutic exercise to 
Occupational Therapy  OT BEDSIDE TREATMENT NOTE      Date:2025  Patient Name: Dori Samayoa  MRN: [id number]  : 1940  Room: [address] [place] [address]     Evaluating OT: Fallon Lopez OTR/L [id number]        Referring Provider:Charo Larson APRN - NP       Specific Provider Orders/Date: OT eval and treat        Diagnosis: Pulmonary Embolism and Congestive Heart Failure    Surgery: none      Pertinent Medical History: none on file        Precautions:  Fall Risk, optiflow, continuous pulse ox   Assessment of current deficits    [x] Functional mobility            [x]ADLs           [x] Strength                  [x]Cognition    [x] Functional transfers          [x] IADLs         [x] Safety Awareness   [x]Endurance    [] Fine Coordination                         [x] Balance      [] Vision/perception   []Sensation      []Gross Motor Coordination             [] ROM           [] Delirium                   [] Motor Control      OT PLAN OF CARE   OT POC based on physician orders, patient diagnosis and results of clinical assessment     Frequency/Duration: 2-5 days/wk for 2 -4 weeks PRN   Specific OT Treatment Interventions to include:   * Instruction/training on adapted ADL techniques and AE recommendations to increase functional independence within precautions       * Training on energy conservation strategies, correct breathing pattern and techniques to improve independence/tolerance for self-care routine  * Functional transfer/mobility training/DME recommendations for increased independence, safety, and fall prevention  * Patient/Family education to increase follow through with safety techniques and functional independence  * Recommendation of environmental modifications for increased safety with functional transfers/mobility and ADLs  * Cognitive retraining/development of therapeutic activities to improve problem solving, judgement, memory, and attention for increased safety/participation in ADL/IADL tasks  * 
Occupational Therapy  OT BEDSIDE TREATMENT NOTE      Date:2025  Patient Name: Dori Samayoa  MRN: [id number]  : 1940  Room: [address] [place], [place] [address]     Per OT Eval:      Evaluating OT: Fallon Lopez OTR/L [id number]      Referring Provider:Charo Larson APRN - NP    Specific Provider Orders/Date: OT eval and treat      Diagnosis: Pulmonary Embolism and Congestive Heart Failure   Surgery: none    Pertinent Medical History: none on file       Precautions: falls risk, O2 via opti-flow, bed/chair alarm, TSM     Assessment of current deficits    [x] Functional mobility            [x]ADLs           [x] Strength                  [x]Cognition    [x] Functional transfers          [x] IADLs         [x] Safety Awareness   [x]Endurance    [] Fine Coordination                         [x] Balance      [] Vision/perception   []Sensation      []Gross Motor Coordination             [] ROM           [] Delirium                   [] Motor Control      OT PLAN OF CARE   OT POC based on physician orders, patient diagnosis and results of clinical assessment     Frequency/Duration: 2-5 days/wk for 2-4 weeks PRN   Specific OT Treatment Interventions to include:   * Instruction/training on adapted ADL techniques and AE recommendations to increase functional independence within precautions       * Training on energy conservation strategies, correct breathing pattern and techniques to improve independence/tolerance for self-care routine  * Functional transfer/mobility training/DME recommendations for increased independence, safety, and fall prevention  * Patient/Family education to increase follow through with safety techniques and functional independence  * Recommendation of environmental modifications for increased safety with functional transfers/mobility and ADLs  * Cognitive retraining/development of therapeutic activities to improve problem solving, judgement, memory, and attention for increased safety/participation in ADL/IADL 
Occupational Therapy  Patient treatment attempted this AM.  Patient declined participation, will continue OT POC as able and appropriate.    Sara HOPE/HENRIETTA [id number]  
Occupational Therapy  Patient treatment attempted this PM.  Patient just got setup to eat meal, will continue OT POC as able and appropriate.    Sara HOPE/HENRIETTA [id number]    
Patient only uses bipap if needing transported. Bipap remains in room.  
Per Dr. Cr she would like patient to have pudding thick liquids due to new pneumonia on recent chest xray.   
Pharmacy Consultation Note  (Antibiotic Dosing and Monitoring)    Initial consult date: 2/11  Consulting physician/provider: SYLVIA Vallejo  Drug: Vancomycin  Indication: HAP    Age/  Gender Height Weight IBW  Allergy Information   84 y.o./female 149.9 cm (4' 11\") 59.9 kg (132 lb)     Ideal body weight: 48.8 kg (107 lb 8.4 oz)  Adjusted ideal body weight: 59.1 kg (130 lb 6.3 oz)   Sulfa antibiotics      Renal Function:  Recent Labs     02/12/25  1550 02/14/25  0232   BUN 47* 44*   CREATININE 1.6* 1.4*       Intake/Output Summary (Last 24 hours) at 2/15/2025 0941  Last data filed at 2/15/2025 0606  Gross per 24 hour   Intake 691.39 ml   Output 800 ml   Net -108.61 ml         Vancomycin Monitoring:  Trough:  No results for input(s): \"VANCOTROUGH\" in the last 72 hours.  Random:    Recent Labs     02/13/25  0600   VANCORANDOM 14.9       Recent vancomycin administrations                     vancomycin (VANCOCIN) 750 mg in 150 mL IVPB (mg) 750 mg New Bag 02/12/25 0819    vancomycin (VANCOCIN) 1,500 mg in sodium chloride 0.9 % 250 mL IVPB (mg) 1,500 mg New Bag 02/11/25 1703                    Assessment:  Patient is a 84 y.o. female who has been initiated on vancomycin  Estimated Creatinine Clearance: 29 mL/min (A) (based on SCr of 1.4 mg/dL (H)).  No history of vancomycin levels in EPIC  Will dose vancomycin using Bayesian modeling to target AUC/ALECIA 400-600.   Random level on 2/13 resulted at 14.9 mcg/mL and correlates to AUC/ALECIA 400-600.   AUC/ALECIA 400-600 per [facility]    Plan:  Continue vancomycin 750 mg every 24 hours.   Vancomycin level ordered with morning labs on 2/16  PLEASE HOLD VANCOMYCIN IF VANCOMYCIN LEVEL IS GREATER THAN 20 MCG/ML.  Will follow-up vancomycin level on 2/16 to determine future vancomycin dosing plans.   Will continue to monitor renal function.  Pharmacy to follow.      Electronically signed by Calvin Tillman RPH, Pharm.D. 2/15/2025 9:41 AM   Ext: [phone]      ANNETTE: [phone]  SEY: [phone]  SJW: [phone] 
Pharmacy Consultation Note  (Antibiotic Dosing and Monitoring)    Initial consult date: 2/11  Consulting physician/provider: SYLVIA Vallejo  Drug: Vancomycin  Indication: HAP    Age/  Gender Height Weight IBW  Allergy Information   84 y.o./female 149.9 cm (4' 11\") 59.9 kg (132 lb)     Ideal body weight: 48.8 kg (107 lb 8.4 oz)  Adjusted ideal body weight: 60.3 kg (133 lb 0.5 oz)   Sulfa antibiotics      Renal Function:  Recent Labs     02/09/25  0345 02/10/25  0350 02/11/25  0608   BUN 48* 53* 55*   CREATININE 1.8* 1.8* 1.7*       Intake/Output Summary (Last 24 hours) at 2/11/2025 1553  Last data filed at 2/11/2025 1239  Gross per 24 hour   Intake 408.14 ml   Output 800 ml   Net -391.86 ml       Vancomycin Monitoring:  Trough:  No results for input(s): \"VANCOTROUGH\" in the last 72 hours.  Random:  No results for input(s): \"VANCORANDOM\" in the last 72 hours.    Assessment:  Patient is a 84 y.o. female who has been initiated on vancomycin  Estimated Creatinine Clearance: 24 mL/min (A) (based on SCr of 1.7 mg/dL (H)).  No history of vancomycin levels in EPIC  Patient's serum creatinine is 1.7 mg/dL today (which is within the range it has been over the past week; as serum creatinine has ranged between 1.3-1.9 mg/dL between 2/4/25 and 2/11/25)    Plan:  Will order vancomycin 1500 mg IV x 1 now, followed by standing order for vancomycin 750 mg IV q24h to begin on 2/12 at 0900 for projected AUC/ALECIA 400-600.  Will check vancomycin levels when appropriate  Will continue to monitor renal function   Pharmacy to follow      Fan Tillman PharmD 2/11/2025 3:53 PM   Ext: [phone]    ANNETTE: [phone]  SEY: [phone]  SJW: [phone]   
Pharmacy Consultation Note  (Antibiotic Dosing and Monitoring)    Initial consult date: 2/11  Consulting physician/provider: SYLVIA Vallejo  Drug: Vancomycin  Indication: HAP    Age/  Gender Height Weight IBW  Allergy Information   84 y.o./female 149.9 cm (4' 11\") 59.9 kg (132 lb)     Ideal body weight: 48.8 kg (107 lb 8.4 oz)  Adjusted ideal body weight: 60.5 kg (133 lb 6.9 oz)   Sulfa antibiotics      Renal Function:  Recent Labs     02/10/25  0350 02/11/25  0608   BUN 53* 55*   CREATININE 1.8* 1.7*       Intake/Output Summary (Last 24 hours) at 2/12/2025 1523  Last data filed at 2/12/2025 1200  Gross per 24 hour   Intake 1768.15 ml   Output 1200 ml   Net 568.15 ml       Vancomycin Monitoring:  Trough:  No results for input(s): \"VANCOTROUGH\" in the last 72 hours.  Random:  No results for input(s): \"VANCORANDOM\" in the last 72 hours.    Recent vancomycin administrations                     vancomycin (VANCOCIN) 750 mg in 150 mL IVPB (mg) 750 mg New Bag 02/12/25 0819    vancomycin (VANCOCIN) 1,500 mg in sodium chloride 0.9 % 250 mL IVPB (mg) 1,500 mg New Bag 02/11/25 1703                  Assessment:  Patient is a 84 y.o. female who has been initiated on vancomycin  Estimated Creatinine Clearance: 24 mL/min (A) (based on SCr of 1.7 mg/dL (H)).  No history of vancomycin levels in EPIC  Will dose vancomycin using Bayesian modeling to target AUC/ALECIA 400-600.     Plan:  Continue vancomycin 750 mg every 24 hours.   Will check vancomycin levels when appropriate- random level with AM labs.   Will continue to monitor renal function   Pharmacy to follow      Electronically signed by Mary Rubalcava RPH, Pharm.D. 2/12/2025 3:25 PM   Ext: [phone]      ANNETTE: [phone]  SEY: [phone]  SJW: [phone]   
Pharmacy Consultation Note  (Antibiotic Dosing and Monitoring)    Initial consult date: 2/11  Consulting physician/provider: SYLVIA Vallejo  Drug: Vancomycin  Indication: HAP    Age/  Gender Height Weight IBW  Allergy Information   84 y.o./female 149.9 cm (4' 11\") 59.9 kg (132 lb)     Ideal body weight: 48.8 kg (107 lb 8.4 oz)  Adjusted ideal body weight: 63.4 kg (139 lb 10.8 oz)   Sulfa antibiotics      Renal Function:  Recent Labs     02/11/25  0608 02/12/25  1550   BUN 55* 47*   CREATININE 1.7* 1.6*       Intake/Output Summary (Last 24 hours) at 2/13/2025 0945  Last data filed at 2/12/2025 2350  Gross per 24 hour   Intake 380.14 ml   Output 950 ml   Net -569.86 ml       Vancomycin Monitoring:  Trough:  No results for input(s): \"VANCOTROUGH\" in the last 72 hours.  Random:    Recent Labs     02/13/25  0600   VANCORANDOM 14.9       Recent vancomycin administrations                     vancomycin (VANCOCIN) 750 mg in 150 mL IVPB (mg) 750 mg New Bag 02/12/25 0819    vancomycin (VANCOCIN) 1,500 mg in sodium chloride 0.9 % 250 mL IVPB (mg) 1,500 mg New Bag 02/11/25 1703                    Assessment:  Patient is a 84 y.o. female who has been initiated on vancomycin  Estimated Creatinine Clearance: 27 mL/min (A) (based on SCr of 1.6 mg/dL (H)).  No history of vancomycin levels in EPIC  Will dose vancomycin using Bayesian modeling to target AUC/ALECIA 400-600.   Random level on 2/13 resulted at 14.9 mcg/mL and correlates to AUC/ALECIA 400-600.     Plan:  Continue vancomycin 750 mg every 24 hours.   Will check vancomycin levels when appropriate.   Will continue to monitor renal function.  Pharmacy to follow.      Electronically signed by Mary Rubalcava RPH, Pharm.D. 2/13/2025 9:45 AM   Ext: [phone]      ANNETTE: [phone]  SEY: [phone]  SJW: [phone]   
Pharmacy Consultation Note  (Antibiotic Dosing and Monitoring)    Initial consult date: 2/11  Consulting physician/provider: SYLVIA Vallejo  Drug: Vancomycin  Indication: HAP    Age/  Gender Height Weight IBW  Allergy Information   84 y.o./female 149.9 cm (4' 11\") 59.9 kg (132 lb)     Ideal body weight: 48.8 kg (107 lb 8.4 oz)  Adjusted ideal body weight: 63.4 kg (139 lb 14 oz)   Sulfa antibiotics      Renal Function:  Recent Labs     02/12/25  1550 02/14/25  0232   BUN 47* 44*   CREATININE 1.6* 1.4*     No intake or output data in the 24 hours ending 02/14/25 1027      Vancomycin Monitoring:  Trough:  No results for input(s): \"VANCOTROUGH\" in the last 72 hours.  Random:    Recent Labs     02/13/25  0600   VANCORANDOM 14.9       Recent vancomycin administrations                     vancomycin (VANCOCIN) 750 mg in 150 mL IVPB (mg) 750 mg New Bag 02/12/25 0819    vancomycin (VANCOCIN) 1,500 mg in sodium chloride 0.9 % 250 mL IVPB (mg) 1,500 mg New Bag 02/11/25 1703                    Assessment:  Patient is a 84 y.o. female who has been initiated on vancomycin  Estimated Creatinine Clearance: 31 mL/min (A) (based on SCr of 1.4 mg/dL (H)).  No history of vancomycin levels in EPIC  Will dose vancomycin using Bayesian modeling to target AUC/ALECIA 400-600.   Random level on 2/13 resulted at 14.9 mcg/mL and correlates to AUC/ALECIA 400-600.   AUC/ALECIA 400-600 per [facility]    Plan:  Continue vancomycin 750 mg every 24 hours.   Will check vancomycin levels when appropriate.   Will continue to monitor renal function.  Pharmacy to follow.      Electronically signed by Calvin Tillman RPH, Pharm.D. 2/14/2025 10:27 AM   Ext: [phone]      ANNETTE: [phone]  SEY: [phone]  SJW: [phone]   
Physical Therapy    Pt NA for Rx, eating a late lunch. Will follow on another date/time.  Nikolas Sesay PTA [id number]    
Physical Therapy    Pt refused Rx, states too tired, wants to stay in bed. Pt encouraged without success. Will follow on another date/time.  Nikolas Sesay PTA [id number]    
Physical Therapy  Facility/Department: [address] INTERMEDIATE 1  Physical Therapy Treatment Note    Name: Dori Samayoa  : 1940  MRN: [id number]  Date of Service: 2025    Patient Diagnosis(es): The primary encounter diagnosis was Other pulmonary embolism without acute cor pulmonale, unspecified chronicity (HCC). Diagnoses of Hypoxemia, Acute systolic congestive heart failure (HCC), Shortness of breath, and Other acute pulmonary embolism without acute cor pulmonale (HCC) were also pertinent to this visit.  Past Medical History:  has no past medical history on file.  Past Surgical History:  has a past surgical history that includes Tonsillectomy and Appendectomy.      Referring provider:  Nino Greco MD    PT Order:  PT eval and treat     Evaluating PT:  Skye Taylor PT, DPT PT [id number]    Room #:  0431/0431-A  Diagnosis:  Shortness of breath [R06.02]  Hypoxemia [R09.02]  Acute systolic congestive heart failure (HCC) [I50.21]  Other acute pulmonary embolism without acute cor pulmonale (HCC) [I26.99]  Other pulmonary embolism without acute cor pulmonale, unspecified chronicity (HCC) [I26.99]  Other pulmonary embolism without acute cor pulmonale (HCC) [I26.99]  Precautions:  fall risk, droplet + isolation, high flow O2.    Equipment Needs:  TBD    SUBJECTIVE:    Pt lives alone in a bi-level story home with 0 stairs to enter and 0 rail.  Bed and bath is on top floor.  Pt ambulated with no device PTA.  Pt's children check on her daily.      OBJECTIVE:   Initial Evaluation  Date:  Treatment  2025 Short Term/ Long Term   Goals   Was pt agreeable to Eval/treatment? With encouragement - son present With encouragement    Does pt have pain? None reported No complaints    Bed Mobility  Rolling: Mod A  Supine to sit: Mod A  Sit to supine: Min A  Scooting: Min A to sitting EOB Rolling: Mod A  Supine to sit: Mod A  Scooting: Mod A seated to EOB SBA   Transfers Sit to stand: Mod A x 2  Stand to sit: Mod A  Stand 
Physical Therapy  Facility/Department: [address] INTERMEDIATE 1  Physical Therapy Initial Assessment    Name: Dori Samayoa  : 1940  MRN: [id number]  Date of Service: 2025    Patient Diagnosis(es): The primary encounter diagnosis was Other pulmonary embolism without acute cor pulmonale, unspecified chronicity (HCC). Diagnoses of Hypoxemia, Acute systolic congestive heart failure (HCC), Shortness of breath, and Other acute pulmonary embolism without acute cor pulmonale (HCC) were also pertinent to this visit.  Past Medical History:  has no past medical history on file.  Past Surgical History:  has a past surgical history that includes Tonsillectomy and Appendectomy.      Referring provider:  Enrique Albrecht MD    PT Order:  PT eval and treat     Evaluating PT:  Skye Taylor PT, DPT PT [id number]    Room #:  0431/0431-A  Diagnosis:  Shortness of breath [R06.02]  Hypoxemia [R09.02]  Acute systolic congestive heart failure (HCC) [I50.21]  Other acute pulmonary embolism without acute cor pulmonale (HCC) [I26.99]  Other pulmonary embolism without acute cor pulmonale, unspecified chronicity (HCC) [I26.99]  Other pulmonary embolism without acute cor pulmonale (HCC) [I26.99]  Precautions:  fall risk, droplet + isolation, high flow O2.    Equipment Needs:  TBD    SUBJECTIVE:    Pt lives alone in a bi-level story home with 0 stairs to enter and 0 rail.  Bed and bath is on top floor.  Pt ambulated with no device PTA.  Pt's children check on her daily.      OBJECTIVE:   Initial Evaluation  Date:  Treatment Short Term/ Long Term   Goals   Was pt agreeable to Eval/treatment? With encouragement - son present     Does pt have pain? None reported     Bed Mobility  Rolling: Mod A  Supine to sit: Mod A  Sit to supine: Min A  Scooting: Min A to sitting EOB  SBA   Transfers Sit to stand: Mod A x 2  Stand to sit: Mod A  Stand pivot: NT  SBA   Ambulation    Pt unable to take steps.   50 feet with AAD SBA    Stair negotiation: 
Physical Therapy  Facility/Department: [address] INTERMEDIATE 1  Daily Treatment Note  NAME: Dori Samayoa  : 1940  MRN: [id number]    Date of Service: 2025    Patient Diagnosis(es): The primary encounter diagnosis was Other pulmonary embolism without acute cor pulmonale, unspecified chronicity (HCC). Diagnoses of Hypoxemia, Acute systolic congestive heart failure (HCC), Shortness of breath, and Other acute pulmonary embolism without acute cor pulmonale (HCC) were also pertinent to this visit.      Referring provider:  Nino Greco MD     PT Order:  PT eval and treat      Evaluating PT:  Skye Taylor, PT, DPT PT [id number]     Room #:  0431/0431-A  Diagnosis:  Shortness of breath [R06.02]  Hypoxemia [R09.02]  Acute systolic congestive heart failure (HCC) [I50.21]  Other acute pulmonary embolism without acute cor pulmonale (HCC) [I26.99]  Other pulmonary embolism without acute cor pulmonale, unspecified chronicity (HCC) [I26.99]  Other pulmonary embolism without acute cor pulmonale (HCC) [I26.99]  Precautions:  fall risk, droplet + isolation, high flow O2.    Equipment Needs:  TBD     SUBJECTIVE:     Pt lives alone in a bi-level story home with 0 stairs to enter and 0 rail.  Bed and bath is on top floor.  Pt ambulated with no device PTA.  Pt's children check on her daily.       OBJECTIVE:    Initial Evaluation  Date:  Treatment   Short Term/ Long Term   Goals   Was pt agreeable to Eval/treatment? With encouragement - son present yes     Does pt have pain? None reported No complaints     Bed Mobility  Rolling: Mod A  Supine to sit: Mod A  Sit to supine: Min A  Scooting: Min A to sitting EOB Rolling:  Min A  Supine to sit:  Mod A  Sit to supine:  NT  Scooting:  Min A to sitting EOB SBA   Transfers Sit to stand: Mod A x 2  Stand to sit: Mod A  Stand pivot: NT Sit to stand:  Min A from the bed.  Max A to partial stand from the chair.   Stand to sit: Max A  Stand pivot:  Max A with w/w.  Pt's legs buckled 
SPEECH LANGUAGE PATHOLOGY  DAILY PROGRESS NOTE      PATIENT NAME:  Dori Samayoa      :  1940          TODAY'S DATE:  2025 ROOM:  [address] [place] [address]-A    Current Diet: ADULT DIET; Dysphagia - Soft and Bite Sized; No Drinking Straws  ADULT ORAL NUTRITION SUPPLEMENT; Dinner, Lunch; Frozen Oral Supplement  ADULT ORAL NUTRITION SUPPLEMENT; Breakfast; Standard High Calorie/High Protein Oral Supplement    Patient seen for ongoing dysphagia tx. On [facility]. Patient agreeable for trials of thin liquids as she was not hungry for lunch meal. Patient with independent use of small sips this date. Reviewed need for continued small sips. No overt s/s of aspiration with thin liquid trials.   It should be noted, silent aspiration can not be ruled out at the bedside, and if silent aspiration is suspected based on clinical correlation an MBSS would be recommended.     Recommendation: cont current diet       CPT code(s) 01016  dysphagia tx  Total minutes :  10 minutes    Ana Varghese M.S. CCC-SLP/L  Speech Language Pathologist  [phone]     
SPEECH/LANGUAGE PATHOLOGY  VIDEOFLUOROSCOPIC STUDY OF SWALLOWING (MBS)   and PLAN OF CARE    PATIENT NAME:  Dori Samayoa  (female)     MRN:  [id number]    :  1940  (84 y.o.)  STATUS:  Inpatient: Room 0431/0431-A    TODAY'S DATE:  2025  ORDER DATE, DESCRIPTION AND REFERRING PROVIDER:  Dr. Cr : [place] MODIFIED BARIUM SWALLOW W VIDEO   REASON FOR REFERRAL: Assess oropharyngeal swallow function    EVALUATING THERAPIST: Ana Varghese, SLP      RESULTS:      DYSPHAGIA DIAGNOSIS:  Clinical indicators of mild-moderate oropharyngeal phase dysphagia     Patient demonstrated transient laryngeal penetration with thin liquid by cup and trace aspiration of thin liquid by straw.     DIET RECOMMENDATIONS:  Soft and bite size consistency solids (IDDSI level 6) with  thin liquids (IDDSI level 0) NO STRAW/ SMALL SINGLE CUP SIPS     If patient unable to maintain small cup sips, may benefit from use of nectar thick liquids     FEEDING RECOMMENDATIONS:    Assistance level:  Set-up is required for all oral intake- DIRECT SUPERVISION DURING ALL INTAKE     Compensatory strategies recommended: Thorough oral care to prevent colonization of oral bacteria   Upright in bed/ chair as tolerated  Fully alert for all PO  Slow rate of intake   SINGLE cup sips  SMALL bites  NO STRAW     Discussed recommendations with:  patient nurse in person    Laryngeal Penetration and Aspiration:  Penetration WITHOUT aspiration was observed in today's study with  thin liquid  Penetration WITH aspiration was observed in today's study with  thin liquid, with straw only    SPEECH THERAPY  PLAN OF CARE   The dysphagia POC is established based on physician order and dysphagia diagnosis    Skilled SLP intervention for dysphagia management on acute care up to 5 x per week until goals met, pt plateaus in function and/or discharged from hospital      Conditions Requiring Skilled Therapeutic Intervention for dysphagia:    Patient is performing below 
Skye Vallejo notified of BNP and ABG lab results.    
Skye bermudez served due to radiology's request regarding pts upcoming CTA. Dr. Mccabe also contacted.   
Speech Language Pathology      NAME:  Dori Samayoa  :  1940  DATE: 2025  ROOM:  [address] [place] [address]A    Chart reviewed; Pt liquids thickened per physician order.     SLP unable to see Pt at time of attempt secondary to completing breathing tx.    Attempted again at 1420, pt declined PO. Denies all s/s of dysphagia however appeared to be questionable historian.     If respiratory status continues to fluctuate, recommend physician consider NPO until an MBSS can be completed as aspiration cannot be ruled out at the bedside.      Shortness of breath [R06.02]  Hypoxemia [R09.02]  Acute systolic congestive heart failure (HCC) [I50.21]  Other acute pulmonary embolism without acute cor pulmonale (HCC) [I26.99]  Other pulmonary embolism without acute cor pulmonale, unspecified chronicity (HCC) [I26.99]  Other pulmonary embolism without acute cor pulmonale (HCC) [I26.99]          
Spiritual Health History and Assessment/Progress Note  [facility]    Palliative Care, Attempted Encounter,  ,  ,  Patient declined  services at this time.    Name: Dori Samayoa MRN: [id number]    Age: 84 y.o.     Sex: female   Language: English   Anabaptist: Unknown   Other pulmonary embolism without acute cor pulmonale (HCC)     Date: 2/7/2025                           Spiritual Assessment began in [address] INTERMEDIATE 1        Referral/Consult From: Palliative Care   Encounter Overview/Reason: Palliative Care, Attempted Encounter  Service Provided For: Patient    Deena, Belief, Meaning:   Patient unable to assess at this time  Family/Friends No family/friends present      Importance and Influence:  Patient unable to assess at this time  Family/Friends No family/friends present    Community:  Patient Other: N/A  Family/Friends No family/friends present    Assessment and Plan of Care:     Patient Interventions include: Other: N/A  Family/Friends Interventions include: No family/friends present    Patient Plan of Care: Spiritual Care available upon further referral  Family/Friends Plan of Care: Spiritual Care available upon further referral    Electronically signed by LUCIO Parson on 2/7/2025 at 3:17 PM   
Spoke with Skye Vallejo NP regarding GFR and Creatinine levels with CTA pulmonary ordered today, she states to proceed with the scan. See new orders   
Spoke with speech therapy and radiology regarding the barium swallow and she is not able to have that done until she is off the heated high flow oxygen. Dr. Cr was made aware and recommends keeping patient NPO until it can be completed.   
Tele pack removed, belongings packed [facility]  
02/12/25 1030    heparin 25,000 units in dextrose 5% 250 mL (premix) infusion  5-30 Units/kg/hr IntraVENous Continuous Breanne Gray MD 9 mL/hr at 02/12/25 1031 15 Units/kg/hr at 02/12/25 1031    atorvastatin (LIPITOR) tablet 20 mg  20 mg Oral Nightly Charo Larson APRN - NP   20 mg at 02/11/25 2045    isosorbide mononitrate (IMDUR) extended release tablet 30 mg  30 mg Oral Daily Charo Larson APRN - NP   30 mg at 02/12/25 0812    [Held by provider] metoprolol succinate (TOPROL XL) extended release tablet 50 mg  50 mg Oral Daily Patricia Blankenship APRN - CNP   50 mg at 02/12/25 0812    sodium chloride flush 0.9 % injection 5-40 mL  5-40 mL IntraVENous 2 times per day Charo Larson APRN - NP   10 mL at 02/12/25 0812    sodium chloride flush 0.9 % injection 5-40 mL  5-40 mL IntraVENous PRN Charo Larson APRN - NP   10 mL at 02/12/25 0000    0.9 % sodium chloride infusion   IntraVENous PRN Charo Larson APRN - NP        ondansetron (ZOFRAN-ODT) disintegrating tablet 4 mg  4 mg Oral Q8H PRN Charo Larson APRN - NP        Or    ondansetron (ZOFRAN) injection 4 mg  4 mg IntraVENous Q6H PRN Charo Larson, APRN - NP        polyethylene glycol (GLYCOLAX) packet 17 g  17 g Oral Daily PRN Charo Larson, APRN - NP        acetaminophen (TYLENOL) tablet 650 mg  650 mg Oral Q6H PRN Charo Larson, APRN - NP        Or    acetaminophen (TYLENOL) suppository 650 mg  650 mg Rectal Q6H PRN Charo Larson, APRN - NP        arformoterol tartrate (BROVANA) nebulizer solution 15 mcg  15 mcg Nebulization BID RT Sebas Huerta MD   15 mcg at 02/12/25 0810    [Held by provider] budesonide (PULMICORT) nebulizer suspension 500 mcg  0.5 mg Nebulization BID RT Sebas Huerta MD   500 mcg at 02/07/25 0949       Recent Complaints:  Review of Systems  Vitals:    02/12/25 1503   BP: (!) 124/59   Pulse: 65   Resp: 20   Temp: 98.1 °F (36.7 °C)   SpO2: 100%     Physical Exam  Vitals reviewed. 
pivot: NT Sit <> stand: Mod A  Stand pivot; Mod A with WW SBA   Ambulation    Pt unable to take steps.  Few with WW Mod A 50 feet with AAD SBA    Stair negotiation: ascended and descended  NT  NT    ROM BLE:  WFL     Strength BLE:  grossly 4-/5  Increase LE Strength by 1/2 mm grade   Balance Sitting EOB:  SBA  Dynamic Standing:  Mod A  Sitting EOB:  supervision  Dynamic Standing:  SBA with AAD   AM-PAC 6 Clicks 9/24 11/24      Pt is alert, following instruction  Balance: poor standing and during brief mobility with WW    Pt performed therapeutic exercise of the following: NT    Patient education/treatment  Pt was educated on UE usage transfer safety, gait mechanics for posture/staying within WW base of support    Patient response to education:   Pt verbalized understanding Pt demonstrated skill Pt requires further education in this area   yes With prompt transfers yes     ASSESSMENT:   Comments: Nurse ok with Rx. Pt found in bed, assisted to EOB, sat EOB SBA. Pt stood briefly with WW Min A for balance. Pt able to WB and advance LE's,  brief mobility slow and laboring, strong hands on support needed for balance/safety/fall prevention.  Pt short of breath  with activity.  02 saturation 89% consistently all Rx on AIRVO.   Pt was left in a bedside chair with call light in reach, waffle cushion placed.    Chair/bed alarm: chair active    Time in 0825  Time out 0842   Total Treatment Time 17 minutes   CPT codes:     Therapeutic activities 76222 17 minutes   Therapeutic exercises 9711 0 minutes       Pt is making consistent progress toward established Physical Therapy goals.  Continue with physical therapy current plan of care.    Nikolas Sesay PTA   License Number: [id number]                            
    2/4/2025 CTA OF THE CHEST   1. Right lower lobe segmental and subsegmental pulmonary artery emboli.  No evidence for right heart strain 2. Emphysema. 3. Subsegmental atelectasis versus trace edema at the lung bases with mosaicism superimposed upon chronic changes of COPD without pleural effusion.     2/4/2025 ONE XRAY VIEW OF THE CHEST 1. Cardiomegaly with pulmonary vascular congestion. 2. Bibasilar opacities and mild to moderate left pleural effusion.     2/4/2025 CT OF THE HEAD WITHOUT CONTRAST 1. No evidence of an acute or subacute intracranial abnormality.     2/8    2/6    2/4      2/10 CXR viewed, looks similar to prior exam     2/11/2025 CTA no PE  1. No acute pulmonary artery embolism.  2. Emphysema with central cylindrical bronchiectasis.  3. Considerable bilateral lower lung atelectasis with lobar atelectasis right  lower lobe.  Hypoventilatory effects mixed findings with attention on  follow-up.            Summary of Events:   84 y.o. female with PMH of dementia, CAD, HTN, HLD,  A-fib  Previous admissions   1/7-/1/3/23 for altered mental status. Was in atrial fibrillation with  RVR. Troponin was elevated. NSTEMI.Stress test was abnormal. Cath showed mild CAD. Medical management recommended.      2/4/2025: found patient more confused than normal, SOB coughing, wheezing.  79% on RA. 4 L applied and DuoNeb given.  proBNP 924, troponin 22, BUN/cr 29/1.9, viral panel neg  blood culture sent, urine culture sent,   EKG sinus rhythm,   ABG 7.22/60.3/75.7/24.1, 92% on 5 L  CT head negative, CXR: Cardiomegaly with pulmonary vascular congestion, bibasilar opacities  CTA chest: RLL segmental and subsegmental PE, no evidence of heart strain, emphysema changes, no effusion noted  2/5 pulmonary consulted for hypoxia, PE, 5L NC  Viral panel neg  2/6: 6L, 93%, later up to 14L HFNC prBNP 1,682  2/7: 15 L HFNC  2/8: Airvo 60L Fio2 100% proBNP 3680 ABG 7.4 3/43/63/27/92  2/10: Airvo 60L. Fio2 100%.   2/11 CTA lower lobe 
    1. Right lower lobe segmental and subsegmental pulmonary artery emboli.  No evidence for right heart strain 2. Emphysema. 3. Subsegmental atelectasis versus trace edema at the lung bases with mosaicism superimposed upon chronic changes of COPD without pleural effusion. RECOMMENDATIONS: Communications: Findings discussed with Dr. Gray by Dr. Guajardo via phone on 02/04/2025 at 1247 hours     XR CHEST PORTABLE    Result Date: 2/4/2025  EXAMINATION: ONE XRAY VIEW OF THE CHEST 2/4/2025 11:26 am COMPARISON: No prior studies available. HISTORY: ORDERING SYSTEM PROVIDED HISTORY: SOB cough TECHNOLOGIST PROVIDED HISTORY: Reason for exam:->SOB cough FINDINGS: The heart is mildly enlarged.  Pulmonary vessels appear congested.  There are bibasilar opacities and mild to moderate left pleural effusion.  No large pneumothorax.     1. Cardiomegaly with pulmonary vascular congestion. 2. Bibasilar opacities and mild to moderate left pleural effusion.     CT HEAD WO CONTRAST    Result Date: 2/4/2025  EXAMINATION: CT OF THE HEAD WITHOUT CONTRAST 2/4/2025 11:13 am TECHNIQUE: CT of the head was performed without the administration of intravenous contrast. Automated exposure control, iterative reconstruction, and/or weight based adjustment of the mA/kV was utilized to reduce the radiation dose to as low as reasonably achievable. COMPARISON: None. HISTORY: ORDERING SYSTEM PROVIDED HISTORY: AMS TECHNOLOGIST PROVIDED HISTORY: Reason for exam:->AMS Has a \"code stroke\" or \"stroke alert\" been called?->No Decision Support Exception - unselect if not a suspected or confirmed emergency medical condition->Emergency Medical Condition (MA) FINDINGS: There are generalized age related atrophic changes of the brain with no mass effect or midline shift or evidence of an acute or subacute infarct or intra or extra-axial hemorrhage.  Ventricular caliber is proportional to the degree of cerebral atrophic changes. The calvarium is intact.  Paranasal 
no effusion noted  2/5 pulmonary consulted for hypoxia, PE, 5L NC  Viral panel neg  2/6: 6L, 93%, later up to 14L HFNC prBNP 1,682  2/7: 15 L HFNC  2/8: Airvo 60L Fio2 100% proBNP 3680 ABG 7.4 3/43/63/27/92  2/10: Airvo 60L. Fio2 100%.   2/11 CTA lower lobe aspiration pneumonia.  On Airvo 60 L 100% FiO2 saturating 90%  2/12 Airvo 60 L 100% FiO2 saturating 94%  2/13: Airvo 60L 70%, 90-92%    Assessment/Plan:  Acute hypoxic and hypercapnic respiratory failure  Secondary to PE, COPD exacerbation and pneumonia   Baseline room air  On Airvo 60L, Fio2 weaned to 70%  Wean O2 to maintain pulse oximetry > 90%  Incentive spirometry and flutter valve, Able to use with assistance   Lower lobe atelectasis/aspiration pneumonia  Continue CPT to right and left base, albuterol, mucomyst, Mucinex every 4 hours   Continue EZ PAP neb  On Zosyn and Vancomycin  NPO until Barium swallow can be completed  Unprovoked pulmonary embolism  Currently on heparin infusion until oxygen demands decrease  Urine with gram-positive organisms  Underlying COPD with acute exacerbation  Repeat respiratory panel negative  Continue IV Solumedrol 40mg q12h until oxygen demands decrease  Continue Brovana. Albuterol  Hold pulmicort while on iv steroids  Outpatient PFT  HFpEF  1/9/23 Echo : EF 75-80%, stage 1 DD, Mild AR, Mild TR, moderate pulm HTN   2/5/25 Echo: EF 65-70% with normal diastolic function  2/6 CXR with increased congestion, 40mg IV lasix x 1 given  IV lasix on hold  2/12 pro-BNP trending down 631  CKD   Monitor renal function    Electronically signed by ADITYA Bravo CNP on 2/13/2025 at 12:28 PM  I had a face-to-face encounter with the patient at the bedside. I agree with the nurse practitioner's note and assessment and plan as detailed above. Necessary editing and changes made to the note by myself.    having periods of intermittent confusion.  Thinks she hurt the side of her head and she is waiting for somebody to look at it.  She 
solid      Method of Intake:   cup, straw, spoon  Self fed      Position:   Sitting in bed with head elevated above 75 degrees    CLINICAL ASSESSMENT:  Oral Stage:      Dentition:  natural    prolonged mastication resulting in functional recollection and bolus formation   Oral residuals were noted :  throughout the oral cavity but independently alternated sips of liquid between bites of food       Pharyngeal Stage:    X2 Throat clears present after presentation of solid foods    No overt s/s of aspiration were displayed with thin liquid via cup/straw or puree food. However, silent aspiration cannot be ruled out at bedside.     Cognition:   Alert & Oriented x2 (self and place), Follows 2 - step directions appropriate for this assessment, Confusion noted, and Insight to current deficits impaired,    Oral Peripheral Examination   Adequate lingual/labial strength     Current Respiratory Status    60 liters O2 via heated high flow nasal cannula     Parameters of Speech Production  Respiration:  Adequate for speech production  Quality:   Within functional limits  Intensity: Within functional limits    Volitional Swallow: Present     Volitional Cough:  Present     Pain: No pain reported.    EDUCATION:   The Speech Language Pathologist (SLP) completed education regarding results of evaluation and that intervention is warranted at this time.  Learner: Patient  Education: Reviewed results and recommendations of this evaluation, Reviewed diet and strategies, and Reviewed signs, symptoms and risks of aspiration  Evaluation of Education:  Verbalizes understanding    This plan may be re-evaluated and revised as warranted.      Evaluation Time includes thorough review of current medical information, gathering information on past medical history/social history and prior level of function, completion of standardized testing/informal observation of tasks, assessment of data and education on plan of care and goals.    [x]The admitting 
subsegmental pulmonary artery emboli.  No evidence for right heart strain 2. Emphysema. 3. Subsegmental atelectasis versus trace edema at the lung bases with mosaicism superimposed upon chronic changes of COPD without pleural effusion. RECOMMENDATIONS: Communications: Findings discussed with Dr. Gray by Dr. Guajardo via phone on 02/04/2025 at 1247 hours     XR CHEST PORTABLE    Result Date: 2/4/2025  EXAMINATION: ONE XRAY VIEW OF THE CHEST 2/4/2025 11:26 am COMPARISON: No prior studies available. HISTORY: ORDERING SYSTEM PROVIDED HISTORY: SOB cough TECHNOLOGIST PROVIDED HISTORY: Reason for exam:->SOB cough FINDINGS: The heart is mildly enlarged.  Pulmonary vessels appear congested.  There are bibasilar opacities and mild to moderate left pleural effusion.  No large pneumothorax.     1. Cardiomegaly with pulmonary vascular congestion. 2. Bibasilar opacities and mild to moderate left pleural effusion.     CT HEAD WO CONTRAST    Result Date: 2/4/2025  EXAMINATION: CT OF THE HEAD WITHOUT CONTRAST 2/4/2025 11:13 am TECHNIQUE: CT of the head was performed without the administration of intravenous contrast. Automated exposure control, iterative reconstruction, and/or weight based adjustment of the mA/kV was utilized to reduce the radiation dose to as low as reasonably achievable. COMPARISON: None. HISTORY: ORDERING SYSTEM PROVIDED HISTORY: AMS TECHNOLOGIST PROVIDED HISTORY: Reason for exam:->AMS Has a \"code stroke\" or \"stroke alert\" been called?->No Decision Support Exception - unselect if not a suspected or confirmed emergency medical condition->Emergency Medical Condition (MA) FINDINGS: There are generalized age related atrophic changes of the brain with no mass effect or midline shift or evidence of an acute or subacute infarct or intra or extra-axial hemorrhage.  Ventricular caliber is proportional to the degree of cerebral atrophic changes. The calvarium is intact.  Paranasal sinuses and mastoid air cells are well

## 2025-02-22 LAB
ALBUMIN SERPL-MCNC: 3.2 G/DL (ref 3.5–5.2)
ALP SERPL-CCNC: 80 U/L (ref 35–104)
ALT SERPL-CCNC: 17 U/L (ref 0–32)
ANION GAP SERPL CALCULATED.3IONS-SCNC: 9 MMOL/L (ref 7–16)
AST SERPL-CCNC: 16 U/L (ref 0–31)
BILIRUB SERPL-MCNC: 0.3 MG/DL (ref 0–1.2)
BNP SERPL-MCNC: 1477 PG/ML (ref 0–450)
BUN SERPL-MCNC: 43 MG/DL (ref 6–23)
CALCIUM SERPL-MCNC: 8 MG/DL (ref 8.6–10.2)
CHLORIDE SERPL-SCNC: 103 MMOL/L (ref 98–107)
CO2 SERPL-SCNC: 23 MMOL/L (ref 22–29)
CREAT SERPL-MCNC: 1.3 MG/DL (ref 0.5–1)
GFR, ESTIMATED: 42 ML/MIN/1.73M2
GLUCOSE SERPL-MCNC: 88 MG/DL (ref 74–99)
POTASSIUM SERPL-SCNC: 4.8 MMOL/L (ref 3.5–5)
PROT SERPL-MCNC: 5.3 G/DL (ref 6.4–8.3)
SODIUM SERPL-SCNC: 135 MMOL/L (ref 132–146)

## 2025-02-22 PROCEDURE — 80053 COMPREHEN METABOLIC PANEL: CPT

## 2025-02-22 PROCEDURE — 83880 ASSAY OF NATRIURETIC PEPTIDE: CPT

## 2025-02-23 LAB
ALBUMIN SERPL-MCNC: 3.1 G/DL (ref 3.5–5.2)
ALP SERPL-CCNC: 86 U/L (ref 35–104)
ALT SERPL-CCNC: 18 U/L (ref 0–32)
ANION GAP SERPL CALCULATED.3IONS-SCNC: 9 MMOL/L (ref 7–16)
AST SERPL-CCNC: 17 U/L (ref 0–31)
BILIRUB SERPL-MCNC: 0.3 MG/DL (ref 0–1.2)
BUN SERPL-MCNC: 40 MG/DL (ref 6–23)
CALCIUM SERPL-MCNC: 8.6 MG/DL (ref 8.6–10.2)
CHLORIDE SERPL-SCNC: 103 MMOL/L (ref 98–107)
CO2 SERPL-SCNC: 23 MMOL/L (ref 22–29)
CREAT SERPL-MCNC: 1.4 MG/DL (ref 0.5–1)
GFR, ESTIMATED: 37 ML/MIN/1.73M2
GLUCOSE SERPL-MCNC: 87 MG/DL (ref 74–99)
POTASSIUM SERPL-SCNC: 5.1 MMOL/L (ref 3.5–5)
POTASSIUM SERPL-SCNC: 5.3 MMOL/L (ref 3.5–5)
PROT SERPL-MCNC: 5.6 G/DL (ref 6.4–8.3)
SODIUM SERPL-SCNC: 135 MMOL/L (ref 132–146)

## 2025-02-23 PROCEDURE — 84132 ASSAY OF SERUM POTASSIUM: CPT

## 2025-02-23 PROCEDURE — 80053 COMPREHEN METABOLIC PANEL: CPT

## 2025-02-24 LAB
ALBUMIN SERPL-MCNC: 3 G/DL (ref 3.5–5.2)
ALP SERPL-CCNC: 79 U/L (ref 35–104)
ALT SERPL-CCNC: 16 U/L (ref 0–32)
ANION GAP SERPL CALCULATED.3IONS-SCNC: 6 MMOL/L (ref 7–16)
AST SERPL-CCNC: 14 U/L (ref 0–31)
BASOPHILS # BLD: 0.01 K/UL (ref 0–0.2)
BASOPHILS NFR BLD: 0 % (ref 0–2)
BILIRUB SERPL-MCNC: 0.3 MG/DL (ref 0–1.2)
BUN SERPL-MCNC: 36 MG/DL (ref 6–23)
CALCIUM SERPL-MCNC: 7.5 MG/DL (ref 8.6–10.2)
CHLORIDE SERPL-SCNC: 108 MMOL/L (ref 98–107)
CO2 SERPL-SCNC: 22 MMOL/L (ref 22–29)
CREAT SERPL-MCNC: 1.3 MG/DL (ref 0.5–1)
EOSINOPHIL # BLD: 0.15 K/UL (ref 0.05–0.5)
EOSINOPHILS RELATIVE PERCENT: 1 % (ref 0–6)
ERYTHROCYTE [DISTWIDTH] IN BLOOD BY AUTOMATED COUNT: 13.9 % (ref 11.5–15)
GFR, ESTIMATED: 41 ML/MIN/1.73M2
GLUCOSE SERPL-MCNC: 92 MG/DL (ref 74–99)
HCT VFR BLD AUTO: 33.1 % (ref 34–48)
HGB BLD-MCNC: 10.5 G/DL (ref 11.5–15.5)
IMM GRANULOCYTES # BLD AUTO: 0.17 K/UL (ref 0–0.58)
IMM GRANULOCYTES NFR BLD: 2 % (ref 0–5)
LYMPHOCYTES NFR BLD: 0.64 K/UL (ref 1.5–4)
LYMPHOCYTES RELATIVE PERCENT: 6 % (ref 20–42)
MCH RBC QN AUTO: 31.7 PG (ref 26–35)
MCHC RBC AUTO-ENTMCNC: 31.7 G/DL (ref 32–34.5)
MCV RBC AUTO: 100 FL (ref 80–99.9)
MONOCYTES NFR BLD: 0.52 K/UL (ref 0.1–0.95)
MONOCYTES NFR BLD: 5 % (ref 2–12)
NEUTROPHILS NFR BLD: 86 % (ref 43–80)
NEUTS SEG NFR BLD: 9.11 K/UL (ref 1.8–7.3)
PLATELET # BLD AUTO: 251 K/UL (ref 130–450)
PMV BLD AUTO: 9.7 FL (ref 7–12)
POTASSIUM SERPL-SCNC: 4.6 MMOL/L (ref 3.5–5)
PREALB SERPL-MCNC: 21 MG/DL (ref 20–40)
PROT SERPL-MCNC: 4.9 G/DL (ref 6.4–8.3)
RBC # BLD AUTO: 3.31 M/UL (ref 3.5–5.5)
SODIUM SERPL-SCNC: 136 MMOL/L (ref 132–146)
WBC OTHER # BLD: 10.6 K/UL (ref 4.5–11.5)

## 2025-02-24 PROCEDURE — 84134 ASSAY OF PREALBUMIN: CPT

## 2025-02-24 PROCEDURE — 80053 COMPREHEN METABOLIC PANEL: CPT

## 2025-02-24 PROCEDURE — 85025 COMPLETE CBC W/AUTO DIFF WBC: CPT

## 2025-02-25 LAB
ALBUMIN SERPL-MCNC: 2.9 G/DL (ref 3.5–5.2)
ALP SERPL-CCNC: 81 U/L (ref 35–104)
ALT SERPL-CCNC: 17 U/L (ref 0–32)
ANION GAP SERPL CALCULATED.3IONS-SCNC: 14 MMOL/L (ref 7–16)
AST SERPL-CCNC: 17 U/L (ref 0–31)
BILIRUB SERPL-MCNC: 0.2 MG/DL (ref 0–1.2)
BUN SERPL-MCNC: 43 MG/DL (ref 6–23)
CALCIUM SERPL-MCNC: 8.3 MG/DL (ref 8.6–10.2)
CHLORIDE SERPL-SCNC: 103 MMOL/L (ref 98–107)
CO2 SERPL-SCNC: 17 MMOL/L (ref 22–29)
CREAT SERPL-MCNC: 1.3 MG/DL (ref 0.5–1)
GFR, ESTIMATED: 41 ML/MIN/1.73M2
GLUCOSE SERPL-MCNC: 123 MG/DL (ref 74–99)
POTASSIUM SERPL-SCNC: 5.2 MMOL/L (ref 3.5–5)
PROT SERPL-MCNC: 5.3 G/DL (ref 6.4–8.3)
SODIUM SERPL-SCNC: 134 MMOL/L (ref 132–146)

## 2025-02-25 PROCEDURE — 80053 COMPREHEN METABOLIC PANEL: CPT

## 2025-02-26 LAB
ALBUMIN SERPL-MCNC: 3 G/DL (ref 3.5–5.2)
ALP SERPL-CCNC: 85 U/L (ref 35–104)
ALT SERPL-CCNC: 15 U/L (ref 0–32)
ANION GAP SERPL CALCULATED.3IONS-SCNC: 13 MMOL/L (ref 7–16)
AST SERPL-CCNC: 16 U/L (ref 0–31)
BILIRUB SERPL-MCNC: 0.2 MG/DL (ref 0–1.2)
BUN SERPL-MCNC: 39 MG/DL (ref 6–23)
CALCIUM SERPL-MCNC: 8.3 MG/DL (ref 8.6–10.2)
CHLORIDE SERPL-SCNC: 106 MMOL/L (ref 98–107)
CO2 SERPL-SCNC: 19 MMOL/L (ref 22–29)
CREAT SERPL-MCNC: 1.3 MG/DL (ref 0.5–1)
GFR, ESTIMATED: 41 ML/MIN/1.73M2
GLUCOSE SERPL-MCNC: 120 MG/DL (ref 74–99)
POTASSIUM SERPL-SCNC: 5.5 MMOL/L (ref 3.5–5)
PROT SERPL-MCNC: 5.4 G/DL (ref 6.4–8.3)
SODIUM SERPL-SCNC: 138 MMOL/L (ref 132–146)

## 2025-02-26 PROCEDURE — 80053 COMPREHEN METABOLIC PANEL: CPT

## 2025-02-26 PROCEDURE — 84132 ASSAY OF SERUM POTASSIUM: CPT

## 2025-02-27 LAB
ALBUMIN SERPL-MCNC: 2.8 G/DL (ref 3.5–5.2)
ALP SERPL-CCNC: 92 U/L (ref 35–104)
ALT SERPL-CCNC: 13 U/L (ref 0–32)
ANION GAP SERPL CALCULATED.3IONS-SCNC: 13 MMOL/L (ref 7–16)
AST SERPL-CCNC: 16 U/L (ref 0–31)
BASOPHILS # BLD: 0.02 K/UL (ref 0–0.2)
BASOPHILS NFR BLD: 0 % (ref 0–2)
BILIRUB SERPL-MCNC: 0.3 MG/DL (ref 0–1.2)
BUN SERPL-MCNC: 39 MG/DL (ref 6–23)
CALCIUM SERPL-MCNC: 8.2 MG/DL (ref 8.6–10.2)
CHLORIDE SERPL-SCNC: 106 MMOL/L (ref 98–107)
CO2 SERPL-SCNC: 21 MMOL/L (ref 22–29)
CREAT SERPL-MCNC: 1.3 MG/DL (ref 0.5–1)
EOSINOPHIL # BLD: 0.06 K/UL (ref 0.05–0.5)
EOSINOPHILS RELATIVE PERCENT: 1 % (ref 0–6)
ERYTHROCYTE [DISTWIDTH] IN BLOOD BY AUTOMATED COUNT: 14.3 % (ref 11.5–15)
GFR, ESTIMATED: 43 ML/MIN/1.73M2
GLUCOSE SERPL-MCNC: 108 MG/DL (ref 74–99)
HCT VFR BLD AUTO: 33.3 % (ref 34–48)
HGB BLD-MCNC: 10.7 G/DL (ref 11.5–15.5)
IMM GRANULOCYTES # BLD AUTO: 0.11 K/UL (ref 0–0.58)
IMM GRANULOCYTES NFR BLD: 1 % (ref 0–5)
LYMPHOCYTES NFR BLD: 0.6 K/UL (ref 1.5–4)
LYMPHOCYTES RELATIVE PERCENT: 6 % (ref 20–42)
MCH RBC QN AUTO: 31.9 PG (ref 26–35)
MCHC RBC AUTO-ENTMCNC: 32.1 G/DL (ref 32–34.5)
MCV RBC AUTO: 99.4 FL (ref 80–99.9)
MONOCYTES NFR BLD: 0.43 K/UL (ref 0.1–0.95)
MONOCYTES NFR BLD: 4 % (ref 2–12)
NEUTROPHILS NFR BLD: 88 % (ref 43–80)
NEUTS SEG NFR BLD: 8.76 K/UL (ref 1.8–7.3)
PLATELET # BLD AUTO: 246 K/UL (ref 130–450)
PMV BLD AUTO: 9.3 FL (ref 7–12)
POTASSIUM SERPL-SCNC: 5.1 MMOL/L (ref 3.5–5)
POTASSIUM SERPL-SCNC: 5.9 MMOL/L (ref 3.5–5)
PROT SERPL-MCNC: 5.3 G/DL (ref 6.4–8.3)
RBC # BLD AUTO: 3.35 M/UL (ref 3.5–5.5)
SODIUM SERPL-SCNC: 140 MMOL/L (ref 132–146)
WBC OTHER # BLD: 10 K/UL (ref 4.5–11.5)

## 2025-02-27 PROCEDURE — 80053 COMPREHEN METABOLIC PANEL: CPT

## 2025-02-27 PROCEDURE — 85025 COMPLETE CBC W/AUTO DIFF WBC: CPT

## 2025-02-28 LAB
ALBUMIN SERPL-MCNC: 2.8 G/DL (ref 3.5–5.2)
ALP SERPL-CCNC: 96 U/L (ref 35–104)
ALT SERPL-CCNC: 15 U/L (ref 0–32)
ANION GAP SERPL CALCULATED.3IONS-SCNC: 13 MMOL/L (ref 7–16)
AST SERPL-CCNC: 15 U/L (ref 0–31)
BILIRUB SERPL-MCNC: 0.3 MG/DL (ref 0–1.2)
BUN SERPL-MCNC: 42 MG/DL (ref 6–23)
CALCIUM SERPL-MCNC: 8.2 MG/DL (ref 8.6–10.2)
CHLORIDE SERPL-SCNC: 105 MMOL/L (ref 98–107)
CO2 SERPL-SCNC: 22 MMOL/L (ref 22–29)
CREAT SERPL-MCNC: 1.2 MG/DL (ref 0.5–1)
GFR, ESTIMATED: 44 ML/MIN/1.73M2
GLUCOSE SERPL-MCNC: 96 MG/DL (ref 74–99)
POTASSIUM SERPL-SCNC: 4.9 MMOL/L (ref 3.5–5)
PROT SERPL-MCNC: 5.3 G/DL (ref 6.4–8.3)
SODIUM SERPL-SCNC: 140 MMOL/L (ref 132–146)

## 2025-02-28 PROCEDURE — 80053 COMPREHEN METABOLIC PANEL: CPT

## 2025-03-03 LAB
ALBUMIN SERPL-MCNC: 2.8 G/DL (ref 3.5–5.2)
ALP SERPL-CCNC: 100 U/L (ref 35–104)
ALT SERPL-CCNC: 13 U/L (ref 0–32)
ANION GAP SERPL CALCULATED.3IONS-SCNC: 12 MMOL/L (ref 7–16)
AST SERPL-CCNC: 17 U/L (ref 0–31)
BASOPHILS # BLD: 0.01 K/UL (ref 0–0.2)
BASOPHILS NFR BLD: 0 % (ref 0–2)
BILIRUB SERPL-MCNC: 0.3 MG/DL (ref 0–1.2)
BUN SERPL-MCNC: 46 MG/DL (ref 6–23)
CALCIUM SERPL-MCNC: 8.1 MG/DL (ref 8.6–10.2)
CHLORIDE SERPL-SCNC: 101 MMOL/L (ref 98–107)
CO2 SERPL-SCNC: 23 MMOL/L (ref 22–29)
CREAT SERPL-MCNC: 1.5 MG/DL (ref 0.5–1)
EOSINOPHIL # BLD: 0.08 K/UL (ref 0.05–0.5)
EOSINOPHILS RELATIVE PERCENT: 1 % (ref 0–6)
ERYTHROCYTE [DISTWIDTH] IN BLOOD BY AUTOMATED COUNT: 14.4 % (ref 11.5–15)
GFR, ESTIMATED: 35 ML/MIN/1.73M2
GLUCOSE SERPL-MCNC: 85 MG/DL (ref 74–99)
HCT VFR BLD AUTO: 34.6 % (ref 34–48)
HGB BLD-MCNC: 11.1 G/DL (ref 11.5–15.5)
IMM GRANULOCYTES # BLD AUTO: 0.08 K/UL (ref 0–0.58)
IMM GRANULOCYTES NFR BLD: 1 % (ref 0–5)
LYMPHOCYTES NFR BLD: 0.69 K/UL (ref 1.5–4)
LYMPHOCYTES RELATIVE PERCENT: 9 % (ref 20–42)
MCH RBC QN AUTO: 32 PG (ref 26–35)
MCHC RBC AUTO-ENTMCNC: 32.1 G/DL (ref 32–34.5)
MCV RBC AUTO: 99.7 FL (ref 80–99.9)
MONOCYTES NFR BLD: 0.41 K/UL (ref 0.1–0.95)
MONOCYTES NFR BLD: 5 % (ref 2–12)
NEUTROPHILS NFR BLD: 84 % (ref 43–80)
NEUTS SEG NFR BLD: 6.41 K/UL (ref 1.8–7.3)
PLATELET # BLD AUTO: 257 K/UL (ref 130–450)
PMV BLD AUTO: 9.4 FL (ref 7–12)
POTASSIUM SERPL-SCNC: 4.7 MMOL/L (ref 3.5–5)
PREALB SERPL-MCNC: 25 MG/DL (ref 20–40)
PROT SERPL-MCNC: 5.4 G/DL (ref 6.4–8.3)
RBC # BLD AUTO: 3.47 M/UL (ref 3.5–5.5)
SODIUM SERPL-SCNC: 136 MMOL/L (ref 132–146)
WBC OTHER # BLD: 7.7 K/UL (ref 4.5–11.5)

## 2025-03-03 PROCEDURE — 80053 COMPREHEN METABOLIC PANEL: CPT

## 2025-03-03 PROCEDURE — 85025 COMPLETE CBC W/AUTO DIFF WBC: CPT

## 2025-03-03 PROCEDURE — 84134 ASSAY OF PREALBUMIN: CPT

## 2025-04-15 ENCOUNTER — HOSPITAL ENCOUNTER (INPATIENT)
Age: 85
LOS: 3 days | Discharge: NURSING, REHAB OR LONG-TERM CARE | DRG: 683 | End: 2025-04-18
Attending: EMERGENCY MEDICINE | Admitting: INTERNAL MEDICINE
Payer: MEDICARE

## 2025-04-15 ENCOUNTER — APPOINTMENT (OUTPATIENT)
Dept: CT IMAGING | Age: 85
DRG: 683 | End: 2025-04-15
Payer: MEDICARE

## 2025-04-15 ENCOUNTER — APPOINTMENT (OUTPATIENT)
Dept: GENERAL RADIOLOGY | Age: 85
DRG: 683 | End: 2025-04-15
Payer: MEDICARE

## 2025-04-15 ENCOUNTER — APPOINTMENT (OUTPATIENT)
Dept: ULTRASOUND IMAGING | Age: 85
DRG: 683 | End: 2025-04-15
Payer: MEDICARE

## 2025-04-15 DIAGNOSIS — S01.01XA LACERATION OF SCALP, INITIAL ENCOUNTER: ICD-10-CM
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
## 2025-04-15 DIAGNOSIS — N17.9 AKI (ACUTE KIDNEY INJURY): ICD-10-CM

## 2025-04-15 LAB
ALBUMIN SERPL-MCNC: 3.2 G/DL (ref 3.5–5.2)
ALP SERPL-CCNC: 94 U/L (ref 35–104)
ALT SERPL-CCNC: 19 U/L (ref 0–32)
ANION GAP SERPL CALCULATED.3IONS-SCNC: 14 MMOL/L (ref 7–16)
AST SERPL-CCNC: 23 U/L (ref 0–31)
B PARAP IS1001 DNA NPH QL NAA+NON-PROBE: NOT DETECTED
B PERT DNA SPEC QL NAA+PROBE: NOT DETECTED
BASOPHILS # BLD: 0.17 K/UL (ref 0–0.2)
BASOPHILS NFR BLD: 2 % (ref 0–2)
BILIRUB SERPL-MCNC: 0.3 MG/DL (ref 0–1.2)
BNP SERPL-MCNC: 1402 PG/ML (ref 0–450)
BUN SERPL-MCNC: 36 MG/DL (ref 6–23)
C PNEUM DNA NPH QL NAA+NON-PROBE: NOT DETECTED
CALCIUM SERPL-MCNC: 8.9 MG/DL (ref 8.6–10.2)
CHLORIDE SERPL-SCNC: 105 MMOL/L (ref 98–107)
CO2 SERPL-SCNC: 19 MMOL/L (ref 22–29)
CREAT SERPL-MCNC: 3.6 MG/DL (ref 0.5–1)
EOSINOPHIL # BLD: 0.62 K/UL (ref 0.05–0.5)
EOSINOPHILS RELATIVE PERCENT: 8 % (ref 0–6)
ERYTHROCYTE [DISTWIDTH] IN BLOOD BY AUTOMATED COUNT: 16 % (ref 11.5–15)
FLUAV RNA NPH QL NAA+NON-PROBE: NOT DETECTED
FLUBV RNA NPH QL NAA+NON-PROBE: NOT DETECTED
FOLATE SERPL-MCNC: 6.2 NG/ML (ref 4.6–34.8)
GFR, ESTIMATED: 12 ML/MIN/1.73M2
GLUCOSE SERPL-MCNC: 103 MG/DL (ref 74–99)
HADV DNA NPH QL NAA+NON-PROBE: NOT DETECTED
HCOV 229E RNA NPH QL NAA+NON-PROBE: NOT DETECTED
HCOV HKU1 RNA NPH QL NAA+NON-PROBE: NOT DETECTED
HCOV NL63 RNA NPH QL NAA+NON-PROBE: NOT DETECTED
HCOV OC43 RNA NPH QL NAA+NON-PROBE: NOT DETECTED
HCT VFR BLD AUTO: 32.9 % (ref 34–48)
HGB BLD-MCNC: 10.3 G/DL (ref 11.5–15.5)
HMPV RNA NPH QL NAA+NON-PROBE: NOT DETECTED
HPIV1 RNA NPH QL NAA+NON-PROBE: NOT DETECTED
HPIV2 RNA NPH QL NAA+NON-PROBE: NOT DETECTED
HPIV3 RNA NPH QL NAA+NON-PROBE: NOT DETECTED
HPIV4 RNA NPH QL NAA+NON-PROBE: NOT DETECTED
IMM GRANULOCYTES # BLD AUTO: 0.1 K/UL (ref 0–0.58)
IMM GRANULOCYTES NFR BLD: 1 % (ref 0–5)
LYMPHOCYTES NFR BLD: 1.46 K/UL (ref 1.5–4)
LYMPHOCYTES RELATIVE PERCENT: 19 % (ref 20–42)
M PNEUMO DNA NPH QL NAA+NON-PROBE: NOT DETECTED
MCH RBC QN AUTO: 31.4 PG (ref 26–35)
MCHC RBC AUTO-ENTMCNC: 31.3 G/DL (ref 32–34.5)
MCV RBC AUTO: 100.3 FL (ref 80–99.9)
MONOCYTES NFR BLD: 0.76 K/UL (ref 0.1–0.95)
MONOCYTES NFR BLD: 10 % (ref 2–12)
NEUTROPHILS NFR BLD: 59 % (ref 43–80)
NEUTS SEG NFR BLD: 4.44 K/UL (ref 1.8–7.3)
PLATELET # BLD AUTO: 425 K/UL (ref 130–450)
PMV BLD AUTO: 8.7 FL (ref 7–12)
POTASSIUM SERPL-SCNC: 5 MMOL/L (ref 3.5–5)
PROT SERPL-MCNC: 5.7 G/DL (ref 6.4–8.3)
RBC # BLD AUTO: 3.28 M/UL (ref 3.5–5.5)
RSV RNA NPH QL NAA+NON-PROBE: NOT DETECTED
RV+EV RNA NPH QL NAA+NON-PROBE: NOT DETECTED
SARS-COV-2 RNA NPH QL NAA+NON-PROBE: NOT DETECTED
SODIUM SERPL-SCNC: 138 MMOL/L (ref 132–146)
SODIUM UR-SCNC: 47 MMOL/L
SPECIMEN DESCRIPTION: NORMAL
UUN UR-MCNC: 368 MG/DL (ref 800–1666)
VIT B12 SERPL-MCNC: 1014 PG/ML (ref 232–1245)
WBC OTHER # BLD: 7.6 K/UL (ref 4.5–11.5)

## 2025-04-15 PROCEDURE — 90714 TD VACC NO PRESV 7 YRS+ IM: CPT

## 2025-04-15 PROCEDURE — 82746 ASSAY OF FOLIC ACID SERUM: CPT

## 2025-04-15 PROCEDURE — 82570 ASSAY OF URINE CREATININE: CPT

## 2025-04-15 PROCEDURE — 85025 COMPLETE CBC W/AUTO DIFF WBC: CPT

## 2025-04-15 PROCEDURE — 84540 ASSAY OF URINE/UREA-N: CPT

## 2025-04-15 PROCEDURE — 72128 CT CHEST SPINE W/O DYE: CPT

## 2025-04-15 PROCEDURE — 12001 RPR S/N/AX/GEN/TRNK 2.5CM/<: CPT

## 2025-04-15 PROCEDURE — 99222 1ST HOSP IP/OBS MODERATE 55: CPT | Performed by: INTERNAL MEDICINE

## 2025-04-15 PROCEDURE — 1200000000 HC SEMI PRIVATE

## 2025-04-15 PROCEDURE — 71045 X-RAY EXAM CHEST 1 VIEW: CPT

## 2025-04-15 PROCEDURE — 2500000003 HC RX 250 WO HCPCS: Performed by: INTERNAL MEDICINE

## 2025-04-15 PROCEDURE — 73521 X-RAY EXAM HIPS BI 2 VIEWS: CPT

## 2025-04-15 PROCEDURE — 99285 EMERGENCY DEPT VISIT HI MDM: CPT

## 2025-04-15 PROCEDURE — 51702 INSERT TEMP BLADDER CATH: CPT

## 2025-04-15 PROCEDURE — 90471 IMMUNIZATION ADMIN: CPT

## 2025-04-15 PROCEDURE — 2580000003 HC RX 258

## 2025-04-15 PROCEDURE — 0HQ0XZZ REPAIR SCALP SKIN, EXTERNAL APPROACH: ICD-10-PCS | Performed by: STUDENT IN AN ORGANIZED HEALTH CARE EDUCATION/TRAINING PROGRAM

## 2025-04-15 PROCEDURE — 6370000000 HC RX 637 (ALT 250 FOR IP): Performed by: INTERNAL MEDICINE

## 2025-04-15 PROCEDURE — 80053 COMPREHEN METABOLIC PANEL: CPT

## 2025-04-15 PROCEDURE — 76770 US EXAM ABDO BACK WALL COMP: CPT

## 2025-04-15 PROCEDURE — 83880 ASSAY OF NATRIURETIC PEPTIDE: CPT

## 2025-04-15 PROCEDURE — 72131 CT LUMBAR SPINE W/O DYE: CPT

## 2025-04-15 PROCEDURE — 70450 CT HEAD/BRAIN W/O DYE: CPT

## 2025-04-15 PROCEDURE — 0202U NFCT DS 22 TRGT SARS-COV-2: CPT

## 2025-04-15 PROCEDURE — 72125 CT NECK SPINE W/O DYE: CPT

## 2025-04-15 PROCEDURE — 6360000002 HC RX W HCPCS

## 2025-04-15 PROCEDURE — 82607 VITAMIN B-12: CPT

## 2025-04-15 PROCEDURE — 84300 ASSAY OF URINE SODIUM: CPT

## 2025-04-15 PROCEDURE — 2700000000 HC OXYGEN THERAPY PER DAY

## 2025-04-15 RX ORDER — ONDANSETRON 4 MG/1
4 TABLET, ORALLY DISINTEGRATING ORAL EVERY 8 HOURS PRN
Status: DISCONTINUED | OUTPATIENT
Start: 2025-04-15 | End: 2025-04-18 | Stop reason: HOSPADM

## 2025-04-15 RX ORDER — IPRATROPIUM BROMIDE AND ALBUTEROL SULFATE 2.5; .5 MG/3ML; MG/3ML
1 SOLUTION RESPIRATORY (INHALATION) EVERY 6 HOURS PRN
COMMUNITY

## 2025-04-15 RX ORDER — SODIUM CHLORIDE 9 MG/ML
INJECTION, SOLUTION INTRAVENOUS PRN
Status: DISCONTINUED | OUTPATIENT
Start: 2025-04-15 | End: 2025-04-18 | Stop reason: HOSPADM

## 2025-04-15 RX ORDER — ISOSORBIDE MONONITRATE 30 MG/1
30 TABLET, EXTENDED RELEASE ORAL DAILY
Status: DISCONTINUED | OUTPATIENT
Start: 2025-04-15 | End: 2025-04-18 | Stop reason: HOSPADM

## 2025-04-15 RX ORDER — LIDOCAINE HYDROCHLORIDE AND EPINEPHRINE 10; 10 MG/ML; UG/ML
20 INJECTION, SOLUTION INFILTRATION; PERINEURAL ONCE
Status: COMPLETED | OUTPATIENT
Start: 2025-04-15 | End: 2025-04-15

## 2025-04-15 RX ORDER — MENTHOL AND ZINC OXIDE .44; 20.625 G/100G; G/100G
OINTMENT TOPICAL 2 TIMES DAILY PRN
COMMUNITY

## 2025-04-15 RX ORDER — DOXYCYCLINE HYCLATE 100 MG
100 TABLET ORAL 2 TIMES DAILY
Status: ON HOLD | COMMUNITY
Start: 2025-04-10 | End: 2025-04-18 | Stop reason: HOSPADM

## 2025-04-15 RX ORDER — HYDROCHLOROTHIAZIDE 25 MG/1
12.5 TABLET ORAL DAILY
Status: DISCONTINUED | OUTPATIENT
Start: 2025-04-15 | End: 2025-04-18 | Stop reason: HOSPADM

## 2025-04-15 RX ORDER — HEPARIN SODIUM 10000 [USP'U]/ML
5000 INJECTION, SOLUTION INTRAVENOUS; SUBCUTANEOUS EVERY 8 HOURS
Status: DISCONTINUED | OUTPATIENT
Start: 2025-04-15 | End: 2025-04-15

## 2025-04-15 RX ORDER — POLYETHYLENE GLYCOL 3350 17 G/17G
17 POWDER, FOR SOLUTION ORAL DAILY PRN
Status: DISCONTINUED | OUTPATIENT
Start: 2025-04-15 | End: 2025-04-18 | Stop reason: HOSPADM

## 2025-04-15 RX ORDER — SACCHAROMYCES BOULARDII 250 MG
250 CAPSULE ORAL 2 TIMES DAILY
COMMUNITY

## 2025-04-15 RX ORDER — ACETAMINOPHEN 325 MG/1
650 TABLET ORAL EVERY 6 HOURS PRN
Status: DISCONTINUED | OUTPATIENT
Start: 2025-04-15 | End: 2025-04-18 | Stop reason: HOSPADM

## 2025-04-15 RX ORDER — FUROSEMIDE 20 MG/1
20 TABLET ORAL DAILY
Status: ON HOLD | COMMUNITY
End: 2025-04-18 | Stop reason: HOSPADM

## 2025-04-15 RX ORDER — 0.9 % SODIUM CHLORIDE 0.9 %
1000 INTRAVENOUS SOLUTION INTRAVENOUS ONCE
Status: COMPLETED | OUTPATIENT
Start: 2025-04-15 | End: 2025-04-15

## 2025-04-15 RX ORDER — PANTOPRAZOLE SODIUM 40 MG/1
40 TABLET, DELAYED RELEASE ORAL DAILY
COMMUNITY

## 2025-04-15 RX ORDER — LOSARTAN POTASSIUM 50 MG/1
50 TABLET ORAL DAILY
COMMUNITY

## 2025-04-15 RX ORDER — DONEPEZIL HYDROCHLORIDE 10 MG/1
10 TABLET, FILM COATED ORAL NIGHTLY
COMMUNITY

## 2025-04-15 RX ORDER — ASPIRIN 81 MG/1
81 TABLET ORAL DAILY
Status: DISCONTINUED | OUTPATIENT
Start: 2025-04-15 | End: 2025-04-18 | Stop reason: HOSPADM

## 2025-04-15 RX ORDER — MIRTAZAPINE 15 MG/1
15 TABLET, FILM COATED ORAL NIGHTLY
COMMUNITY

## 2025-04-15 RX ORDER — ONDANSETRON 2 MG/ML
4 INJECTION INTRAMUSCULAR; INTRAVENOUS EVERY 6 HOURS PRN
Status: DISCONTINUED | OUTPATIENT
Start: 2025-04-15 | End: 2025-04-18 | Stop reason: HOSPADM

## 2025-04-15 RX ORDER — UMECLIDINIUM BROMIDE AND VILANTEROL TRIFENATATE 62.5; 25 UG/1; UG/1
1 POWDER RESPIRATORY (INHALATION) DAILY
COMMUNITY

## 2025-04-15 RX ORDER — METOPROLOL SUCCINATE 25 MG/1
25 TABLET, EXTENDED RELEASE ORAL
Status: DISCONTINUED | OUTPATIENT
Start: 2025-04-15 | End: 2025-04-18 | Stop reason: HOSPADM

## 2025-04-15 RX ORDER — CLOPIDOGREL BISULFATE 75 MG/1
75 TABLET ORAL DAILY
Status: ON HOLD | COMMUNITY
End: 2025-04-18 | Stop reason: HOSPADM

## 2025-04-15 RX ORDER — POLYETHYLENE GLYCOL 3350 17 G/17G
17 POWDER, FOR SOLUTION ORAL DAILY PRN
COMMUNITY

## 2025-04-15 RX ORDER — ACETAMINOPHEN 650 MG/1
650 SUPPOSITORY RECTAL EVERY 6 HOURS PRN
Status: DISCONTINUED | OUTPATIENT
Start: 2025-04-15 | End: 2025-04-18 | Stop reason: HOSPADM

## 2025-04-15 RX ORDER — ATORVASTATIN CALCIUM 20 MG/1
20 TABLET, FILM COATED ORAL NIGHTLY
Status: DISCONTINUED | OUTPATIENT
Start: 2025-04-15 | End: 2025-04-18 | Stop reason: HOSPADM

## 2025-04-15 RX ORDER — SODIUM CHLORIDE 0.9 % (FLUSH) 0.9 %
5-40 SYRINGE (ML) INJECTION PRN
Status: DISCONTINUED | OUTPATIENT
Start: 2025-04-15 | End: 2025-04-18 | Stop reason: HOSPADM

## 2025-04-15 RX ORDER — SODIUM CHLORIDE 0.9 % (FLUSH) 0.9 %
5-40 SYRINGE (ML) INJECTION EVERY 12 HOURS SCHEDULED
Status: DISCONTINUED | OUTPATIENT
Start: 2025-04-15 | End: 2025-04-18 | Stop reason: HOSPADM

## 2025-04-15 RX ADMIN — CLOSTRIDIUM TETANI TOXOID ANTIGEN (FORMALDEHYDE INACTIVATED) AND CORYNEBACTERIUM DIPHTHERIAE TOXOID ANTIGEN (FORMALDEHYDE INACTIVATED) 0.5 ML: 5; 2 INJECTION, SUSPENSION INTRAMUSCULAR at 13:24

## 2025-04-15 RX ADMIN — SODIUM CHLORIDE 1000 ML: 0.9 INJECTION, SOLUTION INTRAVENOUS at 09:27

## 2025-04-15 RX ADMIN — LIDOCAINE HYDROCHLORIDE AND EPINEPHRINE 20 ML: 10; 10 INJECTION, SOLUTION INFILTRATION; PERINEURAL at 09:23

## 2025-04-15 RX ADMIN — ATORVASTATIN CALCIUM 20 MG: 20 TABLET, FILM COATED ORAL at 20:40

## 2025-04-15 RX ADMIN — SODIUM CHLORIDE, PRESERVATIVE FREE 10 ML: 5 INJECTION INTRAVENOUS at 20:42

## 2025-04-15 ASSESSMENT — LIFESTYLE VARIABLES
HOW OFTEN DO YOU HAVE A DRINK CONTAINING ALCOHOL: NEVER
HOW MANY STANDARD DRINKS CONTAINING ALCOHOL DO YOU HAVE ON A TYPICAL DAY: PATIENT DOES NOT DRINK

## 2025-04-15 NOTE — CONSULTS
Department of Internal Medicine  Nephrology Attending Consult Note      Reason for Consult:  ALDO  Requesting Physician:  Randa Agrawal MD    CHIEF COMPLAINT: Fall    History Obtained From:  patient, electronic medical record    HISTORY OF PRESENT ILLNESS: Briefly Mrs. Dori Samayoa is a 84-year-old female with a past medical history of CKD stage IIIa, HTN, HFpEF 65 to 70%, CAD, PAF, hyperlipidemia who was admitted on 4/15/2025 after a fall at the nursing home.  Lab work revealed creatinine 3.6 mg/dL, proBNP 1402, reason for this consultation.  Significant home medications include furosemide, hydrochlorothiazide and metoprolol.    Past Medical History:    No past medical history on file.  Past Surgical History:        Procedure Laterality Date    APPENDECTOMY      TONSILLECTOMY       Current Medications:    Current Facility-Administered Medications: sodium chloride flush 0.9 % injection 5-40 mL, 5-40 mL, IntraVENous, 2 times per day  sodium chloride flush 0.9 % injection 5-40 mL, 5-40 mL, IntraVENous, PRN  0.9 % sodium chloride infusion, , IntraVENous, PRN  ondansetron (ZOFRAN-ODT) disintegrating tablet 4 mg, 4 mg, Oral, Q8H PRN **OR** ondansetron (ZOFRAN) injection 4 mg, 4 mg, IntraVENous, Q6H PRN  polyethylene glycol (GLYCOLAX) packet 17 g, 17 g, Oral, Daily PRN  acetaminophen (TYLENOL) tablet 650 mg, 650 mg, Oral, Q6H PRN **OR** acetaminophen (TYLENOL) suppository 650 mg, 650 mg, Rectal, Q6H PRN  [Held by provider] apixaban (ELIQUIS) tablet 5 mg, 5 mg, Oral, BID  [Held by provider] aspirin EC tablet 81 mg, 81 mg, Oral, Daily  atorvastatin (LIPITOR) tablet 20 mg, 20 mg, Oral, Nightly  [Held by provider] hydroCHLOROthiazide tablet 12.5 mg, 12.5 mg, Oral, Daily  isosorbide mononitrate (IMDUR) extended release tablet 30 mg, 30 mg, Oral, Daily  metoprolol succinate (TOPROL XL) extended release tablet 25 mg, 25 mg, Oral, QHS  Allergies:  Sulfa antibiotics    Social History:    TOBACCO:   reports

## 2025-04-15 NOTE — H&P
Contrast   Final Result   No acute intracranial abnormality.         CT CSpine W/O Contrast   Final Result   No acute abnormality of the cervical spine.         CT THORACIC SPINE WO CONTRAST   Final Result   No acute thoracic or lumbar spine trauma.         CT LUMBAR SPINE WO CONTRAST   Final Result   No acute thoracic or lumbar spine trauma.         US RETROPERITONEAL LIMITED    (Results Pending)         ASSESSMENT and PLAN:  ALDO on CKD creatinine at baseline 1.2-1.5, currently at 3.6.  Given normal saline bolus in the emergency department, follow repeat BMP.  Check urine electrolytes and kidney ultrasound.  Hold nephrotoxic nephrology consulted  Chronic microcytic anemia, hemoglobin at 10.3 which is baseline.  Check vitamin B12 level  CAD on aspirin continue on metoprolol and Imdur  A-fib on Eliquis.  Hold for now due to laceration  Mechanical fall with posterior scalp laceration status post stapling -PT/OT    Code Status: Full  DVT prophylaxis: Eliquis        NOTE: This report was transcribed using voice recognition software. Every effort was made to ensure accuracy; however, inadvertent computerized transcription errors may be present.  Electronically signed by Randa Chris MD on 4/15/2025 at 1:17 PM

## 2025-04-15 NOTE — ED PROVIDER NOTES
[facility] EMERGENCY DEPARTMENT  EMERGENCY DEPARTMENT ENCOUNTER        Pt Name: Dori Samayoa  MRN: [id number]  Birthdate 1940  Date of evaluation: 4/15/2025  Provider: Caridad Adams DO  PCP: Stanley Gar MD  Note Started: 7:44 AM EDT 4/15/25    CHIEF COMPLAINT       Chief Complaint   Patient presents with    Fall     Patient had unwitnessed fall in NH, patient states that she slipped and fell, denies LOC, dit head head with laceration to posterior scalp.       HISTORY OF PRESENT ILLNESS: 1 or more Elements   History From: Patient    Limitations to history : hx of dementia  Social Determinants : None    Dori Samayoa is a 84 y.o. female who presents for fall.  Patient had an unwitnessed fall at nursing facility.  She states that she slipped and fell.  She did not lose consciousness.  She is on Eliquis.  She does have a scalp laceration.  She is not complaining of any other pain.  Patient was reportedly 88% on room air per EMS.  Patient is not complaining of any shortness of breath or chest pain.  Denies any fever, chills, n/v, headache, dizziness, vision changes, neck tenderness or stiffness, weakness, palpitations, leg swelling/tenderness, cough, abd pain, dysuria, hematuria, diarrhea, constipation, bloody stools.    Nursing Notes were all reviewed and agreed with or any disagreements were addressed in the HPI.    ROS:   Pertinent positives and negatives are stated within HPI, all other systems reviewed and are negative.      --------------------------------------------- PAST HISTORY ---------------------------------------------  Past Medical History:  has no past medical history on file.    Past Surgical History:  has a past surgical history that includes Tonsillectomy and Appendectomy.    Social History:  reports that she does not have a smoking history on file. She has never used smokeless tobacco. She reports current alcohol use. She reports that she does not 
Calcium 8.9 8.6 - 10.2 mg/dL    Total Protein 5.7 (L) 6.4 - 8.3 g/dL    Albumin 3.2 (L) 3.5 - 5.2 g/dL    Total Bilirubin 0.3 0.0 - 1.2 mg/dL    Alkaline Phosphatase 94 35 - 104 U/L    ALT 19 0 - 32 U/L    AST 23 0 - 31 U/L         ED Course as of 04/15/25 0953   Tue Apr 15, 2025   0923 Spoke  with the hospitalist who accepted the patient for admission [AD]      ED Course User Index  [AD] Caridad Adams DO          Medical Decision Making   Differential Diagnosis includes but not limited to: ICH, skull fracture, electrolyte abnormalities, spinal fracture    Fortunately the CT scans are reassuring without acute traumatic pathology.  Unfortunately her laboratory test demonstrated acute renal failure.  Medicine was consulted for admission.  Her scalp laceration was repaired by the emergency medicine resident myself with 3 staples.  Refer to her procedure note.  I was present for the entire procedure.      Problems Addressed:  ALDO (acute kidney injury): acute illness or injury  Fall, initial encounter: acute illness or injury  Laceration of scalp, initial encounter: acute illness or injury    Amount and/or Complexity of Data Reviewed  Labs: ordered. Decision-making details documented in ED Course.  Radiology: ordered and independent interpretation performed. Decision-making details documented in ED Course.     Details: The following tests were interpreted by me: CXR - no focal consolidation or pneumothorax        Risk  Prescription drug management.  Decision regarding hospitalization.                          CONSULTS:   IP CONSULT TO INTERNAL MEDICINE  IP CONSULT TO NEPHROLOGY            PROCEDURES:   Dewayne ABRAHAM MD, personally supervised the procedure throughout the course, including all critical and key portions of the procedure and was immediately available to manage complications should they arise, non did.   The procedure was laceration repair.        CRITICAL CARE TIME:     NO          Dewayne ABRAHAM MD, am

## 2025-04-16 LAB
ANION GAP SERPL CALCULATED.3IONS-SCNC: 9 MMOL/L (ref 7–16)
BASOPHILS # BLD: 0.15 K/UL (ref 0–0.2)
BASOPHILS NFR BLD: 2 % (ref 0–2)
BUN SERPL-MCNC: 32 MG/DL (ref 8–23)
CALCIUM SERPL-MCNC: 8.4 MG/DL (ref 8.8–10.2)
CHLORIDE SERPL-SCNC: 108 MMOL/L (ref 98–107)
CO2 SERPL-SCNC: 21 MMOL/L (ref 22–29)
CREAT SERPL-MCNC: 2.7 MG/DL (ref 0.5–1)
EOSINOPHIL # BLD: 0.61 K/UL (ref 0.05–0.5)
EOSINOPHILS RELATIVE PERCENT: 8 % (ref 0–6)
ERYTHROCYTE [DISTWIDTH] IN BLOOD BY AUTOMATED COUNT: 16 % (ref 11.5–15)
GFR, ESTIMATED: 17 ML/MIN/1.73M2
GLUCOSE SERPL-MCNC: 89 MG/DL (ref 74–99)
HCT VFR BLD AUTO: 26.6 % (ref 34–48)
HGB BLD-MCNC: 8.3 G/DL (ref 11.5–15.5)
IMM GRANULOCYTES # BLD AUTO: 0.06 K/UL (ref 0–0.58)
IMM GRANULOCYTES NFR BLD: 1 % (ref 0–5)
LYMPHOCYTES NFR BLD: 1.25 K/UL (ref 1.5–4)
LYMPHOCYTES RELATIVE PERCENT: 16 % (ref 20–42)
MCH RBC QN AUTO: 31.7 PG (ref 26–35)
MCHC RBC AUTO-ENTMCNC: 31.2 G/DL (ref 32–34.5)
MCV RBC AUTO: 101.5 FL (ref 80–99.9)
MONOCYTES NFR BLD: 0.82 K/UL (ref 0.1–0.95)
MONOCYTES NFR BLD: 10 % (ref 2–12)
NEUTROPHILS NFR BLD: 64 % (ref 43–80)
NEUTS SEG NFR BLD: 5.2 K/UL (ref 1.8–7.3)
PLATELET # BLD AUTO: 342 K/UL (ref 130–450)
PMV BLD AUTO: 8.6 FL (ref 7–12)
POTASSIUM SERPL-SCNC: 4.1 MMOL/L (ref 3.4–4.5)
RBC # BLD AUTO: 2.62 M/UL (ref 3.5–5.5)
SODIUM SERPL-SCNC: 138 MMOL/L (ref 136–145)
WBC OTHER # BLD: 8.1 K/UL (ref 4.5–11.5)

## 2025-04-16 PROCEDURE — 6370000000 HC RX 637 (ALT 250 FOR IP): Performed by: INTERNAL MEDICINE

## 2025-04-16 PROCEDURE — 80048 BASIC METABOLIC PNL TOTAL CA: CPT

## 2025-04-16 PROCEDURE — 97535 SELF CARE MNGMENT TRAINING: CPT

## 2025-04-16 PROCEDURE — 2500000003 HC RX 250 WO HCPCS: Performed by: INTERNAL MEDICINE

## 2025-04-16 PROCEDURE — 97530 THERAPEUTIC ACTIVITIES: CPT

## 2025-04-16 PROCEDURE — 85025 COMPLETE CBC W/AUTO DIFF WBC: CPT

## 2025-04-16 PROCEDURE — 36415 COLL VENOUS BLD VENIPUNCTURE: CPT

## 2025-04-16 PROCEDURE — 97161 PT EVAL LOW COMPLEX 20 MIN: CPT

## 2025-04-16 PROCEDURE — 2700000000 HC OXYGEN THERAPY PER DAY

## 2025-04-16 PROCEDURE — 97165 OT EVAL LOW COMPLEX 30 MIN: CPT

## 2025-04-16 PROCEDURE — 1200000000 HC SEMI PRIVATE

## 2025-04-16 PROCEDURE — 99232 SBSQ HOSP IP/OBS MODERATE 35: CPT | Performed by: INTERNAL MEDICINE

## 2025-04-16 RX ADMIN — ATORVASTATIN CALCIUM 20 MG: 20 TABLET, FILM COATED ORAL at 19:42

## 2025-04-16 RX ADMIN — APIXABAN 2.5 MG: 5 TABLET, FILM COATED ORAL at 19:42

## 2025-04-16 RX ADMIN — SODIUM CHLORIDE, PRESERVATIVE FREE 10 ML: 5 INJECTION INTRAVENOUS at 19:43

## 2025-04-16 RX ADMIN — METOPROLOL SUCCINATE 25 MG: 25 TABLET, EXTENDED RELEASE ORAL at 19:42

## 2025-04-16 RX ADMIN — APIXABAN 2.5 MG: 5 TABLET, FILM COATED ORAL at 13:04

## 2025-04-16 RX ADMIN — SODIUM CHLORIDE, PRESERVATIVE FREE 10 ML: 5 INJECTION INTRAVENOUS at 08:08

## 2025-04-16 NOTE — CARE COORDINATION
Patient with a past medical history of A-fib, CAD, CKD is admitted with ALDO on CKD creatinine at baseline 1.2-1.5.  Admitted with creatinine 3.6. Cr 2.7 today. Nephrology is following. Patient is from [facility], per Janette from [place], patient was there private pay and can return when medically ready. They would like updated PT/OT close to discharge. I called and spoke to patient's daughter Jayleen to confirm the discharge plan. Jayleen asked if patient will discharge skilled can she go back to [facility] where she was originally for  skilled care. I called to ask Janette from [place]. She will find out and let me know. Ambulance form in envelope in soft chart will need to be completed, signed and dated by nursing on day of discharge. No Hens needed since she is from a facility.   Helen Topete RN   [phone]          Case Management Assessment  Initial Evaluation    Date/Time of Evaluation: 4/16/2025 3:22 PM  Assessment Completed by: Helen Topete RN    If patient is discharged prior to next notation, then this note serves as note for discharge by case management.    Patient Name: Dori Samayoa                   YOB: 1940  Diagnosis: ALDO (acute kidney injury) [N17.9]  Acute kidney injury [N17.9]  Fall, initial encounter [W19.XXXA]  Laceration of scalp, initial encounter [S01.01XA]                   Date / Time: 4/15/2025  7:31 AM    Patient Admission Status: Inpatient   Readmission Risk (Low < 19, Mod (19-27), High > 27): Readmission Risk Score: 23    Current PCP: Stanley Gar MD  PCP verified by ? Yes (Stanley Gar MD)    Chart Reviewed: Yes      History Provided by: Child/Family  Patient Orientation: Person    Patient Cognition: Severely Impaired    Hospitalization in the last 30 days (Readmission):  No    If yes, Readmission Assessment in  Navigator will be completed.    Advance Directives:      Code Status: Full Code   Patient's Primary Decision Maker

## 2025-04-16 NOTE — ACP (ADVANCE CARE PLANNING)
Advance Care Planning   The patient has the following advanced directives on file:  Advance Directives       Power of  Living Will ACP-Advance Directive ACP-Power of     Not on File Filed on 04/06/12 Filed Not on File            The patient has appointed the following active healthcare agents:    Primary Decision Maker: Blaise Samayoa - [phone]    Secondary Decision Maker: Jayleen Varner - [phone]        Helen Topete RN  4/16/2025

## 2025-04-16 NOTE — PLAN OF CARE
Problem: Safety - Adult  Goal: Free from fall injury  4/16/2025 1146 by Lorena Garrison RN  Outcome: Progressing  4/16/2025 0133 by Juan R Wheeler RN  Outcome: Progressing     Problem: Chronic Conditions and Co-morbidities  Goal: Patient's chronic conditions and co-morbidity symptoms are monitored and maintained or improved  4/16/2025 1146 by Lorena Garrison RN  Outcome: Progressing  4/16/2025 0133 by Juan R Wheeler RN  Outcome: Progressing     Problem: Skin/Tissue Integrity  Goal: Skin integrity remains intact  Description: 1.  Monitor for areas of redness and/or skin breakdown2.  Assess vascular access sites hourly3.  Every 4-6 hours minimum:  Change oxygen saturation probe site4.  Every 4-6 hours:  If on nasal continuous positive airway pressure, respiratory therapy assess nares and determine need for appliance change or resting period  4/16/2025 1146 by Lorena Garrison RN  Outcome: Progressing  4/16/2025 0133 by Juan R Wheeler RN  Outcome: Progressing     Problem: ABCDS Injury Assessment  Goal: Absence of physical injury  4/16/2025 1146 by Lorena Garrison RN  Outcome: Progressing  4/16/2025 0133 by Juan R Wheeler RN  Outcome: Progressing

## 2025-04-16 NOTE — PLAN OF CARE
Problem: Safety - Adult  Goal: Free from fall injury  Outcome: Progressing     Problem: Chronic Conditions and Co-morbidities  Goal: Patient's chronic conditions and co-morbidity symptoms are monitored and maintained or improved  Outcome: Progressing     Problem: Skin/Tissue Integrity  Goal: Skin integrity remains intact  Description: 1.  Monitor for areas of redness and/or skin breakdown2.  Assess vascular access sites hourly3.  Every 4-6 hours minimum:  Change oxygen saturation probe site4.  Every 4-6 hours:  If on nasal continuous positive airway pressure, respiratory therapy assess nares and determine need for appliance change or resting period  Outcome: Progressing     Problem: ABCDS Injury Assessment  Goal: Absence of physical injury  Outcome: Progressing

## 2025-04-17 LAB
ANION GAP SERPL CALCULATED.3IONS-SCNC: 8 MMOL/L (ref 7–16)
BASOPHILS # BLD: 0.11 K/UL (ref 0–0.2)
BASOPHILS NFR BLD: 2 % (ref 0–2)
BUN SERPL-MCNC: 25 MG/DL (ref 8–23)
CALCIUM SERPL-MCNC: 8.2 MG/DL (ref 8.8–10.2)
CHLORIDE SERPL-SCNC: 109 MMOL/L (ref 98–107)
CO2 SERPL-SCNC: 22 MMOL/L (ref 22–29)
CREAT SERPL-MCNC: 2 MG/DL (ref 0.5–1)
EOSINOPHIL # BLD: 0.76 K/UL (ref 0.05–0.5)
EOSINOPHILS RELATIVE PERCENT: 11 % (ref 0–6)
ERYTHROCYTE [DISTWIDTH] IN BLOOD BY AUTOMATED COUNT: 15.9 % (ref 11.5–15)
GFR, ESTIMATED: 25 ML/MIN/1.73M2
GLUCOSE SERPL-MCNC: 81 MG/DL (ref 74–99)
HCT VFR BLD AUTO: 25.8 % (ref 34–48)
HGB BLD-MCNC: 8.1 G/DL (ref 11.5–15.5)
IMM GRANULOCYTES # BLD AUTO: 0.05 K/UL (ref 0–0.58)
IMM GRANULOCYTES NFR BLD: 1 % (ref 0–5)
LYMPHOCYTES NFR BLD: 1.39 K/UL (ref 1.5–4)
LYMPHOCYTES RELATIVE PERCENT: 20 % (ref 20–42)
MCH RBC QN AUTO: 32.5 PG (ref 26–35)
MCHC RBC AUTO-ENTMCNC: 31.4 G/DL (ref 32–34.5)
MCV RBC AUTO: 103.6 FL (ref 80–99.9)
MONOCYTES NFR BLD: 0.76 K/UL (ref 0.1–0.95)
MONOCYTES NFR BLD: 11 % (ref 2–12)
NEUTROPHILS NFR BLD: 56 % (ref 43–80)
NEUTS SEG NFR BLD: 3.9 K/UL (ref 1.8–7.3)
PLATELET # BLD AUTO: 335 K/UL (ref 130–450)
PMV BLD AUTO: 8.8 FL (ref 7–12)
POTASSIUM SERPL-SCNC: 4 MMOL/L (ref 3.4–4.5)
RBC # BLD AUTO: 2.49 M/UL (ref 3.5–5.5)
SODIUM SERPL-SCNC: 139 MMOL/L (ref 136–145)
WBC OTHER # BLD: 7 K/UL (ref 4.5–11.5)

## 2025-04-17 PROCEDURE — 1200000000 HC SEMI PRIVATE

## 2025-04-17 PROCEDURE — 36415 COLL VENOUS BLD VENIPUNCTURE: CPT

## 2025-04-17 PROCEDURE — 85025 COMPLETE CBC W/AUTO DIFF WBC: CPT

## 2025-04-17 PROCEDURE — 99232 SBSQ HOSP IP/OBS MODERATE 35: CPT | Performed by: INTERNAL MEDICINE

## 2025-04-17 PROCEDURE — 97530 THERAPEUTIC ACTIVITIES: CPT

## 2025-04-17 PROCEDURE — 2500000003 HC RX 250 WO HCPCS: Performed by: INTERNAL MEDICINE

## 2025-04-17 PROCEDURE — 80048 BASIC METABOLIC PNL TOTAL CA: CPT

## 2025-04-17 PROCEDURE — 2700000000 HC OXYGEN THERAPY PER DAY

## 2025-04-17 PROCEDURE — 6370000000 HC RX 637 (ALT 250 FOR IP): Performed by: INTERNAL MEDICINE

## 2025-04-17 RX ADMIN — ISOSORBIDE MONONITRATE 30 MG: 30 TABLET, EXTENDED RELEASE ORAL at 07:52

## 2025-04-17 RX ADMIN — SODIUM CHLORIDE, PRESERVATIVE FREE 10 ML: 5 INJECTION INTRAVENOUS at 07:53

## 2025-04-17 RX ADMIN — APIXABAN 2.5 MG: 5 TABLET, FILM COATED ORAL at 19:57

## 2025-04-17 RX ADMIN — APIXABAN 2.5 MG: 5 TABLET, FILM COATED ORAL at 07:53

## 2025-04-17 RX ADMIN — ATORVASTATIN CALCIUM 20 MG: 20 TABLET, FILM COATED ORAL at 19:57

## 2025-04-17 RX ADMIN — SODIUM CHLORIDE, PRESERVATIVE FREE 10 ML: 5 INJECTION INTRAVENOUS at 19:57

## 2025-04-17 NOTE — PLAN OF CARE
Problem: Safety - Adult  Goal: Free from fall injury  4/16/2025 2146 by Shanae Sparks RN  Outcome: Progressing  4/16/2025 1146 by Lorena Garrison RN  Outcome: Progressing     Problem: Chronic Conditions and Co-morbidities  Goal: Patient's chronic conditions and co-morbidity symptoms are monitored and maintained or improved  4/16/2025 2146 by Shanae Sparks RN  Outcome: Progressing  Flowsheets (Taken 4/16/2025 1654 by Lo Larsen, RN)  Care Plan - Patient's Chronic Conditions and Co-Morbidity Symptoms are Monitored and Maintained or Improved: Monitor and assess patient's chronic conditions and comorbid symptoms for stability, deterioration, or improvement  4/16/2025 1146 by Lorena Garrison RN  Outcome: Progressing     Problem: Skin/Tissue Integrity  Goal: Skin integrity remains intact  Description: 1.  Monitor for areas of redness and/or skin breakdown2.  Assess vascular access sites hourly3.  Every 4-6 hours minimum:  Change oxygen saturation probe site4.  Every 4-6 hours:  If on nasal continuous positive airway pressure, respiratory therapy assess nares and determine need for appliance change or resting period  4/16/2025 2146 by Shanae Sparks RN  Outcome: Progressing  Flowsheets (Taken 4/16/2025 1654 by Lo Larsen, RN)  Skin Integrity Remains Intact: Monitor for areas of redness and/or skin breakdown  4/16/2025 1146 by Lorena Garrison RN  Outcome: Progressing     Problem: ABCDS Injury Assessment  Goal: Absence of physical injury  4/16/2025 2146 by Shanae Sparks RN  Outcome: Progressing  4/16/2025 1146 by Lorena Garrison RN  Outcome: Progressing

## 2025-04-17 NOTE — CARE COORDINATION
Patient admitted with ALDO on CKD creatinine at baseline 1.2-1.5. Admitted with creatinine 3.6. Cr 2.0 today. Nephrology is following. Met with internal med who said patient likely will discharge tomorrow. Patient is from [facility], per Janette from [place], patient was there private pay and can return when medically ready. They would like updated PT/OT close to discharge. Patient's daughter Jayleen requested patient go to [facility] if she will be skilled. I notified Janette from [place] and she said patient is not able to go to [facility]. Daughter Jayleen informed and updated on all of the above. She is agreeable for patient to return to [facility]. She would prefer facility  if her mother is able to tolerate sitting up in chair. No Hens needed since since patient is from this facility.    Helen Topete RN   [phone]

## 2025-04-18 VITALS
RESPIRATION RATE: 18 BRPM | OXYGEN SATURATION: 96 % | SYSTOLIC BLOOD PRESSURE: 132 MMHG | DIASTOLIC BLOOD PRESSURE: 45 MMHG | TEMPERATURE: 97.5 F | HEART RATE: 62 BPM

## 2025-04-18 LAB
ANION GAP SERPL CALCULATED.3IONS-SCNC: 8 MMOL/L (ref 7–16)
BASOPHILS # BLD: 0.13 K/UL (ref 0–0.2)
BASOPHILS NFR BLD: 2 % (ref 0–2)
BUN SERPL-MCNC: 22 MG/DL (ref 8–23)
CALCIUM SERPL-MCNC: 8.4 MG/DL (ref 8.8–10.2)
CHLORIDE SERPL-SCNC: 109 MMOL/L (ref 98–107)
CO2 SERPL-SCNC: 23 MMOL/L (ref 22–29)
CREAT SERPL-MCNC: 1.7 MG/DL (ref 0.5–1)
EOSINOPHIL # BLD: 0.84 K/UL (ref 0.05–0.5)
EOSINOPHILS RELATIVE PERCENT: 11 % (ref 0–6)
ERYTHROCYTE [DISTWIDTH] IN BLOOD BY AUTOMATED COUNT: 16.1 % (ref 11.5–15)
GFR, ESTIMATED: 29 ML/MIN/1.73M2
GLUCOSE SERPL-MCNC: 84 MG/DL (ref 74–99)
HCT VFR BLD AUTO: 26 % (ref 34–48)
HGB BLD-MCNC: 8 G/DL (ref 11.5–15.5)
IMM GRANULOCYTES # BLD AUTO: 0.08 K/UL (ref 0–0.58)
IMM GRANULOCYTES NFR BLD: 1 % (ref 0–5)
LYMPHOCYTES NFR BLD: 1.54 K/UL (ref 1.5–4)
LYMPHOCYTES RELATIVE PERCENT: 21 % (ref 20–42)
MCH RBC QN AUTO: 31.6 PG (ref 26–35)
MCHC RBC AUTO-ENTMCNC: 30.8 G/DL (ref 32–34.5)
MCV RBC AUTO: 102.8 FL (ref 80–99.9)
MONOCYTES NFR BLD: 0.81 K/UL (ref 0.1–0.95)
MONOCYTES NFR BLD: 11 % (ref 2–12)
NEUTROPHILS NFR BLD: 54 % (ref 43–80)
NEUTS SEG NFR BLD: 4.04 K/UL (ref 1.8–7.3)
PLATELET # BLD AUTO: 344 K/UL (ref 130–450)
PMV BLD AUTO: 8.9 FL (ref 7–12)
POTASSIUM SERPL-SCNC: 4.2 MMOL/L (ref 3.4–4.5)
RBC # BLD AUTO: 2.53 M/UL (ref 3.5–5.5)
SODIUM SERPL-SCNC: 139 MMOL/L (ref 136–145)
WBC OTHER # BLD: 7.4 K/UL (ref 4.5–11.5)

## 2025-04-18 PROCEDURE — 99239 HOSP IP/OBS DSCHRG MGMT >30: CPT | Performed by: STUDENT IN AN ORGANIZED HEALTH CARE EDUCATION/TRAINING PROGRAM

## 2025-04-18 PROCEDURE — 2500000003 HC RX 250 WO HCPCS: Performed by: INTERNAL MEDICINE

## 2025-04-18 PROCEDURE — 36415 COLL VENOUS BLD VENIPUNCTURE: CPT

## 2025-04-18 PROCEDURE — 85025 COMPLETE CBC W/AUTO DIFF WBC: CPT

## 2025-04-18 PROCEDURE — 2700000000 HC OXYGEN THERAPY PER DAY

## 2025-04-18 PROCEDURE — 6370000000 HC RX 637 (ALT 250 FOR IP): Performed by: INTERNAL MEDICINE

## 2025-04-18 PROCEDURE — 80048 BASIC METABOLIC PNL TOTAL CA: CPT

## 2025-04-18 RX ADMIN — SODIUM CHLORIDE, PRESERVATIVE FREE 10 ML: 5 INJECTION INTRAVENOUS at 07:54

## 2025-04-18 RX ADMIN — APIXABAN 2.5 MG: 5 TABLET, FILM COATED ORAL at 07:54

## 2025-04-18 NOTE — DISCHARGE INSTR - COC
EDT    CASE MANAGEMENT/SOCIAL WORK SECTION    Inpatient Status Date:     Readmission Risk Assessment Score:  [facility] RISK OF UNPLANNED READMISSION 2.0             23.5 Total Score        Discharging to Facility/ Agency   Name:  [facility]  Address:  Phone:  Fax:    Dialysis Facility (if applicable)   Name:  Address:  Dialysis Schedule:  Phone:  Fax:    / signature: Electronically signed by Helen Topete RN on 4/18/2025 at 11:44 AM      PHYSICIAN SECTION    Prognosis: {Prognosis:8771536735}    Condition at Discharge: { Patient Condition:775479531}    Rehab Potential (if transferring to Rehab): {Prognosis:1195456151}    Recommended Labs or Other Treatments After Discharge: ***    Physician Certification: I certify the above information and transfer of Dori Samayoa  is necessary for the continuing treatment of the diagnosis listed and that she requires Skilled Nursing Facility for less 30 days.     Update Admission H&P: {CHP DME Changes in HandP:[id number]}    PHYSICIAN SIGNATURE:  {Esignature:[id number]}

## 2025-04-18 NOTE — DISCHARGE SUMMARY
Hospitalist Discharge Summary    Patient ID: Dori Samayoa   Patient : 1940  Patient's PCP: Stanley Gar MD    Admit Date: 4/15/2025   Admitting Physician: Randa Chris MD    Discharge Date:  2025   Discharge Physician: Rito Gar MD   Discharge Condition: Stable  Discharge Disposition: Skilled Facility      Hospital course in brief:  (Please refer to daily progress notes for a comprehensive review of the hospitalization by requesting medical records)  Patient is a 84-year-old female with a past medical history of A-fib, CAD, CKD who presented to the emergency department for an unwitnessed fall. She sustained a posterior scalp laceration requiring stapling. She denies losing her consciousness, she says there might have been water on the floor that made her slipped. CT of the head, cervical spine thoracic spine and lumbar spine were unremarkable for acute injury. Further lab work showed that she had ALDO with a creatinine of 3.6. Change from her baseline of 1.2-1.5. She was given normal saline bolus and was admitted. Nephrology is on board.      ALDO has improved, Nephrology has consulted Urology. Recommended discharge with butler and follow up as outpatient for TOV. GUSTAVO reviewed which demonstrates right simple renal cyst, otherwise is unremarkable           Consults:   IP CONSULT TO INTERNAL MEDICINE  IP CONSULT TO NEPHROLOGY  IP CONSULT TO UROLOGY    Discharge Diagnoses:  ALDO on CKD IIIa  HFpEF  PAF  CAD  HLD  HTN, presented with hypotension   Urinary Retention   Mechanical fall with posterior scalp laceration s/p stapling  Physical deconditioning          Discharge Instructions / Follow up:  Follow-up with PCP within 1 week of discharge.  Follow-up with consultants as indicated by them.  Compliance with medications as prescribed on discharge.    No future appointments.    The patient's condition is stable.  At this time the patient is without objective evidence of

## 2025-04-18 NOTE — PLAN OF CARE
Problem: Safety - Adult  Goal: Free from fall injury  4/18/2025 1020 by Frannie Yoder RN  Outcome: Progressing  4/18/2025 0208 by Juan R Wheeler RN  Outcome: Progressing     Problem: Chronic Conditions and Co-morbidities  Goal: Patient's chronic conditions and co-morbidity symptoms are monitored and maintained or improved  4/18/2025 1020 by Frannie Yoder RN  Outcome: Progressing  4/18/2025 0208 by Juan R Wheeler RN  Outcome: Progressing     Problem: Skin/Tissue Integrity  Goal: Skin integrity remains intact  Description: 1.  Monitor for areas of redness and/or skin breakdown2.  Assess vascular access sites hourly3.  Every 4-6 hours minimum:  Change oxygen saturation probe site4.  Every 4-6 hours:  If on nasal continuous positive airway pressure, respiratory therapy assess nares and determine need for appliance change or resting period  4/18/2025 1020 by Frannie Yoder RN  Outcome: Progressing  4/18/2025 0208 by Juan R Wheeler RN  Outcome: Progressing     Problem: ABCDS Injury Assessment  Goal: Absence of physical injury  4/18/2025 1020 by Frannie Yoder RN  Outcome: Progressing  4/18/2025 0208 by Juan R Wheeler RN  Outcome: Progressing

## 2025-04-18 NOTE — PLAN OF CARE
Problem: Safety - Adult  Goal: Free from fall injury  4/18/2025 1529 by Frannie Yoder RN  Outcome: Adequate for Discharge  4/18/2025 1020 by Frannie Yoder RN  Outcome: Progressing  4/18/2025 0208 by Juan R Wheeler RN  Outcome: Progressing     Problem: Chronic Conditions and Co-morbidities  Goal: Patient's chronic conditions and co-morbidity symptoms are monitored and maintained or improved  4/18/2025 1529 by Frannie Yoder RN  Outcome: Adequate for Discharge  4/18/2025 1020 by Frannie Yoder RN  Outcome: Progressing  4/18/2025 0208 by Juan R Wheeler RN  Outcome: Progressing     Problem: Skin/Tissue Integrity  Goal: Skin integrity remains intact  Description: 1.  Monitor for areas of redness and/or skin breakdown2.  Assess vascular access sites hourly3.  Every 4-6 hours minimum:  Change oxygen saturation probe site4.  Every 4-6 hours:  If on nasal continuous positive airway pressure, respiratory therapy assess nares and determine need for appliance change or resting period  4/18/2025 1529 by Frannie Yoder RN  Outcome: Adequate for Discharge  4/18/2025 1020 by Frannie Yoder RN  Outcome: Progressing  4/18/2025 0208 by Juan R Wheeler RN  Outcome: Progressing     Problem: ABCDS Injury Assessment  Goal: Absence of physical injury  4/18/2025 1529 by Frannie Yoder RN  Outcome: Adequate for Discharge  4/18/2025 1020 by Frannie Yoder RN  Outcome: Progressing  4/18/2025 0208 by Juan R Wheeler RN  Outcome: Progressing

## 2025-04-18 NOTE — DISCHARGE INSTRUCTIONS
Place the patients butler to leg bag on discharge from the hospital.  Please give the patient a night bag (large bag) and butler instructions upon discharge.  Please have the patient contact the office for butler removal instructions.  The catheter may go red (hematuria), may go clear, and may go red.  This is a normal process, as long as the catheter is draining.  Call the office if any concerns should arise for further instructions, [phone].      Call upon discharge to schedule a follow-up visit with Perez Veliz/Oracio/Graeme ([facility]) at [phone]      Learning About Indwelling Urinary Catheter Care to Prevent Infection  Overview     A urinary catheter is a flexible plastic tube that's used to drain urine from your bladder when you can't urinate on your own. The catheter allows urine to drain from the bladder into a bag.  Two types of drainage bags may be used with a urinary catheter.  A bedside bag is a large bag that you can hang on the side of your bed or on a chair. You can use it overnight or anytime you will be sitting or lying down for a long time.  A leg bag is a small bag that you can use during the day. It is usually attached to your thigh or calf and hidden under your clothes.  Having a urinary catheter increases your risk of getting a urinary tract infection. Germs may get on the catheter and cause an infection in your bladder or kidneys. The longer you have a catheter, the more likely it is that you will get an infection. You can help prevent this problem with good hygiene and careful handling of your catheter and drainage bags.  How can you help prevent infection?  Take care to stay clean  Always wash your hands well before and after you handle your catheter.  Clean the skin around the catheter daily using soap and water. Dry with a clean towel afterward. You can shower with your catheter and drainage bag in place unless your doctor told you not to.  When you clean around the catheter,

## 2025-04-18 NOTE — CARE COORDINATION
Discharge order noted. Per urology note today, Creatinine improving 3.6>>1.7...Nephrology is following. Antibiotics per primary. GUSTAVO reviewed which demonstrates right simple renal cyst, otherwise is unremarkable. Continue the butler. She is likely being discharged today, if so, she may be discharged with the butler and follow up as an outpatient for TOV. Per nephrology note today, Okay to discharge from renal point of view, follow up in 4-6 weeks. MARY/Destination complete.Transportation set up via  [employer] for 3 pm today to [facility]. Charge nurse, RN, liaison, patient and daughter Jayleen all notified. No Hens needed since since patient is from this facility.   Helen Topete RN CM  [phone]

## 2025-04-18 NOTE — PROGRESS NOTES
[facility] Hospitalist Progress Note    Admitting Date and Time: 4/15/2025  7:31 AM  Admit Dx: ALDO (acute kidney injury) [N17.9]  Acute kidney injury [N17.9]  Fall, initial encounter [W19.XXXA]  Laceration of scalp, initial encounter [S01.01XA]    Synopsis: Patient is a 84-year-old female with a past medical history of A-fib, CAD, CKD who presented to the emergency department for an unwitnessed fall.  She sustained a posterior scalp laceration requiring stapling.  She denies losing her consciousness, she says there might have been water on the floor that made her slipped.  CT of the head, cervical spine thoracic spine and lumbar spine were unremarkable for acute injury.  Further lab work showed that she had ALDO with a creatinine of 3.6.  Change from her baseline of 1.2-1.5.  She was given normal saline bolus and was admitted. Nephrology is on board.     Subjective:  Patient seen and examined at bedside.   No new issues.   Pleasantly confused.    ROS: denies fever, chills, cp, sob, n/v, HA unless stated above.      apixaban  2.5 mg Oral BID    sodium chloride flush  5-40 mL IntraVENous 2 times per day    [Held by provider] aspirin  81 mg Oral Daily    atorvastatin  20 mg Oral Nightly    [Held by provider] hydroCHLOROthiazide  12.5 mg Oral Daily    isosorbide mononitrate  30 mg Oral Daily    metoprolol succinate  25 mg Oral QHS     sodium chloride flush, 5-40 mL, PRN  sodium chloride, , PRN  ondansetron, 4 mg, Q8H PRN   Or  ondansetron, 4 mg, Q6H PRN  polyethylene glycol, 17 g, Daily PRN  acetaminophen, 650 mg, Q6H PRN   Or  acetaminophen, 650 mg, Q6H PRN         Objective:    /80   Pulse 67   Temp 97.5 °F (36.4 °C) (Temporal)   Resp 18   SpO2 96%   General Appearance: alert and oriented to self and in no acute distress  Skin: warm and dry  Head: normocephalic and atraumatic  Eyes: pupils equal, round, and reactive to light, extraocular eye movements intact, conjunctivae normal  Neck: neck supple 
4 Eyes Skin Assessment     NAME:  Dori Samayoa  YOB: 1940  MEDICAL RECORD NUMBER:  [id number]    The patient is being assessed for  Admission    I agree that at least one RN has performed a thorough Head to Toe Skin Assessment on the patient. ALL assessment sites listed below have been assessed.      Areas assessed by both nurses:    Head, Face, Ears, Shoulders, Back, Chest, Arms, Elbows, Hands, Sacrum. Buttock, Coccyx, Ischium, and Legs. Feet and Heels        Does the Patient have a Wound? No noted wound(s)  - abrasions to BLE  - dry/flaky to bilat feet  - laceration to back of head with staples       Parish Prevention initiated by RN: No  Wound Care Orders initiated by RN: No    Pressure Injury (Stage 3,4, Unstageable, DTI, NWPT, and Complex wounds) if present, place Wound referral order by RN under : No    New Ostomies, if present place, Ostomy referral order under : No     Nurse 1 eSignature: Electronically signed by Nenita Garrison RN on 4/15/25 at 7:50 PM EDT    **SHARE this note so that the co-signing nurse can place an eSignature**    Nurse 2 eSignature: {Esignature:[id number]}  
Called [facility] for nurse to nurse report , phone call was transferred to nursing unit but to no avail. Will call again later.  
DOMENICA Arroyo notified of urology consult. Pt added to census.  
Department of Internal Medicine  Nephrology Attending Consult Note    Events reviewed.    SUBJECTIVE: We are following Dori Samayoa for ALDO. Patient reports no complaints.    PHYSICAL EXAM:      Vitals:    VITALS:  /80   Pulse 67   Temp 97.5 °F (36.4 °C) (Temporal)   Resp 18   SpO2 96%   24HR INTAKE/OUTPUT:    Intake/Output Summary (Last 24 hours) at 4/17/2025 0854  Last data filed at 4/17/2025 0410  Gross per 24 hour   Intake 120 ml   Output 900 ml   Net -780 ml     Scheduled Meds:   apixaban  2.5 mg Oral BID    sodium chloride flush  5-40 mL IntraVENous 2 times per day    [Held by provider] aspirin  81 mg Oral Daily    atorvastatin  20 mg Oral Nightly    [Held by provider] hydroCHLOROthiazide  12.5 mg Oral Daily    isosorbide mononitrate  30 mg Oral Daily    metoprolol succinate  25 mg Oral QHS     Continuous Infusions:   sodium chloride       PRN Meds:.sodium chloride flush, sodium chloride, ondansetron **OR** ondansetron, polyethylene glycol, acetaminophen **OR** acetaminophen     Constitutional:  Awake, alert, oriented, in NAD  HEENT:  PERRLA, normocephalic, atraumatic  Respiratory:  CTA  Cardiovascular/Edema:  RRR, normal S1, normal S2  Gastrointestinal:  Soft, flat, non-distended, non-tender  Neurologic:  Nonfocal  Skin:  warm, dry, no rashes, no lesions    DATA:    CBC:   Lab Results   Component Value Date/Time    WBC 7.0 04/17/2025 04:09 AM    RBC 2.49 04/17/2025 04:09 AM    HGB 8.1 04/17/2025 04:09 AM    HCT 25.8 04/17/2025 04:09 AM    .6 04/17/2025 04:09 AM    MCH 32.5 04/17/2025 04:09 AM    MCHC 31.4 04/17/2025 04:09 AM    RDW 15.9 04/17/2025 04:09 AM     04/17/2025 04:09 AM    MPV 8.8 04/17/2025 04:09 AM     CMP:    Lab Results   Component Value Date/Time     04/17/2025 04:09 AM    K 4.0 04/17/2025 04:09 AM    K 3.7 01/13/2023 05:59 AM     04/17/2025 04:09 AM    CO2 22 04/17/2025 04:09 AM    BUN 25 04/17/2025 04:09 AM    CREATININE 2.0 04/17/2025 04:09 AM    
Department of Internal Medicine  Nephrology Attending Consult Note    Events reviewed.    SUBJECTIVE: We are following Dori Samayoa for ALDO. Patient reports no complaints.    PHYSICAL EXAM:      Vitals:    VITALS:  BP (!) 107/44   Pulse 62   Temp 97.5 °F (36.4 °C) (Temporal)   Resp 18   SpO2 96%   24HR INTAKE/OUTPUT:    Intake/Output Summary (Last 24 hours) at 4/18/2025 0911  Last data filed at 4/18/2025 0141  Gross per 24 hour   Intake 230 ml   Output 500 ml   Net -270 ml     Scheduled Meds:   apixaban  2.5 mg Oral BID    sodium chloride flush  5-40 mL IntraVENous 2 times per day    [Held by provider] aspirin  81 mg Oral Daily    atorvastatin  20 mg Oral Nightly    [Held by provider] hydroCHLOROthiazide  12.5 mg Oral Daily    isosorbide mononitrate  30 mg Oral Daily    metoprolol succinate  25 mg Oral QHS     Continuous Infusions:   sodium chloride       PRN Meds:.sodium chloride flush, sodium chloride, ondansetron **OR** ondansetron, polyethylene glycol, acetaminophen **OR** acetaminophen     Constitutional:  Awake, alert, oriented, in NAD  HEENT:  PERRLA, normocephalic, atraumatic  Respiratory:  CTA  Cardiovascular/Edema:  RRR, normal S1, normal S2  Gastrointestinal:  Soft, flat, non-distended, non-tender  Neurologic:  Nonfocal  Skin:  warm, dry, no rashes, no lesions    DATA:    CBC:   Lab Results   Component Value Date/Time    WBC 7.4 04/18/2025 04:14 AM    RBC 2.53 04/18/2025 04:14 AM    HGB 8.0 04/18/2025 04:14 AM    HCT 26.0 04/18/2025 04:14 AM    .8 04/18/2025 04:14 AM    MCH 31.6 04/18/2025 04:14 AM    MCHC 30.8 04/18/2025 04:14 AM    RDW 16.1 04/18/2025 04:14 AM     04/18/2025 04:14 AM    MPV 8.9 04/18/2025 04:14 AM     CMP:    Lab Results   Component Value Date/Time     04/18/2025 04:14 AM    K 4.2 04/18/2025 04:14 AM    K 3.7 01/13/2023 05:59 AM     04/18/2025 04:14 AM    CO2 23 04/18/2025 04:14 AM    BUN 22 04/18/2025 04:14 AM    CREATININE 1.7 04/18/2025 04:14 AM    
Department of Internal Medicine  Nephrology Attending Consult Note    Events reviewed.    SUBJECTIVE: We are following Dori Samayoa for ALDO. Patient reports no complaints.    PHYSICAL EXAM:      Vitals:    VITALS:  BP 90/75   Pulse 69   Temp 97.5 °F (36.4 °C) (Temporal)   Resp 17   SpO2 97%   24HR INTAKE/OUTPUT:    Intake/Output Summary (Last 24 hours) at 4/16/2025 0849  Last data filed at 4/16/2025 0508  Gross per 24 hour   Intake --   Output 1650 ml   Net -1650 ml     Scheduled Meds:   apixaban  2.5 mg Oral BID    sodium chloride flush  5-40 mL IntraVENous 2 times per day    [Held by provider] aspirin  81 mg Oral Daily    atorvastatin  20 mg Oral Nightly    [Held by provider] hydroCHLOROthiazide  12.5 mg Oral Daily    isosorbide mononitrate  30 mg Oral Daily    metoprolol succinate  25 mg Oral QHS     Continuous Infusions:   sodium chloride       PRN Meds:.sodium chloride flush, sodium chloride, ondansetron **OR** ondansetron, polyethylene glycol, acetaminophen **OR** acetaminophen     Constitutional:  Awake, alert, oriented, in NAD  HEENT:  PERRLA, normocephalic, atraumatic  Respiratory:  CTA  Cardiovascular/Edema:  RRR, normal S1, normal S2  Gastrointestinal:  Soft, flat, non-distended, non-tender  Neurologic:  Nonfocal  Skin:  warm, dry, no rashes, no lesions    DATA:    CBC:   Lab Results   Component Value Date/Time    WBC 7.6 04/15/2025 07:52 AM    RBC 3.28 04/15/2025 07:52 AM    HGB 10.3 04/15/2025 07:52 AM    HCT 32.9 04/15/2025 07:52 AM    .3 04/15/2025 07:52 AM    MCH 31.4 04/15/2025 07:52 AM    MCHC 31.3 04/15/2025 07:52 AM    RDW 16.0 04/15/2025 07:52 AM     04/15/2025 07:52 AM    MPV 8.7 04/15/2025 07:52 AM     CMP:    Lab Results   Component Value Date/Time     04/15/2025 07:52 AM    K 5.0 04/15/2025 07:52 AM    K 3.7 01/13/2023 05:59 AM     04/15/2025 07:52 AM    CO2 19 04/15/2025 07:52 AM    BUN 36 04/15/2025 07:52 AM    CREATININE 3.6 04/15/2025 07:52 AM    LABGLOM 
Occupational Therapy  OCCUPATIONAL THERAPY INITIAL EVALUATION    [facility]   [address]        Date:2025                                                  Patient Name: Dori Samayoa    MRN: [id number]    : 1940    Room: [address] [place] [address]      Evaluating OT: Fallonirma Lopez OTR/L [id number]       Referring Provider:Randa Agrawal MD     Specific Provider Orders/Date: OT eval and treat 4/15/25      Diagnosis: Acute Kidney Injury S/P Fall with Laceration to Head     Surgery: none      Pertinent Medical History:     No past medical history on file.      Past Surgical History:   Procedure Laterality Date    APPENDECTOMY      TONSILLECTOMY        Precautions:  Fall Risk, TSM, alarms ,cognition    Assessment of current deficits    [x] Functional mobility  [x]ADLs  [x] Strength               [x]Cognition    [x] Functional transfers   [x] IADLs         [x] Safety Awareness   [x]Endurance    [] Fine Coordination              [x] Balance      [] Vision/perception   []Sensation     []Gross Motor Coordination  [] ROM  [] Delirium                   [] Motor Control     OT PLAN OF CARE   OT POC based on physician orders, patient diagnosis and results of clinical assessment    Frequency/Duration 1-3 days/wk for 2 weeks PRN   Specific OT Treatment Interventions to include:   * Instruction/training on adapted ADL techniques and AE recommendations to increase functional independence within precautions       * Training on energy conservation strategies, correct breathing pattern and techniques to improve independence/tolerance for self-care routine  * Functional transfer/mobility training/DME recommendations for increased independence, safety, and fall prevention  * Patient/Family education to increase follow through with safety techniques and functional independence  * Recommendation of environmental modifications for increased 
Occupational Therapy  OT BEDSIDE TREATMENT NOTE   [facility]  [address] [place], [place]      Date:2025  Patient Name: Dori Samayoa  MRN: [id number]  : 1940  Room: [facility]-      Evaluating OT: Fallon Lopez OTR/L [id number]        Referring Provider:Randa Agrawal MD     Specific Provider Orders/Date: OT eval and treat 4/15/25       Diagnosis: Acute Kidney Injury S/P Fall with Laceration to Head     Surgery: none      Pertinent Medical History:       Past Medical History   No past medical history on file.         Past Surgical History         Past Surgical History:   Procedure Laterality Date    APPENDECTOMY        TONSILLECTOMY              Precautions:  Fall Risk, TSM, alarms ,cognition    Assessment of current deficits    [x] Functional mobility            [x]ADLs           [x] Strength                  [x]Cognition    [x] Functional transfers          [x] IADLs         [x] Safety Awareness   [x]Endurance    [] Fine Coordination                         [x] Balance      [] Vision/perception   []Sensation      []Gross Motor Coordination             [] ROM           [] Delirium                   [] Motor Control      OT PLAN OF CARE   OT POC based on physician orders, patient diagnosis and results of clinical assessment     Frequency/Duration 1-3 days/wk for 2 weeks PRN   Specific OT Treatment Interventions to include:   * Instruction/training on adapted ADL techniques and AE recommendations to increase functional independence within precautions       * Training on energy conservation strategies, correct breathing pattern and techniques to improve independence/tolerance for self-care routine  * Functional transfer/mobility training/DME recommendations for increased independence, safety, and fall prevention  * Patient/Family education to increase follow through with safety techniques and functional independence  * 
Physical Therapy  Initial Assessment     Name: Dori Samayoa  : 1940  MRN: [id number]      Date of Service: 2025    Evaluating PT: Cordell Bryson, PT, DPT [id number]      Room #:  8401/8401-B  Diagnosis:  ALDO (acute kidney injury) [N17.9]  Acute kidney injury [N17.9]  Fall, initial encounter [W19.XXXA]  Laceration of scalp, initial encounter [S01.01XA]  PMHx/PSHx:   has no past medical history on file.  Procedure/Surgery:  None this admision  Precautions:  Fall risk, O2, butler, cognition, alarms, TSM  Equipment Needs:  TBD    SUBJECTIVE:    Pt is a limited historian. Per chart, pt admitted from nursing home.     OBJECTIVE:   Initial Evaluation  Date: 25 Treatment Date: Short Term/ Long Term   Goals   AM-PAC 6 Clicks 10/24     Was pt agreeable to Eval/treatment? Yes     Does pt have pain? R toe pain, did not rate     Bed Mobility  Rolling: Min A  Supine to sit: Mod A  Sit to supine: Mod A  Scooting: NT  Rolling: SBA  Supine to sit: SBA  Sit to supine: SBA  Scooting: SBA   Transfers Sit to stand: NT - pt declined to stand  Stand to sit: NT  Stand pivot: NT  Sit to stand: Min A  Stand to sit: Min A  Stand pivot: Mod A with AAD   Ambulation   NT  50 feet with AAD with Mod A   Stair negotiation: ascended and descended NT  TBD   ROM BUE: Refer to OT note  BLE: NT due to cognition     Strength BUE: Refer to OT note  BLE: NT due to cognition     Balance Sitting EOB: SBA  Dynamic Standing: NT  Sitting EOB: Independent  Dynamic Standing: Mod A with AAD     Pt is A & O x1 to self. Pt appeared very confused and was intermittently argumentative. Pt unaware of where she was before current hospital admission and could not state if she used any AD to get around.   Sensation: Denies numbness and tingling of extremities.  Edema: Unremarkable    Patient education  Pt educated on PT role in the acute care setting and safety with all functional mobility.    Patient response to education:   Pt verbalized understanding 
Physical Therapy  Treatment    Name: Dori Samayoa  : 1940  MRN: [id number]      Date of Service: 2025    Evaluating PT: Cordell Bryson, PT, DPT [id number]      Room #:  8401/8401-B  Diagnosis:  ALDO (acute kidney injury) [N17.9]  Acute kidney injury [N17.9]  Fall, initial encounter [W19.XXXA]  Laceration of scalp, initial encounter [S01.01XA]  PMHx/PSHx:   has no past medical history on file.  Procedure/Surgery:  None this admision  Precautions:  Fall risk, O2, butler, cognition, alarms, TSM  Equipment Needs:  TBD    SUBJECTIVE:    Pt is a limited historian. Per chart, pt admitted from nursing home.     OBJECTIVE:   Initial Evaluation  Date: 25 Treatment Date:  25 Short Term/ Long Term   Goals   AM-PAC 6 Clicks 10/24 9/24    Was pt agreeable to Eval/treatment? Yes yes    Does pt have pain? R toe pain, did not rate No c/o pain    Bed Mobility  Rolling: Min A  Supine to sit: Mod A  Sit to supine: Mod A  Scooting: NT Rolling: Gloria  Supine to sit: ModA  Sit to supine: ModA  Scooting: NT Rolling: SBA  Supine to sit: SBA  Sit to supine: SBA  Scooting: SBA   Transfers Sit to stand: NT - pt declined to stand  Stand to sit: NT  Stand pivot: NT Sit to stand: MaxA partial  Stand to sit: MaxA  Stand pivot: NT Sit to stand: Min A  Stand to sit: Min A  Stand pivot: Mod A with AAD   Ambulation   NT NT 50 feet with AAD with Mod A   Stair negotiation: ascended and descended NT NT TBD   ROM BUE: Refer to OT note  BLE: NT due to cognition     Strength BUE: Refer to OT note  BLE: NT due to cognition     Balance Sitting EOB: SBA  Dynamic Standing: NT Sitting EOB: SBA  Dynamic Standing: NT Sitting EOB: Independent  Dynamic Standing: Mod A with AAD     Pt is A & O x 2 to self and place. Pt disoriented to time and situation. Intermittently resistive and agitated, distrustful of staff.  Sensation:  NT  Edema:  none noted    Patient education  Pt educated on role of PT, safety during mobility    Patient response to 
Spiritual Health History and Assessment/Progress Note  [facility]    (P) Initial Encounter, Spiritual/Emotional Needs,  ,  ,      Name: Dori Samayoa MRN: [id number]    Age: 84 y.o.     Sex: female   Language: English   Cheondoism: Unknown   Acute kidney injury     Date: 4/16/2025                           Spiritual Assessment began in [facility]        Referral/Consult From: (P) Rounding   Encounter Overview/Reason: (P) Initial Encounter, Spiritual/Emotional Needs  Service Provided For: (P) Patient    Deena, Belief, Meaning:   Patient identifies as spiritual  Family/Friends No family/friends present      Importance and Influence:  Patient has spiritual/personal beliefs that influence decisions regarding their health  Family/Friends No family/friends present    Community:  Patient is connected with a spiritual community  Family/Friends No family/friends present    Assessment and Plan of Care:     Patient Interventions include: Affirmed coping skills/support systems  Family/Friends Interventions include: No family/friends present    Patient Plan of Care: No spiritual needs identified for follow-up  Family/Friends Plan of Care: No family/friends present    Electronically signed by Chaplain COLLINS on 4/16/2025 at 4:22 PM    
Hold nephrotoxic nephrology recommendations appreciated  Chronic microcytic anemia, hemoglobin at 10.3 which is baseline. vitamin B12 levels ok.  CAD on aspirin continue on metoprolol and Imdur  A-fib on Eliquis.  Resume Eliquis   Mechanical fall with posterior scalp laceration status post stapling -PT/OT      NOTE: This report was transcribed using voice recognition software. Every effort was made to ensure accuracy; however, inadvertent computerized transcription errors may be present.  Electronically signed by Randa Chris MD on 4/16/2025 at 11:18 AM

## 2025-04-18 NOTE — CONSULTS
4/18/2025 10:30 AM  Dori Samayoa  [id number]     Chief Complaint:    Urinary retention      History of Present Illness:      The patient is a 84 y.o. female patient who presented to the hospital with complaints of fall at nursing facility. She is admitted for fall, laceration of scalp and ALDO.     Urology is asked to evaluate the patient for urinary retention. She was bladder scanned for 999mL and was straight cathed for 900mL. A butler was placed for 550cc. She thinks she may have felt relief once the butler was placed but is not sure. She has never seen a urologist before. She is likely being discharged today.     No past medical history on file.   Past Surgical History:   Procedure Laterality Date    APPENDECTOMY      TONSILLECTOMY         Medications Prior to Admission:    Medications Prior to Admission: pantoprazole (PROTONIX) 40 MG tablet, Take 1 tablet by mouth daily  saccharomyces boulardii (FLORASTOR) 250 MG capsule, Take 1 capsule by mouth 2 times daily  donepezil (ARICEPT) 10 MG tablet, Take 1 tablet by mouth nightly  mirtazapine (REMERON) 15 MG tablet, Take 1 tablet by mouth nightly  losartan (COZAAR) 50 MG tablet, Take 1 tablet by mouth daily  umeclidinium-vilanterol (ANORO ELLIPTA) 62.5-25 MCG/ACT inhaler, Inhale 1 puff into the lungs daily  menthol-zinc oxide (CALMOSEPTINE) 0.44-20.625 % OINT ointment, Apply topically 2 times daily as needed (apply to buttocks)  ipratropium 0.5 mg-albuterol 2.5 mg (DUONEB) 0.5-2.5 (3) MG/3ML SOLN nebulizer solution, Inhale 3 mLs into the lungs every 6 hours as needed for Shortness of Breath  melatonin 3 MG TABS tablet, Take 1 tablet by mouth nightly as needed (insomia)  polyethylene glycol (GLYCOLAX) 17 g packet, Take 1 packet by mouth daily as needed for Constipation  [DISCONTINUED] furosemide (LASIX) 20 MG tablet, Take 1 tablet by mouth daily  [DISCONTINUED] doxycycline hyclate (VIBRA-TABS) 100 MG tablet, Take 1 tablet by mouth 2 times daily  [DISCONTINUED]

## 2025-05-18 ENCOUNTER — APPOINTMENT (OUTPATIENT)
Dept: GENERAL RADIOLOGY | Age: 85
DRG: 871 | End: 2025-05-18
Payer: MEDICARE

## 2025-05-18 ENCOUNTER — APPOINTMENT (OUTPATIENT)
Dept: CT IMAGING | Age: 85
DRG: 871 | End: 2025-05-18
Payer: MEDICARE

## 2025-05-18 ENCOUNTER — HOSPITAL ENCOUNTER (INPATIENT)
Age: 85
LOS: 3 days | DRG: 871 | End: 2025-05-21
Attending: STUDENT IN AN ORGANIZED HEALTH CARE EDUCATION/TRAINING PROGRAM | Admitting: HOSPITALIST
Payer: MEDICARE

## 2025-05-18 DIAGNOSIS — N18.9 ACUTE KIDNEY INJURY SUPERIMPOSED ON CHRONIC KIDNEY DISEASE: ICD-10-CM

## 2025-05-18 DIAGNOSIS — R65.21 SEPTIC SHOCK (HCC): ICD-10-CM

## 2025-05-18 DIAGNOSIS — N17.9 ACUTE KIDNEY INJURY SUPERIMPOSED ON CHRONIC KIDNEY DISEASE: ICD-10-CM

## 2025-05-18 DIAGNOSIS — I50.9 CONGESTIVE HEART FAILURE, UNSPECIFIED HF CHRONICITY, UNSPECIFIED HEART FAILURE TYPE (HCC): ICD-10-CM

## 2025-05-18 DIAGNOSIS — N30.00 ACUTE CYSTITIS WITHOUT HEMATURIA: ICD-10-CM

## 2025-05-18 DIAGNOSIS — A41.9 SEPTIC SHOCK (HCC): ICD-10-CM

## 2025-05-18 DIAGNOSIS — I50.9 ACUTE CONGESTIVE HEART FAILURE, UNSPECIFIED HEART FAILURE TYPE (HCC): ICD-10-CM

## 2025-05-18 DIAGNOSIS — R33.9 URINARY RETENTION: ICD-10-CM

## 2025-05-18 DIAGNOSIS — I21.4 NSTEMI (NON-ST ELEVATED MYOCARDIAL INFARCTION) (HCC): Primary | ICD-10-CM

## 2025-05-18 DIAGNOSIS — I95.9 HYPOTENSION, UNSPECIFIED HYPOTENSION TYPE: ICD-10-CM

## 2025-05-18 DIAGNOSIS — R78.81 BACTEREMIA: ICD-10-CM

## 2025-05-18 PROBLEM — E66.01 CLASS 2 SEVERE OBESITY WITH SERIOUS COMORBIDITY AND BODY MASS INDEX (BMI) OF 36.0 TO 36.9 IN ADULT (HCC): Status: ACTIVE | Noted: 2025-05-18

## 2025-05-18 PROBLEM — E66.812 CLASS 2 SEVERE OBESITY WITH SERIOUS COMORBIDITY AND BODY MASS INDEX (BMI) OF 36.0 TO 36.9 IN ADULT (HCC): Status: ACTIVE | Noted: 2025-05-18

## 2025-05-18 LAB
ALBUMIN SERPL-MCNC: 2.8 G/DL (ref 3.5–5.2)
ALP SERPL-CCNC: 153 U/L (ref 35–104)
ALT SERPL-CCNC: 11 U/L (ref 0–32)
ANION GAP SERPL CALCULATED.3IONS-SCNC: 12 MMOL/L (ref 7–16)
ANION GAP SERPL CALCULATED.3IONS-SCNC: 16 MMOL/L (ref 7–16)
AST SERPL-CCNC: 22 U/L (ref 0–31)
B PARAP IS1001 DNA NPH QL NAA+NON-PROBE: NOT DETECTED
B PERT DNA SPEC QL NAA+PROBE: NOT DETECTED
BACTERIA URNS QL MICRO: ABNORMAL
BASOPHILS # BLD: 0 K/UL (ref 0–0.2)
BASOPHILS NFR BLD: 0 % (ref 0–2)
BILIRUB SERPL-MCNC: 0.4 MG/DL (ref 0–1.2)
BILIRUB UR QL STRIP: NEGATIVE
BNP SERPL-MCNC: ABNORMAL PG/ML (ref 0–450)
BUN SERPL-MCNC: 38 MG/DL (ref 6–23)
BUN SERPL-MCNC: 39 MG/DL (ref 6–23)
C PNEUM DNA NPH QL NAA+NON-PROBE: NOT DETECTED
CALCIUM SERPL-MCNC: 7.4 MG/DL (ref 8.6–10.2)
CALCIUM SERPL-MCNC: 8.2 MG/DL (ref 8.6–10.2)
CHLORIDE SERPL-SCNC: 103 MMOL/L (ref 98–107)
CHLORIDE SERPL-SCNC: 110 MMOL/L (ref 98–107)
CLARITY UR: CLEAR
CO2 SERPL-SCNC: 16 MMOL/L (ref 22–29)
CO2 SERPL-SCNC: 18 MMOL/L (ref 22–29)
COLOR UR: YELLOW
CORTIS SERPL-MCNC: 37.7 UG/DL (ref 2.7–18.4)
CREAT SERPL-MCNC: 3.9 MG/DL (ref 0.5–1)
CREAT SERPL-MCNC: 4.5 MG/DL (ref 0.5–1)
EOSINOPHIL # BLD: 0.1 K/UL (ref 0.05–0.5)
EOSINOPHILS RELATIVE PERCENT: 1 % (ref 0–6)
EPI CELLS #/AREA URNS HPF: ABNORMAL /HPF
ERYTHROCYTE [DISTWIDTH] IN BLOOD BY AUTOMATED COUNT: 15.3 % (ref 11.5–15)
FLUAV RNA NPH QL NAA+NON-PROBE: NOT DETECTED
FLUBV RNA NPH QL NAA+NON-PROBE: NOT DETECTED
GFR, ESTIMATED: 11 ML/MIN/1.73M2
GFR, ESTIMATED: 9 ML/MIN/1.73M2
GLUCOSE SERPL-MCNC: 92 MG/DL (ref 74–99)
GLUCOSE SERPL-MCNC: 99 MG/DL (ref 74–99)
GLUCOSE UR STRIP-MCNC: NEGATIVE MG/DL
HADV DNA NPH QL NAA+NON-PROBE: NOT DETECTED
HCOV 229E RNA NPH QL NAA+NON-PROBE: NOT DETECTED
HCOV HKU1 RNA NPH QL NAA+NON-PROBE: NOT DETECTED
HCOV NL63 RNA NPH QL NAA+NON-PROBE: NOT DETECTED
HCOV OC43 RNA NPH QL NAA+NON-PROBE: NOT DETECTED
HCT VFR BLD AUTO: 30.3 % (ref 34–48)
HGB BLD-MCNC: 9.5 G/DL (ref 11.5–15.5)
HGB UR QL STRIP.AUTO: ABNORMAL
HMPV RNA NPH QL NAA+NON-PROBE: NOT DETECTED
HPIV1 RNA NPH QL NAA+NON-PROBE: NOT DETECTED
HPIV2 RNA NPH QL NAA+NON-PROBE: NOT DETECTED
HPIV3 RNA NPH QL NAA+NON-PROBE: NOT DETECTED
HPIV4 RNA NPH QL NAA+NON-PROBE: NOT DETECTED
KETONES UR STRIP-MCNC: NEGATIVE MG/DL
LACTATE BLDV-SCNC: 1.2 MMOL/L (ref 0.5–1.9)
LACTATE BLDV-SCNC: 1.4 MMOL/L (ref 0.5–1.9)
LACTATE BLDV-SCNC: 2.1 MMOL/L (ref 0.5–2.2)
LEUKOCYTE ESTERASE UR QL STRIP: ABNORMAL
LIPASE SERPL-CCNC: 7 U/L (ref 13–60)
LYMPHOCYTES NFR BLD: 0.58 K/UL (ref 1.5–4)
LYMPHOCYTES RELATIVE PERCENT: 5 % (ref 20–42)
M PNEUMO DNA NPH QL NAA+NON-PROBE: NOT DETECTED
MAGNESIUM SERPL-MCNC: 1.6 MG/DL (ref 1.6–2.6)
MAGNESIUM SERPL-MCNC: 1.7 MG/DL (ref 1.6–2.6)
MCH RBC QN AUTO: 31.6 PG (ref 26–35)
MCHC RBC AUTO-ENTMCNC: 31.4 G/DL (ref 32–34.5)
MCV RBC AUTO: 100.7 FL (ref 80–99.9)
MONOCYTES NFR BLD: 0.48 K/UL (ref 0.1–0.95)
MONOCYTES NFR BLD: 4 % (ref 2–12)
NEUTROPHILS NFR BLD: 90 % (ref 43–80)
NEUTS SEG NFR BLD: 9.94 K/UL (ref 1.8–7.3)
NITRITE UR QL STRIP: NEGATIVE
PARTIAL THROMBOPLASTIN TIME: 34.3 SEC (ref 24.5–35.1)
PH UR STRIP: 5.5 [PH] (ref 5–8)
PHOSPHATE SERPL-MCNC: 4.7 MG/DL (ref 2.5–4.5)
PLATELET # BLD AUTO: 433 K/UL (ref 130–450)
PMV BLD AUTO: 8.9 FL (ref 7–12)
POTASSIUM SERPL-SCNC: 4.3 MMOL/L (ref 3.5–5)
POTASSIUM SERPL-SCNC: 4.5 MMOL/L (ref 3.5–5)
PROCALCITONIN SERPL-MCNC: 2.98 NG/ML (ref 0–0.08)
PROT SERPL-MCNC: 5.9 G/DL (ref 6.4–8.3)
PROT UR STRIP-MCNC: NEGATIVE MG/DL
RBC # BLD AUTO: 3.01 M/UL (ref 3.5–5.5)
RBC # BLD: ABNORMAL 10*6/UL
RBC #/AREA URNS HPF: ABNORMAL /HPF
RSV RNA NPH QL NAA+NON-PROBE: NOT DETECTED
RV+EV RNA NPH QL NAA+NON-PROBE: NOT DETECTED
SARS-COV-2 RDRP RESP QL NAA+PROBE: NOT DETECTED
SARS-COV-2 RNA NPH QL NAA+NON-PROBE: NOT DETECTED
SODIUM SERPL-SCNC: 137 MMOL/L (ref 132–146)
SODIUM SERPL-SCNC: 138 MMOL/L (ref 132–146)
SP GR UR STRIP: 1.02 (ref 1–1.03)
SPECIMEN DESCRIPTION: NORMAL
SPECIMEN DESCRIPTION: NORMAL
TROPONIN I SERPL HS-MCNC: 518 NG/L (ref 0–14)
TROPONIN I SERPL HS-MCNC: 546 NG/L (ref 0–14)
UROBILINOGEN UR STRIP-ACNC: 0.2 EU/DL (ref 0–1)
WBC #/AREA URNS HPF: ABNORMAL /HPF
WBC OTHER # BLD: 11.1 K/UL (ref 4.5–11.5)
YEAST URNS QL MICRO: PRESENT

## 2025-05-18 PROCEDURE — 96361 HYDRATE IV INFUSION ADD-ON: CPT

## 2025-05-18 PROCEDURE — 83880 ASSAY OF NATRIURETIC PEPTIDE: CPT

## 2025-05-18 PROCEDURE — 6360000002 HC RX W HCPCS

## 2025-05-18 PROCEDURE — 3E033XZ INTRODUCTION OF VASOPRESSOR INTO PERIPHERAL VEIN, PERCUTANEOUS APPROACH: ICD-10-PCS | Performed by: HOSPITALIST

## 2025-05-18 PROCEDURE — 87081 CULTURE SCREEN ONLY: CPT

## 2025-05-18 PROCEDURE — 2500000003 HC RX 250 WO HCPCS

## 2025-05-18 PROCEDURE — 85025 COMPLETE CBC W/AUTO DIFF WBC: CPT

## 2025-05-18 PROCEDURE — 93005 ELECTROCARDIOGRAM TRACING: CPT

## 2025-05-18 PROCEDURE — 2500000003 HC RX 250 WO HCPCS: Performed by: HOSPITALIST

## 2025-05-18 PROCEDURE — 96365 THER/PROPH/DIAG IV INF INIT: CPT

## 2025-05-18 PROCEDURE — 94640 AIRWAY INHALATION TREATMENT: CPT

## 2025-05-18 PROCEDURE — 36556 INSERT NON-TUNNEL CV CATH: CPT

## 2025-05-18 PROCEDURE — 6360000002 HC RX W HCPCS: Performed by: NURSE PRACTITIONER

## 2025-05-18 PROCEDURE — 83605 ASSAY OF LACTIC ACID: CPT

## 2025-05-18 PROCEDURE — 99223 1ST HOSP IP/OBS HIGH 75: CPT | Performed by: HOSPITALIST

## 2025-05-18 PROCEDURE — 82533 TOTAL CORTISOL: CPT

## 2025-05-18 PROCEDURE — 80048 BASIC METABOLIC PNL TOTAL CA: CPT

## 2025-05-18 PROCEDURE — 2700000000 HC OXYGEN THERAPY PER DAY

## 2025-05-18 PROCEDURE — 2580000003 HC RX 258: Performed by: EMERGENCY MEDICINE

## 2025-05-18 PROCEDURE — 74176 CT ABD & PELVIS W/O CONTRAST: CPT

## 2025-05-18 PROCEDURE — 86403 PARTICLE AGGLUT ANTBDY SCRN: CPT

## 2025-05-18 PROCEDURE — 06HY33Z INSERTION OF INFUSION DEVICE INTO LOWER VEIN, PERCUTANEOUS APPROACH: ICD-10-PCS

## 2025-05-18 PROCEDURE — 0202U NFCT DS 22 TRGT SARS-COV-2: CPT

## 2025-05-18 PROCEDURE — 87106 FUNGI IDENTIFICATION YEAST: CPT

## 2025-05-18 PROCEDURE — 84484 ASSAY OF TROPONIN QUANT: CPT

## 2025-05-18 PROCEDURE — 96367 TX/PROPH/DG ADDL SEQ IV INF: CPT

## 2025-05-18 PROCEDURE — 2580000003 HC RX 258: Performed by: INTERNAL MEDICINE

## 2025-05-18 PROCEDURE — 84145 PROCALCITONIN (PCT): CPT

## 2025-05-18 PROCEDURE — 99285 EMERGENCY DEPT VISIT HI MDM: CPT

## 2025-05-18 PROCEDURE — 87150 DNA/RNA AMPLIFIED PROBE: CPT

## 2025-05-18 PROCEDURE — 2000000000 HC ICU R&B

## 2025-05-18 PROCEDURE — 2580000003 HC RX 258

## 2025-05-18 PROCEDURE — 6370000000 HC RX 637 (ALT 250 FOR IP)

## 2025-05-18 PROCEDURE — P9045 ALBUMIN (HUMAN), 5%, 250 ML: HCPCS | Performed by: NURSE PRACTITIONER

## 2025-05-18 PROCEDURE — 83735 ASSAY OF MAGNESIUM: CPT

## 2025-05-18 PROCEDURE — 85730 THROMBOPLASTIN TIME PARTIAL: CPT

## 2025-05-18 PROCEDURE — 94664 DEMO&/EVAL PT USE INHALER: CPT

## 2025-05-18 PROCEDURE — 80053 COMPREHEN METABOLIC PANEL: CPT

## 2025-05-18 PROCEDURE — 81001 URINALYSIS AUTO W/SCOPE: CPT

## 2025-05-18 PROCEDURE — 83690 ASSAY OF LIPASE: CPT

## 2025-05-18 PROCEDURE — 71045 X-RAY EXAM CHEST 1 VIEW: CPT

## 2025-05-18 PROCEDURE — 3E043XZ INTRODUCTION OF VASOPRESSOR INTO CENTRAL VEIN, PERCUTANEOUS APPROACH: ICD-10-PCS | Performed by: HOSPITALIST

## 2025-05-18 PROCEDURE — 84100 ASSAY OF PHOSPHORUS: CPT

## 2025-05-18 PROCEDURE — 87040 BLOOD CULTURE FOR BACTERIA: CPT

## 2025-05-18 PROCEDURE — 87635 SARS-COV-2 COVID-19 AMP PRB: CPT

## 2025-05-18 PROCEDURE — 87086 URINE CULTURE/COLONY COUNT: CPT

## 2025-05-18 RX ORDER — 0.9 % SODIUM CHLORIDE 0.9 %
1000 INTRAVENOUS SOLUTION INTRAVENOUS ONCE
Status: COMPLETED | OUTPATIENT
Start: 2025-05-18 | End: 2025-05-18

## 2025-05-18 RX ORDER — ACETAMINOPHEN 325 MG/1
650 TABLET ORAL EVERY 6 HOURS PRN
Status: DISCONTINUED | OUTPATIENT
Start: 2025-05-18 | End: 2025-05-22 | Stop reason: HOSPADM

## 2025-05-18 RX ORDER — ATORVASTATIN CALCIUM 20 MG/1
20 TABLET, FILM COATED ORAL NIGHTLY
Status: DISCONTINUED | OUTPATIENT
Start: 2025-05-18 | End: 2025-05-22 | Stop reason: HOSPADM

## 2025-05-18 RX ORDER — IPRATROPIUM BROMIDE AND ALBUTEROL SULFATE 2.5; .5 MG/3ML; MG/3ML
1 SOLUTION RESPIRATORY (INHALATION) EVERY 6 HOURS PRN
Status: DISCONTINUED | OUTPATIENT
Start: 2025-05-18 | End: 2025-05-22 | Stop reason: HOSPADM

## 2025-05-18 RX ORDER — POLYETHYLENE GLYCOL 3350 17 G/17G
17 POWDER, FOR SOLUTION ORAL DAILY PRN
Status: DISCONTINUED | OUTPATIENT
Start: 2025-05-18 | End: 2025-05-22 | Stop reason: HOSPADM

## 2025-05-18 RX ORDER — ASPIRIN 81 MG/1
81 TABLET ORAL DAILY
Status: DISCONTINUED | OUTPATIENT
Start: 2025-05-19 | End: 2025-05-22 | Stop reason: HOSPADM

## 2025-05-18 RX ORDER — SODIUM CHLORIDE, SODIUM LACTATE, POTASSIUM CHLORIDE, CALCIUM CHLORIDE 600; 310; 30; 20 MG/100ML; MG/100ML; MG/100ML; MG/100ML
INJECTION, SOLUTION INTRAVENOUS CONTINUOUS
Status: DISCONTINUED | OUTPATIENT
Start: 2025-05-18 | End: 2025-05-20

## 2025-05-18 RX ORDER — SODIUM CHLORIDE 9 MG/ML
INJECTION, SOLUTION INTRAVENOUS PRN
Status: DISCONTINUED | OUTPATIENT
Start: 2025-05-18 | End: 2025-05-22 | Stop reason: HOSPADM

## 2025-05-18 RX ORDER — PANTOPRAZOLE SODIUM 40 MG/1
40 TABLET, DELAYED RELEASE ORAL DAILY
Status: DISCONTINUED | OUTPATIENT
Start: 2025-05-18 | End: 2025-05-22 | Stop reason: HOSPADM

## 2025-05-18 RX ORDER — NOREPINEPHRINE BITARTRATE 0.06 MG/ML
1-100 INJECTION, SOLUTION INTRAVENOUS CONTINUOUS
Status: DISCONTINUED | OUTPATIENT
Start: 2025-05-18 | End: 2025-05-18 | Stop reason: SDUPTHER

## 2025-05-18 RX ORDER — ARFORMOTEROL TARTRATE 15 UG/2ML
15 SOLUTION RESPIRATORY (INHALATION)
Status: DISCONTINUED | OUTPATIENT
Start: 2025-05-18 | End: 2025-05-22 | Stop reason: HOSPADM

## 2025-05-18 RX ORDER — NOREPINEPHRINE BITARTRATE 0.06 MG/ML
1-100 INJECTION, SOLUTION INTRAVENOUS CONTINUOUS
Status: DISCONTINUED | OUTPATIENT
Start: 2025-05-18 | End: 2025-05-18

## 2025-05-18 RX ORDER — ONDANSETRON 2 MG/ML
4 INJECTION INTRAMUSCULAR; INTRAVENOUS EVERY 6 HOURS PRN
Status: DISCONTINUED | OUTPATIENT
Start: 2025-05-18 | End: 2025-05-22 | Stop reason: HOSPADM

## 2025-05-18 RX ORDER — SODIUM CHLORIDE 0.9 % (FLUSH) 0.9 %
5-40 SYRINGE (ML) INJECTION EVERY 12 HOURS SCHEDULED
Status: DISCONTINUED | OUTPATIENT
Start: 2025-05-18 | End: 2025-05-22 | Stop reason: HOSPADM

## 2025-05-18 RX ORDER — ONDANSETRON 4 MG/1
4 TABLET, ORALLY DISINTEGRATING ORAL EVERY 8 HOURS PRN
Status: DISCONTINUED | OUTPATIENT
Start: 2025-05-18 | End: 2025-05-22 | Stop reason: HOSPADM

## 2025-05-18 RX ORDER — MIDODRINE HYDROCHLORIDE 5 MG/1
10 TABLET ORAL
Status: DISCONTINUED | OUTPATIENT
Start: 2025-05-18 | End: 2025-05-22 | Stop reason: HOSPADM

## 2025-05-18 RX ORDER — HEPARIN SODIUM 1000 [USP'U]/ML
4000 INJECTION, SOLUTION INTRAVENOUS; SUBCUTANEOUS ONCE
Status: COMPLETED | OUTPATIENT
Start: 2025-05-18 | End: 2025-05-18

## 2025-05-18 RX ORDER — ACETAMINOPHEN 650 MG/1
650 SUPPOSITORY RECTAL EVERY 6 HOURS PRN
Status: DISCONTINUED | OUTPATIENT
Start: 2025-05-18 | End: 2025-05-22 | Stop reason: HOSPADM

## 2025-05-18 RX ORDER — HEPARIN SODIUM 1000 [USP'U]/ML
4000 INJECTION, SOLUTION INTRAVENOUS; SUBCUTANEOUS PRN
Status: DISCONTINUED | OUTPATIENT
Start: 2025-05-18 | End: 2025-05-18

## 2025-05-18 RX ORDER — HEPARIN SODIUM 10000 [USP'U]/100ML
5-30 INJECTION, SOLUTION INTRAVENOUS CONTINUOUS
Status: DISCONTINUED | OUTPATIENT
Start: 2025-05-18 | End: 2025-05-18

## 2025-05-18 RX ORDER — HEPARIN SODIUM 1000 [USP'U]/ML
2000 INJECTION, SOLUTION INTRAVENOUS; SUBCUTANEOUS PRN
Status: DISCONTINUED | OUTPATIENT
Start: 2025-05-18 | End: 2025-05-18

## 2025-05-18 RX ORDER — DONEPEZIL HYDROCHLORIDE 10 MG/1
10 TABLET, FILM COATED ORAL NIGHTLY
Status: DISCONTINUED | OUTPATIENT
Start: 2025-05-18 | End: 2025-05-22 | Stop reason: HOSPADM

## 2025-05-18 RX ORDER — ALBUMIN HUMAN 50 G/1000ML
25 SOLUTION INTRAVENOUS ONCE
Status: COMPLETED | OUTPATIENT
Start: 2025-05-18 | End: 2025-05-18

## 2025-05-18 RX ORDER — MIRTAZAPINE 15 MG/1
15 TABLET, FILM COATED ORAL NIGHTLY
Status: DISCONTINUED | OUTPATIENT
Start: 2025-05-18 | End: 2025-05-22 | Stop reason: HOSPADM

## 2025-05-18 RX ORDER — ASPIRIN 81 MG/1
324 TABLET, CHEWABLE ORAL ONCE
Status: COMPLETED | OUTPATIENT
Start: 2025-05-18 | End: 2025-05-18

## 2025-05-18 RX ORDER — SODIUM CHLORIDE 0.9 % (FLUSH) 0.9 %
5-40 SYRINGE (ML) INJECTION PRN
Status: DISCONTINUED | OUTPATIENT
Start: 2025-05-18 | End: 2025-05-22 | Stop reason: HOSPADM

## 2025-05-18 RX ORDER — DEXTROSE MONOHYDRATE AND SODIUM CHLORIDE 5; .45 G/100ML; G/100ML
INJECTION, SOLUTION INTRAVENOUS CONTINUOUS
Status: DISCONTINUED | OUTPATIENT
Start: 2025-05-18 | End: 2025-05-18

## 2025-05-18 RX ADMIN — HEPARIN SODIUM 4000 UNITS: 1000 INJECTION INTRAVENOUS; SUBCUTANEOUS at 12:59

## 2025-05-18 RX ADMIN — CEFEPIME 2000 MG: 2 INJECTION, POWDER, FOR SOLUTION INTRAVENOUS at 16:32

## 2025-05-18 RX ADMIN — SODIUM CHLORIDE 1000 ML: 0.9 INJECTION, SOLUTION INTRAVENOUS at 11:45

## 2025-05-18 RX ADMIN — HEPARIN SODIUM 12 UNITS/KG/HR: 10000 INJECTION, SOLUTION INTRAVENOUS at 13:02

## 2025-05-18 RX ADMIN — SODIUM CHLORIDE 1000 ML: 0.9 INJECTION, SOLUTION INTRAVENOUS at 15:03

## 2025-05-18 RX ADMIN — SODIUM CHLORIDE, PRESERVATIVE FREE 10 ML: 5 INJECTION INTRAVENOUS at 20:13

## 2025-05-18 RX ADMIN — Medication 10 MCG/MIN: at 15:07

## 2025-05-18 RX ADMIN — SODIUM CHLORIDE 1000 ML: 0.9 INJECTION, SOLUTION INTRAVENOUS at 14:17

## 2025-05-18 RX ADMIN — ALBUMIN (HUMAN) 25 G: 12.5 INJECTION, SOLUTION INTRAVENOUS at 20:12

## 2025-05-18 RX ADMIN — ASPIRIN 324 MG: 81 TABLET, CHEWABLE ORAL at 13:03

## 2025-05-18 RX ADMIN — VANCOMYCIN HYDROCHLORIDE 1750 MG: 5 INJECTION, POWDER, LYOPHILIZED, FOR SOLUTION INTRAVENOUS at 17:22

## 2025-05-18 RX ADMIN — SODIUM CHLORIDE, SODIUM LACTATE, POTASSIUM CHLORIDE, AND CALCIUM CHLORIDE: .6; .31; .03; .02 INJECTION, SOLUTION INTRAVENOUS at 17:26

## 2025-05-18 RX ADMIN — ARFORMOTEROL TARTRATE 15 MCG: 15 SOLUTION RESPIRATORY (INHALATION) at 19:56

## 2025-05-18 RX ADMIN — IPRATROPIUM BROMIDE 0.5 MG: 0.5 SOLUTION RESPIRATORY (INHALATION) at 19:56

## 2025-05-18 NOTE — H&P
Hospital Medicine History & Physical      PCP: Stanley Gar MD    Date of Admission: 5/18/2025    Date of Service: Pt seen/examined on 5/18/2025 and is  admitted to Inpatient with expected LOS greater than two midnights due to medical therapy.      Chief Complaint:  had concerns including Cough (Per NH \"chest is making a purring noise\" and \"her color is off\" states was unable to get a BP reading this morning, cough onset this AM wears 3L NC at baseline and is alert to person only at baseline) and Diarrhea (Onset today, finished Cipro 5/8 for a UTI).    History Of Present Illness:    Ms. Dori Samayoa, a 84 y.o. year old female  with PMH of class 3 obesity,  CAD, HFpEF, CKD3, Prediabetes, Paroxysmal a fib, HTN, HLD, physical deconditioning    Pt presented from SNF  to ED for evaluation of diarrhea and cough.  Pt was confused, partially oriented, but no slurred speech, history is from chart review and ED staff mostly.  Pt's workup in ED concern of ALDO ,and elevated troponin,  BP started to drop in ED , in the process of getting central line and started levophed.  Pt has no specific complains, denies chest pain/pressure, N/V, SOB.      Pt was recently admitted in April 2025 for UNWITNESSED FALL.  Labs showed ALDO with Scr 3.6, BASELINE 1.2 to 1.5.  Discharged with butler on 4/18/2025.       I have personally reviewed and interpreted the Initial workups as summarized below:     WBC normal, H/H stable at 9.5/30 with chronic anemia, platelet stable normal range  Renal function creatinine 4.5, baseline was 1.7 to 2 in April 2025  Hepatic function   albumin decreased to 2.8, normal AST/ALT, normal bilirubin level    CXR  reviewed  IMPRESSION:  1. Cardiomegaly with central congestion.  2. Increased opacifications in the left lung base with at least small to  moderate volume left effusion and adjacent opacities could represent adjacent  atelectasis or airspace disease.    Troponin 546--518  proBNP 28,324, baseline

## 2025-05-18 NOTE — DISCHARGE INSTRUCTIONS
***HEART FAILURE - CONGESTIVE HEART FAILURE***  DISCHARGE INSTRUCTIONS:  GUIDELINES TO FOLLOW AT BETTE/LTAC/SNF/ Assisted Living    No future appointments.       MEDICATIONS:  Please notify the doctor if patient is not able to take their medications or if medications are being held for any reasons (such as low blood pressure ect.)  Do not give the patient ibuprofen (Advil or Motrin), naproxen (Aleve) without talking to the doctor first. This could make their heart failure worse.         WEIGHT MONITORING:   Weigh patient every day in the morning after they void (If patient is able to stand, please get a standing weight.)   Notify the doctor of a weight gain of 3 pounds or more in 1 day   OR  a total of 5 pounds or more in 1 week             DIET   Cardiac heart healthy diet:  Low sodium diet: no  more than 2,000mg (2 grams) of salt / sodium per day (which equals to a little less than  a teaspoon of salt)/ Cardiac Diet: Low saturated / low trans fat, no added salt, caffeine restricted    If patient is there for rehab and will be returning home in the near future; reinforce with the patient and the family to follow a low sodium diet (2,000 mg)- avoid using salt at the table, avoid / limit use of canned soups, processed / packaged foods, salted snacks, olives and pickles.  Do not use a salt substitute without checking with the doctor. (Mrs. Ng is safe to use).       NOTIFY THE DOCTOR THE FIRST DAY OF ONSET OF ANY OF THESE   SYMPTOMS:   Weight gain of 3 pounds or more in 1 day         OR 5 pounds or more in one week  More shortness of breath  More swelling in stomach, legs, ankles or feet  Feeling more tired, No energy  Dry hacky cough  Dizziness  More chest pain / discomfort  Hard time breathing laying down

## 2025-05-18 NOTE — ED PROVIDER NOTES
called back and did give verbal consent central line placement [VG]   1539 Case discussed with Dr. Young, intensivist, who does accept patient for ICU admission. [VG]   1608 PROCEDURE  5/18/25       Time: 1600    CENTRAL LINE INSERTION  Risks, benefits and alternatives (for applicable procedures below) described.   Performed By: Brandon Colin MD and Family/Internal Medicine Resident.    Indication: poor peripheral access and centrally administered medications.  Informed consent: Verbal consent obtained.  The daughter counseled regarding the procedure via phone (confirmed by third party present), it's indications, risks, potential complications and alternatives and any questions were answered. Verbal consent was obtained.  Procedure: After routine sterile preparation, local anesthesia obtained by infiltration using 1% Lidocaine without epinephrine. A left 3-Lumen 7F Central Venous Catheter was placed by femoral vein approach and secured by standard fashion.   Ultrasound Guidance:   used.  Number of Attempts: 1  Post-procedure Findings: A post procedural chest x-ray  was not indicated.  Patient tolerated the procedure well.       [VG]   1632 Son at the bedside and given updated regarding patient's current condition. [VG]      ED Course User Index  [KK] Leanne Nunn MD  [VG] Brandon Colin MD        Medical Decision Making  Amount and/or Complexity of Data Reviewed  Labs: ordered.  Radiology: ordered.  ECG/medicine tests: ordered.    Risk  OTC drugs.  Prescription drug management.  Decision regarding hospitalization.        Patient is a 84-year-old female presenting with cough and diarrhea.  On initial assessment, patient was hypertensive but otherwise stable.  Differential diagnosis includes viral illness, pneumonia, CHF exacerbation, gastritis, gastroenteritis, colitis, acute coronary syndrome to name a few.    Patient medicated with fluids initially in the emergency room.    EKG not suggestive of acute ischemic

## 2025-05-19 ENCOUNTER — APPOINTMENT (OUTPATIENT)
Dept: GENERAL RADIOLOGY | Age: 85
DRG: 871 | End: 2025-05-19
Payer: MEDICARE

## 2025-05-19 ENCOUNTER — APPOINTMENT (OUTPATIENT)
Age: 85
DRG: 871 | End: 2025-05-19
Attending: INTERNAL MEDICINE
Payer: MEDICARE

## 2025-05-19 LAB
ALBUMIN: 2.7 G/DL (ref 3.5–5.2)
ANION GAP SERPL CALCULATED.3IONS-SCNC: 13 MMOL/L (ref 7–16)
BASOPHILS # BLD: 0.03 K/UL (ref 0–0.2)
BASOPHILS NFR BLD: 0 % (ref 0–2)
BUN SERPL-MCNC: 40 MG/DL (ref 6–23)
CALCIUM SERPL-MCNC: 7.6 MG/DL (ref 8.6–10.2)
CHLORIDE SERPL-SCNC: 112 MMOL/L (ref 98–107)
CO2 SERPL-SCNC: 16 MMOL/L (ref 22–29)
CREAT SERPL-MCNC: 3.7 MG/DL (ref 0.5–1)
EOSINOPHIL # BLD: 0.07 K/UL (ref 0.05–0.5)
EOSINOPHILS RELATIVE PERCENT: 1 % (ref 0–6)
ERYTHROCYTE [DISTWIDTH] IN BLOOD BY AUTOMATED COUNT: 15.5 % (ref 11.5–15)
GFR, ESTIMATED: 12 ML/MIN/1.73M2
GLUCOSE SERPL-MCNC: 97 MG/DL (ref 74–99)
HCT VFR BLD AUTO: 24.7 % (ref 34–48)
HGB BLD-MCNC: 7.5 G/DL (ref 11.5–15.5)
IMM GRANULOCYTES # BLD AUTO: 0.12 K/UL (ref 0–0.58)
IMM GRANULOCYTES NFR BLD: 1 % (ref 0–5)
LACTATE BLDV-SCNC: 1.7 MMOL/L (ref 0.5–2.2)
LYMPHOCYTES NFR BLD: 0.98 K/UL (ref 1.5–4)
LYMPHOCYTES RELATIVE PERCENT: 6 % (ref 20–42)
MAGNESIUM SERPL-MCNC: 1.6 MG/DL (ref 1.6–2.6)
MCH RBC QN AUTO: 31.6 PG (ref 26–35)
MCHC RBC AUTO-ENTMCNC: 30.4 G/DL (ref 32–34.5)
MCV RBC AUTO: 104.2 FL (ref 80–99.9)
MONOCYTES NFR BLD: 0.98 K/UL (ref 0.1–0.95)
MONOCYTES NFR BLD: 6 % (ref 2–12)
NEUTROPHILS NFR BLD: 86 % (ref 43–80)
NEUTS SEG NFR BLD: 13.17 K/UL (ref 1.8–7.3)
PHOSPHATE SERPL-MCNC: 4.9 MG/DL (ref 2.5–4.5)
PLATELET # BLD AUTO: 401 K/UL (ref 130–450)
PMV BLD AUTO: 8.9 FL (ref 7–12)
POTASSIUM SERPL-SCNC: 4.1 MMOL/L (ref 3.5–5)
RBC # BLD AUTO: 2.37 M/UL (ref 3.5–5.5)
RBC # BLD: ABNORMAL 10*6/UL
SODIUM SERPL-SCNC: 141 MMOL/L (ref 132–146)
VANCOMYCIN SERPL-MCNC: 20.4 UG/ML (ref 5–40)
WBC OTHER # BLD: 15.4 K/UL (ref 4.5–11.5)

## 2025-05-19 PROCEDURE — 2580000003 HC RX 258

## 2025-05-19 PROCEDURE — 85025 COMPLETE CBC W/AUTO DIFF WBC: CPT

## 2025-05-19 PROCEDURE — 6370000000 HC RX 637 (ALT 250 FOR IP): Performed by: HOSPITALIST

## 2025-05-19 PROCEDURE — 6360000002 HC RX W HCPCS: Performed by: HOSPITALIST

## 2025-05-19 PROCEDURE — 36592 COLLECT BLOOD FROM PICC: CPT

## 2025-05-19 PROCEDURE — 94640 AIRWAY INHALATION TREATMENT: CPT

## 2025-05-19 PROCEDURE — 92610 EVALUATE SWALLOWING FUNCTION: CPT

## 2025-05-19 PROCEDURE — 2500000003 HC RX 250 WO HCPCS: Performed by: HOSPITALIST

## 2025-05-19 PROCEDURE — 6360000002 HC RX W HCPCS

## 2025-05-19 PROCEDURE — 6360000002 HC RX W HCPCS: Performed by: NURSE PRACTITIONER

## 2025-05-19 PROCEDURE — 2000000000 HC ICU R&B

## 2025-05-19 PROCEDURE — 99232 SBSQ HOSP IP/OBS MODERATE 35: CPT | Performed by: INTERNAL MEDICINE

## 2025-05-19 PROCEDURE — 6360000002 HC RX W HCPCS: Performed by: INTERNAL MEDICINE

## 2025-05-19 PROCEDURE — 83605 ASSAY OF LACTIC ACID: CPT

## 2025-05-19 PROCEDURE — 2580000003 HC RX 258: Performed by: HOSPITALIST

## 2025-05-19 PROCEDURE — 83735 ASSAY OF MAGNESIUM: CPT

## 2025-05-19 PROCEDURE — 74230 X-RAY XM SWLNG FUNCJ C+: CPT

## 2025-05-19 PROCEDURE — 80069 RENAL FUNCTION PANEL: CPT

## 2025-05-19 PROCEDURE — 2580000003 HC RX 258: Performed by: INTERNAL MEDICINE

## 2025-05-19 PROCEDURE — 80202 ASSAY OF VANCOMYCIN: CPT

## 2025-05-19 PROCEDURE — 2700000000 HC OXYGEN THERAPY PER DAY

## 2025-05-19 PROCEDURE — 2500000003 HC RX 250 WO HCPCS: Performed by: RADIOLOGY

## 2025-05-19 PROCEDURE — 92611 MOTION FLUOROSCOPY/SWALLOW: CPT

## 2025-05-19 PROCEDURE — 2580000003 HC RX 258: Performed by: NURSE PRACTITIONER

## 2025-05-19 PROCEDURE — 6370000000 HC RX 637 (ALT 250 FOR IP): Performed by: NURSE PRACTITIONER

## 2025-05-19 PROCEDURE — 99222 1ST HOSP IP/OBS MODERATE 55: CPT | Performed by: NURSE PRACTITIONER

## 2025-05-19 RX ORDER — MAGNESIUM SULFATE IN WATER 40 MG/ML
2000 INJECTION, SOLUTION INTRAVENOUS ONCE
Status: COMPLETED | OUTPATIENT
Start: 2025-05-19 | End: 2025-05-19

## 2025-05-19 RX ORDER — CALCIUM GLUCONATE 20 MG/ML
2000 INJECTION, SOLUTION INTRAVENOUS ONCE
Status: COMPLETED | OUTPATIENT
Start: 2025-05-19 | End: 2025-05-19

## 2025-05-19 RX ORDER — BUDESONIDE 0.5 MG/2ML
0.5 INHALANT ORAL
Status: DISCONTINUED | OUTPATIENT
Start: 2025-05-19 | End: 2025-05-22 | Stop reason: HOSPADM

## 2025-05-19 RX ORDER — SODIUM CHLORIDE, SODIUM LACTATE, POTASSIUM CHLORIDE, AND CALCIUM CHLORIDE .6; .31; .03; .02 G/100ML; G/100ML; G/100ML; G/100ML
500 INJECTION, SOLUTION INTRAVENOUS ONCE
Status: COMPLETED | OUTPATIENT
Start: 2025-05-19 | End: 2025-05-19

## 2025-05-19 RX ORDER — METOPROLOL TARTRATE 1 MG/ML
5 INJECTION, SOLUTION INTRAVENOUS ONCE
Status: DISCONTINUED | OUTPATIENT
Start: 2025-05-19 | End: 2025-05-21

## 2025-05-19 RX ADMIN — BARIUM SULFATE 10 ML: 400 PASTE ORAL at 14:48

## 2025-05-19 RX ADMIN — Medication 3 MG: at 23:58

## 2025-05-19 RX ADMIN — VANCOMYCIN HYDROCHLORIDE 1000 MG: 1 INJECTION, POWDER, LYOPHILIZED, FOR SOLUTION INTRAVENOUS at 20:41

## 2025-05-19 RX ADMIN — SODIUM CHLORIDE, SODIUM LACTATE, POTASSIUM CHLORIDE, AND CALCIUM CHLORIDE: .6; .31; .03; .02 INJECTION, SOLUTION INTRAVENOUS at 03:18

## 2025-05-19 RX ADMIN — AMIODARONE HYDROCHLORIDE 0.5 MG/MIN: 50 INJECTION, SOLUTION INTRAVENOUS at 23:04

## 2025-05-19 RX ADMIN — CEFEPIME 1000 MG: 1 INJECTION, POWDER, FOR SOLUTION INTRAMUSCULAR; INTRAVENOUS at 16:33

## 2025-05-19 RX ADMIN — MIRTAZAPINE 15 MG: 15 TABLET, FILM COATED ORAL at 20:34

## 2025-05-19 RX ADMIN — APIXABAN 2.5 MG: 2.5 TABLET, FILM COATED ORAL at 20:34

## 2025-05-19 RX ADMIN — IPRATROPIUM BROMIDE 0.5 MG: 0.5 SOLUTION RESPIRATORY (INHALATION) at 11:54

## 2025-05-19 RX ADMIN — SODIUM CHLORIDE, PRESERVATIVE FREE 10 ML: 5 INJECTION INTRAVENOUS at 19:50

## 2025-05-19 RX ADMIN — BUDESONIDE 500 MCG: 0.5 SUSPENSION RESPIRATORY (INHALATION) at 11:54

## 2025-05-19 RX ADMIN — PHENYLEPHRINE HYDROCHLORIDE 15 MCG/MIN: 10 INJECTION INTRAVENOUS at 10:58

## 2025-05-19 RX ADMIN — SODIUM CHLORIDE, SODIUM LACTATE, POTASSIUM CHLORIDE, AND CALCIUM CHLORIDE: .6; .31; .03; .02 INJECTION, SOLUTION INTRAVENOUS at 17:19

## 2025-05-19 RX ADMIN — ARFORMOTEROL TARTRATE 15 MCG: 15 SOLUTION RESPIRATORY (INHALATION) at 19:16

## 2025-05-19 RX ADMIN — ARFORMOTEROL TARTRATE 15 MCG: 15 SOLUTION RESPIRATORY (INHALATION) at 07:56

## 2025-05-19 RX ADMIN — DONEPEZIL HYDROCHLORIDE 10 MG: 10 TABLET, FILM COATED ORAL at 20:34

## 2025-05-19 RX ADMIN — AMIODARONE HYDROCHLORIDE 1 MG/MIN: 50 INJECTION, SOLUTION INTRAVENOUS at 15:01

## 2025-05-19 RX ADMIN — MAGNESIUM SULFATE HEPTAHYDRATE 2000 MG: 40 INJECTION, SOLUTION INTRAVENOUS at 09:10

## 2025-05-19 RX ADMIN — IPRATROPIUM BROMIDE 0.5 MG: 0.5 SOLUTION RESPIRATORY (INHALATION) at 07:56

## 2025-05-19 RX ADMIN — ONDANSETRON 4 MG: 2 INJECTION, SOLUTION INTRAMUSCULAR; INTRAVENOUS at 08:27

## 2025-05-19 RX ADMIN — ACETAMINOPHEN 650 MG: 325 TABLET ORAL at 23:58

## 2025-05-19 RX ADMIN — AMIODARONE HYDROCHLORIDE 150 MG: 50 INJECTION, SOLUTION INTRAVENOUS at 13:30

## 2025-05-19 RX ADMIN — BARIUM SULFATE 120 ML: 400 SUSPENSION ORAL at 14:49

## 2025-05-19 RX ADMIN — SODIUM CHLORIDE, SODIUM LACTATE, POTASSIUM CHLORIDE, AND CALCIUM CHLORIDE 500 ML: .6; .31; .03; .02 INJECTION, SOLUTION INTRAVENOUS at 10:29

## 2025-05-19 RX ADMIN — IPRATROPIUM BROMIDE 0.5 MG: 0.5 SOLUTION RESPIRATORY (INHALATION) at 19:16

## 2025-05-19 RX ADMIN — BARIUM SULFATE 70 G: 0.81 POWDER, FOR SUSPENSION ORAL at 14:49

## 2025-05-19 RX ADMIN — IPRATROPIUM BROMIDE 0.5 MG: 0.5 SOLUTION RESPIRATORY (INHALATION) at 01:14

## 2025-05-19 RX ADMIN — CALCIUM GLUCONATE 2000 MG: 20 INJECTION, SOLUTION INTRAVENOUS at 09:12

## 2025-05-19 RX ADMIN — BUDESONIDE 500 MCG: 0.5 SUSPENSION RESPIRATORY (INHALATION) at 19:16

## 2025-05-19 RX ADMIN — MAGNESIUM SULFATE HEPTAHYDRATE 2000 MG: 40 INJECTION, SOLUTION INTRAVENOUS at 13:15

## 2025-05-19 RX ADMIN — ATORVASTATIN CALCIUM 20 MG: 20 TABLET, FILM COATED ORAL at 20:34

## 2025-05-19 NOTE — ACP (ADVANCE CARE PLANNING)
Advance Care Planning   Healthcare Decision Maker:    Primary Decision Maker: Blaise Samayoa - [phone]    Primary Decision Maker: Jayleen Varner - [place] - [phone]

## 2025-05-19 NOTE — PLAN OF CARE
Problem: Safety - Adult  Goal: Free from fall injury  5/19/2025 1529 by Madyson Garsia RN  Outcome: Progressing  Flowsheets (Taken 5/19/2025 1200)  Free From Fall Injury: Instruct family/caregiver on patient safety  5/19/2025 0425 by Daksha Boswell RN  Outcome: Progressing     Problem: Chronic Conditions and Co-morbidities  Goal: Patient's chronic conditions and co-morbidity symptoms are monitored and maintained or improved  5/19/2025 1529 by Madyson Garsia RN  Outcome: Progressing  5/19/2025 0425 by Daksha Boswell RN  Outcome: Progressing     Problem: Discharge Planning  Goal: Discharge to home or other facility with appropriate resources  5/19/2025 1529 by Madyson Garsia RN  Outcome: Progressing  Flowsheets (Taken 5/19/2025 0800)  Discharge to home or other facility with appropriate resources: Identify barriers to discharge with patient and caregiver  5/19/2025 0425 by Daksha Boswell RN  Outcome: Progressing     Problem: Skin/Tissue Integrity  Goal: Skin integrity remains intact  Description: 1.  Monitor for areas of redness and/or skin breakdown2.  Assess vascular access sites hourly3.  Every 4-6 hours minimum:  Change oxygen saturation probe site4.  Every 4-6 hours:  If on nasal continuous positive airway pressure, respiratory therapy assess nares and determine need for appliance change or resting period  5/19/2025 1529 by Madyson Garsia RN  Outcome: Progressing  Flowsheets  Taken 5/19/2025 1200  Skin Integrity Remains Intact: Monitor for areas of redness and/or skin breakdown  Taken 5/19/2025 0800  Skin Integrity Remains Intact: Monitor for areas of redness and/or skin breakdown  5/19/2025 0425 by Daksha Boswell RN  Outcome: Progressing     Problem: ABCDS Injury Assessment  Goal: Absence of physical injury  5/19/2025 1529 by Madyson Garsia RN  Outcome: Progressing  Flowsheets (Taken 5/19/2025 1200)  Absence of Physical Injury: Implement safety measures based on patient

## 2025-05-19 NOTE — CARE COORDINATION
CARE MANAGEMENT.... Patient in icu, but off floor for testing at this time. Called and spoke with son, Blaise [phone] to discuss patients hospital stay and dc plans. Blaise confirmed that his mother has dementia (especially trouble with short term memory). She has history at [facility] and most recently [facility] where she was admitted from. Per Janette, if she is too return, she will require new precert because her skilled nursing stay had just . Blaise stated that he and his sister, Jayleen, were looking into [facility] vs [facility]. He is hopeful that his mother will qualify for SNF at [place] and [facility] before transferring to [facility] or [facility]. Referral called to Ban with the facility. No answer. Left vm. Await input. Janette with [facility] aware of plans for [facility]. PT/OT on consult - evals pending. Currently on strict bedrest. PCP is Dr Gar.  For now, she is on aerosols, ivf, iv amiodarone gtt, iv el, and iv maxipime. Also, eliquis pta. Speech/Palliative/Renal/Cardio/Pulm on board. 95% on 4lnc. Will cont to follow along and assist with needs accordingly.

## 2025-05-19 NOTE — PATIENT CARE CONFERENCE
Intensive Care Daily Quality Rounding Checklist      ICU Team Members: Dr Blanton, bedside nurse, charge nurse, clinical pharmacist    ICU Day #: NUMBER: 2    SOFA Score: 6    Intubation Date: n/a     Ventilator Day #: n/a    Central Line Insertion Date: May 18        Day #: NUMBER: 2        Indication: CVCIndication: Administration of vasopressors or vesicant medications     Arterial Line Insertion Date: n/a       Day #:     Temporary Hemodialysis Catheter Insertion Date: n/a       Day #     DVT Prophylaxis: eliquis HOLD failed BSS/ scd's    GI Prophylaxis: protonix     Schuler Catheter Insertion Date: May 18       Day #: 2      Indications: Need for fluid management of the critically ill patient in a critical care setting      Continued need (if yes, reason documented and discussed with physician): yes, vasopressors, ALDO     Skin Issues/ Wounds and ordered treatment discussed on rounds: yes    Goals/ Plans for the Day: wean pressors as tolerated, HR control, SLP eval and treat    Reviewed plan and goals for day with patient and/or representative: Yes    **SHARE this note so that the rounding nurse may edit**

## 2025-05-20 ENCOUNTER — APPOINTMENT (OUTPATIENT)
Age: 85
DRG: 871 | End: 2025-05-20
Attending: INTERNAL MEDICINE
Payer: MEDICARE

## 2025-05-20 ENCOUNTER — APPOINTMENT (OUTPATIENT)
Dept: GENERAL RADIOLOGY | Age: 85
DRG: 871 | End: 2025-05-20
Payer: MEDICARE

## 2025-05-20 PROBLEM — Z51.5 PALLIATIVE CARE ENCOUNTER: Status: ACTIVE | Noted: 2025-05-20

## 2025-05-20 PROBLEM — Z71.89 GOALS OF CARE, COUNSELING/DISCUSSION: Status: ACTIVE | Noted: 2025-05-20

## 2025-05-20 LAB
ANION GAP SERPL CALCULATED.3IONS-SCNC: 12 MMOL/L (ref 7–16)
BASOPHILS # BLD: 0.04 K/UL (ref 0–0.2)
BASOPHILS NFR BLD: 0 % (ref 0–2)
BUN SERPL-MCNC: 36 MG/DL (ref 6–23)
CALCIUM SERPL-MCNC: 7.9 MG/DL (ref 8.6–10.2)
CHLORIDE SERPL-SCNC: 111 MMOL/L (ref 98–107)
CO2 SERPL-SCNC: 16 MMOL/L (ref 22–29)
CREAT SERPL-MCNC: 3.5 MG/DL (ref 0.5–1)
CRP SERPL HS-MCNC: 207 MG/L (ref 0–5)
ECHO AO ASC DIAM: 3 CM
ECHO AO ASCENDING AORTA INDEX: 1.79 CM/M2
ECHO AR MAX VEL PISA: 3.8 M/S
ECHO AV AREA PEAK VELOCITY: 3.5 CM2
ECHO AV AREA VTI: 3.6 CM2
ECHO AV AREA/BSA PEAK VELOCITY: 2.1 CM2/M2
ECHO AV AREA/BSA VTI: 2.1 CM2/M2
ECHO AV CUSP MM: 1.5 CM
ECHO AV MEAN GRADIENT: 3 MMHG
ECHO AV MEAN VELOCITY: 0.9 M/S
ECHO AV PEAK GRADIENT: 6 MMHG
ECHO AV PEAK VELOCITY: 1.2 M/S
ECHO AV REGURGITANT PHT: 492.3 MS
ECHO AV VELOCITY RATIO: 1
ECHO AV VTI: 24.5 CM
ECHO BSA: 1.74 M2
ECHO EST RA PRESSURE: 15 MMHG
ECHO LA DIAMETER INDEX: 2.26 CM/M2
ECHO LA DIAMETER: 3.8 CM
ECHO LA VOL A-L A2C: 66 ML (ref 22–52)
ECHO LA VOL A-L A4C: 67 ML (ref 22–52)
ECHO LA VOL MOD A2C: 63 ML (ref 22–52)
ECHO LA VOL MOD A4C: 61 ML (ref 22–52)
ECHO LA VOLUME AREA LENGTH: 71 ML
ECHO LA VOLUME INDEX A-L A2C: 39 ML/M2 (ref 16–34)
ECHO LA VOLUME INDEX A-L A4C: 40 ML/M2 (ref 16–34)
ECHO LA VOLUME INDEX AREA LENGTH: 42 ML/M2 (ref 16–34)
ECHO LA VOLUME INDEX MOD A2C: 38 ML/M2 (ref 16–34)
ECHO LA VOLUME INDEX MOD A4C: 36 ML/M2 (ref 16–34)
ECHO LV EDV A2C: 37 ML
ECHO LV EDV A4C: 42 ML
ECHO LV EDV BP: 40 ML (ref 56–104)
ECHO LV EDV INDEX A4C: 25 ML/M2
ECHO LV EDV INDEX BP: 24 ML/M2
ECHO LV EDV NDEX A2C: 22 ML/M2
ECHO LV EF PHYSICIAN: 73 %
ECHO LV EJECTION FRACTION A2C: 65 %
ECHO LV EJECTION FRACTION A4C: 63 %
ECHO LV EJECTION FRACTION BIPLANE: 64 % (ref 55–100)
ECHO LV ESV A2C: 13 ML
ECHO LV ESV A4C: 16 ML
ECHO LV ESV BP: 14 ML (ref 19–49)
ECHO LV ESV INDEX A2C: 8 ML/M2
ECHO LV ESV INDEX A4C: 10 ML/M2
ECHO LV ESV INDEX BP: 8 ML/M2
ECHO LV FRACTIONAL SHORTENING: 44 % (ref 28–44)
ECHO LV INTERNAL DIMENSION DIASTOLE INDEX: 1.9 CM/M2
ECHO LV INTERNAL DIMENSION DIASTOLIC: 3.2 CM (ref 3.9–5.3)
ECHO LV INTERNAL DIMENSION SYSTOLIC INDEX: 1.07 CM/M2
ECHO LV INTERNAL DIMENSION SYSTOLIC: 1.8 CM
ECHO LV IVSD: 1 CM (ref 0.6–0.9)
ECHO LV IVSS: 1.4 CM
ECHO LV MASS 2D: 119.4 G (ref 67–162)
ECHO LV MASS INDEX 2D: 71.1 G/M2 (ref 43–95)
ECHO LV POSTERIOR WALL DIASTOLIC: 1.4 CM (ref 0.6–0.9)
ECHO LV POSTERIOR WALL SYSTOLIC: 1.6 CM
ECHO LV RELATIVE WALL THICKNESS RATIO: 0.88
ECHO LVOT AREA: 3.5 CM2
ECHO LVOT AV VTI INDEX: 1.04
ECHO LVOT DIAM: 2.1 CM
ECHO LVOT MEAN GRADIENT: 4 MMHG
ECHO LVOT PEAK GRADIENT: 6 MMHG
ECHO LVOT PEAK VELOCITY: 1.2 M/S
ECHO LVOT STROKE VOLUME INDEX: 52.5 ML/M2
ECHO LVOT SV: 88.3 ML
ECHO LVOT VTI: 25.5 CM
ECHO MV "A" WAVE DURATION: 131.5 MSEC
ECHO MV AREA PHT: 4.1 CM2
ECHO MV AREA VTI: 2.9 CM2
ECHO MV LVOT VTI INDEX: 1.19
ECHO MV MAX VELOCITY: 1.4 M/S
ECHO MV MEAN GRADIENT: 2 MMHG
ECHO MV MEAN VELOCITY: 0.6 M/S
ECHO MV PEAK GRADIENT: 8 MMHG
ECHO MV PRESSURE HALF TIME (PHT): 53.1 MS
ECHO MV VTI: 30.3 CM
ECHO PULMONARY ARTERY END DIASTOLIC PRESSURE: 3 MMHG
ECHO PV MAX VELOCITY: 0.9 M/S
ECHO PV MEAN GRADIENT: 2 MMHG
ECHO PV MEAN VELOCITY: 0.7 M/S
ECHO PV PEAK GRADIENT: 3 MMHG
ECHO PV REGURGITANT MAX VELOCITY: 0.8 M/S
ECHO PV VTI: 16.4 CM
ECHO RIGHT VENTRICULAR SYSTOLIC PRESSURE (RVSP): 62 MMHG
ECHO RV INTERNAL DIMENSION: 2.2 CM
ECHO RV TAPSE: 1.8 CM (ref 1.7–?)
ECHO TV REGURGITANT MAX VELOCITY: 3.41 M/S
ECHO TV REGURGITANT PEAK GRADIENT: 47 MMHG
EKG ATRIAL RATE: 77 BPM
EKG ATRIAL RATE: 86 BPM
EKG P AXIS: 67 DEGREES
EKG P AXIS: 67 DEGREES
EKG P-R INTERVAL: 176 MS
EKG P-R INTERVAL: 182 MS
EKG Q-T INTERVAL: 354 MS
EKG Q-T INTERVAL: 366 MS
EKG QRS DURATION: 56 MS
EKG QRS DURATION: 64 MS
EKG QTC CALCULATION (BAZETT): 414 MS
EKG QTC CALCULATION (BAZETT): 423 MS
EKG R AXIS: 0 DEGREES
EKG R AXIS: 25 DEGREES
EKG T AXIS: 76 DEGREES
EKG T AXIS: 90 DEGREES
EKG VENTRICULAR RATE: 77 BPM
EKG VENTRICULAR RATE: 86 BPM
EOSINOPHIL # BLD: 0.26 K/UL (ref 0.05–0.5)
EOSINOPHILS RELATIVE PERCENT: 2 % (ref 0–6)
ERYTHROCYTE [DISTWIDTH] IN BLOOD BY AUTOMATED COUNT: 15.8 % (ref 11.5–15)
ERYTHROCYTE [SEDIMENTATION RATE] IN BLOOD BY WESTERGREN METHOD: 21 MM/HR (ref 0–20)
FERRITIN SERPL-MCNC: 158 NG/ML
FOLATE SERPL-MCNC: 8.7 NG/ML (ref 4.6–34.8)
GFR, ESTIMATED: 12 ML/MIN/1.73M2
GLUCOSE SERPL-MCNC: 113 MG/DL (ref 74–99)
HCT VFR BLD AUTO: 23.9 % (ref 34–48)
HGB BLD-MCNC: 7.5 G/DL (ref 11.5–15.5)
IMM GRANULOCYTES # BLD AUTO: 0.66 K/UL (ref 0–0.58)
IMM GRANULOCYTES NFR BLD: 4 % (ref 0–5)
IRON SATN MFR SERPL: 13 % (ref 15–50)
IRON SERPL-MCNC: 17 UG/DL (ref 37–145)
LACTATE BLDV-SCNC: 1.1 MMOL/L (ref 0.5–2.2)
LYMPHOCYTES NFR BLD: 0.95 K/UL (ref 1.5–4)
LYMPHOCYTES RELATIVE PERCENT: 6 % (ref 20–42)
MAGNESIUM SERPL-MCNC: 2.7 MG/DL (ref 1.6–2.6)
MCH RBC QN AUTO: 31.6 PG (ref 26–35)
MCHC RBC AUTO-ENTMCNC: 31.4 G/DL (ref 32–34.5)
MCV RBC AUTO: 100.8 FL (ref 80–99.9)
MICROORGANISM SPEC CULT: ABNORMAL
MICROORGANISM SPEC CULT: ABNORMAL
MONOCYTES NFR BLD: 1.32 K/UL (ref 0.1–0.95)
MONOCYTES NFR BLD: 8 % (ref 2–12)
NEUTROPHILS NFR BLD: 80 % (ref 43–80)
NEUTS SEG NFR BLD: 12.66 K/UL (ref 1.8–7.3)
PHOSPHATE SERPL-MCNC: 4.3 MG/DL (ref 2.5–4.5)
PLATELET # BLD AUTO: 393 K/UL (ref 130–450)
PMV BLD AUTO: 8.7 FL (ref 7–12)
POTASSIUM SERPL-SCNC: 3.9 MMOL/L (ref 3.5–5)
RBC # BLD AUTO: 2.37 M/UL (ref 3.5–5.5)
RBC # BLD: ABNORMAL 10*6/UL
SERVICE CMNT-IMP: ABNORMAL
SERVICE CMNT-IMP: ABNORMAL
SODIUM SERPL-SCNC: 139 MMOL/L (ref 132–146)
SPECIMEN DESCRIPTION: ABNORMAL
SPECIMEN DESCRIPTION: ABNORMAL
TIBC SERPL-MCNC: 134 UG/DL (ref 250–450)
TRANSFERRIN SERPL-MCNC: 110 MG/DL (ref 200–360)
VANCOMYCIN SERPL-MCNC: 24.5 UG/ML (ref 5–40)
VIT B12 SERPL-MCNC: 1892 PG/ML (ref 232–1245)
WBC OTHER # BLD: 15.9 K/UL (ref 4.5–11.5)

## 2025-05-20 PROCEDURE — 99232 SBSQ HOSP IP/OBS MODERATE 35: CPT | Performed by: INTERNAL MEDICINE

## 2025-05-20 PROCEDURE — 6360000002 HC RX W HCPCS: Performed by: NURSE PRACTITIONER

## 2025-05-20 PROCEDURE — 2580000003 HC RX 258

## 2025-05-20 PROCEDURE — 92526 ORAL FUNCTION THERAPY: CPT

## 2025-05-20 PROCEDURE — 6360000002 HC RX W HCPCS: Performed by: INTERNAL MEDICINE

## 2025-05-20 PROCEDURE — 99231 SBSQ HOSP IP/OBS SF/LOW 25: CPT | Performed by: NURSE PRACTITIONER

## 2025-05-20 PROCEDURE — 82728 ASSAY OF FERRITIN: CPT

## 2025-05-20 PROCEDURE — 83605 ASSAY OF LACTIC ACID: CPT

## 2025-05-20 PROCEDURE — 80202 ASSAY OF VANCOMYCIN: CPT

## 2025-05-20 PROCEDURE — 2500000003 HC RX 250 WO HCPCS: Performed by: HOSPITALIST

## 2025-05-20 PROCEDURE — 6360000002 HC RX W HCPCS: Performed by: HOSPITALIST

## 2025-05-20 PROCEDURE — 93010 ELECTROCARDIOGRAM REPORT: CPT | Performed by: INTERNAL MEDICINE

## 2025-05-20 PROCEDURE — 2580000003 HC RX 258: Performed by: INTERNAL MEDICINE

## 2025-05-20 PROCEDURE — 2500000003 HC RX 250 WO HCPCS: Performed by: INTERNAL MEDICINE

## 2025-05-20 PROCEDURE — 2000000000 HC ICU R&B

## 2025-05-20 PROCEDURE — 82746 ASSAY OF FOLIC ACID SERUM: CPT

## 2025-05-20 PROCEDURE — 71045 X-RAY EXAM CHEST 1 VIEW: CPT

## 2025-05-20 PROCEDURE — 83540 ASSAY OF IRON: CPT

## 2025-05-20 PROCEDURE — 82607 VITAMIN B-12: CPT

## 2025-05-20 PROCEDURE — 94640 AIRWAY INHALATION TREATMENT: CPT

## 2025-05-20 PROCEDURE — 84466 ASSAY OF TRANSFERRIN: CPT

## 2025-05-20 PROCEDURE — 85652 RBC SED RATE AUTOMATED: CPT

## 2025-05-20 PROCEDURE — 6370000000 HC RX 637 (ALT 250 FOR IP): Performed by: HOSPITALIST

## 2025-05-20 PROCEDURE — 99497 ADVNCD CARE PLAN 30 MIN: CPT | Performed by: NURSE PRACTITIONER

## 2025-05-20 PROCEDURE — 2700000000 HC OXYGEN THERAPY PER DAY

## 2025-05-20 PROCEDURE — 36415 COLL VENOUS BLD VENIPUNCTURE: CPT

## 2025-05-20 PROCEDURE — 83735 ASSAY OF MAGNESIUM: CPT

## 2025-05-20 PROCEDURE — 87040 BLOOD CULTURE FOR BACTERIA: CPT

## 2025-05-20 PROCEDURE — 86140 C-REACTIVE PROTEIN: CPT

## 2025-05-20 PROCEDURE — 84100 ASSAY OF PHOSPHORUS: CPT

## 2025-05-20 PROCEDURE — 80048 BASIC METABOLIC PNL TOTAL CA: CPT

## 2025-05-20 PROCEDURE — 93306 TTE W/DOPPLER COMPLETE: CPT

## 2025-05-20 PROCEDURE — 6370000000 HC RX 637 (ALT 250 FOR IP): Performed by: NURSE PRACTITIONER

## 2025-05-20 PROCEDURE — 85025 COMPLETE CBC W/AUTO DIFF WBC: CPT

## 2025-05-20 PROCEDURE — 6360000002 HC RX W HCPCS

## 2025-05-20 RX ORDER — METOPROLOL TARTRATE 1 MG/ML
5 INJECTION, SOLUTION INTRAVENOUS EVERY 6 HOURS PRN
Status: DISCONTINUED | OUTPATIENT
Start: 2025-05-20 | End: 2025-05-22 | Stop reason: HOSPADM

## 2025-05-20 RX ORDER — FENTANYL CITRATE 50 UG/ML
25 INJECTION, SOLUTION INTRAMUSCULAR; INTRAVENOUS ONCE
Status: COMPLETED | OUTPATIENT
Start: 2025-05-21 | End: 2025-05-20

## 2025-05-20 RX ADMIN — APIXABAN 2.5 MG: 2.5 TABLET, FILM COATED ORAL at 09:06

## 2025-05-20 RX ADMIN — MIDODRINE HYDROCHLORIDE 10 MG: 5 TABLET ORAL at 12:36

## 2025-05-20 RX ADMIN — ONDANSETRON 4 MG: 2 INJECTION, SOLUTION INTRAMUSCULAR; INTRAVENOUS at 23:06

## 2025-05-20 RX ADMIN — SODIUM CHLORIDE, PRESERVATIVE FREE 10 ML: 5 INJECTION INTRAVENOUS at 20:56

## 2025-05-20 RX ADMIN — IPRATROPIUM BROMIDE 0.5 MG: 0.5 SOLUTION RESPIRATORY (INHALATION) at 12:50

## 2025-05-20 RX ADMIN — APIXABAN 2.5 MG: 2.5 TABLET, FILM COATED ORAL at 20:56

## 2025-05-20 RX ADMIN — SODIUM BICARBONATE: 84 INJECTION, SOLUTION INTRAVENOUS at 23:49

## 2025-05-20 RX ADMIN — FENTANYL CITRATE 25 MCG: 50 INJECTION INTRAMUSCULAR; INTRAVENOUS at 23:44

## 2025-05-20 RX ADMIN — BUDESONIDE 500 MCG: 0.5 SUSPENSION RESPIRATORY (INHALATION) at 20:25

## 2025-05-20 RX ADMIN — MIDODRINE HYDROCHLORIDE 10 MG: 5 TABLET ORAL at 09:06

## 2025-05-20 RX ADMIN — PANTOPRAZOLE SODIUM 40 MG: 40 TABLET, DELAYED RELEASE ORAL at 09:06

## 2025-05-20 RX ADMIN — MIRTAZAPINE 15 MG: 15 TABLET, FILM COATED ORAL at 20:56

## 2025-05-20 RX ADMIN — ASPIRIN 81 MG: 81 TABLET, COATED ORAL at 09:06

## 2025-05-20 RX ADMIN — ARFORMOTEROL TARTRATE 15 MCG: 15 SOLUTION RESPIRATORY (INHALATION) at 08:51

## 2025-05-20 RX ADMIN — DONEPEZIL HYDROCHLORIDE 10 MG: 10 TABLET, FILM COATED ORAL at 20:56

## 2025-05-20 RX ADMIN — SODIUM CHLORIDE, PRESERVATIVE FREE 10 ML: 5 INJECTION INTRAVENOUS at 09:07

## 2025-05-20 RX ADMIN — SODIUM CHLORIDE, SODIUM LACTATE, POTASSIUM CHLORIDE, AND CALCIUM CHLORIDE: .6; .31; .03; .02 INJECTION, SOLUTION INTRAVENOUS at 06:03

## 2025-05-20 RX ADMIN — SODIUM BICARBONATE: 84 INJECTION, SOLUTION INTRAVENOUS at 11:00

## 2025-05-20 RX ADMIN — Medication 3 MG: at 20:56

## 2025-05-20 RX ADMIN — IPRATROPIUM BROMIDE 0.5 MG: 0.5 SOLUTION RESPIRATORY (INHALATION) at 20:25

## 2025-05-20 RX ADMIN — ACETAMINOPHEN 650 MG: 650 SUPPOSITORY RECTAL at 23:05

## 2025-05-20 RX ADMIN — ATORVASTATIN CALCIUM 20 MG: 20 TABLET, FILM COATED ORAL at 20:56

## 2025-05-20 RX ADMIN — ARFORMOTEROL TARTRATE 15 MCG: 15 SOLUTION RESPIRATORY (INHALATION) at 20:25

## 2025-05-20 RX ADMIN — IPRATROPIUM BROMIDE 0.5 MG: 0.5 SOLUTION RESPIRATORY (INHALATION) at 08:51

## 2025-05-20 RX ADMIN — BUDESONIDE 500 MCG: 0.5 SUSPENSION RESPIRATORY (INHALATION) at 08:51

## 2025-05-20 RX ADMIN — PHENYLEPHRINE HYDROCHLORIDE 20 MCG/MIN: 10 INJECTION INTRAVENOUS at 03:55

## 2025-05-20 ASSESSMENT — PAIN SCALES - PAIN ASSESSMENT IN ADVANCED DEMENTIA (PAINAD)
BREATHING: NORMAL
BREATHING: NORMAL
TOTALSCORE: 0
CONSOLABILITY: UNABLE TO CONSOLE, DISTRACT OR REASSURE
CONSOLABILITY: DISTRACTED OR REASSURED BY VOICE/TOUCH
FACIALEXPRESSION: FACIAL GRIMACING
FACIALEXPRESSION: FACIAL GRIMACING
CONSOLABILITY: UNABLE TO CONSOLE, DISTRACT OR REASSURE
BREATHING: NORMAL
BREATHING: NORMAL
FACIALEXPRESSION: SMILING OR INEXPRESSIVE
TOTALSCORE: 7
NEGVOCALIZATION: REPEATED TROUBLED CALLING OUT, LOUD MOANING/GROANING, CRYING
BODYLANGUAGE: TENSE, DISTRESSED PACING, FIDGETING
BODYLANGUAGE: RIGID, FISTS CLENCHED, KNEES UP, PUSHING/PULLING AWAY, STRIKES OUT
BODYLANGUAGE: RELAXED
FACIALEXPRESSION: SMILING OR INEXPRESSIVE
CONSOLABILITY: NO NEED TO CONSOLE
TOTALSCORE: 4
NEGVOCALIZATION: REPEATED TROUBLED CALLING OUT, LOUD MOANING/GROANING, CRYING
BODYLANGUAGE: TENSE, DISTRESSED PACING, FIDGETING
TOTALSCORE: 0
CONSOLABILITY: NO NEED TO CONSOLE
BODYLANGUAGE: RELAXED

## 2025-05-20 ASSESSMENT — PAIN SCALES - WONG BAKER: WONGBAKER_NUMERICALRESPONSE: HURTS LITTLE MORE

## 2025-05-20 NOTE — DISCHARGE INSTR - COC
Date:}    Treatments at the Time of Hospital Discharge:   Respiratory Treatments: ***  Oxygen Therapy:  {Therapy; copd oxygen:33873}  Ventilator:    { CC Vent List:334482939}    Rehab Therapies: {THERAPEUTIC INTERVENTION:5107758894}  Weight Bearing Status/Restrictions: { CC Weight Bearin}  Other Medical Equipment (for information only, NOT a DME order):  {EQUIPMENT:002702219}  Other Treatments: ***    Patient's personal belongings (please select all that are sent with patient):  {CHP DME Belongings:357340093}    RN SIGNATURE:  {Esignature:093288163}    CASE MANAGEMENT/SOCIAL WORK SECTION    Inpatient Status Date: ***    Readmission Risk Assessment Score:  [facility] RISK OF UNPLANNED READMISSION 2.0             23.6 Total Score        Discharging to Facility/ Agency   Name: [facility]  Address: [address] [address]  Phone: [phone]  Fax: [phone]    Dialysis Facility (if applicable)   Name:  Address:  Dialysis Schedule:  Phone:  Fax:    / signature: Electronically signed by Mikki Magallanes RN on 25 at 1:17 PM EDT    PHYSICIAN SECTION    Prognosis: {Prognosis:9522769353}    Condition at Discharge: { Patient Condition:396845714}    Rehab Potential (if transferring to Rehab): {Prognosis:6762198741}    Recommended Labs or Other Treatments After Discharge: ***    Physician Certification: I certify the above information and transfer of Dori Samayoa  is necessary for the continuing treatment of the diagnosis listed and that she requires {Admit to Appropriate Level of Care:15738} for {GREATER/LESS:601905533} 30 days.     Update Admission H&P: {CHP DME Changes in HandP:624260813}    PHYSICIAN SIGNATURE:  {Esignature:786512117}

## 2025-05-20 NOTE — ACP (ADVANCE CARE PLANNING)
Advance Care Planning      Palliative Medicine Provider (MD/NP)  Advance Care Planning (ACP) Conversation      Date of Conversation: 05/20/25  The patient and/or authorized decision maker consented to a voluntary Advance Care Planning conversation.   Individuals present for the conversation:   Legal healthcare agent named below    Legal Healthcare Agent(s):    Primary Decision Maker: Blaise Samayoa - Child - [phone]    Primary Decision Maker: Jayleen Varner - Child - [phone]    ACP documents available in EMR prior to discussion:  -Power of  for Healthcare  -Living Will    Primary Palliative Diagnosis(es):  LADO on CKD  Advanced dementia    Conversation Summary:  Patient seen resting in bed, alert to voice and oriented to self only.  Confused and unable to hold meaningful conversation.  Spoke with sonBlaise, on the phone.  Living will and healthcare power of  documentation in soft chart.  SonBlaise, is healthcare power of .  Discussed goals of care and CODE STATUS options.  After further discussion Blaise would like to change CODE STATUS to DNR CCA.  At this time he would like to continue all other medical management.  Ultimate goal is for Dori to be discharged back to facility when medically stable.  Will continue to follow.    Resuscitation Status:    Code Status: DNR-CCA    Outcomes / Completed Documentation:  An explanation of advance directives and their importance was provided and the following forms completed:    -No new documents completed.    If new document completed, original was provided to patient and/or family member.    Copy was placed for scanning into the [facility] EMR.      I spent 30 minutes providing separately identifiable ACP services with the patient and/or surrogate decision maker in a voluntary, in-person conversation discussing the patient's wishes and goals as detailed in the above note.       sIha Garcia, ADITYA - CNP

## 2025-05-20 NOTE — PLAN OF CARE
Problem: Chronic Conditions and Co-morbidities  Goal: Patient's chronic conditions and co-morbidity symptoms are monitored and maintained or improved  Recent Flowsheet Documentation  Taken 5/19/2025 2000 by Carlos Coe RN  Care Plan - Patient's Chronic Conditions and Co-Morbidity Symptoms are Monitored and Maintained or Improved: Monitor and assess patient's chronic conditions and comorbid symptoms for stability, deterioration, or improvement  5/19/2025 1529 by Madyson Garsia RN  Outcome: Progressing     Problem: Safety - Adult  Goal: Free from fall injury  5/20/2025 0028 by Carlos Coe RN  Outcome: Progressing  Flowsheets (Taken 5/20/2025 0026)  Free From Fall Injury: Instruct family/caregiver on patient safety  5/19/2025 1529 by Madyson Garsia RN  Outcome: Progressing  Flowsheets (Taken 5/19/2025 1200)  Free From Fall Injury: Instruct family/caregiver on patient safety     Problem: Skin/Tissue Integrity  Goal: Skin integrity remains intact  Description: 1.  Monitor for areas of redness and/or skin breakdown2.  Assess vascular access sites hourly3.  Every 4-6 hours minimum:  Change oxygen saturation probe site4.  Every 4-6 hours:  If on nasal continuous positive airway pressure, respiratory therapy assess nares and determine need for appliance change or resting period  5/20/2025 0028 by Carlos Coe RN  Outcome: Progressing  Flowsheets  Taken 5/20/2025 0026  Skin Integrity Remains Intact: Monitor for areas of redness and/or skin breakdown  Taken 5/19/2025 2000  Skin Integrity Remains Intact: Monitor for areas of redness and/or skin breakdown  5/19/2025 1529 by Madyson Garsia RN  Outcome: Progressing  Flowsheets  Taken 5/19/2025 1200  Skin Integrity Remains Intact: Monitor for areas of redness and/or skin breakdown  Taken 5/19/2025 0800  Skin Integrity Remains Intact: Monitor for areas of redness and/or skin breakdown

## 2025-05-20 NOTE — PATIENT CARE CONFERENCE
ICU Team Members: Dr Blanton, bedside nurse, charge nurse, clinical pharmacist     ICU Day #: NUMBER: 3     SOFA Score: 7     Intubation Date: n/a      Ventilator Day #: n/a     Central Line Insertion Date: May 18                                                    Day #: NUMBER: 3                                                    Indication: CVCIndication: Administration of vasopressors or vesicant medications      Arterial Line Insertion Date: n/a                              Day #:      Temporary Hemodialysis Catheter Insertion Date: n/a                              Day #      DVT Prophylaxis: eliquis (PASSED BARIUM SWALLOW)    GI Prophylaxis: protonix      Schuler Catheter Insertion Date: May 18                                        Day #: 3                             Indications: Need for fluid management of the critically ill patient in a critical care setting                             Continued need (if yes, reason documented and discussed with physician): yes, vasopressors, ALDO      Skin Issues/ Wounds and ordered treatment discussed on rounds: yes     Goals/ Plans for the Day: wean pressors as tolerated, HR control, SLP eval and treat     Reviewed plan and goals for day with patient and/or representative: Yes     **SHARE this note so that the rounding nurse may edit**                  Revision History

## 2025-05-20 NOTE — PLAN OF CARE
Patient's chart updated to reflect:      .    - HF care plan, HF education points and HF discharge instructions.  -Orders: 2 gram sodium diet, daily weights, I/O.  -PCP or cardiology follow up appointments to be scheduled within 7 days of hospital discharge.  -CHF education session will be provided to the patient prior to hospital discharge.    Hortencia Thomas RN   Heart Failure Navigator

## 2025-05-20 NOTE — CARE COORDINATION
CARE MANAGEMENT.....Odilia [place] [facility] can accept patient at discharge. Will need PT/OT cheryl and precert. Janette from [facility] will fax [phone] to Sarah at [phone]. N 17 in epic. Will need Transport forms completed. Will follow.

## 2025-05-20 NOTE — CONSULTS
[facility]  [facility]  Consultation Note     Admit Date: 5/18/2025 10:41 AM    Reason for Consult:   Bacteremia    Attending Physician:  Nino Greco MD    HISTORY OF PRESENT ILLNESS:             The history is obtained from extensive review of available past medical records. The patient is a 84 y.o. female who is unknown to the ID service.  Patient was admitted on 4/15/2025 after an unwitnessed fall and a laceration to the posterior scalp requiring stapling.  She was found to have a simple renal cyst and was seen by urology.  Also seen by nephrology.  She was discharged on 4/18/2025 to a skilled nursing facility. She was recently treated with Ciprofloxacin for a UTI.    The patient patient presents to ED at [facility] on 5/18/2025 because her color was off and she was hypotensive.  On presentation she was afebrile.  Later on she developed hypotension and slight hypothermia.  White count was 11.1 and has increased to 15.9.  CMP was remarkable for creatinine of 4.5, which is improving.  Lactic acid was normal.  Procalcitonin was 2.98.  Blood cultures turned positive last night for 4 bottles with MRSA.  She has been treated with Cefepime and Vancomycin.  She was in the ICU with his sodium bicarbonate drip. The patient cannot provide reliable information given history of dementia.    Past Medical History:    No past medical history on file.  Past Surgical History:        Procedure Laterality Date    APPENDECTOMY      TONSILLECTOMY       Current Medications:   Scheduled Meds:   budesonide  0.5 mg Nebulization BID RT    metoprolol  5 mg IntraVENous Once    apixaban  2.5 mg Oral BID    aspirin  81 mg Oral Daily    atorvastatin  20 mg Oral Nightly    donepezil  10 mg Oral Nightly    sodium chloride flush  5-40 mL IntraVENous 2 times per day    cefepime  1,000 mg IntraVENous Q24H    midodrine  10 mg Oral TID WC    mirtazapine  15 mg Oral Nightly    pantoprazole  40 mg Oral Daily    
  Palliative Care Department  [phone]  Palliative Care Initial Consult  Provider Isha Garcia, APRN - CNP    Dori Samayoa  [id number]  Hospital Day: 2  Date of Initial Consult: 5/18/2025  Referring Provider: Vasu Young DO  Palliative Medicine was consulted for assistance with: GOC, code status    HPI:   Dori Samyaoa is a 84 y.o. with a medical history of CAD, HFpEF, CKD, A-fib, HTN and HLD who was admitted on 5/18/2025 from SNF with a CHIEF COMPLAINT of cough. Patient wears 3 L O2 via NC at baseline and is alert to person only at baseline. Nursing home also reports diarrhea and patient just finished Cipro 5/8 for UTI. In ED patient's workup was concerning for ALDO, elevated troponin and hypotension. Patient with recent admission in April 2025 for unwitnessed falls. Patient was discharged to SNF with Schuler on 4/18. CXR showing cardiomegaly with central congestion. Increased opacifications in the left lung base with at least small to moderate volume left effusion and adjacent opacities could represent adjacent atelectasis or airspace disease. CT abdomen pelvis showing basilar atelectasis, distended bladder. Patient was admitted for further medical management.  ASSESSMENT/PLAN:     Pertinent Hospital Diagnoses     Sepsis 2/2 UTI  ALDO on CKD  HFpEF  CAD  Dementia    Palliative Care Encounter / Counseling Regarding Goals of Care  Please see detailed goals of care discussion as below  At this time, Dori Samayoa, Does Not have capacity for medical decision-making.  Capacity is time limited and situation/question specific  During encounter Jayleen was surrogate medical decision-maker  Outcome of goals of care meeting:   Continue full code status; children discussing code status options; full vs CCA  Code status Full Code  Advanced Directives: no POA or living will in [facility]  Surrogate/Legal NOK:  Blaise Samayoa (child/POA) [phone]  Jayleen Varner (child) [phone]    Spiritual assessment: no spiritual 
Department of Internal Medicine  Nephrology Attending Consult Note      Reason for Consult:  ALDO  Requesting Physician:  Bryan Irwin MD     CHIEF COMPLAINT: Fall    History Obtained From:  patient, electronic medical record    HISTORY OF PRESENT ILLNESS: Briefly Mrs. Dori Samayoa is a 84-year-old female with a past medical history of CKD stage IIIa with minimal proteinuria, baseline creatinine 1.5 mg/dL, HTN, HFpEF 65 to 70%, CAD, PAF, hyperlipidemia recently admitted after a fall, she was found to have ALDO stage III 2 component of urinary retention and intravascular volume depletion, at the time of discharge his creatinine was 1.7 mg/dL, who was readmitted on 5/18/2025 after she was brought to the ER by EMS due to chest congestion and diarrhea.  In the ER patient was found hypotensive, she was fluid resuscitated and she was started on vasopressors.  Her creatinine on admission was 4.5 mg deciliter, reason for this consultation, her proBNP level was 28,324.  Chest x-ray showed cardiomegaly with central congestion, CT abdomen pelvis without IV contrast showed no acute intra-abdominal or pelvic process.  Current medications at home included losartan 50 mg daily.     Past Medical History:    No past medical history on file.  Past Surgical History:        Procedure Laterality Date    APPENDECTOMY      TONSILLECTOMY       Current Medications:    Current Facility-Administered Medications: norepinephrine (LEVOPHED) 16 mg in sodium chloride 0.9 % 250 mL infusion (premix), 1-100 mcg/min, IntraVENous, Continuous  apixaban (ELIQUIS) tablet 2.5 mg, 2.5 mg, Oral, BID  [START ON 5/19/2025] aspirin EC tablet 81 mg, 81 mg, Oral, Daily  atorvastatin (LIPITOR) tablet 20 mg, 20 mg, Oral, Nightly  donepezil (ARICEPT) tablet 10 mg, 10 mg, Oral, Nightly  sodium chloride flush 0.9 % injection 5-40 mL, 5-40 mL, IntraVENous, 2 times per day  sodium chloride flush 0.9 % injection 5-40 mL, 5-40 mL, IntraVENous, PRN  0.9 % sodium chloride 
Normal heart sounds.   Pulmonary:      Effort: Pulmonary effort is normal.      Breath sounds: Decreased breath sounds present.   Abdominal:      General: Bowel sounds are normal.      Palpations: Abdomen is soft.      Tenderness: There is no abdominal tenderness.   Musculoskeletal:         General: No swelling.      Cervical back: Neck supple.   Lymphadenopathy:      Cervical: No cervical adenopathy.   Skin:     General: Skin is dry.      Findings: No rash.   Neurological:      General: No focal deficit present.      Mental Status: She is alert. Mental status is at baseline. She is confused.   Psychiatric:         Behavior: Behavior normal.         PERTINENT LAB RESULTS: Labs reviewed.      DIAGNOSTICS:  Pertinent imaging reviewed.      ASSESMENT/PLAN:     Assessment:    Shock-undifferentiated  ALDO on CKD  Bladder distention  Elevated troponin  HFpEF  Paroxysmal A-fib on Eliquis  Basilar atelectasis - unchanged from previous  Dementia, debility with falls      Consultants: Cardiology, nephrology, palliative    Continue resuscitation, check NICOM, lactic  Pressors for MAP low-grade 65  Place arterial line for hemodynamic monitoring  Check cortisol level, if low start Solu-Cortef  2D echo  Schuler to be placed with urine analysis  Empiric antibiotics pending culture results  Check viral panel      GI/DVT - SUP/Eliquis 2.5 twice daily        Code Status: Prior    Total critical care time spent reviewing chart records and managing patient at the bedside is 43 minutes exclusive of procedures or overlap.    ELECTRONICALLY SIGNED:  Vasu Young DO    
No visual changes or diplopia. No scleral icterus.  ENT: No Headaches, hearing loss or vertigo. No mouth sores or sore throat. No change in taste or smell.  Cardiovascular: Occasional chest discomfort, dyspnea on exertion, palpitations, loss of consciousness, no phlebitis, no claudication.  Respiratory: No cough or wheezing, no sputum production. No hemoptysis, pleuritic pain.  Gastrointestinal: No abdominal pain, appetite loss, blood in stools. No change in bowel habits. No hematemesis  Genitourinary: No dysuria, trouble voiding or hematuria. No nocturia or increased frequency.  Musculoskeletal:  No gait disturbance, weakness or joint complaints.  Integumentary: No rash or pruritis.  Neurological: No headache, diplopia, change in muscle strength, numbness or tingling. No change in gait, balance, coordination, mood, affect, memory, mentation, behavior.  Psychiatric: No anxiety or depression.  Endocrine: No temperature intolerance. No excessive thirst, fluid intake, or urination. No tremor.  Hematologic/Lymphatic: No abnormal bruising or bleeding, blood clots or swollen lymph nodes.  Allergic/Immunologic: No nasal congestion or hives.    Physical Examination:    Vital Signs: BP (!) 94/48   Pulse (!) 125   Temp 97.8 °F (36.6 °C) (Oral)   Resp 19   Ht 1.499 m (4' 11\")   Wt 68.2 kg (150 lb 5.7 oz)   SpO2 91%   BMI 30.37 kg/m²   General appearance: Well preserved, mesomorphic body habitus, alert, no distress.  Skin: Skin color, texture, turgor normal. No rashes or lesions. No induration or tightening palpated.  Head: Normocephalic. No masses, lesions, tenderness or abnormalities  Eyes: Conjunctivae/corneas clear. PERRL, EOMs intact. Sclera non icteric.  Ears: External ears normal. Canals clear. TM's clear bilaterally. Hearing normal to finger rub.  Nose/Sinuses: Nares normal. Septum midline. Mucosa normal. No drainage or sinus tenderness.  Oropharynx: Lips, mucosa, and tongue normal. Oropharynx clear with no

## 2025-05-20 NOTE — PATIENT CARE CONFERENCE
Intensive Care Daily Quality Rounding Checklist      ICU Team Members: Dr Blanton, bedside nurse, charge nurse, clinical pharmacist    ICU Day #: NUMBER: 3    SOFA Score: 8    Intubation Date: n/a     Ventilator Day #: n/a    Central Line Insertion Date: May 18        Day #: NUMBER: 3        Indication: CVCIndication: Administration of vasopressors or vesicant medications     Arterial Line Insertion Date: n/a       Day #:     Temporary Hemodialysis Catheter Insertion Date: n/a       Day #     DVT Prophylaxis: eliquis HOLD failed BSS/ scd's    GI Prophylaxis: protonix     Schuler Catheter Insertion Date: May 18       Day #: 3      Indications: Need for fluid management of the critically ill patient in a critical care setting      Continued need (if yes, reason documented and discussed with physician): yes, ALDO     Skin Issues/ Wounds and ordered treatment discussed on rounds: yes    Goals/ Plans for the Day: wean pressors as tolerated, HR control, SLP eval and treat. Stop amiodarone. Order Bicarb drip. Order blood cultures. ID consult. Remove TLC    Reviewed plan and goals for day with patient and/or representative: Yes    **SHARE this note so that the rounding nurse may edit**

## 2025-05-21 VITALS
BODY MASS INDEX: 34.67 KG/M2 | HEIGHT: 59 IN | SYSTOLIC BLOOD PRESSURE: 99 MMHG | WEIGHT: 171.96 LBS | TEMPERATURE: 97.1 F | DIASTOLIC BLOOD PRESSURE: 45 MMHG | OXYGEN SATURATION: 92 %

## 2025-05-21 LAB
ACB COMPLEX DNA BLD POS QL NAA+NON-PROBE: NOT DETECTED
ANION GAP SERPL CALCULATED.3IONS-SCNC: 10 MMOL/L (ref 7–16)
B FRAGILIS DNA BLD POS QL NAA+NON-PROBE: NOT DETECTED
BASOPHILS # BLD: 0 K/UL (ref 0–0.2)
BASOPHILS NFR BLD: 0 % (ref 0–2)
BIOFIRE TEST COMMENT: ABNORMAL
BNP SERPL-MCNC: ABNORMAL PG/ML (ref 0–450)
BUN SERPL-MCNC: 35 MG/DL (ref 6–23)
C ALBICANS DNA BLD POS QL NAA+NON-PROBE: NOT DETECTED
C AURIS DNA BLD POS QL NAA+NON-PROBE: NOT DETECTED
C GATTII+NEOFOR DNA BLD POS QL NAA+N-PRB: NOT DETECTED
C GLABRATA DNA BLD POS QL NAA+NON-PROBE: NOT DETECTED
C KRUSEI DNA BLD POS QL NAA+NON-PROBE: NOT DETECTED
C PARAP DNA BLD POS QL NAA+NON-PROBE: NOT DETECTED
C TROPICLS DNA BLD POS QL NAA+NON-PROBE: NOT DETECTED
CALCIUM SERPL-MCNC: 7.9 MG/DL (ref 8.6–10.2)
CHLORIDE SERPL-SCNC: 108 MMOL/L (ref 98–107)
CO2 SERPL-SCNC: 20 MMOL/L (ref 22–29)
CREAT SERPL-MCNC: 3 MG/DL (ref 0.5–1)
E CLOAC COMP DNA BLD POS NAA+NON-PROBE: NOT DETECTED
E COLI DNA BLD POS QL NAA+NON-PROBE: NOT DETECTED
E FAECALIS DNA BLD POS QL NAA+NON-PROBE: NOT DETECTED
E FAECIUM DNA BLD POS QL NAA+NON-PROBE: NOT DETECTED
ENTEROBACTERALES DNA BLD POS NAA+N-PRB: NOT DETECTED
EOSINOPHIL # BLD: 0.25 K/UL (ref 0.05–0.5)
EOSINOPHILS RELATIVE PERCENT: 2 % (ref 0–6)
ERYTHROCYTE [DISTWIDTH] IN BLOOD BY AUTOMATED COUNT: 16.1 % (ref 11.5–15)
GFR, ESTIMATED: 15 ML/MIN/1.73M2
GLUCOSE SERPL-MCNC: 116 MG/DL (ref 74–99)
GP B STREP DNA BLD POS QL NAA+NON-PROBE: NOT DETECTED
HAEM INFLU DNA BLD POS QL NAA+NON-PROBE: NOT DETECTED
HCT VFR BLD AUTO: 24 % (ref 34–48)
HGB BLD-MCNC: 7.4 G/DL (ref 11.5–15.5)
K OXYTOCA DNA BLD POS QL NAA+NON-PROBE: NOT DETECTED
KLEBSIELLA SP DNA BLD POS QL NAA+NON-PRB: NOT DETECTED
KLEBSIELLA SP DNA BLD POS QL NAA+NON-PRB: NOT DETECTED
L MONOCYTOG DNA BLD POS QL NAA+NON-PROBE: NOT DETECTED
LACTATE BLDV-SCNC: 1.4 MMOL/L (ref 0.5–2.2)
LYMPHOCYTES NFR BLD: 1.25 K/UL (ref 1.5–4)
LYMPHOCYTES RELATIVE PERCENT: 9 % (ref 20–42)
MAGNESIUM SERPL-MCNC: 2.5 MG/DL (ref 1.6–2.6)
MCH RBC QN AUTO: 31.4 PG (ref 26–35)
MCHC RBC AUTO-ENTMCNC: 30.8 G/DL (ref 32–34.5)
MCV RBC AUTO: 101.7 FL (ref 80–99.9)
MECA+MECC+MREJ ISLT/SPM QL: DETECTED
METAMYELOCYTES ABSOLUTE COUNT: 0.13 K/UL (ref 0–0.12)
METAMYELOCYTES: 1 % (ref 0–1)
MICROORGANISM SPEC CULT: ABNORMAL
MICROORGANISM SPEC CULT: ABNORMAL
MICROORGANISM/AGENT SPEC: ABNORMAL
MICROORGANISM/AGENT SPEC: ABNORMAL
MONOCYTES NFR BLD: 1.13 K/UL (ref 0.1–0.95)
MONOCYTES NFR BLD: 8 % (ref 2–12)
MYELOCYTES ABSOLUTE COUNT: 0.13 K/UL
MYELOCYTES: 1 %
N MEN DNA BLD POS QL NAA+NON-PROBE: NOT DETECTED
NEUTROPHILS NFR BLD: 80 % (ref 43–80)
NEUTS SEG NFR BLD: 11.63 K/UL (ref 1.8–7.3)
P AERUGINOSA DNA BLD POS NAA+NON-PROBE: NOT DETECTED
PHOSPHATE SERPL-MCNC: 4 MG/DL (ref 2.5–4.5)
PLATELET # BLD AUTO: 469 K/UL (ref 130–450)
PMV BLD AUTO: 9.4 FL (ref 7–12)
POTASSIUM SERPL-SCNC: 4.6 MMOL/L (ref 3.5–5)
PROTEUS SP DNA BLD POS QL NAA+NON-PROBE: NOT DETECTED
RBC # BLD AUTO: 2.36 M/UL (ref 3.5–5.5)
RBC # BLD: ABNORMAL 10*6/UL
S AUREUS DNA BLD POS QL NAA+NON-PROBE: DETECTED
S AUREUS+CONS DNA BLD POS NAA+NON-PROBE: DETECTED
S EPIDERMIDIS DNA BLD POS QL NAA+NON-PRB: NOT DETECTED
S LUGDUNENSIS DNA BLD POS QL NAA+NON-PRB: NOT DETECTED
S MALTOPHILIA DNA BLD POS QL NAA+NON-PRB: NOT DETECTED
S MARCESCENS DNA BLD POS NAA+NON-PROBE: NOT DETECTED
S PNEUM DNA BLD POS QL NAA+NON-PROBE: NOT DETECTED
S PYO DNA BLD POS QL NAA+NON-PROBE: NOT DETECTED
SALMONELLA DNA BLD POS QL NAA+NON-PROBE: NOT DETECTED
SERVICE CMNT-IMP: ABNORMAL
SERVICE CMNT-IMP: ABNORMAL
SODIUM SERPL-SCNC: 138 MMOL/L (ref 132–146)
SPECIMEN DESCRIPTION: ABNORMAL
SPECIMEN DESCRIPTION: ABNORMAL
STREPTOCOCCUS DNA BLD POS NAA+NON-PROBE: NOT DETECTED
VANCOMYCIN SERPL-MCNC: 21 UG/ML (ref 5–40)
WBC # BLD: ABNORMAL 10*3/UL
WBC OTHER # BLD: 14.5 K/UL (ref 4.5–11.5)

## 2025-05-21 PROCEDURE — 6360000002 HC RX W HCPCS: Performed by: INTERNAL MEDICINE

## 2025-05-21 PROCEDURE — 99239 HOSP IP/OBS DSCHRG MGMT >30: CPT | Performed by: INTERNAL MEDICINE

## 2025-05-21 PROCEDURE — 6370000000 HC RX 637 (ALT 250 FOR IP): Performed by: NURSE PRACTITIONER

## 2025-05-21 PROCEDURE — 6360000002 HC RX W HCPCS

## 2025-05-21 PROCEDURE — 83735 ASSAY OF MAGNESIUM: CPT

## 2025-05-21 PROCEDURE — 94640 AIRWAY INHALATION TREATMENT: CPT

## 2025-05-21 PROCEDURE — 83880 ASSAY OF NATRIURETIC PEPTIDE: CPT

## 2025-05-21 PROCEDURE — 2500000003 HC RX 250 WO HCPCS: Performed by: NURSE PRACTITIONER

## 2025-05-21 PROCEDURE — 2700000000 HC OXYGEN THERAPY PER DAY

## 2025-05-21 PROCEDURE — 2580000003 HC RX 258: Performed by: INTERNAL MEDICINE

## 2025-05-21 PROCEDURE — 84100 ASSAY OF PHOSPHORUS: CPT

## 2025-05-21 PROCEDURE — 2500000003 HC RX 250 WO HCPCS: Performed by: HOSPITALIST

## 2025-05-21 PROCEDURE — 6370000000 HC RX 637 (ALT 250 FOR IP): Performed by: HOSPITALIST

## 2025-05-21 PROCEDURE — 6360000002 HC RX W HCPCS: Performed by: NURSE PRACTITIONER

## 2025-05-21 PROCEDURE — P9045 ALBUMIN (HUMAN), 5%, 250 ML: HCPCS

## 2025-05-21 PROCEDURE — 85025 COMPLETE CBC W/AUTO DIFF WBC: CPT

## 2025-05-21 PROCEDURE — 80202 ASSAY OF VANCOMYCIN: CPT

## 2025-05-21 PROCEDURE — 5A0935A ASSISTANCE WITH RESPIRATORY VENTILATION, LESS THAN 24 CONSECUTIVE HOURS, HIGH NASAL FLOW/VELOCITY: ICD-10-PCS | Performed by: INTERNAL MEDICINE

## 2025-05-21 PROCEDURE — 80048 BASIC METABOLIC PNL TOTAL CA: CPT

## 2025-05-21 PROCEDURE — 83605 ASSAY OF LACTIC ACID: CPT

## 2025-05-21 PROCEDURE — 6370000000 HC RX 637 (ALT 250 FOR IP): Performed by: INTERNAL MEDICINE

## 2025-05-21 RX ORDER — NADOLOL 20 MG/1
10 TABLET ORAL ONCE
Status: COMPLETED | OUTPATIENT
Start: 2025-05-21 | End: 2025-05-21

## 2025-05-21 RX ORDER — FUROSEMIDE 10 MG/ML
80 INJECTION INTRAMUSCULAR; INTRAVENOUS ONCE
Status: COMPLETED | OUTPATIENT
Start: 2025-05-21 | End: 2025-05-21

## 2025-05-21 RX ORDER — AMIODARONE HYDROCHLORIDE 200 MG/1
200 TABLET ORAL DAILY
Status: DISCONTINUED | OUTPATIENT
Start: 2025-05-21 | End: 2025-05-22 | Stop reason: HOSPADM

## 2025-05-21 RX ORDER — ALBUMIN HUMAN 50 G/1000ML
25 SOLUTION INTRAVENOUS ONCE
Status: COMPLETED | OUTPATIENT
Start: 2025-05-21 | End: 2025-05-21

## 2025-05-21 RX ADMIN — ALBUMIN (HUMAN) 25 G: 12.5 INJECTION, SOLUTION INTRAVENOUS at 09:51

## 2025-05-21 RX ADMIN — BUDESONIDE 500 MCG: 0.5 SUSPENSION RESPIRATORY (INHALATION) at 08:30

## 2025-05-21 RX ADMIN — IPRATROPIUM BROMIDE 0.5 MG: 0.5 SOLUTION RESPIRATORY (INHALATION) at 12:55

## 2025-05-21 RX ADMIN — NADOLOL 10 MG: 20 TABLET ORAL at 13:37

## 2025-05-21 RX ADMIN — SODIUM CHLORIDE, PRESERVATIVE FREE 10 ML: 5 INJECTION INTRAVENOUS at 09:00

## 2025-05-21 RX ADMIN — AMIODARONE HYDROCHLORIDE 200 MG: 200 TABLET ORAL at 13:37

## 2025-05-21 RX ADMIN — FUROSEMIDE 80 MG: 10 INJECTION, SOLUTION INTRAMUSCULAR; INTRAVENOUS at 09:38

## 2025-05-21 RX ADMIN — METOPROLOL TARTRATE 5 MG: 5 INJECTION INTRAVENOUS at 00:54

## 2025-05-21 RX ADMIN — BUMETANIDE 0.5 MG/HR: 0.25 INJECTION INTRAMUSCULAR; INTRAVENOUS at 13:34

## 2025-05-21 RX ADMIN — MIDODRINE HYDROCHLORIDE 10 MG: 5 TABLET ORAL at 13:37

## 2025-05-21 RX ADMIN — IPRATROPIUM BROMIDE 0.5 MG: 0.5 SOLUTION RESPIRATORY (INHALATION) at 02:20

## 2025-05-21 RX ADMIN — ARFORMOTEROL TARTRATE 15 MCG: 15 SOLUTION RESPIRATORY (INHALATION) at 08:30

## 2025-05-21 RX ADMIN — IPRATROPIUM BROMIDE 0.5 MG: 0.5 SOLUTION RESPIRATORY (INHALATION) at 08:30

## 2025-05-21 ASSESSMENT — PAIN SCALES - PAIN ASSESSMENT IN ADVANCED DEMENTIA (PAINAD)
BODYLANGUAGE: RELAXED
BREATHING: NORMAL
CONSOLABILITY: NO NEED TO CONSOLE
TOTALSCORE: 0
FACIALEXPRESSION: SMILING OR INEXPRESSIVE

## 2025-05-21 ASSESSMENT — PAIN SCALES - WONG BAKER: WONGBAKER_NUMERICALRESPONSE: HURTS LITTLE MORE

## 2025-05-21 NOTE — PATIENT CARE CONFERENCE
ICU Team Members: Dr Blanton, bedside nurse, charge nurse, clinical pharmacist     ICU Day #: NUMBER: 4     SOFA Score: 7     Intubation Date: n/a      Ventilator Day #: n/a     Central Line Insertion Date: May 18                                                    Day #:   REMOVED  5/20                                                    Indication: CVCIndication: Administration of vasopressors or vesicant medications      Arterial Line Insertion Date: n/a                              Day #:      Temporary Hemodialysis Catheter Insertion Date: n/a                              Day #      DVT Prophylaxis: eliquis HOLD failed BSS/ scd's    GI Prophylaxis: protonix      Schuler Catheter Insertion Date: May 18                                        Day #: 4                             Indications: retention                             Continued need (if yes, reason documented and discussed with physician): yes, ALDO      Skin Issues/ Wounds and ordered treatment discussed on rounds: yes     Goals/ Plans for the Day: wean pressors as tolerated, HR control, SLP eval and treat     Reviewed plan and goals for day with patient and/or representative: Yes     **SHARE this note so that the rounding nurse may edit**            Revision History

## 2025-05-21 NOTE — PLAN OF CARE
Problem: Safety - Adult  Goal: Free from fall injury  Outcome: Progressing  Flowsheets (Taken 5/21/2025 0310)  Free From Fall Injury: Instruct family/caregiver on patient safety     Problem: Skin/Tissue Integrity  Goal: Skin integrity remains intact  Description: 1.  Monitor for areas of redness and/or skin breakdown2.  Assess vascular access sites hourly3.  Every 4-6 hours minimum:  Change oxygen saturation probe site4.  Every 4-6 hours:  If on nasal continuous positive airway pressure, respiratory therapy assess nares and determine need for appliance change or resting period  Outcome: Progressing  Flowsheets  Taken 5/21/2025 0310  Skin Integrity Remains Intact: Monitor for areas of redness and/or skin breakdown  Taken 5/20/2025 2000  Skin Integrity Remains Intact: Monitor for areas of redness and/or skin breakdown     Problem: ABCDS Injury Assessment  Goal: Absence of physical injury  Outcome: Progressing  Flowsheets (Taken 5/21/2025 0310)  Absence of Physical Injury: Implement safety measures based on patient assessment

## 2025-05-21 NOTE — PLAN OF CARE
Problem: Chronic Conditions and Co-morbidities  Goal: Patient's chronic conditions and co-morbidity symptoms are monitored and maintained or improved  Outcome: Not Progressing  Flowsheets (Taken 5/20/2025 2000 by Carlos Coe RN)  Care Plan - Patient's Chronic Conditions and Co-Morbidity Symptoms are Monitored and Maintained or Improved: Monitor and assess patient's chronic conditions and comorbid symptoms for stability, deterioration, or improvement     Problem: Discharge Planning  Goal: Discharge to home or other facility with appropriate resources  Outcome: Not Progressing

## 2025-05-22 NOTE — DISCHARGE SUMMARY
every 5 minutes as needed for Chest pain up to max of 3 total doses. If no relief after 1 dose, call 911., Disp-25 tablet, R-3NO PRINT      atorvastatin (LIPITOR) 20 MG tablet Take 1 tablet by mouth nightly, Disp-30 tablet, R-3NO PRINT               Note that greater than 30 minutes was spent in preparing discharge papers, discussing discharge with patient, medication review, etc.      Signed:  Electronically signed by Nino Greco MD on 5/22/2025 at 8:13 AM

## 2025-05-22 NOTE — PROGRESS NOTES
Progress  Note  Dori Samayoa  [id number]  1940  0214/0214-A    Chief Complaint   Patient presents with    Cough     Per NH \"chest is making a purring noise\" and \"her color is off\" states was unable to get a BP reading this morning, cough onset this AM wears 3L NC at baseline and is alert to person only at baseline    Diarrhea     Onset today, finished Cipro 5/8 for a UTI     Historical Issues:    Principal Problem:    ALDO (acute kidney injury)  Active Problems:    NSTEMI (non-ST elevated myocardial infarction) ([facility])    Acute on chronic diastolic congestive heart failure ([facility])    Coronary artery disease involving native coronary artery of native heart without angina pectoris    Acute kidney injury    Class 2 severe obesity with serious comorbidity and body mass index (BMI) of 36.0 to 36.9 in adult ([facility])  Resolved Problems:    * No resolved hospital problems. *    Current Facility-Administered Medications   Medication Dose Route Frequency Provider Last Rate Last Admin    budesonide (PULMICORT) nebulizer suspension 500 mcg  0.5 mg Nebulization BID RT Ela Blanton MD   500 mcg at 05/19/25 1154    phenylephrine (JULEE-SYNEPHRINE) 50 mg in sodium chloride 0.9 % 250 mL infusion   mcg/min IntraVENous Continuous Deborah Hughes MD 16.5 mL/hr at 05/19/25 1232 55 mcg/min at 05/19/25 1232    metoprolol (LOPRESSOR) injection 5 mg  5 mg IntraVENous Once Zunilda Hall,         amiodarone (CORDARONE) 150 mg in dextrose 5 % 100 mL bolus  150 mg IntraVENous Once Ela Blanton MD        magnesium sulfate 2000 mg in 50 mL IVPB premix  2,000 mg IntraVENous Once Ela Blanton MD 25 mL/hr at 05/19/25 1315 2,000 mg at 05/19/25 1315    apixaban (ELIQUIS) tablet 2.5 mg  2.5 mg Oral BID Bryan Irwin MD        aspirin EC tablet 81 mg  81 mg Oral Daily Bryan Irwin MD        atorvastatin (LIPITOR) tablet 20 mg  20 mg Oral Nightly Bryan Irwin MD        donepezil (ARICEPT) tablet 10 mg  10 mg Oral Nightly 
    Progress  Note  Dori Samayoa  [id number]  1940  0214/0214-A    Chief Complaint   Patient presents with    Cough     Per NH \"chest is making a purring noise\" and \"her color is off\" states was unable to get a BP reading this morning, cough onset this AM wears 3L NC at baseline and is alert to person only at baseline    Diarrhea     Onset today, finished Cipro 5/8 for a UTI     Historical Issues:    Principal Problem:    ALDO (acute kidney injury)  Active Problems:    NSTEMI (non-ST elevated myocardial infarction) ([facility])    Acute on chronic diastolic congestive heart failure ([facility])    Coronary artery disease involving native coronary artery of native heart without angina pectoris    Acute kidney injury    Class 2 severe obesity with serious comorbidity and body mass index (BMI) of 36.0 to 36.9 in adult ([facility])    Palliative care encounter    Goals of care, counseling/discussion  Resolved Problems:    * No resolved hospital problems. *    Current Facility-Administered Medications   Medication Dose Route Frequency Provider Last Rate Last Admin    albumin human 5% IV solution 25 g  25 g IntraVENous Once Zunilda Hall,  mL/hr at 05/21/25 0951 25 g at 05/21/25 0951    vancomycin (VANCOCIN) intermittent dosing (placeholder)   Other RX Placeholder Gucci Joy MD        metoprolol (LOPRESSOR) injection 5 mg  5 mg IntraVENous Q6H PRN Ban Arnold APRN - CNP   5 mg at 05/21/25 0054    budesonide (PULMICORT) nebulizer suspension 500 mcg  0.5 mg Nebulization BID RT Ela Blanton MD   500 mcg at 05/21/25 0830    apixaban (ELIQUIS) tablet 2.5 mg  2.5 mg Oral BID Bryan Irwin MD   2.5 mg at 05/20/25 2056    aspirin EC tablet 81 mg  81 mg Oral Daily Bryan Irwin MD   81 mg at 05/20/25 0906    atorvastatin (LIPITOR) tablet 20 mg  20 mg Oral Nightly Bryan Irwin MD   20 mg at 05/20/25 2056    donepezil (ARICEPT) tablet 10 mg  10 mg Oral Nightly Bryan Irwin MD   10 mg at 05/20/25 2056    sodium chloride flush 
    Progress  Note  Dori Samayoa  [id number]  1940  0214/0214-A    Chief Complaint   Patient presents with    Cough     Per NH \"chest is making a purring noise\" and \"her color is off\" states was unable to get a BP reading this morning, cough onset this AM wears 3L NC at baseline and is alert to person only at baseline    Diarrhea     Onset today, finished Cipro 5/8 for a UTI     Historical Issues:    Principal Problem:    ALDO (acute kidney injury)  Active Problems:    NSTEMI (non-ST elevated myocardial infarction) ([facility])    Acute on chronic diastolic congestive heart failure ([facility])    Coronary artery disease involving native coronary artery of native heart without angina pectoris    Acute kidney injury    Class 2 severe obesity with serious comorbidity and body mass index (BMI) of 36.0 to 36.9 in adult ([facility])    Palliative care encounter    Goals of care, counseling/discussion  Resolved Problems:    * No resolved hospital problems. *    Current Facility-Administered Medications   Medication Dose Route Frequency Provider Last Rate Last Admin    sodium bicarbonate 150 mEq in dextrose 5 % 1,000 mL infusion   IntraVENous Continuous Ela Blanton MD 75 mL/hr at 05/20/25 1100 New Bag at 05/20/25 1100    vancomycin (VANCOCIN) intermittent dosing (placeholder)   Other RX Placeholder Gucci Joy MD        budesonide (PULMICORT) nebulizer suspension 500 mcg  0.5 mg Nebulization BID RT Ela Blanton MD   500 mcg at 05/20/25 0851    metoprolol (LOPRESSOR) injection 5 mg  5 mg IntraVENous Once Isabel, Zunilda,         apixaban (ELIQUIS) tablet 2.5 mg  2.5 mg Oral BID Bryan Irwin MD   2.5 mg at 05/20/25 0906    aspirin EC tablet 81 mg  81 mg Oral Daily Bryan Irwin MD   81 mg at 05/20/25 0906    atorvastatin (LIPITOR) tablet 20 mg  20 mg Oral Nightly Bryan Irwin MD   20 mg at 05/19/25 2034    donepezil (ARICEPT) tablet 10 mg  10 mg Oral Nightly Bryan Irwin MD   10 mg at 05/19/25 2034    sodium chloride flush 
   [facility]  [facility]  Progress Note    SUBJECTIVE:  Chief Complaint   Patient presents with    Cough     Per NH \"chest is making a purring noise\" and \"her color is off\" states was unable to get a BP reading this morning, cough onset this AM wears 3L NC at baseline and is alert to person only at baseline    Diarrhea     Onset today, finished Cipro  for a UTI     The patient seems to be tolerating antibiotics.  She is still in the ICU.  Still confused.  She remains afebrile.  No nausea, vomiting or diarrhea.      Review of systems:  As stated above in the chief complaint, otherwise negative.    Medications:  Scheduled Meds:   vancomycin (VANCOCIN) intermittent dosing (placeholder)   Other RX Placeholder    budesonide  0.5 mg Nebulization BID RT    metoprolol  5 mg IntraVENous Once    apixaban  2.5 mg Oral BID    aspirin  81 mg Oral Daily    atorvastatin  20 mg Oral Nightly    donepezil  10 mg Oral Nightly    sodium chloride flush  5-40 mL IntraVENous 2 times per day    midodrine  10 mg Oral TID WC    mirtazapine  15 mg Oral Nightly    pantoprazole  40 mg Oral Daily    arformoterol tartrate  15 mcg Nebulization BID RT    And    ipratropium  0.5 mg Nebulization Q6H RT     Continuous Infusions:   sodium chloride       PRN Meds:metoprolol, sodium chloride flush, sodium chloride, ondansetron **OR** ondansetron, acetaminophen **OR** acetaminophen, sulfur hexafluoride microspheres, melatonin, ipratropium 0.5 mg-albuterol 2.5 mg, polyethylene glycol    OBJECTIVE:  /69   Pulse (!) 114   Temp 97.7 °F (36.5 °C) (Axillary)   Resp 20   Ht 1.499 m (4' 11\")   Wt 78 kg (171 lb 15.3 oz)   SpO2 91%   BMI 34.73 kg/m²   Temp  Av.7 °F (36.5 °C)  Min: 97.6 °F (36.4 °C)  Max: 97.7 °F (36.5 °C)  Constitutional: The patient is in bed in the ICU.  Resting comfortably.  Rapidly gets agitated when examined.  Fairly cooperative, however.  Skin: Warm and dry. No rashes were noted.   HEENT: 
  Palliative Care Department  [phone]  Palliative Care Progress Note  Provider Isha Garcia, APRN - CNP    Dori Samayoa  [id number]  Hospital Day: 3  Date of Initial Consult: 5/18/2025  Referring Provider: Vasu Young DO  Palliative Medicine was consulted for assistance with: GOC, code status    HPI:   Dori Samayoa is a 84 y.o. with a medical history of CAD, HFpEF, CKD, A-fib, HTN and HLD who was admitted on 5/18/2025 from SNF with a CHIEF COMPLAINT of cough. Patient wears 3 L O2 via NC at baseline and is alert to person only at baseline. Nursing home also reports diarrhea and patient just finished Cipro 5/8 for UTI. In ED patient's workup was concerning for ALDO, elevated troponin and hypotension. Patient with recent admission in April 2025 for unwitnessed falls. Patient was discharged to SNF with [facility] on 4/18. CXR showing cardiomegaly with central congestion. Increased opacifications in the left lung base with at least small to moderate volume left effusion and adjacent opacities could represent adjacent atelectasis or airspace disease. CT abdomen pelvis showing basilar atelectasis, distended bladder. Patient was admitted for further medical management.  ASSESSMENT/PLAN:     Pertinent Hospital Diagnoses     Sepsis 2/2 UTI  ALDO on CKD  HFpEF  CAD  Dementia    Palliative Care Encounter / Counseling Regarding Goals of Care  Please see detailed goals of care discussion as below  At this time, Dori Samayoa, Does Not have capacity for medical decision-making.  Capacity is time limited and situation/question specific  During encounter Jayleen was surrogate medical decision-maker  Outcome of goals of care meeting:   Change CODE STATUS to DNR CCA  Code status DNR-CCA  Advanced Directives: no POA or living will in [facility]  Surrogate/Legal NOK:  Blaise Samayoa (child/POA) [phone]  Jayleen Varner (child) [phone]    Spiritual assessment: no spiritual distress identified  Bereavement and grief: to be 
  Speech Language Pathology  NAME:  Dori Samayoa  :  1940  DATE: 2025  ROOM:  [facility]4/0214-A    Pt unavailable today for Dysphagia therapy     REASON:  Patient NPO for procedure not able to address dysphagia goals due to this.    Will re-attempt as appropriate.       Thank You    Ada Nguyễn M.S., CCC-SLP  Speech-Language Pathologist  [id number]  2025      
 CI HR MAP TPRI SVI TFC   Baseline 3.1 79   39 127.8   Test 4.3 97   54 131.5   % Change 38.5% 22.9%   37.6% 2.9%   noninvasive -  NICOM 500cc bolus completed. Patient fluid responsive with SVI change of 37.6%   
4 Eyes Skin Assessment     NAME:  Dori Samayoa  YOB: 1940  MEDICAL RECORD NUMBER:  [id number]    The patient is being assessed for  Admission    I agree that at least one RN has performed a thorough Head to Toe Skin Assessment on the patient. ALL assessment sites listed below have been assessed.      Areas assessed by both nurses:    Head, Face, Ears, Shoulders, Back, Chest, Arms, Elbows, Hands, Sacrum. Buttock, Coccyx, Ischium, Legs. Feet and Heels, and Under Medical Devices         Does the Patient have a Wound? No noted wound(s)       Parish Prevention initiated by RN: Yes  Wound Care Orders initiated by RN: n/a    Pressure Injury (Stage 3,4, Unstageable, DTI, NWPT, and Complex wounds) if present, place Wound referral order by RN under : No    New Ostomies, if present place, Ostomy referral order under : No     Nurse 1 eSignature: Electronically signed by Aj Crockett RN on 5/18/25 at 6:44 PM EDT    **SHARE this note so that the co-signing nurse can place an eSignature**    Nurse 2 eSignature: Electronically signed by Shanae Antonio RN on 5/18/25 at 6:50 PM EDT   
Attempted to feed pt. And give meds and pt. Refused and spit food and meds at RN.   
Bedside swallow performed, pt coughing with just small sip of water.  NP notified and pt kept npo per order   
Chart reviewed, full consultation to follow.    Briefly Mrs. Dori Samayoa is a 84-year-old female with a past medical history of CKD stage IIIa with minimal proteinuria, baseline creatinine 1.5 mg/dL, HTN, HFpEF 65 to 70%, CAD, PAF, hyperlipidemia recently admitted after a fall, she was found to have ALDO stage III 2 component of urinary retention and intravascular volume depletion, at the time of discharge his creatinine was 1.7 mg/dL, who was readmitted on 5/18/2025 after she was brought to the ER by EMS due to chest congestion and diarrhea.  In the ER patient was found hypotensive, she was fluid resuscitated and she was started on vasopressors.  Her creatinine on admission was 4.5 mg deciliter, reason for this consultation, her proBNP level was 28,324.  Chest x-ray showed cardiomegaly with central congestion, CT abdomen pelvis without IV contrast showed no acute intra-abdominal or pelvic process.  Current medications at home included losartan 50 mg daily.         ALDO stage III on CKD, probably due to volume responsive ALDO in the setting of ARB administration versus sepsis associated ALDO, rule out urinary retention.  Creatinine level on admission 4.5 mg/dL     CKD stage IIIa with minimal proteinuria 2/2 nephrosclerosis, baseline creatinine 1.5 mg/dL     Hemodynamic shock, probably hypovolemic septic, on vasopressors and intubated  HFpEF 65-70, proBNP 28,326  Macrocytic anemia  -----------------------------  PAF, on apixaban  Hyperlipidemia, atorvastatin       Plan:     Continue LR at 75 cc/hour  Monitor renal  Schuler catheter placement  Continue vasopressor       Florecita Carmona MD  
Critical Care Resident note         Patient - Dori Samayoa   MRN -  [id number]   [facility] # - [id number]   - 1940      Date of Admission -  2025 10:41 AM  Date of evaluation -  2025/0214-A   Hospital Day - 2        Consulting Service/Physician   Consulting - Nino Greco MD  Primary Care Physician - Stanley Gar MD         Past Medical History   No past medical history on file.     Past Surgical History           Procedure Laterality Date    APPENDECTOMY      TONSILLECTOMY         Current Medications   Current Medications    budesonide  0.5 mg Nebulization BID RT    metoprolol  5 mg IntraVENous Once    apixaban  2.5 mg Oral BID    aspirin  81 mg Oral Daily    atorvastatin  20 mg Oral Nightly    donepezil  10 mg Oral Nightly    sodium chloride flush  5-40 mL IntraVENous 2 times per day    cefepime  1,000 mg IntraVENous Q24H    midodrine  10 mg Oral TID WC    mirtazapine  15 mg Oral Nightly    pantoprazole  40 mg Oral Daily    arformoterol tartrate  15 mcg Nebulization BID RT    And    ipratropium  0.5 mg Nebulization Q6H RT     sodium chloride flush, sodium chloride, ondansetron **OR** ondansetron, acetaminophen **OR** acetaminophen, sulfur hexafluoride microspheres, melatonin, ipratropium 0.5 mg-albuterol 2.5 mg, polyethylene glycol  IV Drips/Infusions   sodium bicarbonate 150 mEq in dextrose 5 % 1,000 mL infusion 75 mL/hr at 25 1100    sodium chloride         Home Medications  Prior to Admission medications    Medication Sig Start Date End Date Taking? Authorizing Provider   apixaban (ELIQUIS) 2.5 MG TABS tablet Take 1 tablet by mouth 2 times daily 25   Rito Gar MD   pantoprazole (PROTONIX) 40 MG tablet Take 1 tablet by mouth daily    ProviderNelda MD   umeclidinium-vilanterol (ANORO ELLIPTA) 62.5-25 MCG/ACT inhaler Inhale 1 puff into the lungs daily    ProviderNelda MD   menthol-zinc oxide (CALMOSEPTINE) 0.44-20.625 % OINT ointment Apply topically 2 
Critical Care Resident note         Patient - Dori Samayoa   MRN -  [id number]   [facility] # - [id number]   - 1940      Date of Admission -  2025 10:41 AM  Date of evaluation -  2025  0214/0214-A   Hospital Day - 3        Consulting Service/Physician   Consulting - Nino Greco MD  Primary Care Physician - Stanley Gar MD         Past Medical History   No past medical history on file.     Past Surgical History           Procedure Laterality Date    APPENDECTOMY      TONSILLECTOMY         Current Medications   Current Medications    albumin human 5%  25 g IntraVENous Once    vancomycin (VANCOCIN) intermittent dosing (placeholder)   Other RX Placeholder    budesonide  0.5 mg Nebulization BID RT    apixaban  2.5 mg Oral BID    aspirin  81 mg Oral Daily    atorvastatin  20 mg Oral Nightly    donepezil  10 mg Oral Nightly    sodium chloride flush  5-40 mL IntraVENous 2 times per day    midodrine  10 mg Oral TID WC    mirtazapine  15 mg Oral Nightly    pantoprazole  40 mg Oral Daily    arformoterol tartrate  15 mcg Nebulization BID RT    And    ipratropium  0.5 mg Nebulization Q6H RT     metoprolol, sodium chloride flush, sodium chloride, ondansetron **OR** ondansetron, acetaminophen **OR** acetaminophen, melatonin, ipratropium 0.5 mg-albuterol 2.5 mg, polyethylene glycol  IV Drips/Infusions   sodium chloride         Home Medications  Prior to Admission medications    Medication Sig Start Date End Date Taking? Authorizing Provider   apixaban (ELIQUIS) 2.5 MG TABS tablet Take 1 tablet by mouth 2 times daily 25   Rito Gar MD   pantoprazole (PROTONIX) 40 MG tablet Take 1 tablet by mouth daily    ProviderNelda MD   umeclidinium-vilanterol (ANORO ELLIPTA) 62.5-25 MCG/ACT inhaler Inhale 1 puff into the lungs daily    ProviderNelda MD   menthol-zinc oxide (CALMOSEPTINE) 0.44-20.625 % OINT ointment Apply topically 2 times daily as needed (apply to buttocks)    Provider, 
Department of Internal Medicine  Nephrology Attending Consult Note    Events reviewed.    SUBJECTIVE: We are following Mrs. Dori Samayoa for ALDO.  Has no complaints    PHYSICAL EXAM:      Vitals:    VITALS:  BP (!) 129/55   Pulse (!) 120   Temp 97.5 °F (36.4 °C) (Axillary)   Resp 18   Ht 1.499 m (4' 11\")   Wt 72.6 kg (160 lb)   SpO2 92%   BMI 32.32 kg/m²   24HR INTAKE/OUTPUT:    Intake/Output Summary (Last 24 hours) at 5/20/2025 1150  Last data filed at 5/20/2025 1100  Gross per 24 hour   Intake 3235.99 ml   Output 180 ml   Net 3055.99 ml       Constitutional:  Awake, alert, oriented, in NAD  HEENT:  PERRLA, normocephalic, atraumatic  Respiratory:  CTA  Cardiovascular/Edema:  RRR, normal S1, normal S2  Gastrointestinal:  Soft, flat, non-distended, non-tender  Neurologic:  Nonfocal  Skin:  warm, dry, no rashes, no lesions    Scheduled Meds:   vancomycin (VANCOCIN) intermittent dosing (placeholder)   Other RX Placeholder    budesonide  0.5 mg Nebulization BID RT    metoprolol  5 mg IntraVENous Once    apixaban  2.5 mg Oral BID    aspirin  81 mg Oral Daily    atorvastatin  20 mg Oral Nightly    donepezil  10 mg Oral Nightly    sodium chloride flush  5-40 mL IntraVENous 2 times per day    midodrine  10 mg Oral TID WC    mirtazapine  15 mg Oral Nightly    pantoprazole  40 mg Oral Daily    arformoterol tartrate  15 mcg Nebulization BID RT    And    ipratropium  0.5 mg Nebulization Q6H RT     Continuous Infusions:   sodium bicarbonate 150 mEq in dextrose 5 % 1,000 mL infusion 75 mL/hr at 05/20/25 1100    sodium chloride       PRN Meds:.sodium chloride flush, sodium chloride, ondansetron **OR** ondansetron, acetaminophen **OR** acetaminophen, sulfur hexafluoride microspheres, melatonin, ipratropium 0.5 mg-albuterol 2.5 mg, polyethylene glycol      DATA:    CBC:   Lab Results   Component Value Date/Time    WBC 15.9 05/20/2025 04:00 AM    RBC 2.37 05/20/2025 04:00 AM    HGB 7.5 05/20/2025 04:00 AM    HCT 23.9 
Dr Keen notified about A fib   continue cordarone drip as ordered.  Cardiology will re-assess 5/20  
Dr. Young was called and notified of the following findings:  patient is unresponsive with non-reactive pupils; no pulses palpated; no spontaneous breaths; no heart sounds auscultated.  Dr. Young pronounced patient dead at 2115 on 05/21/2025.  Dr. Greco will sign the death certificate.    
Echocardiogram done this morning. Pt. Is pleasantly confused but able to follow commands. She is alert to self. Assessment as documented. Set pt. Up to eat. VSS  
PROGRESS NOTE       PATIENT PROBLEM LIST:  Patient Active Problem List   Diagnosis Code    AMS (altered mental status) R41.82    NSTEMI (non-ST elevated myocardial infarction) ([facility]) I21.4    Other pulmonary embolism without acute cor pulmonale ([facility]) I26.99    Atrial fibrillation with RVR ([facility]) I48.91    Acute on chronic diastolic congestive heart failure ([facility]) I50.33    Acute respiratory failure with hypoxia ([facility]) J96.01    Coronary artery disease involving native coronary artery of native heart without angina pectoris I25.10    PAF (paroxysmal atrial fibrillation) ([facility]) I48.0    Chronic kidney disease N18.9    Acute kidney injury N17.9    Class 2 severe obesity with serious comorbidity and body mass index (BMI) of 36.0 to 36.9 in adult ([facility]) E66.812, Z68.36, E66.01    ALDO (acute kidney injury) N17.9    Palliative care encounter Z51.5    Goals of care, counseling/discussion Z71.89       SUBJECTIVE:  Dori Samayoa states she is somewhat short of breath and coughing but states that she feels otherwise well.  Two-dimensional echocardiogram completed yesterday and demonstrates normal-hyperdynamic left ventricular systolic function.    REVIEW OF SYSTEMS:  General ROS: positive for - fatigue, malaise  Psychological ROS: positive for - anxiety and intermittent mild confusion.  Ophthalmic ROS: negative for - decreased vision or visual distortion.  ENT ROS: negative  Allergy and Immunology ROS: negative  Hematological and Lymphatic ROS: negative  Endocrine: no heat or cold intolerance and no polyphagia, polydipsia, or polyuria  Respiratory ROS: positive for - cough and shortness of breath  Cardiovascular ROS: positive for - irregular heartbeat, rapid heart rate, and shortness of breath.  Gastrointestinal ROS: no abdominal pain, change in bowel habits, or black or bloody stools  Genito-Urinary ROS: no nocturia, dysuria, trouble voiding, frequency or hematuria  Musculoskeletal ROS: negative for- myalgias, arthralgias, or 
Patient admitted from ER to room 214, placed on monitor, patient oriented to room and unit visiting hours.  Patient guide at bedside, reviewed patient rights and responsibilities. MRSA nasal swab obtained. Bed alarm on, call light within reach.   
Patient admitted from PACU to room 213, placed on monitor, patient oriented to room and unit visiting hours.  Patient guide at bedside, reviewed patient rights and responsibilities. MRSA nasal swab obtained. Bed alarm on, call light within reach.   
Pharmacy Consultation Note  (Antibiotic Dosing and Monitoring)    Initial consult date: 5/18/25  Consulting physician/provider: Dr Irwin  Drug: Vancomycin  Indication: sepsis    Age/  Gender Height Weight IBW  Allergy Information   84 y.o./female 149.9 cm (4' 11\") 82.1 kg (181 lb)     Ideal body weight: 48.8 kg (107 lb 8.4 oz)  Adjusted ideal body weight: 56.5 kg (124 lb 10.5 oz)   Sulfa antibiotics      Renal Function:  Recent Labs     05/18/25  1129   BUN 38*   CREATININE 4.5*     No intake or output data in the 24 hours ending 05/18/25 1832    Vancomycin Monitoring:  Trough:  No results for input(s): \"VANCOTROUGH\" in the last 72 hours.  Random:  No results for input(s): \"VANCORANDOM\" in the last 72 hours.    Vancomycin Administration Times:  Recent vancomycin administrations                     vancomycin (VANCOCIN) 1,750 mg in sodium chloride 0.9 % 500 mL IVPB (mg) 1,750 mg New Bag 05/18/25 1722                    Assessment:  Patient is a 84 y.o. female who has been initiated on vancomycin  Estimated Creatinine Clearance: 9 mL/min (A) (based on SCr of 4.5 mg/dL (H)).  To dose vancomycin, pharmacy will be utilizing dosing based off of levels because of patient's renal impairment/insufficiency    Plan:  Will continue vancomycin based off levels  Will check vancomycin levels when appropriate  Will continue to monitor renal function   Pharmacy to follow      [unfilled]    ANNETTE: [phone]  SEY: [phone]  SJW: [phone]    
Pharmacy Consultation Note  (Antibiotic Dosing and Monitoring)    Initial consult date: 5/20/25  Consulting physician/provider: Dr Joy  Drug: Vancomycin  Indication: Bacteremia    Age/  Gender Height Weight IBW  Allergy Information   84 y.o./female 149.9 cm (4' 11\") 82.1 kg (181 lb)     Ideal body weight: 48.8 kg (107 lb 8.4 oz)  Adjusted ideal body weight: 60.5 kg (133 lb 4.8 oz)   Sulfa antibiotics      Renal Function:  Recent Labs     05/19/25  0435 05/20/25  0400 05/21/25  0430   BUN 40* 36* 35*   CREATININE 3.7* 3.5* 3.0*       Intake/Output Summary (Last 24 hours) at 5/21/2025 0914  Last data filed at 5/21/2025 0640  Gross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

## 2025-05-25 LAB
MICROORGANISM SPEC CULT: NORMAL
MICROORGANISM SPEC CULT: NORMAL
SERVICE CMNT-IMP: NORMAL
SERVICE CMNT-IMP: NORMAL
SPECIMEN DESCRIPTION: NORMAL
SPECIMEN DESCRIPTION: NORMAL